# Patient Record
Sex: FEMALE | Employment: OTHER | URBAN - METROPOLITAN AREA
[De-identification: names, ages, dates, MRNs, and addresses within clinical notes are randomized per-mention and may not be internally consistent; named-entity substitution may affect disease eponyms.]

---

## 2019-01-01 ENCOUNTER — APPOINTMENT (OUTPATIENT)
Dept: GENERAL RADIOLOGY | Age: 74
DRG: 189 | End: 2019-01-01
Attending: EMERGENCY MEDICINE
Payer: MEDICARE

## 2019-01-01 ENCOUNTER — ANESTHESIA (OUTPATIENT)
Dept: ENDOSCOPY | Age: 74
DRG: 181 | End: 2019-01-01
Payer: MEDICARE

## 2019-01-01 ENCOUNTER — APPOINTMENT (OUTPATIENT)
Dept: VASCULAR SURGERY | Age: 74
DRG: 189 | End: 2019-01-01
Attending: HOSPITALIST
Payer: MEDICARE

## 2019-01-01 ENCOUNTER — APPOINTMENT (OUTPATIENT)
Dept: ULTRASOUND IMAGING | Age: 74
End: 2019-01-01
Attending: PHYSICIAN ASSISTANT
Payer: MEDICARE

## 2019-01-01 ENCOUNTER — ANESTHESIA EVENT (OUTPATIENT)
Dept: ENDOSCOPY | Age: 74
DRG: 181 | End: 2019-01-01
Payer: MEDICARE

## 2019-01-01 ENCOUNTER — APPOINTMENT (OUTPATIENT)
Dept: CT IMAGING | Age: 74
DRG: 189 | End: 2019-01-01
Attending: EMERGENCY MEDICINE
Payer: MEDICARE

## 2019-01-01 ENCOUNTER — APPOINTMENT (OUTPATIENT)
Dept: CT IMAGING | Age: 74
DRG: 189 | End: 2019-01-01
Attending: HOSPITALIST
Payer: MEDICARE

## 2019-01-01 ENCOUNTER — HOSPITAL ENCOUNTER (INPATIENT)
Age: 74
LOS: 1 days | Discharge: ANOTHER HOSPITAL | DRG: 189 | End: 2019-12-16
Attending: EMERGENCY MEDICINE | Admitting: HOSPITALIST
Payer: MEDICARE

## 2019-01-01 ENCOUNTER — HOSPITAL ENCOUNTER (EMERGENCY)
Age: 74
Discharge: HOME | End: 2019-12-07
Attending: EMERGENCY MEDICINE
Payer: MEDICARE

## 2019-01-01 ENCOUNTER — HOSPITAL ENCOUNTER (INPATIENT)
Age: 74
LOS: 4 days | Discharge: ANOTHER HOSPITAL | DRG: 181 | End: 2019-12-20
Attending: FAMILY MEDICINE | Admitting: STUDENT IN AN ORGANIZED HEALTH CARE EDUCATION/TRAINING PROGRAM
Payer: MEDICARE

## 2019-01-01 VITALS
SYSTOLIC BLOOD PRESSURE: 118 MMHG | RESPIRATION RATE: 20 BRPM | OXYGEN SATURATION: 93 % | TEMPERATURE: 98.3 F | HEART RATE: 90 BPM | BODY MASS INDEX: 15.54 KG/M2 | DIASTOLIC BLOOD PRESSURE: 73 MMHG | HEIGHT: 62 IN | WEIGHT: 84.44 LBS

## 2019-01-01 VITALS
HEIGHT: 62 IN | TEMPERATURE: 97.7 F | OXYGEN SATURATION: 97 % | DIASTOLIC BLOOD PRESSURE: 94 MMHG | RESPIRATION RATE: 16 BRPM | SYSTOLIC BLOOD PRESSURE: 169 MMHG | WEIGHT: 82.89 LBS | BODY MASS INDEX: 15.25 KG/M2 | HEART RATE: 74 BPM

## 2019-01-01 VITALS
BODY MASS INDEX: 15.42 KG/M2 | HEIGHT: 62 IN | OXYGEN SATURATION: 100 % | RESPIRATION RATE: 18 BRPM | HEART RATE: 69 BPM | TEMPERATURE: 97.4 F | SYSTOLIC BLOOD PRESSURE: 164 MMHG | DIASTOLIC BLOOD PRESSURE: 95 MMHG | WEIGHT: 83.78 LBS

## 2019-01-01 DIAGNOSIS — J44.9 CHRONIC OBSTRUCTIVE PULMONARY DISEASE, UNSPECIFIED COPD TYPE (HCC): ICD-10-CM

## 2019-01-01 DIAGNOSIS — R09.02 HYPOXIA: ICD-10-CM

## 2019-01-01 DIAGNOSIS — R06.09 DYSPNEA ON EXERTION: ICD-10-CM

## 2019-01-01 DIAGNOSIS — M79.662 PAIN OF LEFT CALF: Primary | ICD-10-CM

## 2019-01-01 DIAGNOSIS — Z71.6 TOBACCO ABUSE COUNSELING: ICD-10-CM

## 2019-01-01 DIAGNOSIS — M54.32 SCIATICA, LEFT SIDE: ICD-10-CM

## 2019-01-01 DIAGNOSIS — I83.813 VARICOSE VEINS OF BOTH LOWER EXTREMITIES WITH PAIN: ICD-10-CM

## 2019-01-01 DIAGNOSIS — I87.1 SVC SYNDROME: Primary | ICD-10-CM

## 2019-01-01 LAB
ALBUMIN SERPL-MCNC: 3.2 G/DL (ref 3.5–5)
ALBUMIN SERPL-MCNC: 3.4 G/DL (ref 3.5–5)
ALBUMIN/GLOB SERPL: 0.9 {RATIO} (ref 1.1–2.2)
ALBUMIN/GLOB SERPL: 1 {RATIO} (ref 1.1–2.2)
ALP SERPL-CCNC: 81 U/L (ref 45–117)
ALP SERPL-CCNC: 88 U/L (ref 45–117)
ALT SERPL-CCNC: 26 U/L (ref 12–78)
ALT SERPL-CCNC: 29 U/L (ref 12–78)
ANION GAP SERPL CALC-SCNC: 4 MMOL/L (ref 5–15)
ANION GAP SERPL CALC-SCNC: 6 MMOL/L (ref 5–15)
ANION GAP SERPL CALC-SCNC: 7 MMOL/L (ref 5–15)
APTT PPP: 110.4 SEC (ref 22.1–32)
APTT PPP: 21.2 SEC (ref 22.1–32)
APTT PPP: 26.7 SEC (ref 22.1–32)
APTT PPP: 27.2 SEC (ref 22.1–32)
APTT PPP: 31.9 SEC (ref 22.1–32)
APTT PPP: 39.4 SEC (ref 22.1–32)
APTT PPP: 43.6 SEC (ref 22.1–32)
APTT PPP: 52 SEC (ref 22.1–32)
APTT PPP: 53.6 SEC (ref 22.1–32)
APTT PPP: 60.6 SEC (ref 22.1–32)
APTT PPP: 60.9 SEC (ref 22.1–32)
APTT PPP: 61.8 SEC (ref 22.1–32)
APTT PPP: 64.2 SEC (ref 22.1–32)
APTT PPP: 66.5 SEC (ref 22.1–32)
APTT PPP: 74.9 SEC (ref 22.1–32)
APTT PPP: >130 SEC (ref 22.1–32)
AST SERPL-CCNC: 23 U/L (ref 15–37)
AST SERPL-CCNC: 28 U/L (ref 15–37)
ATRIAL RATE: 72 BPM
ATRIAL RATE: 74 BPM
BASOPHILS # BLD: 0 K/UL (ref 0–0.1)
BASOPHILS # BLD: 0 K/UL (ref 0–0.1)
BASOPHILS # BLD: 0.1 K/UL (ref 0–0.1)
BASOPHILS NFR BLD: 0 % (ref 0–1)
BASOPHILS NFR BLD: 0 % (ref 0–1)
BASOPHILS NFR BLD: 1 % (ref 0–1)
BILIRUB SERPL-MCNC: 0.4 MG/DL (ref 0.2–1)
BILIRUB SERPL-MCNC: 0.4 MG/DL (ref 0.2–1)
BNP SERPL-MCNC: 396 PG/ML
BUN SERPL-MCNC: 15 MG/DL (ref 6–20)
BUN SERPL-MCNC: 20 MG/DL (ref 6–20)
BUN SERPL-MCNC: 20 MG/DL (ref 6–20)
BUN SERPL-MCNC: 23 MG/DL (ref 6–20)
BUN SERPL-MCNC: 27 MG/DL (ref 6–20)
BUN/CREAT SERPL: 21 (ref 12–20)
BUN/CREAT SERPL: 23 (ref 12–20)
BUN/CREAT SERPL: 23 (ref 12–20)
BUN/CREAT SERPL: 27 (ref 12–20)
BUN/CREAT SERPL: 38 (ref 12–20)
CALCIUM SERPL-MCNC: 8.3 MG/DL (ref 8.5–10.1)
CALCIUM SERPL-MCNC: 8.4 MG/DL (ref 8.5–10.1)
CALCIUM SERPL-MCNC: 8.6 MG/DL (ref 8.5–10.1)
CALCIUM SERPL-MCNC: 8.8 MG/DL (ref 8.5–10.1)
CALCIUM SERPL-MCNC: 9 MG/DL (ref 8.5–10.1)
CALCULATED P AXIS, ECG09: 74 DEGREES
CALCULATED P AXIS, ECG09: 88 DEGREES
CALCULATED R AXIS, ECG10: 86 DEGREES
CALCULATED R AXIS, ECG10: 91 DEGREES
CALCULATED T AXIS, ECG11: 63 DEGREES
CALCULATED T AXIS, ECG11: 87 DEGREES
CHLORIDE SERPL-SCNC: 103 MMOL/L (ref 97–108)
CHLORIDE SERPL-SCNC: 103 MMOL/L (ref 97–108)
CHLORIDE SERPL-SCNC: 104 MMOL/L (ref 97–108)
CHLORIDE SERPL-SCNC: 105 MMOL/L (ref 97–108)
CHLORIDE SERPL-SCNC: 105 MMOL/L (ref 97–108)
CO2 SERPL-SCNC: 23 MMOL/L (ref 21–32)
CO2 SERPL-SCNC: 25 MMOL/L (ref 21–32)
CO2 SERPL-SCNC: 26 MMOL/L (ref 21–32)
CO2 SERPL-SCNC: 29 MMOL/L (ref 21–32)
CO2 SERPL-SCNC: 31 MMOL/L (ref 21–32)
CREAT SERPL-MCNC: 0.71 MG/DL (ref 0.55–1.02)
CREAT SERPL-MCNC: 0.72 MG/DL (ref 0.55–1.02)
CREAT SERPL-MCNC: 0.85 MG/DL (ref 0.55–1.02)
CREAT SERPL-MCNC: 0.86 MG/DL (ref 0.55–1.02)
CREAT SERPL-MCNC: 0.88 MG/DL (ref 0.55–1.02)
DIAGNOSIS, 93000: NORMAL
DIAGNOSIS, 93000: NORMAL
DIFFERENTIAL METHOD BLD: ABNORMAL
EOSINOPHIL # BLD: 0 K/UL (ref 0–0.4)
EOSINOPHIL # BLD: 0 K/UL (ref 0–0.4)
EOSINOPHIL # BLD: 0.2 K/UL (ref 0–0.4)
EOSINOPHIL NFR BLD: 0 % (ref 0–7)
EOSINOPHIL NFR BLD: 0 % (ref 0–7)
EOSINOPHIL NFR BLD: 1 % (ref 0–7)
ERYTHROCYTE [DISTWIDTH] IN BLOOD BY AUTOMATED COUNT: 12.8 % (ref 11.5–14.5)
ERYTHROCYTE [DISTWIDTH] IN BLOOD BY AUTOMATED COUNT: 13.1 % (ref 11.5–14.5)
ERYTHROCYTE [DISTWIDTH] IN BLOOD BY AUTOMATED COUNT: 13.3 % (ref 11.5–14.5)
GLOBULIN SER CALC-MCNC: 3.4 G/DL (ref 2–4)
GLOBULIN SER CALC-MCNC: 3.4 G/DL (ref 2–4)
GLUCOSE SERPL-MCNC: 105 MG/DL (ref 65–100)
GLUCOSE SERPL-MCNC: 112 MG/DL (ref 65–100)
GLUCOSE SERPL-MCNC: 126 MG/DL (ref 65–100)
GLUCOSE SERPL-MCNC: 73 MG/DL (ref 65–100)
GLUCOSE SERPL-MCNC: 82 MG/DL (ref 65–100)
HCT VFR BLD AUTO: 39.3 % (ref 35–47)
HCT VFR BLD AUTO: 39.5 % (ref 35–47)
HCT VFR BLD AUTO: 39.7 % (ref 35–47)
HCT VFR BLD AUTO: 41.4 % (ref 35–47)
HCT VFR BLD AUTO: 42.1 % (ref 35–47)
HGB BLD-MCNC: 13 G/DL (ref 11.5–16)
HGB BLD-MCNC: 13.1 G/DL (ref 11.5–16)
HGB BLD-MCNC: 13.4 G/DL (ref 11.5–16)
HGB BLD-MCNC: 13.7 G/DL (ref 11.5–16)
HGB BLD-MCNC: 13.7 G/DL (ref 11.5–16)
IMM GRANULOCYTES # BLD AUTO: 0 K/UL (ref 0–0.04)
IMM GRANULOCYTES # BLD AUTO: 0.1 K/UL (ref 0–0.04)
IMM GRANULOCYTES # BLD AUTO: 0.2 K/UL (ref 0–0.04)
IMM GRANULOCYTES NFR BLD AUTO: 0 % (ref 0–0.5)
IMM GRANULOCYTES NFR BLD AUTO: 1 % (ref 0–0.5)
IMM GRANULOCYTES NFR BLD AUTO: 1 % (ref 0–0.5)
LYMPHOCYTES # BLD: 0.5 K/UL (ref 0.8–3.5)
LYMPHOCYTES # BLD: 1.2 K/UL (ref 0.8–3.5)
LYMPHOCYTES # BLD: 2.4 K/UL (ref 0.8–3.5)
LYMPHOCYTES NFR BLD: 19 % (ref 12–49)
LYMPHOCYTES NFR BLD: 3 % (ref 12–49)
LYMPHOCYTES NFR BLD: 7 % (ref 12–49)
MAGNESIUM SERPL-MCNC: 2 MG/DL (ref 1.6–2.4)
MCH RBC QN AUTO: 31.1 PG (ref 26–34)
MCH RBC QN AUTO: 31.3 PG (ref 26–34)
MCH RBC QN AUTO: 31.5 PG (ref 26–34)
MCH RBC QN AUTO: 32 PG (ref 26–34)
MCH RBC QN AUTO: 32 PG (ref 26–34)
MCHC RBC AUTO-ENTMCNC: 32.5 G/DL (ref 30–36.5)
MCHC RBC AUTO-ENTMCNC: 32.9 G/DL (ref 30–36.5)
MCHC RBC AUTO-ENTMCNC: 33.1 G/DL (ref 30–36.5)
MCHC RBC AUTO-ENTMCNC: 33.3 G/DL (ref 30–36.5)
MCHC RBC AUTO-ENTMCNC: 33.8 G/DL (ref 30–36.5)
MCV RBC AUTO: 94.5 FL (ref 80–99)
MCV RBC AUTO: 94.7 FL (ref 80–99)
MCV RBC AUTO: 95.6 FL (ref 80–99)
MCV RBC AUTO: 95.7 FL (ref 80–99)
MCV RBC AUTO: 96.1 FL (ref 80–99)
MONOCYTES # BLD: 0.5 K/UL (ref 0–1)
MONOCYTES # BLD: 1.1 K/UL (ref 0–1)
MONOCYTES # BLD: 1.3 K/UL (ref 0–1)
MONOCYTES NFR BLD: 3 % (ref 5–13)
MONOCYTES NFR BLD: 7 % (ref 5–13)
MONOCYTES NFR BLD: 9 % (ref 5–13)
NEUTS SEG # BLD: 15 K/UL (ref 1.8–8)
NEUTS SEG # BLD: 15.3 K/UL (ref 1.8–8)
NEUTS SEG # BLD: 9.4 K/UL (ref 1.8–8)
NEUTS SEG NFR BLD: 70 % (ref 32–75)
NEUTS SEG NFR BLD: 85 % (ref 32–75)
NEUTS SEG NFR BLD: 93 % (ref 32–75)
NRBC # BLD: 0 K/UL (ref 0–0.01)
NRBC BLD-RTO: 0 PER 100 WBC
P-R INTERVAL, ECG05: 146 MS
P-R INTERVAL, ECG05: 156 MS
PHOSPHATE SERPL-MCNC: 3 MG/DL (ref 2.6–4.7)
PLATELET # BLD AUTO: 232 K/UL (ref 150–400)
PLATELET # BLD AUTO: 236 K/UL (ref 150–400)
PLATELET # BLD AUTO: 240 K/UL (ref 150–400)
PLATELET # BLD AUTO: 267 K/UL (ref 150–400)
PLATELET # BLD AUTO: 279 K/UL (ref 150–400)
PMV BLD AUTO: 10.1 FL (ref 8.9–12.9)
PMV BLD AUTO: 10.3 FL (ref 8.9–12.9)
PMV BLD AUTO: 10.6 FL (ref 8.9–12.9)
PMV BLD AUTO: 10.6 FL (ref 8.9–12.9)
PMV BLD AUTO: 9.9 FL (ref 8.9–12.9)
POTASSIUM SERPL-SCNC: 3.6 MMOL/L (ref 3.5–5.1)
POTASSIUM SERPL-SCNC: 3.8 MMOL/L (ref 3.5–5.1)
POTASSIUM SERPL-SCNC: 3.8 MMOL/L (ref 3.5–5.1)
POTASSIUM SERPL-SCNC: 4 MMOL/L (ref 3.5–5.1)
POTASSIUM SERPL-SCNC: 4.1 MMOL/L (ref 3.5–5.1)
PROT SERPL-MCNC: 6.6 G/DL (ref 6.4–8.2)
PROT SERPL-MCNC: 6.8 G/DL (ref 6.4–8.2)
Q-T INTERVAL, ECG07: 384 MS
Q-T INTERVAL, ECG07: 398 MS
QRS DURATION, ECG06: 66 MS
QRS DURATION, ECG06: 76 MS
QTC CALCULATION (BEZET), ECG08: 420 MS
QTC CALCULATION (BEZET), ECG08: 441 MS
RBC # BLD AUTO: 4.09 M/UL (ref 3.8–5.2)
RBC # BLD AUTO: 4.13 M/UL (ref 3.8–5.2)
RBC # BLD AUTO: 4.19 M/UL (ref 3.8–5.2)
RBC # BLD AUTO: 4.38 M/UL (ref 3.8–5.2)
RBC # BLD AUTO: 4.4 M/UL (ref 3.8–5.2)
RBC MORPH BLD: ABNORMAL
RBC MORPH BLD: ABNORMAL
SODIUM SERPL-SCNC: 134 MMOL/L (ref 136–145)
SODIUM SERPL-SCNC: 134 MMOL/L (ref 136–145)
SODIUM SERPL-SCNC: 135 MMOL/L (ref 136–145)
SODIUM SERPL-SCNC: 138 MMOL/L (ref 136–145)
SODIUM SERPL-SCNC: 138 MMOL/L (ref 136–145)
THERAPEUTIC RANGE,PTTT: ABNORMAL SECS (ref 58–77)
THERAPEUTIC RANGE,PTTT: NORMAL SECS (ref 58–77)
TROPONIN I SERPL-MCNC: <0.05 NG/ML
VENTRICULAR RATE, ECG03: 72 BPM
VENTRICULAR RATE, ECG03: 74 BPM
WBC # BLD AUTO: 11.3 K/UL (ref 3.6–11)
WBC # BLD AUTO: 13.2 K/UL (ref 3.6–11)
WBC # BLD AUTO: 16.5 K/UL (ref 3.6–11)
WBC # BLD AUTO: 17.6 K/UL (ref 3.6–11)
WBC # BLD AUTO: 20.9 K/UL (ref 3.6–11)

## 2019-01-01 PROCEDURE — 74011250637 HC RX REV CODE- 250/637: Performed by: INTERNAL MEDICINE

## 2019-01-01 PROCEDURE — 74011250636 HC RX REV CODE- 250/636: Performed by: STUDENT IN AN ORGANIZED HEALTH CARE EDUCATION/TRAINING PROGRAM

## 2019-01-01 PROCEDURE — 74011000250 HC RX REV CODE- 250: Performed by: PHYSICIAN ASSISTANT

## 2019-01-01 PROCEDURE — 74011250637 HC RX REV CODE- 250/637: Performed by: HOSPITALIST

## 2019-01-01 PROCEDURE — 76060000032 HC ANESTHESIA 0.5 TO 1 HR: Performed by: INTERNAL MEDICINE

## 2019-01-01 PROCEDURE — 74011000250 HC RX REV CODE- 250: Performed by: FAMILY MEDICINE

## 2019-01-01 PROCEDURE — 36415 COLL VENOUS BLD VENIPUNCTURE: CPT

## 2019-01-01 PROCEDURE — 77030029684 HC NEB SM VOL KT MONA -A

## 2019-01-01 PROCEDURE — 80048 BASIC METABOLIC PNL TOTAL CA: CPT

## 2019-01-01 PROCEDURE — 71275 CT ANGIOGRAPHY CHEST: CPT

## 2019-01-01 PROCEDURE — 85025 COMPLETE CBC W/AUTO DIFF WBC: CPT

## 2019-01-01 PROCEDURE — 65660000000 HC RM CCU STEPDOWN

## 2019-01-01 PROCEDURE — 94664 DEMO&/EVAL PT USE INHALER: CPT

## 2019-01-01 PROCEDURE — 94640 AIRWAY INHALATION TREATMENT: CPT

## 2019-01-01 PROCEDURE — 74011000250 HC RX REV CODE- 250: Performed by: HOSPITALIST

## 2019-01-01 PROCEDURE — 76040000020: Performed by: INTERNAL MEDICINE

## 2019-01-01 PROCEDURE — 74011250636 HC RX REV CODE- 250/636: Performed by: HOSPITALIST

## 2019-01-01 PROCEDURE — 74011636320 HC RX REV CODE- 636/320: Performed by: HOSPITALIST

## 2019-01-01 PROCEDURE — 85730 THROMBOPLASTIN TIME PARTIAL: CPT

## 2019-01-01 PROCEDURE — 93005 ELECTROCARDIOGRAM TRACING: CPT

## 2019-01-01 PROCEDURE — 74011250636 HC RX REV CODE- 250/636: Performed by: EMERGENCY MEDICINE

## 2019-01-01 PROCEDURE — 74011000250 HC RX REV CODE- 250: Performed by: EMERGENCY MEDICINE

## 2019-01-01 PROCEDURE — 88342 IMHCHEM/IMCYTCHM 1ST ANTB: CPT

## 2019-01-01 PROCEDURE — 76040000019: Performed by: INTERNAL MEDICINE

## 2019-01-01 PROCEDURE — 84100 ASSAY OF PHOSPHORUS: CPT

## 2019-01-01 PROCEDURE — 65660000001 HC RM ICU INTERMED STEPDOWN

## 2019-01-01 PROCEDURE — 74011250637 HC RX REV CODE- 250/637: Performed by: FAMILY MEDICINE

## 2019-01-01 PROCEDURE — 80053 COMPREHEN METABOLIC PANEL: CPT

## 2019-01-01 PROCEDURE — 88305 TISSUE EXAM BY PATHOLOGIST: CPT

## 2019-01-01 PROCEDURE — 74011250636 HC RX REV CODE- 250/636: Performed by: FAMILY MEDICINE

## 2019-01-01 PROCEDURE — 74011250637 HC RX REV CODE- 250/637: Performed by: STUDENT IN AN ORGANIZED HEALTH CARE EDUCATION/TRAINING PROGRAM

## 2019-01-01 PROCEDURE — 93971 EXTREMITY STUDY: CPT

## 2019-01-01 PROCEDURE — 74011000250 HC RX REV CODE- 250: Performed by: NURSE ANESTHETIST, CERTIFIED REGISTERED

## 2019-01-01 PROCEDURE — 76882 US LMTD JT/FCL EVL NVASC XTR: CPT

## 2019-01-01 PROCEDURE — 83880 ASSAY OF NATRIURETIC PEPTIDE: CPT

## 2019-01-01 PROCEDURE — 0BD78ZX EXTRACTION OF LEFT MAIN BRONCHUS, VIA NATURAL OR ARTIFICIAL OPENING ENDOSCOPIC, DIAGNOSTIC: ICD-10-PCS | Performed by: INTERNAL MEDICINE

## 2019-01-01 PROCEDURE — 96374 THER/PROPH/DIAG INJ IV PUSH: CPT

## 2019-01-01 PROCEDURE — 88341 IMHCHEM/IMCYTCHM EA ADD ANTB: CPT

## 2019-01-01 PROCEDURE — 70491 CT SOFT TISSUE NECK W/DYE: CPT

## 2019-01-01 PROCEDURE — 99285 EMERGENCY DEPT VISIT HI MDM: CPT

## 2019-01-01 PROCEDURE — 85027 COMPLETE CBC AUTOMATED: CPT

## 2019-01-01 PROCEDURE — 74011636320 HC RX REV CODE- 636/320: Performed by: EMERGENCY MEDICINE

## 2019-01-01 PROCEDURE — 84484 ASSAY OF TROPONIN QUANT: CPT

## 2019-01-01 PROCEDURE — 99284 EMERGENCY DEPT VISIT MOD MDM: CPT

## 2019-01-01 PROCEDURE — 74011250636 HC RX REV CODE- 250/636: Performed by: NEUROMUSCULOSKELETAL MEDICINE & OMM

## 2019-01-01 PROCEDURE — 74011250636 HC RX REV CODE- 250/636: Performed by: NURSE ANESTHETIST, CERTIFIED REGISTERED

## 2019-01-01 PROCEDURE — 71046 X-RAY EXAM CHEST 2 VIEWS: CPT

## 2019-01-01 PROCEDURE — 74011000250 HC RX REV CODE- 250: Performed by: STUDENT IN AN ORGANIZED HEALTH CARE EDUCATION/TRAINING PROGRAM

## 2019-01-01 PROCEDURE — 96375 TX/PRO/DX INJ NEW DRUG ADDON: CPT

## 2019-01-01 PROCEDURE — 83735 ASSAY OF MAGNESIUM: CPT

## 2019-01-01 RX ORDER — ONDANSETRON 2 MG/ML
4 INJECTION INTRAMUSCULAR; INTRAVENOUS
Status: CANCELLED | OUTPATIENT
Start: 2019-01-01

## 2019-01-01 RX ORDER — MONTELUKAST SODIUM 10 MG/1
10 TABLET ORAL DAILY
Status: DISCONTINUED | OUTPATIENT
Start: 2019-01-01 | End: 2019-01-01 | Stop reason: HOSPADM

## 2019-01-01 RX ORDER — SODIUM CHLORIDE 0.9 % (FLUSH) 0.9 %
5-40 SYRINGE (ML) INJECTION AS NEEDED
Status: DISCONTINUED | OUTPATIENT
Start: 2019-01-01 | End: 2019-01-01

## 2019-01-01 RX ORDER — SODIUM CHLORIDE 0.9 % (FLUSH) 0.9 %
5-40 SYRINGE (ML) INJECTION AS NEEDED
Status: DISCONTINUED | OUTPATIENT
Start: 2019-01-01 | End: 2019-01-01 | Stop reason: HOSPADM

## 2019-01-01 RX ORDER — FLUTICASONE PROPIONATE 50 MCG
2 SPRAY, SUSPENSION (ML) NASAL DAILY
Status: DISCONTINUED | OUTPATIENT
Start: 2019-01-01 | End: 2019-01-01 | Stop reason: HOSPADM

## 2019-01-01 RX ORDER — HEPARIN SODIUM 10000 [USP'U]/100ML
18-36 INJECTION, SOLUTION INTRAVENOUS
Status: DISCONTINUED | OUTPATIENT
Start: 2019-01-01 | End: 2019-01-01

## 2019-01-01 RX ORDER — SODIUM CHLORIDE 9 MG/ML
70 INJECTION, SOLUTION INTRAVENOUS CONTINUOUS
Status: DISPENSED | OUTPATIENT
Start: 2019-01-01 | End: 2019-01-01

## 2019-01-01 RX ORDER — ALBUTEROL SULFATE 0.83 MG/ML
2.5 SOLUTION RESPIRATORY (INHALATION)
Status: DISCONTINUED | OUTPATIENT
Start: 2019-01-01 | End: 2019-01-01 | Stop reason: HOSPADM

## 2019-01-01 RX ORDER — HEPARIN SODIUM 10000 [USP'U]/100ML
18-36 INJECTION, SOLUTION INTRAVENOUS
Status: DISCONTINUED | OUTPATIENT
Start: 2019-01-01 | End: 2019-01-01 | Stop reason: HOSPADM

## 2019-01-01 RX ORDER — ACETAMINOPHEN 325 MG/1
650 TABLET ORAL
Status: DISCONTINUED | OUTPATIENT
Start: 2019-01-01 | End: 2019-01-01 | Stop reason: HOSPADM

## 2019-01-01 RX ORDER — SODIUM CHLORIDE 0.9 % (FLUSH) 0.9 %
5-40 SYRINGE (ML) INJECTION AS NEEDED
Status: CANCELLED | OUTPATIENT
Start: 2019-01-01

## 2019-01-01 RX ORDER — FLUTICASONE PROPIONATE 50 MCG
1 SPRAY, SUSPENSION (ML) NASAL DAILY
Status: DISCONTINUED | OUTPATIENT
Start: 2019-01-01 | End: 2019-01-01

## 2019-01-01 RX ORDER — BUDESONIDE 3 MG/1
6 CAPSULE, COATED PELLETS ORAL
Status: DISCONTINUED | OUTPATIENT
Start: 2019-01-01 | End: 2019-01-01 | Stop reason: HOSPADM

## 2019-01-01 RX ORDER — LOSARTAN POTASSIUM 50 MG/1
100 TABLET ORAL DAILY
Status: DISCONTINUED | OUTPATIENT
Start: 2019-01-01 | End: 2019-01-01 | Stop reason: HOSPADM

## 2019-01-01 RX ORDER — HYDROXYZINE HYDROCHLORIDE 10 MG/1
10 TABLET, FILM COATED ORAL
COMMUNITY
End: 2020-01-01

## 2019-01-01 RX ORDER — IBUPROFEN 200 MG
1 TABLET ORAL DAILY
Status: CANCELLED | OUTPATIENT
Start: 2019-01-01

## 2019-01-01 RX ORDER — ATENOLOL 25 MG/1
50 TABLET ORAL DAILY
Status: DISCONTINUED | OUTPATIENT
Start: 2019-01-01 | End: 2019-01-01 | Stop reason: HOSPADM

## 2019-01-01 RX ORDER — MONTELUKAST SODIUM 10 MG/1
10 TABLET ORAL DAILY
Status: CANCELLED | OUTPATIENT
Start: 2019-01-01

## 2019-01-01 RX ORDER — POTASSIUM CHLORIDE 1500 MG/1
20 TABLET, FILM COATED, EXTENDED RELEASE ORAL DAILY
Qty: 30 TAB | Refills: 1 | Status: ON HOLD | OUTPATIENT
Start: 2019-01-01 | End: 2020-01-01 | Stop reason: SDUPTHER

## 2019-01-01 RX ORDER — HEPARIN SODIUM 5000 [USP'U]/ML
40 INJECTION, SOLUTION INTRAVENOUS; SUBCUTANEOUS ONCE
Status: COMPLETED | OUTPATIENT
Start: 2019-01-01 | End: 2019-01-01

## 2019-01-01 RX ORDER — PHENYLEPHRINE HCL IN 0.9% NACL 0.4MG/10ML
SYRINGE (ML) INTRAVENOUS AS NEEDED
Status: DISCONTINUED | OUTPATIENT
Start: 2019-01-01 | End: 2019-01-01 | Stop reason: HOSPADM

## 2019-01-01 RX ORDER — IPRATROPIUM BROMIDE AND ALBUTEROL SULFATE 2.5; .5 MG/3ML; MG/3ML
3 SOLUTION RESPIRATORY (INHALATION)
Status: CANCELLED | OUTPATIENT
Start: 2019-01-01

## 2019-01-01 RX ORDER — FLUTICASONE PROPIONATE 50 MCG
1 SPRAY, SUSPENSION (ML) NASAL DAILY
Status: CANCELLED | OUTPATIENT
Start: 2019-01-01

## 2019-01-01 RX ORDER — SUCCINYLCHOLINE CHLORIDE 20 MG/ML
INJECTION INTRAMUSCULAR; INTRAVENOUS AS NEEDED
Status: DISCONTINUED | OUTPATIENT
Start: 2019-01-01 | End: 2019-01-01 | Stop reason: HOSPADM

## 2019-01-01 RX ORDER — MONTELUKAST SODIUM 10 MG/1
10 TABLET ORAL DAILY
COMMUNITY
End: 2020-01-01

## 2019-01-01 RX ORDER — LIDOCAINE HYDROCHLORIDE AND EPINEPHRINE 10; 10 MG/ML; UG/ML
INJECTION, SOLUTION INFILTRATION; PERINEURAL
Status: DISCONTINUED
Start: 2019-01-01 | End: 2019-01-01 | Stop reason: WASHOUT

## 2019-01-01 RX ORDER — IPRATROPIUM BROMIDE AND ALBUTEROL SULFATE 2.5; .5 MG/3ML; MG/3ML
3 SOLUTION RESPIRATORY (INHALATION)
Status: DISCONTINUED | OUTPATIENT
Start: 2019-01-01 | End: 2019-01-01 | Stop reason: HOSPADM

## 2019-01-01 RX ORDER — HEPARIN SODIUM 5000 [USP'U]/ML
40 INJECTION, SOLUTION INTRAVENOUS; SUBCUTANEOUS AS NEEDED
Status: DISCONTINUED | OUTPATIENT
Start: 2019-01-01 | End: 2019-01-01 | Stop reason: HOSPADM

## 2019-01-01 RX ORDER — ENOXAPARIN SODIUM 100 MG/ML
1 INJECTION SUBCUTANEOUS EVERY 12 HOURS
Qty: 14 SYRINGE | Refills: 1 | Status: SHIPPED | OUTPATIENT
Start: 2019-01-01 | End: 2020-01-01

## 2019-01-01 RX ORDER — SODIUM CHLORIDE 0.9 % (FLUSH) 0.9 %
5-40 SYRINGE (ML) INJECTION EVERY 8 HOURS
Status: DISCONTINUED | OUTPATIENT
Start: 2019-01-01 | End: 2019-01-01

## 2019-01-01 RX ORDER — HEPARIN SODIUM 5000 [USP'U]/ML
80 INJECTION, SOLUTION INTRAVENOUS; SUBCUTANEOUS AS NEEDED
Status: DISCONTINUED | OUTPATIENT
Start: 2019-01-01 | End: 2019-01-01 | Stop reason: HOSPADM

## 2019-01-01 RX ORDER — SODIUM CHLORIDE 0.9 % (FLUSH) 0.9 %
10 SYRINGE (ML) INJECTION
Status: COMPLETED | OUTPATIENT
Start: 2019-01-01 | End: 2019-01-01

## 2019-01-01 RX ORDER — SODIUM CHLORIDE 9 MG/ML
INJECTION, SOLUTION INTRAVENOUS
Status: DISCONTINUED | OUTPATIENT
Start: 2019-01-01 | End: 2019-01-01 | Stop reason: HOSPADM

## 2019-01-01 RX ORDER — SODIUM CHLORIDE 0.9 % (FLUSH) 0.9 %
5-40 SYRINGE (ML) INJECTION EVERY 8 HOURS
Status: DISCONTINUED | OUTPATIENT
Start: 2019-01-01 | End: 2019-01-01 | Stop reason: HOSPADM

## 2019-01-01 RX ORDER — ALBUTEROL SULFATE 90 UG/1
2 AEROSOL, METERED RESPIRATORY (INHALATION)
COMMUNITY
End: 2020-01-01 | Stop reason: SDUPTHER

## 2019-01-01 RX ORDER — LORAZEPAM 2 MG/ML
1 CONCENTRATE ORAL
Status: DISCONTINUED | OUTPATIENT
Start: 2019-01-01 | End: 2019-01-01 | Stop reason: HOSPADM

## 2019-01-01 RX ORDER — HYDRALAZINE HYDROCHLORIDE 20 MG/ML
20 INJECTION INTRAMUSCULAR; INTRAVENOUS
Status: DISCONTINUED | OUTPATIENT
Start: 2019-01-01 | End: 2019-01-01 | Stop reason: HOSPADM

## 2019-01-01 RX ORDER — ALBUTEROL SULFATE 0.83 MG/ML
2.5 SOLUTION RESPIRATORY (INHALATION)
Status: CANCELLED | OUTPATIENT
Start: 2019-01-01

## 2019-01-01 RX ORDER — ARFORMOTEROL TARTRATE 15 UG/2ML
15 SOLUTION RESPIRATORY (INHALATION)
Status: DISCONTINUED | OUTPATIENT
Start: 2019-01-01 | End: 2019-01-01 | Stop reason: HOSPADM

## 2019-01-01 RX ORDER — LOSARTAN POTASSIUM 100 MG/1
100 TABLET ORAL DAILY
Status: CANCELLED | OUTPATIENT
Start: 2019-01-01

## 2019-01-01 RX ORDER — HYDROXYZINE HYDROCHLORIDE 10 MG/1
10 TABLET, FILM COATED ORAL
Status: DISCONTINUED | OUTPATIENT
Start: 2019-01-01 | End: 2019-01-01 | Stop reason: HOSPADM

## 2019-01-01 RX ORDER — LIDOCAINE HYDROCHLORIDE 20 MG/ML
INJECTION, SOLUTION EPIDURAL; INFILTRATION; INTRACAUDAL; PERINEURAL AS NEEDED
Status: DISCONTINUED | OUTPATIENT
Start: 2019-01-01 | End: 2019-01-01 | Stop reason: HOSPADM

## 2019-01-01 RX ORDER — ONDANSETRON 2 MG/ML
4 INJECTION INTRAMUSCULAR; INTRAVENOUS
Status: DISCONTINUED | OUTPATIENT
Start: 2019-01-01 | End: 2019-01-01

## 2019-01-01 RX ORDER — LOSARTAN POTASSIUM 100 MG/1
100 TABLET ORAL DAILY
COMMUNITY

## 2019-01-01 RX ORDER — HEPARIN SODIUM 5000 [USP'U]/ML
3064 INJECTION, SOLUTION INTRAVENOUS; SUBCUTANEOUS ONCE
Status: COMPLETED | OUTPATIENT
Start: 2019-01-01 | End: 2019-01-01

## 2019-01-01 RX ORDER — BUDESONIDE 3 MG/1
6 CAPSULE, COATED PELLETS ORAL
COMMUNITY
End: 2020-01-01

## 2019-01-01 RX ORDER — IPRATROPIUM BROMIDE AND ALBUTEROL SULFATE 2.5; .5 MG/3ML; MG/3ML
3 SOLUTION RESPIRATORY (INHALATION)
Status: DISCONTINUED | OUTPATIENT
Start: 2019-01-01 | End: 2019-01-01

## 2019-01-01 RX ORDER — BUDESONIDE 3 MG/1
6 CAPSULE, COATED PELLETS ORAL
Status: DISCONTINUED | OUTPATIENT
Start: 2019-01-01 | End: 2019-01-01

## 2019-01-01 RX ORDER — IPRATROPIUM BROMIDE AND ALBUTEROL SULFATE 2.5; .5 MG/3ML; MG/3ML
SOLUTION RESPIRATORY (INHALATION)
Status: DISPENSED
Start: 2019-01-01 | End: 2019-01-01

## 2019-01-01 RX ORDER — ALBUTEROL SULFATE 2.5 MG/.5ML
2.5 SOLUTION RESPIRATORY (INHALATION)
Qty: 20 ML | Refills: 5 | Status: SHIPPED | OUTPATIENT
Start: 2019-01-01

## 2019-01-01 RX ORDER — LOSARTAN POTASSIUM 100 MG/1
100 TABLET ORAL DAILY
Status: DISCONTINUED | OUTPATIENT
Start: 2019-01-01 | End: 2019-01-01 | Stop reason: HOSPADM

## 2019-01-01 RX ORDER — MONTELUKAST SODIUM 10 MG/1
10 TABLET ORAL DAILY
Status: DISCONTINUED | OUTPATIENT
Start: 2019-01-01 | End: 2019-01-01

## 2019-01-01 RX ORDER — OXYMETAZOLINE HCL 0.05 %
2 SPRAY, NON-AEROSOL (ML) NASAL
Status: CANCELLED | OUTPATIENT
Start: 2019-01-01

## 2019-01-01 RX ORDER — ONDANSETRON 2 MG/ML
4 INJECTION INTRAMUSCULAR; INTRAVENOUS
Status: DISCONTINUED | OUTPATIENT
Start: 2019-01-01 | End: 2019-01-01 | Stop reason: HOSPADM

## 2019-01-01 RX ORDER — FUROSEMIDE 40 MG/1
40 TABLET ORAL DAILY
Qty: 30 TAB | Refills: 1 | Status: SHIPPED | OUTPATIENT
Start: 2019-01-01 | End: 2020-01-01

## 2019-01-01 RX ORDER — ATENOLOL 25 MG/1
50 TABLET ORAL DAILY
Status: CANCELLED | OUTPATIENT
Start: 2019-01-01

## 2019-01-01 RX ORDER — OXYMETAZOLINE HCL 0.05 %
2 SPRAY, NON-AEROSOL (ML) NASAL
Status: DISCONTINUED | OUTPATIENT
Start: 2019-01-01 | End: 2019-01-01 | Stop reason: HOSPADM

## 2019-01-01 RX ORDER — SODIUM CHLORIDE 0.9 % (FLUSH) 0.9 %
5-40 SYRINGE (ML) INJECTION EVERY 8 HOURS
Status: CANCELLED | OUTPATIENT
Start: 2019-01-01

## 2019-01-01 RX ORDER — PROPOFOL 10 MG/ML
INJECTION, EMULSION INTRAVENOUS AS NEEDED
Status: DISCONTINUED | OUTPATIENT
Start: 2019-01-01 | End: 2019-01-01 | Stop reason: HOSPADM

## 2019-01-01 RX ORDER — FLUTICASONE PROPIONATE 50 MCG
1 SPRAY, SUSPENSION (ML) NASAL DAILY
Status: DISCONTINUED | OUTPATIENT
Start: 2019-01-01 | End: 2019-01-01 | Stop reason: HOSPADM

## 2019-01-01 RX ORDER — PREDNISONE 10 MG/1
TABLET ORAL
Qty: 16 TAB | Refills: 0 | Status: SHIPPED | OUTPATIENT
Start: 2019-01-01 | End: 2019-01-01

## 2019-01-01 RX ORDER — HYDRALAZINE HYDROCHLORIDE 20 MG/ML
20 INJECTION INTRAMUSCULAR; INTRAVENOUS
Status: CANCELLED | OUTPATIENT
Start: 2019-01-01

## 2019-01-01 RX ORDER — BUDESONIDE 3 MG/1
6 CAPSULE, COATED PELLETS ORAL
Status: CANCELLED | OUTPATIENT
Start: 2019-01-01

## 2019-01-01 RX ORDER — FUROSEMIDE 40 MG/1
40 TABLET ORAL DAILY
Status: DISCONTINUED | OUTPATIENT
Start: 2019-01-01 | End: 2019-01-01 | Stop reason: HOSPADM

## 2019-01-01 RX ORDER — BUDESONIDE 0.5 MG/2ML
500 INHALANT ORAL
Status: DISCONTINUED | OUTPATIENT
Start: 2019-01-01 | End: 2019-01-01 | Stop reason: HOSPADM

## 2019-01-01 RX ORDER — ENOXAPARIN SODIUM 100 MG/ML
1 INJECTION SUBCUTANEOUS EVERY 12 HOURS
Status: DISCONTINUED | OUTPATIENT
Start: 2019-01-01 | End: 2019-01-01 | Stop reason: HOSPADM

## 2019-01-01 RX ORDER — ROCURONIUM BROMIDE 10 MG/ML
INJECTION, SOLUTION INTRAVENOUS AS NEEDED
Status: DISCONTINUED | OUTPATIENT
Start: 2019-01-01 | End: 2019-01-01 | Stop reason: HOSPADM

## 2019-01-01 RX ORDER — IBUPROFEN 200 MG
1 TABLET ORAL DAILY
Status: DISCONTINUED | OUTPATIENT
Start: 2019-01-01 | End: 2019-01-01 | Stop reason: HOSPADM

## 2019-01-01 RX ORDER — LOSARTAN POTASSIUM 50 MG/1
100 TABLET ORAL DAILY
Status: DISCONTINUED | OUTPATIENT
Start: 2019-01-01 | End: 2019-01-01

## 2019-01-01 RX ORDER — HEPARIN SODIUM 5000 [USP'U]/ML
40 INJECTION, SOLUTION INTRAVENOUS; SUBCUTANEOUS AS NEEDED
Status: CANCELLED | OUTPATIENT
Start: 2019-01-01

## 2019-01-01 RX ORDER — ATENOLOL 50 MG/1
50 TABLET ORAL DAILY
Status: DISCONTINUED | OUTPATIENT
Start: 2019-01-01 | End: 2019-01-01

## 2019-01-01 RX ORDER — ACETAMINOPHEN 325 MG/1
650 TABLET ORAL
Status: CANCELLED | OUTPATIENT
Start: 2019-01-01

## 2019-01-01 RX ORDER — HEPARIN SODIUM 5000 [USP'U]/ML
80 INJECTION, SOLUTION INTRAVENOUS; SUBCUTANEOUS ONCE
Status: COMPLETED | OUTPATIENT
Start: 2019-01-01 | End: 2019-01-01

## 2019-01-01 RX ORDER — ATENOLOL 50 MG/1
50 TABLET ORAL DAILY
COMMUNITY

## 2019-01-01 RX ORDER — IPRATROPIUM BROMIDE AND ALBUTEROL SULFATE 2.5; .5 MG/3ML; MG/3ML
3 SOLUTION RESPIRATORY (INHALATION)
Status: COMPLETED | OUTPATIENT
Start: 2019-01-01 | End: 2019-01-01

## 2019-01-01 RX ORDER — HEPARIN SODIUM 5000 [USP'U]/ML
80 INJECTION, SOLUTION INTRAVENOUS; SUBCUTANEOUS AS NEEDED
Status: CANCELLED | OUTPATIENT
Start: 2019-01-01

## 2019-01-01 RX ADMIN — LOSARTAN POTASSIUM 100 MG: 50 TABLET, FILM COATED ORAL at 10:36

## 2019-01-01 RX ADMIN — PHENYLEPHRINE HYDROCHLORIDE 120 MCG: 10 INJECTION INTRAVENOUS at 16:25

## 2019-01-01 RX ADMIN — IPRATROPIUM BROMIDE AND ALBUTEROL SULFATE 3 ML: .5; 3 SOLUTION RESPIRATORY (INHALATION) at 08:17

## 2019-01-01 RX ADMIN — IPRATROPIUM BROMIDE AND ALBUTEROL SULFATE 3 ML: .5; 3 SOLUTION RESPIRATORY (INHALATION) at 20:13

## 2019-01-01 RX ADMIN — METHYLPREDNISOLONE SODIUM SUCCINATE 40 MG: 40 INJECTION, POWDER, FOR SOLUTION INTRAMUSCULAR; INTRAVENOUS at 06:37

## 2019-01-01 RX ADMIN — BUDESONIDE 500 MCG: 0.5 INHALANT RESPIRATORY (INHALATION) at 20:05

## 2019-01-01 RX ADMIN — HEPARIN SODIUM 20 UNITS/KG/HR: 10000 INJECTION, SOLUTION INTRAVENOUS at 02:23

## 2019-01-01 RX ADMIN — HEPARIN SODIUM 18 UNITS/KG/HR: 10000 INJECTION, SOLUTION INTRAVENOUS at 02:07

## 2019-01-01 RX ADMIN — IPRATROPIUM BROMIDE AND ALBUTEROL SULFATE 3 ML: .5; 3 SOLUTION RESPIRATORY (INHALATION) at 09:12

## 2019-01-01 RX ADMIN — METHYLPREDNISOLONE SODIUM SUCCINATE 40 MG: 40 INJECTION, POWDER, FOR SOLUTION INTRAMUSCULAR; INTRAVENOUS at 13:32

## 2019-01-01 RX ADMIN — METHYLPREDNISOLONE SODIUM SUCCINATE 40 MG: 40 INJECTION, POWDER, FOR SOLUTION INTRAMUSCULAR; INTRAVENOUS at 18:54

## 2019-01-01 RX ADMIN — PHENYLEPHRINE HYDROCHLORIDE 120 MCG: 10 INJECTION INTRAVENOUS at 16:19

## 2019-01-01 RX ADMIN — FLUTICASONE PROPIONATE 2 SPRAY: 50 SPRAY, METERED NASAL at 10:25

## 2019-01-01 RX ADMIN — ATENOLOL 50 MG: 25 TABLET ORAL at 11:59

## 2019-01-01 RX ADMIN — LIDOCAINE HYDROCHLORIDE 100 MG: 20 INJECTION, SOLUTION EPIDURAL; INFILTRATION; INTRACAUDAL; PERINEURAL at 16:01

## 2019-01-01 RX ADMIN — IPRATROPIUM BROMIDE AND ALBUTEROL SULFATE 3 ML: .5; 3 SOLUTION RESPIRATORY (INHALATION) at 22:07

## 2019-01-01 RX ADMIN — IPRATROPIUM BROMIDE AND ALBUTEROL SULFATE 3 ML: .5; 3 SOLUTION RESPIRATORY (INHALATION) at 20:05

## 2019-01-01 RX ADMIN — HYDRALAZINE HYDROCHLORIDE 20 MG: 20 INJECTION INTRAMUSCULAR; INTRAVENOUS at 00:53

## 2019-01-01 RX ADMIN — MONTELUKAST SODIUM 10 MG: 10 TABLET, FILM COATED ORAL at 10:25

## 2019-01-01 RX ADMIN — IPRATROPIUM BROMIDE AND ALBUTEROL SULFATE 3 ML: .5; 3 SOLUTION RESPIRATORY (INHALATION) at 15:32

## 2019-01-01 RX ADMIN — ROCURONIUM BROMIDE 10 MG: 10 SOLUTION INTRAVENOUS at 16:01

## 2019-01-01 RX ADMIN — MONTELUKAST SODIUM 10 MG: 10 TABLET, FILM COATED ORAL at 11:59

## 2019-01-01 RX ADMIN — MONTELUKAST 10 MG: 10 TABLET, FILM COATED ORAL at 08:14

## 2019-01-01 RX ADMIN — Medication 10 ML: at 07:05

## 2019-01-01 RX ADMIN — Medication 10 ML: at 15:56

## 2019-01-01 RX ADMIN — IPRATROPIUM BROMIDE AND ALBUTEROL SULFATE 3 ML: .5; 3 SOLUTION RESPIRATORY (INHALATION) at 13:05

## 2019-01-01 RX ADMIN — IPRATROPIUM BROMIDE AND ALBUTEROL SULFATE 3 ML: .5; 3 SOLUTION RESPIRATORY (INHALATION) at 08:02

## 2019-01-01 RX ADMIN — LIDOCAINE HYDROCHLORIDE 40 MG: 20 INJECTION, SOLUTION EPIDURAL; INFILTRATION; INTRACAUDAL; PERINEURAL at 16:11

## 2019-01-01 RX ADMIN — LOSARTAN POTASSIUM 100 MG: 100 TABLET, FILM COATED ORAL at 08:15

## 2019-01-01 RX ADMIN — BUDESONIDE 500 MCG: 0.5 INHALANT RESPIRATORY (INHALATION) at 07:19

## 2019-01-01 RX ADMIN — PHENYLEPHRINE HYDROCHLORIDE 80 MCG: 10 INJECTION INTRAVENOUS at 16:40

## 2019-01-01 RX ADMIN — HYDRALAZINE HYDROCHLORIDE 20 MG: 20 INJECTION INTRAMUSCULAR; INTRAVENOUS at 09:39

## 2019-01-01 RX ADMIN — LORAZEPAM 1 MG: 2 SOLUTION, CONCENTRATE ORAL at 22:28

## 2019-01-01 RX ADMIN — Medication 10 ML: at 06:41

## 2019-01-01 RX ADMIN — LORAZEPAM 1 MG: 2 SOLUTION, CONCENTRATE ORAL at 01:00

## 2019-01-01 RX ADMIN — PROPOFOL 80 MG: 10 INJECTION, EMULSION INTRAVENOUS at 16:01

## 2019-01-01 RX ADMIN — BUDESONIDE 500 MCG: 0.5 INHALANT RESPIRATORY (INHALATION) at 09:54

## 2019-01-01 RX ADMIN — METHYLPREDNISOLONE SODIUM SUCCINATE 40 MG: 40 INJECTION, POWDER, FOR SOLUTION INTRAMUSCULAR; INTRAVENOUS at 06:01

## 2019-01-01 RX ADMIN — METHYLPREDNISOLONE SODIUM SUCCINATE 40 MG: 40 INJECTION, POWDER, FOR SOLUTION INTRAMUSCULAR; INTRAVENOUS at 00:30

## 2019-01-01 RX ADMIN — IPRATROPIUM BROMIDE AND ALBUTEROL SULFATE 3 ML: .5; 3 SOLUTION RESPIRATORY (INHALATION) at 07:50

## 2019-01-01 RX ADMIN — HEPARIN SODIUM 19 UNITS/KG/HR: 10000 INJECTION, SOLUTION INTRAVENOUS at 09:12

## 2019-01-01 RX ADMIN — IPRATROPIUM BROMIDE AND ALBUTEROL SULFATE 3 ML: .5; 3 SOLUTION RESPIRATORY (INHALATION) at 07:21

## 2019-01-01 RX ADMIN — LIDOCAINE HYDROCHLORIDE 40 MG: 20 INJECTION, SOLUTION EPIDURAL; INFILTRATION; INTRACAUDAL; PERINEURAL at 16:07

## 2019-01-01 RX ADMIN — HEPARIN SODIUM 19 UNITS/KG/HR: 10000 INJECTION, SOLUTION INTRAVENOUS at 15:51

## 2019-01-01 RX ADMIN — IPRATROPIUM BROMIDE AND ALBUTEROL SULFATE 3 ML: .5; 3 SOLUTION RESPIRATORY (INHALATION) at 02:29

## 2019-01-01 RX ADMIN — METHYLPREDNISOLONE SODIUM SUCCINATE 40 MG: 40 INJECTION, POWDER, FOR SOLUTION INTRAMUSCULAR; INTRAVENOUS at 22:28

## 2019-01-01 RX ADMIN — Medication 10 ML: at 08:49

## 2019-01-01 RX ADMIN — SODIUM CHLORIDE 100 ML/HR: 900 INJECTION, SOLUTION INTRAVENOUS at 10:28

## 2019-01-01 RX ADMIN — METHYLPREDNISOLONE SODIUM SUCCINATE 40 MG: 40 INJECTION, POWDER, FOR SOLUTION INTRAMUSCULAR; INTRAVENOUS at 23:06

## 2019-01-01 RX ADMIN — ENOXAPARIN SODIUM 40 MG: 40 INJECTION SUBCUTANEOUS at 16:06

## 2019-01-01 RX ADMIN — Medication 10 ML: at 05:52

## 2019-01-01 RX ADMIN — FLUTICASONE PROPIONATE 1 SPRAY: 50 SPRAY, METERED NASAL at 10:22

## 2019-01-01 RX ADMIN — Medication 10 ML: at 18:55

## 2019-01-01 RX ADMIN — ACETAMINOPHEN 650 MG: 325 TABLET ORAL at 21:11

## 2019-01-01 RX ADMIN — UMECLIDINIUM BROMIDE AND VILANTEROL TRIFENATATE 1 PUFF: 62.5; 25 POWDER RESPIRATORY (INHALATION) at 08:15

## 2019-01-01 RX ADMIN — IOPAMIDOL 100 ML: 755 INJECTION, SOLUTION INTRAVENOUS at 23:47

## 2019-01-01 RX ADMIN — HEPARIN SODIUM 1550 UNITS: 5000 INJECTION INTRAVENOUS; SUBCUTANEOUS at 20:37

## 2019-01-01 RX ADMIN — Medication 10 ML: at 21:08

## 2019-01-01 RX ADMIN — IPRATROPIUM BROMIDE AND ALBUTEROL SULFATE 3 ML: .5; 3 SOLUTION RESPIRATORY (INHALATION) at 00:05

## 2019-01-01 RX ADMIN — Medication 10 ML: at 21:57

## 2019-01-01 RX ADMIN — LOSARTAN POTASSIUM 100 MG: 50 TABLET, FILM COATED ORAL at 11:59

## 2019-01-01 RX ADMIN — FUROSEMIDE 40 MG: 40 TABLET ORAL at 10:38

## 2019-01-01 RX ADMIN — SUCCINYLCHOLINE CHLORIDE 100 MG: 20 INJECTION, SOLUTION INTRAMUSCULAR; INTRAVENOUS; PARENTERAL at 16:01

## 2019-01-01 RX ADMIN — SODIUM CHLORIDE: 900 INJECTION, SOLUTION INTRAVENOUS at 15:41

## 2019-01-01 RX ADMIN — Medication 10 ML: at 06:00

## 2019-01-01 RX ADMIN — IPRATROPIUM BROMIDE AND ALBUTEROL SULFATE 3 ML: .5; 3 SOLUTION RESPIRATORY (INHALATION) at 14:02

## 2019-01-01 RX ADMIN — ATENOLOL 50 MG: 25 TABLET ORAL at 08:14

## 2019-01-01 RX ADMIN — OXYMETAZOLINE HYDROCHLORIDE 2 SPRAY: 0.05 SPRAY NASAL at 10:38

## 2019-01-01 RX ADMIN — METHYLPREDNISOLONE SODIUM SUCCINATE 40 MG: 40 INJECTION, POWDER, FOR SOLUTION INTRAMUSCULAR; INTRAVENOUS at 23:35

## 2019-01-01 RX ADMIN — ATENOLOL 50 MG: 25 TABLET ORAL at 10:24

## 2019-01-01 RX ADMIN — IPRATROPIUM BROMIDE AND ALBUTEROL SULFATE 3 ML: .5; 3 SOLUTION RESPIRATORY (INHALATION) at 14:53

## 2019-01-01 RX ADMIN — HYDRALAZINE HYDROCHLORIDE 20 MG: 20 INJECTION INTRAMUSCULAR; INTRAVENOUS at 21:10

## 2019-01-01 RX ADMIN — METHYLPREDNISOLONE SODIUM SUCCINATE 60 MG: 40 INJECTION, POWDER, FOR SOLUTION INTRAMUSCULAR; INTRAVENOUS at 21:07

## 2019-01-01 RX ADMIN — HEPARIN SODIUM 3050 UNITS: 5000 INJECTION INTRAVENOUS; SUBCUTANEOUS at 01:58

## 2019-01-01 RX ADMIN — METHYLPREDNISOLONE SODIUM SUCCINATE 40 MG: 40 INJECTION, POWDER, FOR SOLUTION INTRAMUSCULAR; INTRAVENOUS at 14:28

## 2019-01-01 RX ADMIN — IPRATROPIUM BROMIDE AND ALBUTEROL SULFATE 3 ML: .5; 3 SOLUTION RESPIRATORY (INHALATION) at 19:14

## 2019-01-01 RX ADMIN — METHYLPREDNISOLONE SODIUM SUCCINATE 60 MG: 40 INJECTION, POWDER, FOR SOLUTION INTRAMUSCULAR; INTRAVENOUS at 15:54

## 2019-01-01 RX ADMIN — Medication 10 ML: at 23:06

## 2019-01-01 RX ADMIN — MONTELUKAST SODIUM 10 MG: 10 TABLET, FILM COATED ORAL at 10:37

## 2019-01-01 RX ADMIN — Medication 10 ML: at 06:11

## 2019-01-01 RX ADMIN — IPRATROPIUM BROMIDE AND ALBUTEROL SULFATE 3 ML: .5; 3 SOLUTION RESPIRATORY (INHALATION) at 23:24

## 2019-01-01 RX ADMIN — Medication 10 ML: at 21:09

## 2019-01-01 RX ADMIN — LORAZEPAM 1 MG: 2 SOLUTION, CONCENTRATE ORAL at 20:34

## 2019-01-01 RX ADMIN — LORAZEPAM 1 MG: 2 SOLUTION, CONCENTRATE ORAL at 02:22

## 2019-01-01 RX ADMIN — FLUTICASONE PROPIONATE 1 SPRAY: 50 SPRAY, METERED NASAL at 08:18

## 2019-01-01 RX ADMIN — PHENYLEPHRINE HYDROCHLORIDE 80 MCG: 10 INJECTION INTRAVENOUS at 16:31

## 2019-01-01 RX ADMIN — METHYLPREDNISOLONE SODIUM SUCCINATE 40 MG: 40 INJECTION, POWDER, FOR SOLUTION INTRAMUSCULAR; INTRAVENOUS at 06:10

## 2019-01-01 RX ADMIN — IPRATROPIUM BROMIDE AND ALBUTEROL SULFATE 3 ML: .5; 3 SOLUTION RESPIRATORY (INHALATION) at 02:25

## 2019-01-01 RX ADMIN — METHYLPREDNISOLONE SODIUM SUCCINATE 40 MG: 40 INJECTION, POWDER, FOR SOLUTION INTRAMUSCULAR; INTRAVENOUS at 07:05

## 2019-01-01 RX ADMIN — Medication 10 ML: at 15:14

## 2019-01-01 RX ADMIN — METHYLPREDNISOLONE SODIUM SUCCINATE 60 MG: 40 INJECTION, POWDER, FOR SOLUTION INTRAMUSCULAR; INTRAVENOUS at 05:51

## 2019-01-01 RX ADMIN — ATENOLOL 50 MG: 25 TABLET ORAL at 08:25

## 2019-01-01 RX ADMIN — Medication 10 ML: at 13:32

## 2019-01-01 RX ADMIN — LORAZEPAM 1 MG: 2 SOLUTION, CONCENTRATE ORAL at 21:08

## 2019-01-01 RX ADMIN — Medication 10 ML: at 23:35

## 2019-01-01 RX ADMIN — BUDESONIDE 500 MCG: 0.5 INHALANT RESPIRATORY (INHALATION) at 19:14

## 2019-01-01 RX ADMIN — Medication 10 ML: at 23:47

## 2019-01-01 RX ADMIN — IPRATROPIUM BROMIDE AND ALBUTEROL SULFATE 3 ML: .5; 3 SOLUTION RESPIRATORY (INHALATION) at 15:53

## 2019-01-01 RX ADMIN — SODIUM CHLORIDE 70 ML/HR: 900 INJECTION, SOLUTION INTRAVENOUS at 21:21

## 2019-01-01 RX ADMIN — LOSARTAN POTASSIUM 100 MG: 50 TABLET, FILM COATED ORAL at 10:25

## 2019-01-01 RX ADMIN — BUDESONIDE 500 MCG: 0.5 INHALANT RESPIRATORY (INHALATION) at 13:05

## 2019-01-01 RX ADMIN — MONTELUKAST 10 MG: 10 TABLET, FILM COATED ORAL at 08:15

## 2019-01-01 RX ADMIN — METHYLPREDNISOLONE SODIUM SUCCINATE 125 MG: 125 INJECTION, POWDER, FOR SOLUTION INTRAMUSCULAR; INTRAVENOUS at 01:58

## 2019-01-01 RX ADMIN — IPRATROPIUM BROMIDE AND ALBUTEROL SULFATE 3 ML: .5; 3 SOLUTION RESPIRATORY (INHALATION) at 03:14

## 2019-01-01 RX ADMIN — OXYMETAZOLINE HYDROCHLORIDE 2 SPRAY: 0.05 SPRAY NASAL at 06:50

## 2019-01-01 RX ADMIN — IPRATROPIUM BROMIDE AND ALBUTEROL SULFATE 3 ML: .5; 3 SOLUTION RESPIRATORY (INHALATION) at 16:59

## 2019-01-01 RX ADMIN — HEPARIN SODIUM 3064 UNITS: 5000 INJECTION INTRAVENOUS; SUBCUTANEOUS at 17:51

## 2019-01-01 RX ADMIN — IOPAMIDOL 100 ML: 755 INJECTION, SOLUTION INTRAVENOUS at 08:49

## 2019-01-01 RX ADMIN — LOSARTAN POTASSIUM 100 MG: 50 TABLET, FILM COATED ORAL at 08:25

## 2019-01-01 RX ADMIN — ATENOLOL 50 MG: 25 TABLET ORAL at 08:15

## 2019-01-01 RX ADMIN — METHYLPREDNISOLONE SODIUM SUCCINATE 40 MG: 40 INJECTION, POWDER, FOR SOLUTION INTRAMUSCULAR; INTRAVENOUS at 15:13

## 2019-01-01 RX ADMIN — ARFORMOTEROL TARTRATE 15 MCG: 15 SOLUTION RESPIRATORY (INHALATION) at 09:54

## 2019-01-01 RX ADMIN — IPRATROPIUM BROMIDE AND ALBUTEROL SULFATE 3 ML: .5; 3 SOLUTION RESPIRATORY (INHALATION) at 11:29

## 2019-01-01 RX ADMIN — Medication 10 ML: at 06:02

## 2019-01-01 RX ADMIN — HEPARIN SODIUM 20 UNITS/KG/HR: 10000 INJECTION, SOLUTION INTRAVENOUS at 02:18

## 2019-01-01 RX ADMIN — PHENYLEPHRINE HYDROCHLORIDE 80 MCG: 10 INJECTION INTRAVENOUS at 16:18

## 2019-01-01 RX ADMIN — IPRATROPIUM BROMIDE AND ALBUTEROL SULFATE 3 ML: .5; 3 SOLUTION RESPIRATORY (INHALATION) at 00:04

## 2019-01-01 RX ADMIN — LORAZEPAM 1 MG: 2 SOLUTION, CONCENTRATE ORAL at 23:06

## 2019-01-01 RX ADMIN — BUDESONIDE 500 MCG: 0.5 INHALANT RESPIRATORY (INHALATION) at 22:07

## 2019-01-01 RX ADMIN — IPRATROPIUM BROMIDE AND ALBUTEROL SULFATE 3 ML: .5; 3 SOLUTION RESPIRATORY (INHALATION) at 11:28

## 2019-01-01 RX ADMIN — HEPARIN SODIUM 21 UNITS/KG/HR: 10000 INJECTION, SOLUTION INTRAVENOUS at 17:51

## 2019-01-01 RX ADMIN — ATENOLOL 50 MG: 25 TABLET ORAL at 10:36

## 2019-01-01 RX ADMIN — Medication 10 ML: at 14:30

## 2019-01-01 RX ADMIN — MONTELUKAST SODIUM 10 MG: 10 TABLET, FILM COATED ORAL at 08:25

## 2019-01-01 RX ADMIN — LOSARTAN POTASSIUM 100 MG: 100 TABLET, FILM COATED ORAL at 08:14

## 2019-02-25 ENCOUNTER — HOSPITAL ENCOUNTER (OUTPATIENT)
Dept: GENERAL RADIOLOGY | Age: 74
Discharge: HOME | End: 2019-02-25
Payer: MEDICARE

## 2019-02-25 DIAGNOSIS — J44.1 COPD EXACERBATION (HCC): ICD-10-CM

## 2019-02-25 PROCEDURE — 71046 X-RAY EXAM CHEST 2 VIEWS: CPT

## 2019-12-07 NOTE — ED NOTES
I have reviewed discharge instructions with the patient. The patient verbalized understanding. Pt ambulated to [facility], no distress noted, no needs at time

## 2019-12-07 NOTE — ED PROVIDER NOTES
EMERGENCY DEPARTMENT HISTORY AND PHYSICAL EXAM 
 
 
Date: 12/7/2019 Patient Name: David Negron Please note that this dictation was completed with [employer], the computer voice recognition software. Quite often unanticipated grammatical, syntax, homophones, and other interpretive errors are inadvertently transcribed by the computer software. Please disregard these errors. Please excuse any errors that have escaped final proofreading. History of Presenting Illness Chief Complaint Patient presents with  Leg Pain L posterior calf pain since October, states was seen by ortho and was told probably sciatica  Arm Pain  
  pt reports lump under her left arm that she first noticed yesterday, and states she noticed \"broken blood vessels\" to her anterior torso yesterday History Provided By: Patient HPI: David Negron, 69 y.o. female with PMHx significant for anxiety and tobacco abuse, presents ambulatory to the ED with numerous complaints including L posterior calf pain, choknig sensation that occurs 1-2x daily, \"blood veins to my chest,\" lump underneath her left arm. Patient states that her left posterior calf pain began in October and was present for about 3 weeks. She went to an orthopedic doctor and was diagnosed with sciatica. Patient states that her pain resolved after 3 weeks but returned yesterday. Patient states she noticed her \"blood veins\" feels a lump last night. She denies any pain associated with the area. In regards her blood veins, she states that she was wearing a rather tight bra for a long period of time. Of note, patient states she has been under increased stress recently. 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 She states she saw her ENT doctor few weeks ago but did not bring up with the choking sensation that she experiences.   She denies any sore throat, voice changes fever, chills, abdominal pain, chest pain, shortness of breath, skin color changes. PCP: Yael Ornelas NP There are no other complaints, changes, or physical findings at this time. Past History Past Medical History: No past medical history on file. Past Surgical History: No past surgical history on file. Family History: No family history on file. Social History: 
Social History Tobacco Use  Smoking status: Not on file Substance Use Topics  Alcohol use: Not on file  Drug use: Not on file Allergies: Allergies Allergen Reactions  Codeine Itching Review of Systems Review of Systems Constitutional: Negative. Negative for chills and fever. HENT:  
     +Choking sensation x months Eyes: Negative for pain and visual disturbance. Respiratory: Negative for cough and shortness of breath. Cardiovascular: Negative for chest pain and palpitations. Gastrointestinal: Negative for abdominal pain, nausea and vomiting. Genitourinary: Negative for dysuria and hematuria. Musculoskeletal: Positive for myalgias (L posterior calf pain). Negative for arthralgias. Skin: Negative for color change, rash and wound. +\"blood veins\" +\"bump to my armpit\" Neurological: Negative for numbness and headaches. All other systems reviewed and are negative. Physical Exam  
Physical Exam 
Vitals signs and nursing note reviewed. Constitutional:   
   General: She is not in acute distress. Appearance: She is well-developed. She is not diaphoretic. Comments: 69 y.o. female in NAD Communicates appropriately and in full sentences Normal vital signs HENT:  
   Head: Normocephalic and atraumatic.   
   Right Ear: Tympanic membrane normal.  
   Left Ear: Tympanic membrane normal.  
   Nose: Nose normal.  
   Mouth/Throat:  
   Mouth: Mucous membranes are moist.  
Eyes:  
   Conjunctiva/sclera: Conjunctivae normal.  
 Pupils: Pupils are equal, round, and reactive to light. Neck: Musculoskeletal: Normal range of motion and neck supple. Comments: No nuchal rigidity Cardiovascular:  
   Rate and Rhythm: Normal rate and regular rhythm. Pulses: Normal pulses. Heart sounds: Normal heart sounds. Comments: Small pulsatile mass to the left armpit Pulmonary:  
   Effort: Pulmonary effort is normal. No respiratory distress. Breath sounds: Normal breath sounds. No wheezing. Abdominal:  
   General: Bowel sounds are normal. There is no distension. Palpations: Abdomen is soft. Tenderness: There is no tenderness. Musculoskeletal: Normal range of motion. General: Tenderness (L posterior calf, neg SLR) present. No deformity. Right lower leg: No edema. Left lower leg: No edema. Comments: No neurologic or vascular compromise on exam.  
BACK: Normal spinal curvatures. No step off or deformity. NT to palpation along midline. Negative seated SLR bilaterally. Flexion/extension movement's at pt's baseline. Ambulatory without difficulty. Skin: 
   General: Skin is warm and dry. Capillary Refill: Capillary refill takes less than 2 seconds. Coloration: Skin is not pale. Findings: No erythema or rash. Comments: Varicose veins to her anterior chest along the bra line Neurological:  
   Mental Status: She is alert and oriented to person, place, and time. Coordination: Coordination normal.  
 
 
 
 
 
 
 
Diagnostic Study Results Labs - Recent Results (from the past 12 hour(s)) EKG, 12 LEAD, INITIAL Collection Time: 12/07/19 12:38 PM  
Result Value Ref Range Ventricular Rate 72 BPM  
 Atrial Rate 72 BPM  
 P-R Interval 156 ms QRS Duration 66 ms  
 Q-T Interval 384 ms QTC Calculation (Bezet) 420 ms Calculated P Axis 74 degrees Calculated R Axis 86 degrees Calculated T Axis 63 degrees Diagnosis Normal sinus rhythm Normal ECG No previous ECGs available CBC W/O DIFF Collection Time: 12/07/19  2:00 PM  
Result Value Ref Range WBC 11.3 (H) 3.6 - 11.0 K/uL  
 RBC 4.38 3.80 - 5.20 M/uL  
 HGB 13.7 11.5 - 16.0 g/dL HCT 41.4 35.0 - 47.0 % MCV 94.5 80.0 - 99.0 FL  
 MCH 31.3 26.0 - 34.0 PG  
 MCHC 33.1 30.0 - 36.5 g/dL  
 RDW 12.8 11.5 - 14.5 % PLATELET 672 088 - 062 K/uL MPV 10.1 8.9 - 12.9 FL  
 NRBC 0.0 0  WBC ABSOLUTE NRBC 0.00 0.00 - 0.01 K/uL METABOLIC PANEL, COMPREHENSIVE Collection Time: 12/07/19  2:00 PM  
Result Value Ref Range Sodium 138 136 - 145 mmol/L Potassium 3.8 3.5 - 5.1 mmol/L Chloride 105 97 - 108 mmol/L  
 CO2 29 21 - 32 mmol/L Anion gap 4 (L) 5 - 15 mmol/L Glucose 73 65 - 100 mg/dL BUN 15 6 - 20 MG/DL Creatinine 0.71 0.55 - 1.02 MG/DL  
 BUN/Creatinine ratio 21 (H) 12 - 20 GFR est AA >60 >60 ml/min/1.73m2 GFR est non-AA >60 >60 ml/min/1.73m2 Calcium 8.3 (L) 8.5 - 10.1 MG/DL Bilirubin, total 0.4 0.2 - 1.0 MG/DL  
 ALT (SGPT) 26 12 - 78 U/L  
 AST (SGOT) 23 15 - 37 U/L Alk. phosphatase 81 45 - 117 U/L Protein, total 6.6 6.4 - 8.2 g/dL Albumin 3.2 (L) 3.5 - 5.0 g/dL Globulin 3.4 2.0 - 4.0 g/dL A-G Ratio 0.9 (L) 1.1 - 2.2 Radiologic Studies -  
US EXT NONVAS LT LTD Final Result IMPRESSION: No mass or fluid collection in the left axillary soft tissues at the  
site of a palpable abnormality. Prominent patent venous structure in this  
location with a focal outpouching incidentally noted. .  
  
  
 
CT Results  (Last 48 hours) None CXR Results  (Last 48 hours) None Medical Decision Making I am the first provider for this patient. I reviewed the vital signs, available nursing notes, past medical history, past surgical history, family history and social history. Vital Signs-Reviewed the patient's vital signs. Patient Vitals for the past 12 hrs: 
 Temp Pulse Resp BP SpO2 12/07/19 1430    (!) 169/94 97 % 12/07/19 1400    (!) 158/92 96 % 12/07/19 1229 97.7 °F (36.5 °C) 74 16 184/88 100 % Records Reviewed: Nursing Notes and Old Medical Records Provider Notes (Medical Decision Making):  
Differential diagnosis includes DVT, sciatica, neuropathy, radiculopathy, cachexia, anemia, dehydration, electrolyte abnormality, abscess, cyst, oral cancer, throat cancer. 41-year-old female with 50-year smoking history presents with numerous complaints. Patient reporting a choking sensation that is been ongoing for the last few months. Saw ENT last week but forgot to mention this. She states that she has been seeing him for years and feels comfortable bringing this up to them. Patient has no lymphadenopathy. Will evaluate with duplex, ultrasound to left axilla, and basic labs. Will disposition based on results. ED Course:  
Initial assessment performed. The patients presenting problems have been discussed, and they are in agreement with the care plan formulated and outlined with them. I have encouraged them to ask questions as they arise throughout their visit. TOBACCO COUNSELING: 
Spent 1-5 minutes discussing the risks of smoking and the benefits of smoking cessation as well as the long term sequelae of smoking with the pt who verbalized his understanding. Reviewed strategies for success, including gradually decreasing the number of cigarettes smoked a day. DISCHARGE NOTE: 
Arabella Adame's  results have been reviewed with her. She has been counseled regarding her diagnosis. She verbally conveys understanding and agreement of the signs, symptoms, diagnosis, treatment and prognosis and additionally agrees to follow up as recommended with Dr. Sophie Bloch, NP in 24 - 48 hours. She also agrees with the care-plan and conveys that all of her questions have been answered.   I have also put together some discharge instructions for her that include: 1) educational information regarding their diagnosis, 2) how to care for their diagnosis at home, as well a 3) list of reasons why they would want to return to the ED prior to their follow-up appointment, should their condition change. She and/or family's questions have been answered. I have encouraged them to see the official results in [facility]\" or to retrieve the specifics of their results from medical records. PLAN: 
1. Return precautions as discussed 2. Follow-up with providers as directed 3. Medications as prescribed Return to ED if worse Diagnosis Clinical Impression: 1. Pain of left calf 2. Varicose veins of both lower extremities with pain 3. Tobacco abuse counseling 4. Sciatica, left side There are no discharge medications for this patient. Follow-up Information Follow up With Specialties Details Why Contact Info Your ENT  Schedule an appointment as soon as possible for a visit in 2 days Possible further evaluation and treatment, To schedule an appointment as soon as possible. [facility] EMERGENCY DEPT Emergency Medicine Go to If symptoms worsen [address] [facility] [address] [facility] 
[phone] Keanu Conner MD General and Vascular Surgery Call If symptoms worsen [address] [facility] 
[phone] This note will not be viewable in [address]

## 2019-12-07 NOTE — DISCHARGE INSTRUCTIONS
Patient Education        Leg Pain: Care Instructions  Your Care Instructions  Many things can cause leg pain. Too much exercise or overuse can cause a muscle cramp (or charley horse). You can get leg cramps from not eating a balanced diet that has enough potassium, calcium, and other minerals. If you do not drink enough fluids or are taking certain medicines, you may develop leg cramps. Other causes of leg pain include injuries, blood flow problems, nerve damage, and twisted and enlarged veins (varicose veins). You can usually ease pain with self-care. Your doctor may recommend that you rest your leg and keep it elevated. Follow-up care is a key part of your treatment and safety. Be sure to make and go to all appointments, and call your doctor if you are having problems. It's also a good idea to know your test results and keep a list of the medicines you take. How can you care for yourself at home? · Take pain medicines exactly as directed. ? If the doctor gave you a prescription medicine for pain, take it as prescribed. ? If you are not taking a prescription pain medicine, ask your doctor if you can take an over-the-counter medicine. · Take any other medicines exactly as prescribed. Call your doctor if you think you are having a problem with your medicine. · Rest your leg while you have pain, and avoid standing for long periods of time. · Prop up your leg at or above the level of your heart when possible. · Make sure you are eating a balanced diet that is rich in calcium, potassium, and magnesium, especially if you are pregnant. · If directed by your doctor, put ice or a cold pack on the area for 10 to 20 minutes at a time. Put a thin cloth between the ice and your skin. · Your leg may be in a splint, a brace, or an elastic bandage, and you may have crutches to help you walk. Follow your doctor's directions about how long to wear supports and how to use the crutches.   When should you call for help?  Call 911 anytime you think you may need emergency care. For example, call if:    · You have sudden chest pain and shortness of breath, or you cough up blood.     · Your leg is cool or pale or changes color.    Call your doctor now or seek immediate medical care if:    · You have increasing or severe pain.     · Your leg suddenly feels weak and you cannot move it.     · You have signs of a blood clot, such as:  ? Pain in your calf, back of the knee, thigh, or groin. ? Redness and swelling in your leg or groin.     · You have signs of infection, such as:  ? Increased pain, swelling, warmth, or redness. ? Red streaks leading from the sore area. ? Pus draining from a place on your leg. ? A fever.     · You cannot bear weight on your leg.    Watch closely for changes in your health, and be sure to contact your doctor if:    · You do not get better as expected. Where can you learn more? Go to http://dom-merle.info/. Enter T862 in the search box to learn more about \"Leg Pain: Care Instructions. \"  Current as of: June 26, 2019  Content Version: 12.2  © 5780-6866 [employer]. Care instructions adapted under license by [employer] (which disclaims liability or warranty for this information). If you have questions about a medical condition or this instruction, always ask your healthcare professional. [place] [address] any warranty or liability for your use of this information. Patient Education        Learning About Microphlebectomy  What is microphlebectomy? Microphlebectomy (say \"gm-drgm-ibni-GORDO-tuh-natacha\") is a procedure used to remove varicose veins. These are twisted and enlarged veins near the surface of the skin. The procedure is also called ambulatory phlebectomy or stab avulsion. How is the procedure done? The procedure is usually done in your doctor's office. You will get medicine to make you relax or to numb the area.   Your doctor will make several tiny cuts (incisions) in the skin. The varicose veins will be removed through the cuts. You most likely will not need stitches to close the cuts. It usually takes less than 1 hour. What can you expect after the procedure? Your doctor may wrap your leg in a compression bandage. You will have to wear compression stockings for a few weeks. You most likely can go home the same day. You will probably be able to do your usual activities the next day. You may have a little bruising and numbness. Follow-up care is a key part of your treatment and safety. Be sure to make and go to all appointments, and call your doctor if you are having problems. It's also a good idea to know your test results and keep a list of the medicines you take. Where can you learn more? Go to http://dom-merle.info/. Enter C143 in the search box to learn more about \"Learning About Microphlebectomy. \"  Current as of: September 26, 2018  Content Version: 12.2  © 1560-3335 [employer]. Care instructions adapted under license by [employer] (which disclaims liability or warranty for this information). If you have questions about a medical condition or this instruction, always ask your healthcare professional. [address] any warranty or liability for your use of this information. Patient Education        Varicose Veins: Care Instructions  Your Care Instructions  Varicose veins are twisted, enlarged veins near the surface of the skin. They develop most often in the legs and ankles. Some people may be more likely than others to get varicose veins because of aging or hormone changes or because a parent has them. Being overweight or pregnant can make varicose veins worse. Jobs that require standing for long periods of time also can make them worse. Follow-up care is a key part of your treatment and safety.  Be sure to make and go to all appointments, and call your doctor if you are having problems. It's also a good idea to know your test results and keep a list of the medicines you take. How can you care for yourself at home? · Wear compression stockings during the day to help relieve symptoms. They improve blood flow and are the main treatment for varicose veins. Talk to your doctor about which ones to get and where to get them. · Prop up your legs at or above the level of your heart when possible. This helps keep the blood from pooling in your lower legs and improves blood flow to the rest of your body. · Avoid sitting and standing for long periods. This puts added stress on your veins. · Get regular exercise, and control your weight. Walk, bicycle, or swim to improve blood flow in your legs. · If you bump your leg so hard that you know it is likely to bruise, prop up your leg and put ice or a cold pack on the area for 10 to 20 minutes at a time. Try to do this every 1 to 2 hours for the next 3 days (when you are awake) or until the swelling goes down. Put a thin cloth between the ice and your skin. · If you cut or scratch the skin over a vein, it may bleed a lot. Prop up your leg and apply firm pressure with a clean bandage over the site of the bleeding. Continue to apply pressure for a full 15 minutes. Do not check sooner to see if the bleeding has stopped. If the bleeding has not stopped after 15 minutes, apply pressure again for another 15 minutes. You can repeat this up to 3 times for a total of 45 minutes. If you have a blood clot in a varicose vein, you may have tenderness and swelling over the vein. The vein may feel firm. Be sure to call your doctor right away if you have these symptoms. If your doctor has told you how to care for the clot, follow his or her instructions.  Care may include the following:  · Prop up your leg and apply heat with a warm, damp cloth or a heating pad set on low (put a towel or cloth between your leg and the heating pad to prevent burns). · Ask your doctor if you can take an over-the-counter pain medicine, such as acetaminophen (Tylenol), ibuprofen (Advil, Motrin), or naproxen (Aleve). Be safe with medicines. Read and follow all instructions on the label. When should you call for help? Call 911 anytime you think you may need emergency care. For example, call if:    · You have sudden chest pain and shortness of breath, or you cough up blood.    Call your doctor now or seek immediate medical care if:    · You have signs of a blood clot, such as:  ? Pain in your calf, back of the knee, thigh, or groin. ? Redness and swelling in your leg or groin.     · A varicose vein begins to bleed and you cannot stop it.     · You have a tender lump in your leg.     · You get an open sore.    Watch closely for changes in your health, and be sure to contact your doctor if:    · Your varicose vein symptoms do not improve with home treatment. Where can you learn more? Go to http://dom-merle.info/. Enter J111 in the search box to learn more about \"Varicose Veins: Care Instructions. \"  Current as of: September 26, 2018  Content Version: 12.2  © 6398-6668 [employer]. Care instructions adapted under license by [employer] (which disclaims liability or warranty for this information). If you have questions about a medical condition or this instruction, always ask your healthcare professional. [place] [address] any warranty or liability for your use of this information. Patient Education        Learning About Benefits From Quitting Smoking  How does quitting smoking make you healthier? If you're thinking about quitting smoking, you may have a few reasons to be smoke-free. Your health may be one of them. · When you quit smoking, you lower your risks for cancer, lung disease, heart attack, stroke, blood vessel disease, and blindness from macular degeneration.   · When you're smoke-free, you get sick less often, and you heal faster. You are less likely to get colds, flu, bronchitis, and pneumonia. · As a nonsmoker, you may find that your mood is better and you are less stressed. When and how will you feel healthier? Quitting has real health benefits that start from day 1 of being smoke-free. And the longer you stay smoke-free, the healthier you get and the better you feel. The first hours  · After just 20 minutes, your blood pressure and heart rate go down. That means there's less stress on your heart and blood vessels. · Within 12 hours, the level of carbon monoxide in your blood drops back to normal. That makes room for more oxygen. With more oxygen in your body, you may notice that you have more energy than when you smoked. After 2 weeks  · Your lungs start to work better. · Your risk of heart attack starts to drop. After 1 month  · When your lungs are clear, you cough less and breathe deeper, so it's easier to be active. · Your sense of taste and smell return. That means you can enjoy food more than you have since you started smoking. Over the years  · After 1 year, your risk of heart disease is half what it would be if you kept smoking. · After 5 years, your risk of stroke starts to shrink. Within a few years after that, it's about the same as if you'd never smoked. · After 10 years, your risk of dying from lung cancer is cut by about half. And your risk for many other types of cancer is lower too. How would quitting help others in your life? When you quit smoking, you improve the health of everyone who now breathes in your smoke. · Their heart, lung, and cancer risks drop, much like yours. · They are sick less. For babies and small children, living smoke-free means they're less likely to have ear infections, pneumonia, and bronchitis. · If you're a woman who is or will be pregnant someday, quitting smoking means a healthier .   · Children who are close to you are less likely to become adult smokers. Where can you learn more? Go to http://dom-merle.info/. Enter 052 [id number] 72 11 in the search box to learn more about \"Learning About Benefits From Quitting Smoking. \"  Current as of: September 26, 2018  Content Version: 12.2  © 9552-8702 [employer]. Care instructions adapted under license by [employer] (which disclaims liability or warranty for this information). If you have questions about a medical condition or this instruction, always ask your healthcare professional. [place] [address] any warranty or liability for your use of this information.

## 2019-12-15 PROBLEM — I87.1 SVC SYNDROME: Status: ACTIVE | Noted: 2019-01-01

## 2019-12-15 NOTE — CONSULTS
[facility] [place] Initial Patient Consult Name: Karyle Reining MRN: [id number] : 1945 Hospital: [facility] Date: 12/15/2019 IMPRESSION:  
· Multiple complex acute problems: 
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  
· O2 PRN 
· Bronchodilators - she was on Symbicort at home (only prescribed 1 puff once daily. ..), but Anoro probably more appropriate for her · Systemic corticosteroids · Heparin drip for now, if she does not improve, she may need thrombolysis and/or stent - discussed on phone with IR, who will follow peripherally for now · She does need a biopsy of her nodule before committing her to long term anticoagulation, so it needs to be dealt with during this admission. Not sure of best approach at this time. Await radiology official read of chest CT. She may need navigation bronchoscopy (only done at [facility]) or possibly EBUS before starting chronic anticoagulation or stenting. · Unfortunately, her array of issues are complicated and one makes the other difficult to deal with. Discussed all of the above with the patient at length. Subjective: This patient has been seen and evaluated at the request of Dr. Daly Gonzales for pulmonary nodule and SVC syndrome. Patient is a 69 y.o. female smoker (~100 p-y) with COPD (has never seen pulmonary, though has initial appointment scheduled for sometime in the near future. She presented overnight with a 1+ day history of increasing swelling of face and arms.   She had noted new distended veins on her lower chest and her left arm about a week ago, but the swelling was new about a day prior to coming to the hospital.  She is also wheezing and more short of breath than at baseline. Found to have SVC obstruction by thrombus. Also found to have a pulmonary nodule. Past Medical History:  
Diagnosis Date  CAD (coronary artery disease)  Chronic obstructive pulmonary disease ([facility] [address]  Hypertension Past Surgical History:  
Procedure Laterality Date  HX CAROTID STENT  2003 Prior to Admission medications Medication Sig Start Date End Date Taking? Authorizing Provider  
fluticasone propionate (FLONASE ALLERGY RELIEF NA) by Nasal route. Yes Other, MD Trudy  
hydrOXYzine HCl (ATARAX) 10 mg tablet Take 10 mg by mouth three (3) times daily as needed for Itching. Yes Car, MD Trudy  
montelukast (SINGULAIR) 10 mg tablet Take 10 mg by mouth daily. Yes Other, MD Trudy  
albuterol (PROVENTIL HFA, VENTOLIN HFA, PROAIR HFA) 90 mcg/actuation inhaler Take 2 Puffs by inhalation. Yes Car, MD Trudy  
atenolol (TENORMIN) 50 mg tablet Take 50 mg by mouth daily. Yes Car, MD Trudy  
losartan (COZAAR) 100 mg tablet Take 100 mg by mouth daily. Yes Other, MD Trudy  
budesonide (ENTOCORT EC) 3 mg capsule Take 6 mg by mouth every morning. Yes Other, MD Trudy  
 
Allergies Allergen Reactions  Codeine Itching Social History Tobacco Use  Smoking status: Current Every Day Smoker Packs/day: 0.50  Smokeless tobacco: Never Used Substance Use Topics  Alcohol use: Yes Comment: occ History reviewed. No pertinent family history. Current Facility-Administered Medications Medication Dose Route Frequency  heparin 25,000 units in D5W 250 ml infusion  18-36 Units/kg/hr IntraVENous TITRATE  sodium chloride (NS) flush 5-40 mL  5-40 mL IntraVENous Q8H  
 losartan (COZAAR) tablet 100 mg  100 mg Oral DAILY  atenolol (TENORMIN) tablet 50 mg  50 mg Oral DAILY  budesonide (ENTOCORT EC) capsule 6 mg  6 mg Oral 7am  
 montelukast (SINGULAIR) tablet 10 mg  10 mg Oral DAILY  fluticasone propionate (FLONASE) 50 mcg/actuation nasal spray 1 Spray  1 Spray Both Nostrils DAILY  albuterol-ipratropium (DUO-NEB) 2.5 MG-0.5 MG/3 ML  3 mL Nebulization QID RT  
 nicotine (NICODERM CQ) 21 mg/24 hr patch 1 Patch  1 Patch TransDERmal DAILY  0.9% sodium chloride infusion  100 mL/hr IntraVENous CONTINUOUS  
 albuterol-ipratropium (DUO-NEB) 2.5 mg-0.5 mg/3 ml nebulizer solution Review of Systems: A comprehensive review of systems was negative except for that written in the HPI. Objective:  
Vital Signs:   
Visit Vitals /90 (BP 1 Location: Right arm, BP Patient Position: At rest) Pulse 70 Temp 97.8 °F (36.6 °C) Resp 18 Ht 5' 2\" (1.575 m) Wt 38.3 kg (84 lb 7 oz) SpO2 93% BMI 15.44 kg/m² O2 Device: Room air Temp (24hrs), Av.7 °F (36.5 °C), Min:96.9 °F (36.1 °C), Max:98 °F (36.7 °C) Intake/Output:  
Last shift:      No intake/output data recorded. Last 3 shifts: No intake/output data recorded. No intake or output data in the 24 hours ending 12/15/19 1218 Physical Exam:  
General:  Alert, cooperative, no distress Head:  Normocephalic, mild facial edema Eyes:  Conjunctivae/corneas clear. PERRL, EOMs intact. Nose: Nares normal. Septum midline. Mucosa normal.    
Throat: Lips, mucosa, and tongue normal.    
Neck: Supple, symmetrical, trachea midline, +distended veins Back:   Symmetric, no curvature. ROM normal.  
Lungs:   Diffuse bilateral wheeze, no rales or ronchi Chest wall:  No tenderness or deformity. Heart:  Regular rate and rhythm Abdomen:   Soft, non-tender. Bowel sounds normal.   
Extremities: Extremities normal, atraumatic, no cyanosis or clubbing Skin: Skin color, texture, turgor normal. No rashes or lesions Lymph nodes: Cervical, supraclavicular, and axillary nodes normal.  She does have what looks like a left axillary nodule but is soft and more likely a varicosity of a distended axillary vein Neurologic: Grossly nonfocal  
 
Data review:  
 
Recent Results (from the past 24 hour(s)) EKG, 12 LEAD, INITIAL Collection Time: 12/14/19 10:02 PM  
Result Value Ref Range Ventricular Rate 74 BPM  
 Atrial Rate 74 BPM  
 P-R Interval 146 ms  
 QRS Duration 76 ms  
 Q-T Interval 398 ms QTC Calculation (Bezet) 441 ms Calculated P Axis 88 degrees Calculated R Axis 91 degrees Calculated T Axis 87 degrees Diagnosis Normal sinus rhythm Rightward axis Anterior infarct , age undetermined When compared with ECG of 07-DEC-2019 12:38, Anterior infarct is now present CBC WITH AUTOMATED DIFF Collection Time: 12/14/19 10:29 PM  
Result Value Ref Range WBC 13.2 (H) 3.6 - 11.0 K/uL  
 RBC 4.40 3.80 - 5.20 M/uL  
 HGB 13.7 11.5 - 16.0 g/dL HCT 42.1 35.0 - 47.0 % MCV 95.7 80.0 - 99.0 FL  
 MCH 31.1 26.0 - 34.0 PG  
 MCHC 32.5 30.0 - 36.5 g/dL  
 RDW 12.8 11.5 - 14.5 % PLATELET 297 224 - 921 K/uL MPV 9.9 8.9 - 12.9 FL  
 NRBC 0.0 0  WBC ABSOLUTE NRBC 0.00 0.00 - 0.01 K/uL NEUTROPHILS 70 32 - 75 % LYMPHOCYTES 19 12 - 49 % MONOCYTES 9 5 - 13 % EOSINOPHILS 1 0 - 7 % BASOPHILS 1 0 - 1 % IMMATURE GRANULOCYTES 0 0.0 - 0.5 % ABS. NEUTROPHILS 9.4 (H) 1.8 - 8.0 K/UL  
 ABS. LYMPHOCYTES 2.4 0.8 - 3.5 K/UL  
 ABS. MONOCYTES 1.1 (H) 0.0 - 1.0 K/UL  
 ABS. EOSINOPHILS 0.2 0.0 - 0.4 K/UL  
 ABS. BASOPHILS 0.1 0.0 - 0.1 K/UL  
 ABS. IMM. GRANS. 0.0 0.00 - 0.04 K/UL  
 DF AUTOMATED METABOLIC PANEL, COMPREHENSIVE Collection Time: 12/14/19 10:29 PM  
Result Value Ref Range Sodium 138 136 - 145 mmol/L Potassium 3.8 3.5 - 5.1 mmol/L Chloride 103 97 - 108 mmol/L  
 CO2 31 21 - 32 mmol/L Anion gap 4 (L) 5 - 15 mmol/L Glucose 82 65 - 100 mg/dL BUN 20 6 - 20 MG/DL  Creatinine 0.86 0.55 - 1.02 MG/DL  
 BUN/Creatinine ratio 23 (H) 12 - 20 GFR est AA >60 >60 ml/min/1.73m2 GFR est non-AA >60 >60 ml/min/1.73m2 Calcium 9.0 8.5 - 10.1 MG/DL Bilirubin, total 0.4 0.2 - 1.0 MG/DL  
 ALT (SGPT) 29 12 - 78 U/L  
 AST (SGOT) 28 15 - 37 U/L Alk. phosphatase 88 45 - 117 U/L Protein, total 6.8 6.4 - 8.2 g/dL Albumin 3.4 (L) 3.5 - 5.0 g/dL Globulin 3.4 2.0 - 4.0 g/dL A-G Ratio 1.0 (L) 1.1 - 2.2 NT-PRO BNP Collection Time: 12/14/19 10:29 PM  
Result Value Ref Range NT pro- (H) <125 PG/ML  
TROPONIN I Collection Time: 12/14/19 10:29 PM  
Result Value Ref Range Troponin-I, Qt. <0.05 <0.05 ng/mL PTT Collection Time: 12/15/19  1:12 AM  
Result Value Ref Range aPTT 27.2 22.1 - 32.0 sec  
 aPTT, therapeutic range     58.0 - 77.0 SECS  
PTT Collection Time: 12/15/19  8:23 AM  
Result Value Ref Range aPTT 64.2 (H) 22.1 - 32.0 sec  
 aPTT, therapeutic range     58.0 - 77.0 SECS Imaging: 
I have personally reviewed the patients radiographs and have reviewed the reports: 
Emphysema, right upper lobe pulmonary nodule, thrombus in the SVC (I can't tell if there is any adenopathy vs thrombosis), official read pending Leona Wharton MD

## 2019-12-15 NOTE — PROGRESS NOTES
TRANSFER - IN REPORT: 
Verbal report received from [facility] RN(name) on Kin Fallon  being received from ED(unit) for routine progression of care Report consisted of patients Situation, Background, Assessment and  
Recommendations(SBAR). Information from the following report(s) SBAR, Kardex, ED Summary, Procedure Summary, Intake/Output, MAR and Recent Results was reviewed with the receiving nurse. Opportunity for questions and clarification was provided. Assessment completed upon patients arrival to unit and care assumed. Primary Nurse Jada Guillory RN and Zarina Church RN performed a dual skin assessment on this patient No impairment noted Bright score is 22.

## 2019-12-15 NOTE — ED NOTES
TRANSFER - OUT REPORT: 
 
Verbal report given to Fany(name) on Karyle Reining  being transferred to [facility](unit) for routine progression of care Report consisted of patients Situation, Background, Assessment and  
Recommendations(SBAR). Information from the following report(s) SBAR, ED Summary, [facility] and Recent Results was reviewed with the receiving nurse. Lines:  
Peripheral IV 12/14/19 Left Antecubital (Active) Site Assessment Clean, dry, & intact 12/14/2019 10:31 PM  
Phlebitis Assessment 0 12/14/2019 10:31 PM  
Infiltration Assessment 0 12/14/2019 10:31 PM  
Dressing Status Clean, dry, & intact 12/14/2019 10:31 PM  
Dressing Type Transparent 12/14/2019 10:31 PM  
Hub Color/Line Status Flushed 12/14/2019 10:31 PM  
Action Taken Blood drawn 12/14/2019 10:31 PM  
  
 
Opportunity for questions and clarification was provided. Patient transported with: 
 Monitor Registered Nurse 2

## 2019-12-15 NOTE — ED TRIAGE NOTES
Assumed care of pt from triage. Pt CC of SOB with activity, facial swelling, jaw pain, and chest tightness. The facial swelling and jaw pain started today. All other symptoms have been going on for a few weeks. Pt took neb treatment today multiple times with no relief. Pt reporting difficulty swallowing. Pt maintaining her own airway with no apparent difficulty at this time. Pt smokes. Pt on monitor x3. Call bell in reach. Family at bedside.

## 2019-12-15 NOTE — PROGRESS NOTES
0700: Received bedside report from [place], off going nurse. Assumed care of patient. Pt has an IV in the LAC, with heparin infusing. Verified w/ Dr. Klever Tatum that it is appropriate to use that IV. Waiting on a doppler to be done on R arm to rule out clot, R arm is swollen. Vascular lab is not here today, Dr. Klever Tatum okay with waiting until tomorrow to get dopplar done. Problem: Falls - Risk of 
Goal: *Absence of Falls Description Document [facility] Fall Risk and appropriate interventions in the flowsheet. Outcome: Progressing Towards Goal 
Note: Fall Risk Interventions: 
  
 
 
Medication Interventions: Patient to call before getting OOB, Teach patient to arise slowly Problem: Chronic Obstructive Pulmonary Disease (COPD) Goal: *Oxygen saturation during activity within specified parameters Outcome: Progressing Towards Goal 
Goal: *Able to remain out of bed as prescribed Outcome: Progressing Towards Goal 
Goal: *Absence of hypoxia Outcome: Progressing Towards Goal 
Goal: *Optimize nutritional status Outcome: Progressing Towards Goal 
  
Problem: Breathing Pattern - Ineffective Goal: *Absence of hypoxia Outcome: Progressing Towards Goal 
  
1900: Bedside shift change report GIVEN TO Eddi campos RN. Report included the following information SBAR, Kardex, Intake/Output, MAR, Recent Results and Cardiac Rhythm NSR.  
 
 
SIGNIFICANT CHANGES DURING SHIFT:  n/a CONCERNS TO ADDRESS WITH MD:  n/a 
 
 
 
 
[address] NURSING NOTE Admission Date 12/14/2019 Admission Diagnosis SVC syndrome [I87.1] Consults IP CONSULT TO HOSPITALIST 
IP CONSULT TO PULMONOLOGY Cardiac Monitoring [x] Yes [] No  
  
Purposeful Hourly Rounding [x] Yes   
Zeyad Score Total Score: 1 Zeyad score 3 or > [] Bed Alarm [] Avasys [] 1:1 sitter [] Patient refused (Signed refusal form in chart) Bright Score Bright Score: 21 Bright score 14 or < [] PMT consult [] Wound Care consult  
 []  Specialty bed  [] Nutrition consult Influenza Vaccine Received Flu Vaccine for Current Season (usually Sept-March): Yes Oxygen needs? [x] Room air Oxygen @  []1L    []2L    []3L   []4L    []5L   []6L via NC Chronic home O2 use? [] Yes [] No 
Perform O2 challenge test and document in progress note using smartphrase (.Homeoxygen) Last bowel movement Last Bowel Movement Date: 12/14/19 Urinary Catheter LDAs Peripheral IV 12/14/19 Left Antecubital (Active) Site Assessment Clean, dry, & intact 12/15/2019  3:58 PM  
Phlebitis Assessment 0 12/15/2019  3:58 PM  
Infiltration Assessment 0 12/15/2019  3:58 PM  
Dressing Status Clean, dry, & intact 12/15/2019  3:58 PM  
Dressing Type Transparent 12/15/2019  3:58 PM  
Hub Color/Line Status Pink; Infusing;Patent 12/15/2019  3:58 PM  
Action Taken Blood drawn 12/14/2019 10:31 PM  
   
Peripheral IV 12/15/19 Anterior; Left Forearm (Active) Site Assessment Clean, dry, & intact 12/15/2019  3:58 PM  
Phlebitis Assessment 0 12/15/2019  3:58 PM  
Infiltration Assessment 0 12/15/2019  3:58 PM  
Dressing Status Clean, dry, & intact 12/15/2019  3:58 PM  
Dressing Type Transparent 12/15/2019  3:58 PM  
Hub Color/Line Status Blue; Infusing;Patent 12/15/2019  3:58 PM  
                  
  
Readmission Risk Assessment Tool Score Low Risk 8 Total Score 3 Has Seen PCP in Last 6 Months (Yes=3, No=0)  
 5 Pt. Coverage (Medicare=5 , Medicaid, or Self-Pay=4) Criteria that do not apply:  
 . Living with Significant Other. Assisted Living. LTAC. SNF. or  
Rehab Patient Length of Stay (>5 days = 3) IP Visits Last 12 Months (1-3=4, 4=9, >4=11) Charlson Comorbidity Score (Age + Comorbid Conditions) Expected Length of Stay - - - Actual Length of Stay 0

## 2019-12-15 NOTE — PROGRESS NOTES
ADULT PROTOCOL: JET AEROSOL ASSESSMENT Patient  Karyle Reining     69 y.o.   female     12/15/2019  8:46 AM 
 
Breathing pattern: Pre procedure Breathing Pattern: Regular Post procedure Breathing Pattern: Regular Heart Rate: Pre procedure Pulse: 89 
         Post procedure Pulse: 89 Resp Rate: Pre procedure Respirations: 18 Post procedure Respirations: 18 
 
 
Oxygen: O2 Device: Room air SpO2: Pre procedure SpO2: 92 % Post procedure SpO2: 92 % Nebulizer Therapy: Current medications Aerosolized Medications: DuoNeb Smoking History: Current every day smoker Problem List:  
Patient Active Problem List  
Diagnosis Code  SVC syndrome I87.1 Respiratory Therapist: Neftali Neil

## 2019-12-15 NOTE — ED NOTES
Ambulated pt per MD orders. Pt had moments during ambulation when pt O2 sat dropped down to 80s. Pt fingers, nose and ears were blue while walking. MD notified.

## 2019-12-15 NOTE — H&P
HISTORY AND PHYSICAL 
 
 
PCP: Rey Gonzalez NP History source: the patient CC: head pressure HPI: 69 y.o lady with COPD, CAD, HTN, tobacco use, who presents with a headache. Symptoms began about a month ago. She describes a pressure sensation from inside her head that has been gradually increasing in severity over the past month. There are no known exacerbating or alleviating factors. Associated symptoms include a purple discoloration of her face and hands. She also feels like her breathing has gradually worsened over this period of time. She denies chest pain, leg swelling, fever, or new cough. PMH/PSH: 
Past Medical History:  
Diagnosis Date  CAD (coronary artery disease)  Chronic obstructive pulmonary disease ([facility] [address]  Hypertension Past Surgical History:  
Procedure Laterality Date  HX CAROTID STENT   Home meds:  
Prior to Admission medications Medication Sig Start Date End Date Taking? Authorizing Provider  
fluticasone propionate (FLONASE ALLERGY RELIEF NA) by Nasal route. Yes Car, MD Trudy  
hydrOXYzine HCl (ATARAX) 10 mg tablet Take 10 mg by mouth three (3) times daily as needed for Itching. Yes Other, MD Trudy  
montelukast (SINGULAIR) 10 mg tablet Take 10 mg by mouth daily. Yes Car, MD Trudy  
albuterol (PROVENTIL HFA, VENTOLIN HFA, PROAIR HFA) 90 mcg/actuation inhaler Take 2 Puffs by inhalation. Yes Car, MD Trudy  
atenolol (TENORMIN) 50 mg tablet Take 50 mg by mouth daily. Yes Car, MD Trudy  
losartan (COZAAR) 100 mg tablet Take 100 mg by mouth daily. Yes Car, MD Trudy  
budesonide (ENTOCORT EC) 3 mg capsule Take 6 mg by mouth every morning. Yes Other, MD Trudy  
 
 
Allergies: Allergies Allergen Reactions  Codeine Itching FH: 
Brother  from lung cancer SH: Social History Tobacco Use  Smoking status: Current Every Day Smoker Packs/day: 0.50  Smokeless tobacco: Never Used Substance Use Topics  Alcohol use: Yes Comment: occ  
smoking for >50 years ROS: A comprehensive review of systems was negative except for that written in the HPI. PHYSICAL EXAM: 
Visit Vitals BP (!) 190/96 (BP 1 Location: Left arm, BP Patient Position: At rest) Pulse 83 Temp 96.9 °F (36.1 °C) Resp 18 Ht 5' 2\" (1.575 m) Wt 38.4 kg (84 lb 10.5 oz) SpO2 98% BMI 15.48 kg/m² Gen: NAD, chronically-ill appearing HEENT: plethoric face, anicteric sclerae, normal conjunctiva, oropharynx clear, MM moist 
Neck: supple, trachea midline, no adenopathy Heart: RRR, no MRG, varicose present on anterior thorax, distended jugular veins, no peripheral edema Lungs: diminished breath sounds globally, non-labored respirations Abd: soft, NT, ND, BS+, no organomegaly Extr: warm Skin: dry, purple discoloration of the distal arms and hands Neuro: CN II-XII grossly intact, normal speech, moves all extremities Psych: normal mood, appropriate affect Labs/Imaging: 
Recent Results (from the past 24 hour(s)) EKG, 12 LEAD, INITIAL Collection Time: 12/14/19 10:02 PM  
Result Value Ref Range Ventricular Rate 74 BPM  
 Atrial Rate 74 BPM  
 P-R Interval 146 ms  
 QRS Duration 76 ms  
 Q-T Interval 398 ms QTC Calculation (Bezet) 441 ms Calculated P Axis 88 degrees Calculated R Axis 91 degrees Calculated T Axis 87 degrees Diagnosis Normal sinus rhythm Rightward axis Anterior infarct , age undetermined When compared with ECG of 07-DEC-2019 12:38, Anterior infarct is now present CBC WITH AUTOMATED DIFF Collection Time: 12/14/19 10:29 PM  
Result Value Ref Range WBC 13.2 (H) 3.6 - 11.0 K/uL  
 RBC 4.40 3.80 - 5.20 M/uL  
 HGB 13.7 11.5 - 16.0 g/dL HCT 42.1 35.0 - 47.0 % MCV 95.7 80.0 - 99.0 FL  
 MCH 31.1 26.0 - 34.0 PG  
 MCHC 32.5 30.0 - 36.5 g/dL  
 RDW 12.8 11.5 - 14.5 % PLATELET 634 843 - 831 K/uL MPV 9.9 8.9 - 12.9 FL  
 NRBC 0.0 0  WBC ABSOLUTE NRBC 0.00 0.00 - 0.01 K/uL NEUTROPHILS 70 32 - 75 % LYMPHOCYTES 19 12 - 49 % MONOCYTES 9 5 - 13 % EOSINOPHILS 1 0 - 7 % BASOPHILS 1 0 - 1 % IMMATURE GRANULOCYTES 0 0.0 - 0.5 % ABS. NEUTROPHILS 9.4 (H) 1.8 - 8.0 K/UL  
 ABS. LYMPHOCYTES 2.4 0.8 - 3.5 K/UL  
 ABS. MONOCYTES 1.1 (H) 0.0 - 1.0 K/UL  
 ABS. EOSINOPHILS 0.2 0.0 - 0.4 K/UL  
 ABS. BASOPHILS 0.1 0.0 - 0.1 K/UL  
 ABS. IMM. GRANS. 0.0 0.00 - 0.04 K/UL  
 DF AUTOMATED METABOLIC PANEL, COMPREHENSIVE Collection Time: 12/14/19 10:29 PM  
Result Value Ref Range Sodium 138 136 - 145 mmol/L Potassium 3.8 3.5 - 5.1 mmol/L Chloride 103 97 - 108 mmol/L  
 CO2 31 21 - 32 mmol/L Anion gap 4 (L) 5 - 15 mmol/L Glucose 82 65 - 100 mg/dL BUN 20 6 - 20 MG/DL Creatinine 0.86 0.55 - 1.02 MG/DL  
 BUN/Creatinine ratio 23 (H) 12 - 20 GFR est AA >60 >60 ml/min/1.73m2 GFR est non-AA >60 >60 ml/min/1.73m2 Calcium 9.0 8.5 - 10.1 MG/DL Bilirubin, total 0.4 0.2 - 1.0 MG/DL  
 ALT (SGPT) 29 12 - 78 U/L  
 AST (SGOT) 28 15 - 37 U/L Alk. phosphatase 88 45 - 117 U/L Protein, total 6.8 6.4 - 8.2 g/dL Albumin 3.4 (L) 3.5 - 5.0 g/dL Globulin 3.4 2.0 - 4.0 g/dL A-G Ratio 1.0 (L) 1.1 - 2.2 NT-PRO BNP Collection Time: 12/14/19 10:29 PM  
Result Value Ref Range NT pro- (H) <125 PG/ML  
TROPONIN I Collection Time: 12/14/19 10:29 PM  
Result Value Ref Range Troponin-I, Qt. <0.05 <0.05 ng/mL Recent Labs 12/14/19 2229 WBC 13.2* HGB 13.7 HCT 42.1  Recent Labs 12/14/19 2229   
K 3.8  CO2 31 BUN 20  
CREA 0.86 GLU 82  
CA 9.0 Recent Labs 12/14/19 2229 SGOT 28 ALT 29 AP 88 TBILI 0.4 TP 6.8 ALB 3.4*  
GLOB 3.4 Recent Labs 12/14/19 2229 TROIQ <0.05 No results for input(s): INR, PTP, APTT, INREXT in the last 72 hours. No results for input(s): PH, PCO2, PO2 in the last 72 hours. Xr Chest Pa Lat Result Date: 12/14/2019 IMPRESSION: Severe COPD. No airspace disease or other acute abnormality. Ct Neck Soft Tissue W Cont Result Date: 12/15/2019 IMPRESSION: 1. Normal CT examination of the neck. 2. 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 3. Right upper lobe lung nodule. 4. Emphysema. Assessment & Plan: SVC syndrome: grade 1, secondary to left brachiocephalic and SVC thrombus. Malignancy has not been ruled-out yet. 
-continue heparin drip started in the ED 
-needs a CT of the chest which will be delayed until later today since she just received IV contrast 
 
COPD: no evidence of bronchospasm currently HTN: 
-continue home meds Tobacco use: she is not really looking to quit at this time 
-advised her on cessation 
-provide nicotine patch while here CAD: s/p stenting per the patient, though chart notes \"carotid stent\" DVT ppx: anticoagulated Code status: full Disposition: home when ready Signed By: Yuriy Smith MD   
 December 15, 2019

## 2019-12-15 NOTE — PROGRESS NOTES
Hospitalist Progress Note 12/15/2019  1:40 PM                    
 
Patient:  Esteban Colorado PCP:  Micheal Barney, NP Date of admission:  12/14/2019 
 
70 yo female with COPD, CAD, HTN, Chronic smoker admitted for head pressures, arm swelling going on for 1 month. 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 
- this is a complicated situation. Patient likely needs EBUS however her SVC syndrome symptoms are worsening and needs anticoagulation. I have discussed it with Pulmonary associates who have spoke with IR already. Pt may need thrombolytics but if that fails may kerry up getting stent to SVC and will end up on DAP which would make doing Biopsy difficult as well. Currently patient has COPD exacerbation and that needs to be treated before moving forward. - Nebs and Solumedrol added - c/w Heparin gtt - Pulmonary following - CTA Chest with Occluded SVC by mediastinal mass or nodule, RUL malignant appearing nodule. Hx of CAD 
- c/w Atenolol COPD exacerbation 
- nebs, steroids, Singulair, Anoro HTN 
- c/w cozar and atenolol VTE prophylaxis: hep gtt Follow-up labs/studies: Pulm recs Discussed plan of care with Patient/Family, Nurse and Consultant Pulmonary team  
Disposition:  Anticipate discharge to  HOME Subjective Pt seen and examined Feels that her right arm is swelling Head pressure is persistent Review of systems otherwise as above Physical examination Visit Vitals /90 (BP 1 Location: Right arm, BP Patient Position: At rest) Pulse 70 Temp 97.8 °F (36.6 °C) Resp 18 Ht 5' 2\" (1.575 m) Wt 38.3 kg (84 lb 7 oz) SpO2 93% BMI 15.44 kg/m² Temp (24hrs), Av.7 °F (36.5 °C), Min:96.9 °F (36.1 °C), Max:98 °F (36.7 °C) O2 Device: Room air Visit Vitals /90 (BP 1 Location: Right arm, BP Patient Position: At rest) Pulse 70 Temp 97.8 °F (36.6 °C) Resp 18 Ht 5' 2\" (1.575 m) Wt 38.3 kg (84 lb 7 oz) SpO2 93% BMI 15.44 kg/m² O2 Device: Room air Visit Vitals /90 (BP 1 Location: Right arm, BP Patient Position: At rest) Pulse 70 Temp 97.8 °F (36.6 °C) Resp 18 Ht 5' 2\" (1.575 m) Wt 38.3 kg (84 lb 7 oz) SpO2 93% BMI 15.44 kg/m² No intake or output data in the 24 hours ending 12/15/19 1340 Last shift: 
  No intake/output data recorded. Last 3 shifts: 
  No intake/output data recorded. General:   Alert, cooperative, no acute distress Head:   No obvious abnormalities, atraumatic Eyes:   Conjunctivae clear Oropharynx:  Oral mucosa normal  
Neck:  Supple, trachea midline, no adenopathy No JVD Back:    No CVA tenderness Chest wall:    No tenderness or deformities Lungs:   decreased BS, +Wheezing Heart:   Regular rhythm, no murmur Abdomen:    Soft, non-tender Bowel sounds normal  
 No masses or organomegaly Extremities:  No edema or DVT signs Pulses:  Symmetric all extremities Skin:  Warm and dry No rashes or lesions Neurologic:  Oriented x3 No focal deficits Urinary catheter:  deferred Data review CTA Chest 
IMPRESSION:  
1. No pulmonary embolus. 2. RUL neoplastic appearing nodule. 3. Occluded SVC with adjacent mediastinal soft tissue mass/node. 4. Emphysema. 
  
 
Recent Labs 19 
2229 WBC 13.2* HGB 13.7 HCT 42.1  Recent Labs 19 
2229   
K 3.8  CO2 31 GLU 82 BUN 20  
CREA 0.86 CA 9.0 ALB 3.4* TBILI 0.4 SGOT 28 ALT 29  
 
Current Facility-Administered Medications Medication Dose Route Frequency  heparin 25,000 units in D5W 250 ml infusion  18-36 Units/kg/hr IntraVENous TITRATE  sodium chloride (NS) flush 5-40 mL  5-40 mL IntraVENous Q8H  
 sodium chloride (NS) flush 5-40 mL  5-40 mL IntraVENous PRN  
 acetaminophen (TYLENOL) tablet 650 mg  650 mg Oral Q4H PRN  
 ondansetron (ZOFRAN) injection 4 mg  4 mg IntraVENous Q4H PRN  
 losartan (COZAAR) tablet 100 mg  100 mg Oral DAILY  atenolol (TENORMIN) tablet 50 mg  50 mg Oral DAILY  budesonide (ENTOCORT EC) capsule 6 mg  6 mg Oral 7am  
 montelukast (SINGULAIR) tablet 10 mg  10 mg Oral DAILY  fluticasone propionate (FLONASE) 50 mcg/actuation nasal spray 1 Spray  1 Spray Both Nostrils DAILY  heparin (porcine) injection 1,550 Units  40 Units/kg IntraVENous PRN Or  
 heparin (porcine) injection 3,050 Units  80 Units/kg IntraVENous PRN  
 albuterol-ipratropium (DUO-NEB) 2.5 MG-0.5 MG/3 ML  3 mL Nebulization QID RT  
 albuterol (PROVENTIL VENTOLIN) nebulizer solution 2.5 mg  2.5 mg Nebulization Q4H PRN  
 nicotine (NICODERM CQ) 21 mg/24 hr patch 1 Patch  1 Patch TransDERmal DAILY  hydrALAZINE (APRESOLINE) 20 mg/mL injection 20 mg  20 mg IntraVENous Q6H PRN  
 0.9% sodium chloride infusion  100 mL/hr IntraVENous CONTINUOUS  
 albuterol-ipratropium (DUO-NEB) 2.5 mg-0.5 mg/3 ml nebulizer solution  methylPREDNISolone (PF) (SOLU-MEDROL) injection 60 mg  60 mg IntraVENous Q8H  
 [START ON 12/16/2019] umeclidinium-vilanterol (ANORO ELLIPTA) 62.5 mcg- 25 mcg/inhalation  1 Puff Inhalation DAILY Total time spent managing care of the patient: 35 minutes. Lauren Capone MD 
 --Hospitalist, Internal Medicine

## 2019-12-15 NOTE — ED PROVIDER NOTES
EMERGENCY DEPARTMENT HISTORY AND PHYSICAL EXAM 
 
 
Date: 12/14/2019 Patient Name: Misael Cole History of Presenting Illness Chief Complaint Patient presents with  Shortness of Breath  
  c/o sinus issues for 6 weeks, woke today with shortness of breath and swelling to face and eyes History Provided By: Patient HPI: Misael Cole, 69 y.o. female with history of COPD presents to the ED with cc of shortness of breath and facial swelling. Shortness of breath has been present for the past few weeks with progressive worsening. She complains of dyspnea on exertion at home. Denies any change in sputum production. She continues to smoke a little less than a pack per day. She does not have any baseline O2 requirements. She denies any fevers or recent illness but states that she has been having sinus issues for the past few weeks. Today she developed facial swelling with a redness/purplish hue to her face. She has a sensation that of throat tightness and feels that somebody is choking her. This is a new symptom for her and had her concerned enough to come into the ED. She denies any stridor or difficulty swallowing. No change in her voice. No change in vision. She is tolerating secretions. There are no other complaints, changes, or physical findings at this time. PCP: Marcio Barnett NP No current facility-administered medications on file prior to encounter. Current Outpatient Medications on File Prior to Encounter Medication Sig Dispense Refill  fluticasone propionate (FLONASE ALLERGY RELIEF NA) by Nasal route.  hydrOXYzine HCl (ATARAX) 10 mg tablet Take 10 mg by mouth three (3) times daily as needed for Itching.  montelukast (SINGULAIR) 10 mg tablet Take 10 mg by mouth daily.  albuterol (PROVENTIL HFA, VENTOLIN HFA, PROAIR HFA) 90 mcg/actuation inhaler Take 2 Puffs by inhalation.  atenolol (TENORMIN) 50 mg tablet Take 50 mg by mouth daily.  losartan (COZAAR) 100 mg tablet Take 100 mg by mouth daily.  budesonide (ENTOCORT EC) 3 mg capsule Take 6 mg by mouth every morning. Past History Past Medical History: 
Past Medical History:  
Diagnosis Date  CAD (coronary artery disease)  Chronic obstructive pulmonary disease ([facility] [address]  Hypertension Past Surgical History: 
Past Surgical History:  
Procedure Laterality Date  HX CAROTID STENT  2003 Family History: 
History reviewed. No pertinent family history. Social History: 
Social History Tobacco Use  Smoking status: Current Every Day Smoker Packs/day: 0.50  Smokeless tobacco: Never Used Substance Use Topics  Alcohol use: Yes Comment: occ  Drug use: Never Allergies: Allergies Allergen Reactions  Codeine Itching Review of Systems Review of Systems Constitutional: Negative for chills and fever. HENT: Positive for facial swelling. Negative for congestion, rhinorrhea, sore throat, trouble swallowing and voice change. Eyes: Negative for visual disturbance. Respiratory: Positive for cough, shortness of breath and wheezing. Cardiovascular: Negative for chest pain and leg swelling. Gastrointestinal: Negative for abdominal pain, nausea and vomiting. Genitourinary: Negative. Musculoskeletal: Negative for back pain and gait problem. Skin: Negative for color change and rash. Neurological: Negative for dizziness, weakness, light-headedness and headaches. Hematological: Does not bruise/bleed easily. All other systems reviewed and are negative. Physical Exam  
Physical Exam 
Vitals signs and nursing note reviewed. Constitutional:   
   General: She is not in acute distress. Appearance: Normal appearance. She is not ill-appearing or toxic-appearing. HENT:  
   Head: Normocephalic and atraumatic.   
   Comments: I do not appreciate any facial swelling but family at bedside states that patient's face appears more puffy. There is minimal sinus tenderness to palpation Nose: Nose normal.  
   Mouth/Throat:  
   Mouth: Mucous membranes are moist.  
   Pharynx: Oropharynx is clear. No pharyngeal swelling or oropharyngeal exudate. Eyes:  
   Extraocular Movements: Extraocular movements intact. Pupils: Pupils are equal, round, and reactive to light. Neck: Musculoskeletal: Normal range of motion and neck supple. Cardiovascular:  
   Rate and Rhythm: Normal rate and regular rhythm. Heart sounds: No murmur. Pulmonary:  
   Effort: Pulmonary effort is normal. No tachypnea or respiratory distress. Breath sounds: Normal breath sounds. No wheezing. Abdominal:  
   General: There is no distension. Palpations: Abdomen is soft. Tenderness: There is no tenderness. There is no guarding or rebound. Musculoskeletal: Normal range of motion. General: No swelling or tenderness. Right lower leg: No edema. Left lower leg: No edema. Skin: 
   General: Skin is warm and dry. Coloration: Skin is not pale. Findings: No erythema. Neurological:  
   General: No focal deficit present. Mental Status: She is alert and oriented to person, place, and time. Diagnostic Study Results Labs - Recent Results (from the past 12 hour(s)) EKG, 12 LEAD, INITIAL Collection Time: 12/14/19 10:02 PM  
Result Value Ref Range Ventricular Rate 74 BPM  
 Atrial Rate 74 BPM  
 P-R Interval 146 ms  
 QRS Duration 76 ms  
 Q-T Interval 398 ms QTC Calculation (Bezet) 441 ms Calculated P Axis 88 degrees Calculated R Axis 91 degrees Calculated T Axis 87 degrees Diagnosis Normal sinus rhythm Rightward axis Anterior infarct , age undetermined When compared with ECG of 07-DEC-2019 12:38, Anterior infarct is now present CBC WITH AUTOMATED DIFF Collection Time: 12/14/19 10:29 PM  
Result Value Ref Range WBC 13.2 (H) 3.6 - 11.0 K/uL  
 RBC 4.40 3.80 - 5.20 M/uL  
 HGB 13.7 11.5 - 16.0 g/dL HCT 42.1 35.0 - 47.0 % MCV 95.7 80.0 - 99.0 FL  
 MCH 31.1 26.0 - 34.0 PG  
 MCHC 32.5 30.0 - 36.5 g/dL  
 RDW 12.8 11.5 - 14.5 % PLATELET 073 634 - 977 K/uL MPV 9.9 8.9 - 12.9 FL  
 NRBC 0.0 0  WBC ABSOLUTE NRBC 0.00 0.00 - 0.01 K/uL NEUTROPHILS 70 32 - 75 % LYMPHOCYTES 19 12 - 49 % MONOCYTES 9 5 - 13 % EOSINOPHILS 1 0 - 7 % BASOPHILS 1 0 - 1 % IMMATURE GRANULOCYTES 0 0.0 - 0.5 % ABS. NEUTROPHILS 9.4 (H) 1.8 - 8.0 K/UL  
 ABS. LYMPHOCYTES 2.4 0.8 - 3.5 K/UL  
 ABS. MONOCYTES 1.1 (H) 0.0 - 1.0 K/UL  
 ABS. EOSINOPHILS 0.2 0.0 - 0.4 K/UL  
 ABS. BASOPHILS 0.1 0.0 - 0.1 K/UL  
 ABS. IMM. GRANS. 0.0 0.00 - 0.04 K/UL  
 DF AUTOMATED METABOLIC PANEL, COMPREHENSIVE Collection Time: 12/14/19 10:29 PM  
Result Value Ref Range Sodium 138 136 - 145 mmol/L Potassium 3.8 3.5 - 5.1 mmol/L Chloride 103 97 - 108 mmol/L  
 CO2 31 21 - 32 mmol/L Anion gap 4 (L) 5 - 15 mmol/L Glucose 82 65 - 100 mg/dL BUN 20 6 - 20 MG/DL Creatinine 0.86 0.55 - 1.02 MG/DL  
 BUN/Creatinine ratio 23 (H) 12 - 20 GFR est AA >60 >60 ml/min/1.73m2 GFR est non-AA >60 >60 ml/min/1.73m2 Calcium 9.0 8.5 - 10.1 MG/DL Bilirubin, total 0.4 0.2 - 1.0 MG/DL  
 ALT (SGPT) 29 12 - 78 U/L  
 AST (SGOT) 28 15 - 37 U/L Alk. phosphatase 88 45 - 117 U/L Protein, total 6.8 6.4 - 8.2 g/dL Albumin 3.4 (L) 3.5 - 5.0 g/dL Globulin 3.4 2.0 - 4.0 g/dL A-G Ratio 1.0 (L) 1.1 - 2.2 Radiologic Studies -  
CT NECK SOFT TISSUE W CONT Final Result IMPRESSION:   
1. Normal CT examination of the neck. 2. 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  
extensive collateral vessels of the left shoulder and neck. 3. Right upper lobe lung nodule. 4. Emphysema. XR CHEST PA LAT Final Result IMPRESSION: Severe COPD. No airspace disease or other acute abnormality. CT Results  (Last 48 hours) 12/14/19 2349  CT NECK SOFT TISSUE W CONT Final result Impression:  IMPRESSION:   
1. Normal CT examination of the neck. 2. 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  
extensive collateral vessels of the left shoulder and neck. 3. Right upper lobe lung nodule. 4. Emphysema. Narrative:  EXAM:  CT NECK SOFT TISSUE W CONT INDICATION: swelling in throat/face. concern for pancoast tumor vs SVC syndrome  
vs ENT tumor vs sinusitis. COMPARISON: None. CONTRAST: 100 mL of Isovue-300. TECHNIQUE: Multislice helical CT was performed from the mid calvarium to the  
aortic arch during uneventful rapid bolus intravenous contrast administration. Contiguous 2.5 mm axial images were reconstructed and lung and soft tissue  
windows were generated. Coronal reformations were generated. CT dose reduction  
was achieved through use of a standardized protocol tailored for this  
examination and automatic exposure control for dose modulation. FINDINGS:  
No mass or adenopathy is identified within the neck. The carotid and jugular vessels enhance normally. The thyroid gland, submandibular salivary glands and parotid glands are normal.  
No nasopharyngeal, pharyngeal or laryngeal mass is identified. No abnormalities are identified in the visualized portions of the brain or  
orbits. The visualized mastoid air cells and paranasal sinuses are clear. There are changes of emphysema throughout the lungs and there is a 1.6 x 1.6 x  
2.3 cm nodule in the right upper lobe. There is thrombus in the left brachiocephalic vein which is not completely  
imaged resulting in extensive collateral vessels in the region of the left  
shoulder. CXR Results  (Last 48 hours) 12/14/19 2245  XR CHEST PA LAT Final result Impression:  IMPRESSION: Severe COPD. No airspace disease or other acute abnormality. Narrative:  EXAM:  XR CHEST PA LAT. INDICATION: SOB. COMPARISON: 2/25/2019. FINDINGS:   
PA and lateral radiographs of the chest were obtained. The patient is on a  
cardiac monitor. Lungs: The lungs are very hyperinflated and clear of mass, nodule, airspace  
disease or edema. Pleura: There is no pleural effusion or pneumothorax. Mediastinum: The cardiac and mediastinal contours and pulmonary vascularity are  
normal.  
Bones and soft tissues: There are degenerative changes of the spine. Medical Decision Making I am the first provider for this patient. I reviewed the vital signs, available nursing notes, past medical history, past surgical history, family history and social history. Vital Signs-Reviewed the patient's vital signs. Patient Vitals for the past 12 hrs: 
 Temp Pulse Resp BP SpO2  
12/14/19 2156 96.9 °F (36.1 °C) 83 18 (!) 190/96 98 % Records Reviewed: Nursing Notes, Old Medical Records, Previous Radiology Studies and Previous Laboratory Studies Provider Notes (Medical Decision Making): This is a 78-year-old female here with shortness of breath, dyspnea, facial swelling and plethoric veins with discoloration of the face and bilateral upper extremities. On exam she is in mild respiratory distress. She does become hypoxic with ambulation or with extensive conversation. She does seem to have some plethoric edema and discoloration to the face, neck, bilateral upper extremities. Given her extensive smoking history of this did have any concern for possible Pancoast tumor and/or SVC syndrome versus ENT mass. I obtained CT soft tissue neck which demonstrated thrombus within the left brachiocephalic vein as well as the SVC. Unclear etiology for this whether infectious or malignancy.   Patient started on heparin and will contact hospitalist at this time for admission. ED Course:  
Initial assessment performed. The patients presenting problems have been discussed, and they are in agreement with the care plan formulated and outlined with them. I have encouraged them to ask questions as they arise throughout their visit. ED Course as of Dec 15 0050 Sat Dec 14, 2019  
2242 EKG per my interpretation normal sinus rhythm, rate 74 bpm, rightward axis, no acute ischemic changes. Wanda Sexton ED Course User Index Cady Beverly MD  
 
 
 
Admission Note: 
Patient is being admitted to the hospital by Dr. Ariana Davidson, Service: Hospitalist.  The results of their tests and reasons for their admission have been discussed with them and available family. They convey agreement and understanding for the need to be admitted and for their admission diagnosis. Critical Care Time:  
0 Disposition: 
Admit PLAN: 
1. Current Discharge Medication List  
  
 
2. Follow-up Information None Return to ED if worse Diagnosis Clinical Impression: 1. SVC syndrome 2. Dyspnea on exertion 3. Hypoxia 4. Chronic obstructive pulmonary disease, unspecified COPD type ([address] Attestations: 
 
Kaylee Park MD 
 
Please note that this dictation was completed with [employer], the computer voice recognition software. Quite often unanticipated grammatical, syntax, homophones, and other interpretive errors are inadvertently transcribed by the computer software. Please disregard these errors. Please excuse any errors that have escaped final proofreading. Thank you.

## 2019-12-16 PROBLEM — I87.1 SVC (SUPERIOR VENA CAVA OBSTRUCTION): Status: ACTIVE | Noted: 2019-01-01

## 2019-12-16 PROBLEM — R91.8 LUNG MASS: Status: ACTIVE | Noted: 2019-01-01

## 2019-12-16 NOTE — PROGRESS NOTES
Reason for Admission:   SVC syndrome RRAT Score:    8 Low risk Plan for utilizing home health:  Pt has not had home health in the past. Pt is receptive to utilizing home health if needed. Current Advanced Directive/Advance Care Plan: Pt is FULL code status. Pt does not have an ACP on file. CM discussed with pt about ACP. Pt did not want to address ACP at this time. Pt wanted to have a discussion with her son about ACP. Transition of Care Plan:                   
Transfer to [facility] CM met with pt and pt's friend Jing Ayala ([phone]) at bedside to discuss d/c plan. CM verified pt's demographics, insurance and PCP. Pt is a 70 y/o  female admitted to [facility] on 12/15/19 for SVC syndrome. Pt sees Adrian Dupree NP. Pt uses [facility] in [facility] for Rx. Pt resides with her son in an one level home with 3 FLAKO. Pt is independent with ADL's and IADL's. Pt does drive. Pt has a nebulizer and crutches. No HH, SNF, or acute inpatient rehab in the past. Pt is FULL code status. Pt does not have an ACP on file. Pt will be transferred by medical transport to [facility]. Care Management Interventions PCP Verified by CM: Yes(Pt sees Adrian Dupree NP.) Palliative Care Criteria Met (RRAT>21 & CHF Dx)?: No 
Mode of Transport at Discharge: Other (see comment)(Pt will be transported by medical transport) Transition of Care Consult (CM Consult): Other(Transfer to [facility]) Discharge Durable Medical Equipment: No(Pt has a nebulizer and crutches. ) Physical Therapy Consult: No 
Occupational Therapy Consult: No 
Speech Therapy Consult: No 
Current Support Network: Relative's Home(Pt resides with her son in an one level home with 3 FLAKO. ) Confirm Follow Up Transport: Self(Pt does drive) Plan discussed with Pt/Family/Caregiver: Yes Discharge Location Discharge Placement: Transferred to higher level of care(Transfer to [facility]) CM will continue to follow patient for discharge planning needs and arrange for services as deemed necessary. Sruthi Kaiser [address] [place] [address] 
[phone]

## 2019-12-16 NOTE — PROGRESS NOTES
Hospitalist Progress Note NAME: Lexy Taylor :  1945 MRN:  [id number] Assessment / Plan: 
Acute Respiratory failure with Hypoxia due to COPD Exacerbation SVC Syndrome COPD Exacerbation RUL Nodule likely malignant 
- this is a complicated situation. Patient likely needs EBUS however her SVC syndrome symptoms are worsening and needs anticoagulation. I have discussed it with Pulmonary associates who have spoke with IR already. Pt may need thrombolytics but if that fails may kerry up getting stent to SVC and will end up on DAP which would make doing Biopsy difficult as well. Currently patient has COPD exacerbation and that needs to be treated before moving forward. - cont nebs. Decrease salumedrol to 40 q8hr. - c/w Heparin gtt - Pulmonary following - CTA Chest with Occluded SVC by mediastinal mass or nodule, RUL malignant appearing nodule.  
  
Hx of CAD 
- c/w Atenolol 
  
COPD exacerbation 
- nebs, steroids, Singulair, Anoro  
  
HTN 
- c/w cozar and atenolol  
  
VTE prophylaxis: hep gtt Follow-up labs/studies: Pulm recs Discussed plan of care with Patient/Family, Nurse and Consultant Pulmonary team  
Disposition:  Anticipate discharge to  HOME    
 
 
 
 
less than 18.5 Underweight / Body mass index is 15.44 kg/m². Code status: Full Subjective: Chief Complaint / Reason for Physician Visit \"less sob on nebs\". Discussed with RN events overnight. Review of Systems: 
Symptom Y/N Comments  Symptom Y/N Comments Fever/Chills    Chest Pain Poor Appetite    Edema Cough    Abdominal Pain Sputum    Joint Pain SOB/AMBROSE    Pruritis/Rash Nausea/vomit    Tolerating PT/OT Diarrhea    Tolerating Diet Constipation    Other Could NOT obtain due to:   
 
Objective: VITALS:  
Last 24hrs VS reviewed since prior progress note. Most recent are: 
Patient Vitals for the past 24 hrs: 
 Temp Pulse Resp BP SpO2 12/16/19 0716 98.2 °F (36.8 °C) 91 18 (!) 179/100 90 % 12/16/19 0416 97.7 °F (36.5 °C) 86 17 147/88 93 % 12/15/19 2229 97.6 °F (36.4 °C) 98 16 152/82 94 % 12/15/19 2102    178/86   
12/15/19 2020 97.7 °F (36.5 °C) 87 16 170/85 91 % 12/15/19 2013     94 % 12/15/19 1558 97.6 °F (36.4 °C) 75 16 152/86 91 % 12/15/19 1553     93 % 12/15/19 1154 97.8 °F (36.6 °C) 70 18 181/90 93 % 12/15/19 1128     94 % 12/15/19 0900  81     
12/15/19 0845  90     
12/15/19 0830  82   94 % 12/15/19 0817     92 % 12/15/19 0815  80   91 % 12/15/19 0800  92  (!) 157/94 90 % Intake/Output Summary (Last 24 hours) at 12/16/2019 0750 Last data filed at 12/16/2019 2804 Gross per 24 hour Intake 1320 ml Output  Net 1320 ml PHYSICAL EXAM: 
General: WD, WN. Alert, cooperative, no acute distress   
EENT:  EOMI. Anicteric sclerae. MMM Resp:  CTA bilaterally, no wheezing or rales. No accessory muscle use CV:  Regular  rhythm,  No edema GI:  Soft, Non distended, Non tender.  +Bowel sounds Neurologic:  Alert and oriented X 3, normal speech, Psych:   Good insight. Not anxious nor agitated Skin:  No rashes. No jaundice Reviewed most current lab test results and cultures  YES Reviewed most current radiology test results   YES Review and summation of old records today    NO Reviewed patient's current orders and MAR    YES 
PMH/SH reviewed - no change compared to H&P 
________________________________________________________________________ Care Plan discussed with: 
  Comments Patient Family RN Care Manager Consultant Multidiciplinary team rounds were held today with , nursing, pharmacist and clinical coordinator. Patient's plan of care was discussed; medications were reviewed and discharge planning was addressed. ________________________________________________________________________ Total NON critical care TIME:  20   Minutes Total CRITICAL CARE TIME Spent:   Minutes non procedure based Comments >50% of visit spent in counseling and coordination of care    
________________________________________________________________________ Charis Beebe DO  
 
Procedures: see electronic medical records for all procedures/Xrays and details which were not copied into this note but were reviewed prior to creation of Plan. LABS: 
I reviewed today's most current labs and imaging studies. Pertinent labs include: 
Recent Labs 12/14/19 
2229 WBC 13.2* HGB 13.7 HCT 42.1  Recent Labs 12/14/19 2229   
K 3.8  CO2 31 GLU 82 BUN 20  
CREA 0.86 CA 9.0 ALB 3.4* TBILI 0.4 SGOT 28 ALT 29 Signed: Charis Beebe DO

## 2019-12-16 NOTE — PROGRESS NOTES
[facility] Pulmonary, Critical Care, and Sleep Medicine Patient Consult Name: Gabriel Bullard MRN: [id number] : 1945 Hospital: [facility] Date: 2019 Discussed with Dr. Shelton of our Group who is at [facility]. Will plan to transfer to [facility] to evaluate for EBUS and possible CT guided bronch if needed. Will make NPO past midnight in case can be done tomorrow. Discussed with DR. Emmett Ramirez, Patient and her Daughter. All agree on Transfer. IMPRESSION:  
· Multiple complex acute problems: 
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 · Pulmonary nodule, likely malignant, In a difficult location to Bx. Will ask my colleagues at [facility] about [address] [place]. · CAD with h/o RCA stent in  · Tobacco use RECOMMENDATIONS:  
· O2 PRN 
· Bronchodilators - she was on Symbicort at home (only prescribed 1 puff once daily. ..), but Anoro probably more appropriate for her · Systemic corticosteroids · Heparin drip for now, if she does not improve, she may need thrombolysis and/or stent - discussed on phone with IR, who will follow peripherally for now · She does need a biopsy of her nodule before committing her to long term anticoagulation, so it needs to be dealt with during this admission. Not sure of best approach at this time. Await radiology official read of chest CT. She may need navigation bronchoscopy (only done at [facility]) or possibly EBUS before starting chronic anticoagulation or stenting. · Unfortunately, her array of issues are complicated and one makes the other difficult to deal with. Discussed all of the above with the patient at length. Subjective:  
12-16-19: Pt is feeling about the same. Still with dyspnea. No coughing. No chest pain, no back pain. No headaches. Has sensation of fullness in her chest and head. Slept ok last pm. No leg pain no abdominal pain. Tolerating po intake. Has dyspnea at rest. Has about the same level of RUL arm swelling. This patient has been seen and evaluated at the request of Dr. Luanne Hurtado for pulmonary nodule and SVC syndrome. Patient is a 69 y.o. female smoker (~100 p-y) with COPD (has never seen pulmonary, though has initial appointment scheduled for sometime in the near future. She presented overnight with a 1+ day history of increasing swelling of face and arms. She had noted new distended veins on her lower chest and her left arm about a week ago, but the swelling was new about a day prior to coming to the hospital.  She is also wheezing and more short of breath than at baseline. Found to have SVC obstruction by thrombus. Also found to have a pulmonary nodule. Past Medical History:  
Diagnosis Date  CAD (coronary artery disease)  Chronic obstructive pulmonary disease ([facility] [address]  Hypertension Past Surgical History:  
Procedure Laterality Date  HX CAROTID STENT  2003 Prior to Admission medications Medication Sig Start Date End Date Taking? Authorizing Provider  
fluticasone propionate (FLONASE ALLERGY RELIEF NA) by Nasal route. Yes Car, MD Trudy  
hydrOXYzine HCl (ATARAX) 10 mg tablet Take 10 mg by mouth three (3) times daily as needed for Itching. Yes Car, MD Trudy  
montelukast (SINGULAIR) 10 mg tablet Take 10 mg by mouth daily. Yes Car, MD Trudy  
albuterol (PROVENTIL HFA, VENTOLIN HFA, PROAIR HFA) 90 mcg/actuation inhaler Take 2 Puffs by inhalation. Yes Car, MD Trudy  
atenolol (TENORMIN) 50 mg tablet Take 50 mg by mouth daily. Yes Car, MD Trudy  
losartan (COZAAR) 100 mg tablet Take 100 mg by mouth daily. Yes Trudy Yoon MD  
budesonide (ENTOCORT EC) 3 mg capsule Take 6 mg by mouth every morning. Yes Car, MD Trudy  
 
Allergies Allergen Reactions  Codeine Itching Social History Tobacco Use  Smoking status: Current Every Day Smoker Packs/day: 0.50  Smokeless tobacco: Never Used Substance Use Topics  Alcohol use: Yes Comment: occ History reviewed. No pertinent family history. Current Facility-Administered Medications Medication Dose Route Frequency  methylPREDNISolone (PF) (SOLU-MEDROL) injection 40 mg  40 mg IntraVENous Q8H  
 heparin 25,000 units in D5W 250 ml infusion  18-36 Units/kg/hr IntraVENous TITRATE  sodium chloride (NS) flush 5-40 mL  5-40 mL IntraVENous Q8H  
 losartan (COZAAR) tablet 100 mg  100 mg Oral DAILY  atenolol (TENORMIN) tablet 50 mg  50 mg Oral DAILY  budesonide (ENTOCORT EC) capsule 6 mg  6 mg Oral 7am  
 montelukast (SINGULAIR) tablet 10 mg  10 mg Oral DAILY  fluticasone propionate (FLONASE) 50 mcg/actuation nasal spray 1 Spray  1 Spray Both Nostrils DAILY  albuterol-ipratropium (DUO-NEB) 2.5 MG-0.5 MG/3 ML  3 mL Nebulization QID RT  
 nicotine (NICODERM CQ) 21 mg/24 hr patch 1 Patch  1 Patch TransDERmal DAILY  umeclidinium-vilanterol (ANORO ELLIPTA) 62.5 mcg- 25 mcg/inhalation  1 Puff Inhalation DAILY Review of Systems: A comprehensive review of systems was negative except for that written in the HPI. Objective:  
Vital Signs:   
Visit Vitals /83 (BP 1 Location: Right arm, BP Patient Position: Sitting) Pulse 82 Temp 98 °F (36.7 °C) Resp 20 Ht 5' 2\" (1.575 m) Wt 38.3 kg (84 lb 7 oz) SpO2 90% BMI 15.44 kg/m² O2 Device: Room air Temp (24hrs), Av.8 °F (36.6 °C), Min:97.6 °F (36.4 °C), Max:98.2 °F (36.8 °C) Intake/Output:  
Last shift:       07 - 1900 In: 480 [P.O.:480] Out: - Last 3 shifts:  190 -  0700 In: 1320 [P.O.:1320] Out: - Intake/Output Summary (Last 24 hours) at 2019 1111 Last data filed at 2019 1027 Gross per 24 hour Intake 1560 ml Output  Net 1560 ml Physical Exam: General:  Alert, cooperative, no distress Head:  Normocephalic, mild facial edema Eyes:  Conjunctivae/corneas clear. PERRL, EOMs intact. Nose: Nares normal. Septum midline. Mucosa normal.    
Throat: Lips, mucosa, and tongue normal.    
Neck: Supple, symmetrical, trachea midline, +distended veins Back:   Symmetric, no curvature. ROM normal.  
Lungs:   Diffuse bilateral wheeze, no rales or ronchi Chest wall:  No tenderness or deformity. Heart:  Regular rate and rhythm Abdomen:   Soft, non-tender. Bowel sounds normal.   
Extremities: Extremities normal, atraumatic, no cyanosis or clubbing, has some edema of the RUE. Mild facial swelling. Prominet Skin: Skin color, texture, turgor normal. No rashes or lesions Lymph nodes: Cervical, supraclavicular, and axillary nodes normal.  She does have what looks like a left axillary nodule but is soft and more likely a varicosity of a distended axillary vein Neurologic: Grossly nonfocal, motor is grossly intact. Psych: no overt anxiety or depression. Data review:  
 
Recent Results (from the past 24 hour(s)) PTT Collection Time: 12/15/19  3:09 PM  
Result Value Ref Range aPTT 53.6 (H) 22.1 - 32.0 sec  
 aPTT, therapeutic range     58.0 - 77.0 SECS  
PTT Collection Time: 12/15/19  9:58 PM  
Result Value Ref Range aPTT 60.9 (H) 22.1 - 32.0 sec  
 aPTT, therapeutic range     58.0 - 77.0 SECS  
PTT Collection Time: 12/16/19  4:19 AM  
Result Value Ref Range aPTT 66.5 (H) 22.1 - 32.0 sec  
 aPTT, therapeutic range     58.0 - 77.0 SECS Imaging: 
I have personally reviewed the patients radiographs and have reviewed the reports: 
Emphysema, right upper lobe pulmonary nodule, thrombus in the SVC (I can't tell if there is any adenopathy vs thrombosis), official read pending Rennis Favre, MD

## 2019-12-16 NOTE — PROGRESS NOTES
Bedside shift change report given to Jazmyne Villela (oncoming nurse) by Toya Cates (offgoing nurse). Report included the following information SBAR.

## 2019-12-16 NOTE — PROGRESS NOTES
Patient has a bed at [facility]. Unit IM, bed 403. Transport service will set up . Called report to [phone]. Transportation set up by transport service at [facility]. 1836: TRANSFER - OUT REPORT: 
 
Verbal report given to JULIANO Argueta (name) on Wayne Hadley  being transferred to [facility] (unit) for routine progression of care Report consisted of patients Situation, Background, Assessment and  
Recommendations(SBAR). Information from the following report(s) SBAR and Kardex was reviewed with the receiving nurse. Lines:  
Peripheral IV 12/14/19 Left Antecubital (Active) Site Assessment Clean, dry, & intact 12/16/2019  2:00 PM  
Phlebitis Assessment 0 12/16/2019  2:00 PM  
Infiltration Assessment 0 12/16/2019  2:00 PM  
Dressing Status Clean, dry, & intact 12/16/2019  2:00 PM  
Dressing Type Transparent 12/16/2019  2:00 PM  
Hub Color/Line Status Pink;Flushed 12/16/2019  2:00 PM  
Action Taken Blood drawn 12/14/2019 10:31 PM  
   
Peripheral IV 12/15/19 Anterior; Left Forearm (Active) Site Assessment Clean, dry, & intact 12/16/2019  2:00 PM  
Phlebitis Assessment 0 12/16/2019  2:00 PM  
Infiltration Assessment 0 12/16/2019  2:00 PM  
Dressing Status Clean, dry, & intact 12/16/2019  2:00 PM  
Dressing Type Transparent 12/16/2019  2:00 PM  
Hub Color/Line Status Blue;Flushed 12/16/2019  2:00 PM  
  
 
Opportunity for questions and clarification was provided. Patient transported with: 
 Registered Nurse

## 2019-12-16 NOTE — PROGRESS NOTES
Bedside shift change report GIVEN TO Dimitry Boswell RN. Report included the following information SBAR. SIGNIFICANT CHANGES DURING SHIFT:  NA 
 
 
CONCERNS TO ADDRESS WITH MD:  RADHA 
 
 
 
 
[address] NURSING NOTE Admission Date 12/14/2019 Admission Diagnosis SVC syndrome [I87.1] Consults IP CONSULT TO HOSPITALIST 
IP CONSULT TO PULMONOLOGY Cardiac Monitoring [x] Yes [] No  
  
Purposeful Hourly Rounding [x] Yes   
Zeyad Score Total Score: 1 Zeyad score 3 or > [x] Bed Alarm [] Avasys [] 1:1 sitter [] Patient refused (Signed refusal form in chart) Bright Score Bright Score: 21 Bright score 14 or < [] PMT consult [] Wound Care consult  
 []  Specialty bed  [] Nutrition consult Influenza Vaccine Received Flu Vaccine for Current Season (usually Sept-March): Yes Oxygen needs? [x] Room air Oxygen @  []1L    []2L    []3L   []4L    []5L   []6L via NC Chronic home O2 use? [] Yes [x] No 
Perform O2 challenge test and document in progress note using smartphrase (.Homeoxygen) Last bowel movement Last Bowel Movement Date: 12/14/19 Urinary Catheter LDAs Peripheral IV 12/14/19 Left Antecubital (Active) Site Assessment Clean, dry, & intact 12/15/2019  8:20 PM  
Phlebitis Assessment 0 12/15/2019  8:20 PM  
Infiltration Assessment 0 12/15/2019  8:20 PM  
Dressing Status Clean, dry, & intact 12/15/2019  8:20 PM  
Dressing Type Transparent 12/15/2019  8:20 PM  
Hub Color/Line Status Pink 12/15/2019  8:20 PM  
Action Taken Blood drawn 12/14/2019 10:31 PM  
   
Peripheral IV 12/15/19 Anterior; Left Forearm (Active) Site Assessment Clean, dry, & intact 12/15/2019  8:20 PM  
Phlebitis Assessment 0 12/15/2019  8:20 PM  
Infiltration Assessment 0 12/15/2019  8:20 PM  
Dressing Status Clean, dry, & intact 12/15/2019  8:20 PM  
Dressing Type Transparent 12/15/2019  8:20 PM  
Hub Color/Line Status Blue 12/15/2019  8:20 PM  
                  
  
 Readmission Risk Assessment Tool Score Low Risk 8 Total Score 3 Has Seen PCP in Last 6 Months (Yes=3, No=0)  
 5 Pt. Coverage (Medicare=5 , Medicaid, or Self-Pay=4) Criteria that do not apply:  
 . Living with Significant Other. Assisted Living. LTAC. SNF. or  
Rehab Patient Length of Stay (>5 days = 3) IP Visits Last 12 Months (1-3=4, 4=9, >4=11) Charlson Comorbidity Score (Age + Comorbid Conditions) Expected Length of Stay - - - Actual Length of Stay 1

## 2019-12-16 NOTE — PROGRESS NOTES
ADULT PROTOCOL: JET AEROSOL ASSESSMENT Patient  [place]     69 y.o.   female     12/16/2019  3:34 AM 
 
Breath Sounds Pre Procedure: Right Breath Sounds: Diminished Left Breath Sounds: Diminished Breath Sounds Post Procedure:   
                                 
 
Breathing pattern: Pre procedure Breathing Pattern: Regular Post procedure Breathing Pattern: Regular Heart Rate: Pre procedure Pulse: 71 
         Post procedure Pulse: 74 Resp Rate: Pre procedure Respirations: 17 Post procedure Respirations: 18 Peak Flow: Pre bronchodilator Post bronchodilator FVC/FEV1:   
 
Incentive Spirometry:    
     
 
Cough: Pre procedure Cough: Strong Post procedure Cough: Non-productive Suctioned: NO Sputum: Pre procedure Post procedure Oxygen: O2 Device: Room air Changed: NO SpO2: Pre procedure SpO2: 94 %   without oxygen Post procedure SpO2: 95 %  without oxygen Nebulizer Therapy: Current medications Aerosolized Medications: DuoNeb Changed: NO Smoking History: current smoker Problem List:  
Patient Active Problem List  
Diagnosis Code  SVC syndrome I87.1 Respiratory Therapist: Mckenna Draper RT

## 2019-12-16 NOTE — PROGRESS NOTES
Problem: Falls - Risk of 
Goal: *Absence of Falls Description Document Kanika Hassell Fall Risk and appropriate interventions in the flowsheet. Outcome: Progressing Towards Goal 
Note: Fall Risk Interventions: 
  
 
  
 
Medication Interventions: Bed/chair exit alarm, Patient to call before getting OOB Problem: Patient Education: Go to Patient Education Activity Goal: Patient/Family Education Outcome: Progressing Towards Goal 
  
Problem: Chronic Obstructive Pulmonary Disease (COPD) Goal: *Oxygen saturation during activity within specified parameters Outcome: Progressing Towards Goal 
Goal: *Able to remain out of bed as prescribed Outcome: Progressing Towards Goal 
Goal: *Absence of hypoxia Outcome: Progressing Towards Goal 
Goal: *Optimize nutritional status Outcome: Progressing Towards Goal 
  
Problem: Patient Education: Go to Patient Education Activity Goal: Patient/Family Education Outcome: Progressing Towards Goal 
  
Problem: Breathing Pattern - Ineffective Goal: *Absence of hypoxia Outcome: Progressing Towards Goal 
Goal: *Use of effective breathing techniques Outcome: Progressing Towards Goal

## 2019-12-16 NOTE — PROGRESS NOTES
Initial Nutrition Assessment: 
 
INTERVENTIONS/RECOMMENDATIONS:  
· Consider regular diet · Ensure TID (chocolate) · Please document % meals and supplements consumed in flowsheet I/O's under intake · Could get SLP eval if she continues coughing with liquid consumption ASSESSMENT:  
Chart reviewed, medically noted for COPD with acute exacerbation, SVC Syndrome, RUL Nodule, and PMH shown below. Assessing pt due to low BMI. Pt reports she has never weighed more than 100 lbs. Weight history shows she has been underweight since 2013. Her usual meal pattern consists of 2 meals per day (breakfast and lunch) with snacks and 3 Boost shakes per day. She agreed to receive ensure during her stay. She reports she had experienced weight loss when she first started feeling sick however she has gained this back. She notes coughing when consuming liquids, had been seen by ENT recently. Past Medical History:  
Diagnosis Date  CAD (coronary artery disease)  Chronic obstructive pulmonary disease ([facility] [address]  Hypertension Diet Order: Cardiac 
% Eaten:   
Patient Vitals for the past 72 hrs: 
 % Diet Eaten 12/15/19 1751 50 % 12/15/19 1300 50 % 12/15/19 0900 75 % Pertinent Medications: [x]Reviewed:  
Pertinent Labs: [x]Reviewed:  
Food Allergies: [x]NKFA  []Other Last BM: 12/14 Edema:        [x]RUE 1+  [x]LUE 1+  []RLE   []LLE Pressure Injury:  n/a   [] Stage I   [] Stage II   [] Stage III   [] Stage IV Wt Readings from Last 30 Encounters:  
12/15/19 38.3 kg (84 lb 7 oz) 12/07/19 37.6 kg (82 lb 14.3 oz)  
11/16/13 39.4 kg (86 lb 13.8 oz) Anthropometrics:  
Height: 5' 2\" (157.5 cm) Weight: 38.3 kg (84 lb 7 oz) IBW (%IBW):   ( ) UBW (%UBW):   (  %) Last Weight Metrics: 
Weight Loss Metrics 12/15/2019 12/7/2019 11/16/2013 Today's Wt 84 lb 7 oz 82 lb 14.3 oz 86 lb 13.8 oz BMI 15.44 kg/m2 15.16 kg/m2 -  
 
 
BMI: Body mass index is 15.44 kg/m². This BMI is indicative of: [x]Underweight    []Normal    []Overweight    [] Obesity   [] Extreme Obesity (BMI>40) Estimated Nutrition Needs (Based on):  
1481 Kcals/day(BMR: 850 x 1.5) , 60 g(1.5 g/kg) Protein Carbohydrate: At Least 130 g/day  Fluids: 1275 mL/day (1ml/kcal) or per primary team 
 
NUTRITION DIAGNOSES:  
Problem:  Underweight Etiology: related to COPD and chronically underweight Signs/Symptoms: as evidenced by BMI of 15.4 NUTRITION INTERVENTIONS: 
Meals/Snacks: General/healthful diet   Supplements: Commercial supplement GOAL:  
consume >50% of meals and ONS in 3-5 days LEARNING NEEDS (Diet, Food/Nutrient-Drug Interaction):  
 [x] None Identified 
 [] Identified and Education Provided/Documented 
 [] Identified and Pt declined/was not appropriate Cultureal, Mandaen, OR Ethnic Dietary Needs:  
 [x] None Identified 
 [] Identified and Addressed 
 
 [x] Interdisciplinary Care Plan Reviewed/Documented  
 [x] Discharge Planning: General healthy diet with kcal and protein dense foods. Continue Boost TID MONITORING /EVALUATION:  
  
Food/Nutrient Intake Outcomes: Total energy intake Physical Signs/Symptoms Outcomes: Weight/weight change, Electrolyte and renal profile, GI 
 
NUTRITION RISK:  
 [] High              [x] Moderate           []  Low  []  Minimal/Uncompromised PT SEEN FOR:  
 []  MD Consult: []Calorie Count []Diabetic Diet Education []Diet Education []Electrolyte Management []General Nutrition Management and Supplements []Management of Tube Feeding []TPN Recommendations []  RN Referral:  []MST score >=2 
   []Enteral/Parenteral Nutrition PTA []Pregnant: Gestational DM or Multigestation 
   []Pressure Ulcer/Wound Care needs [x]  Low BMI 
[]  LOS Referral  
 
 
Re Butts RDN Pager [phone] Weekend Pager [phone]

## 2019-12-16 NOTE — PROGRESS NOTES
0700: Bedside shift change report given to [facility]. RN (oncoming nurse) by Eddi campos RN (offgoing nurse). Report included the following information Kardex.

## 2019-12-16 NOTE — PROGRESS NOTES
Problem: Chronic Obstructive Pulmonary Disease (COPD) Goal: *Oxygen saturation during activity within specified parameters Outcome: Progressing Towards Goal 
Goal: *Able to remain out of bed as prescribed Outcome: Progressing Towards Goal 
Goal: *Absence of hypoxia Outcome: Progressing Towards Goal 
Goal: *Optimize nutritional status Outcome: Progressing Towards Goal 
  
Problem: Breathing Pattern - Ineffective Goal: *Absence of hypoxia Outcome: Progressing Towards Goal 
Goal: *Use of effective breathing techniques Outcome: Progressing Towards Goal

## 2019-12-16 NOTE — PROGRESS NOTES
Spiritual Care Partner Volunteer visited patient in [facility] on 12/16/19. Documented by: 
Rev. Yael Berger EdD MDiv  [facility] Page [phone]

## 2019-12-16 NOTE — DISCHARGE SUMMARY
Hospitalist Discharge Summary Patient ID: 
Karyle Reining [id number] 
48 y.o. 
1945 12/14/2019 PCP on record: Merissa Garrido NP Admit date: 12/14/2019 Discharge date and time: 12/16/2019 DISCHARGE DIAGNOSIS: 
 
Acute Respiratory failure with Hypoxia due to COPD Exacerbation SVC Syndrome COPD Exacerbation RUL Nodule likely malignant 
 
  
Hx of CAD 
 
  
COPD exacerbation 
 
  
HTN Left brachiocephalic dvt CONSULTATIONS: 
IP CONSULT TO HOSPITALIST 
IP CONSULT TO PULMONOLOGY Excerpted HPI from H&P of Jesusita Presley MD: 
69 y.o lady with COPD, CAD, HTN, tobacco use, who presents with a headache. Symptoms began about a month ago. She describes a pressure sensation from inside her head that has been gradually increasing in severity over the past month. There are no known exacerbating or alleviating factors. Associated symptoms include a purple discoloration of her face and hands. She also feels like her breathing has gradually worsened over this period of time. She denies chest pain, leg swelling, fever, or new cough.  
  
 
______________________________________________________________________ DISCHARGE SUMMARY/HOSPITAL COURSE:  for full details see H&P, daily progress notes, labs, consult notes. The pt was placed on heparin gtt for svc syndrome. t he pt was seen by pulmonary and recommended ct guided bronch vs ebus the pt is planned for possible transfer to a facility where this can be done. Her ruq doppler was negative. She is off o2 at this time but is sob and congested with minimal activity 
 
 
 
 
_______________________________________________________________________ Patient seen and examined by me on discharge day. Pertinent Findings: 
Gen:    Not in distress Chest: Clear lungs CVS:   Regular rhythm. No edema Abd:  Soft, not distended, not tender Neuro:  Alert, nad 
_______________________________________________________________________ DISCHARGE MEDICATIONS:  
Current Discharge Medication List  
  
 
 
 
Patient Follow Up Instructions: Activity: Activity as tolerated Diet: Cardiac Diet Wound Care: None needed Follow-up with [facility] Follow-up Information Follow up With Specialties Details Why Contact Aidee Stevens NP Nurse Practitioner   [place] [phone] 
  
  
 
________________________________________________________________ Risk of deterioration: High 
 
Condition at Discharge:  Stable 
__________________________________________________________________ Disposition 
inpt hospital 
 
____________________________________________________________________ Code Status: Full Code 
___________________________________________________________________ Total time in minutes spent coordinating this discharge (includes going over instructions, follow-up, prescriptions, and preparing report for sign off to her PCP) :  31 minutes Signed: 
Duy Meza DO

## 2019-12-17 NOTE — H&P
HISTORY AND PHYSICAL Brandon Gardner MD 
 
 
 
PCP: Eduardo Sandoval NP Please note that this dictation was completed with [employer], the computer voice recognition software. Quite often unanticipated grammatical, syntax, homophones, and other interpretive errors are inadvertently transcribed by the computer software. Please disregard these errors. Please excuse any errors that have escaped final proofreading. Chief Complaint:  
Head pressure History of present illness:  
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 Patient presented to [facility] with complaint of pressure sensation from inside her head has been progressively worsening over the past month associated with swelling of the upper extremity and purple discoloration of her face and upper extremity. She also reported progressively worsening shortness of breath. She was being treated for acute respiratory failure with hypoxia due to COPD exacerbation with IV steroids and breathing treatments, now off oxygen. 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 Patient was continued on IV steroid and breathing treatment, placed on heparin drip. Pulmonology (Dr. Pravin Vazquez ) was consulted and she is transferred to [facility] for possible bronch in the morning. During my evaluation at the bedside, V/S were unremarkable SPO2 above 90% on RA. PMH/PSH: 
Past Medical History:  
Diagnosis Date  CAD (coronary artery disease)  Chronic obstructive pulmonary disease ([address]  Hypertension Past Surgical History:  
Procedure Laterality Date  HX CAROTID STENT  2003 Home meds:  
Prior to Admission medications Medication Sig Start Date End Date Taking? Authorizing Provider  
fluticasone propionate (FLONASE ALLERGY RELIEF NA) by Nasal route. Trudy Yoon MD  
hydrOXYzine HCl (ATARAX) 10 mg tablet Take 10 mg by mouth three (3) times daily as needed for Itching. Trudy Yoon MD  
montelukast (SINGULAIR) 10 mg tablet Take 10 mg by mouth daily. Trudy Yoon MD  
albuterol (PROVENTIL HFA, VENTOLIN HFA, PROAIR HFA) 90 mcg/actuation inhaler Take 2 Puffs by inhalation. Trudy Yoon MD  
atenolol (TENORMIN) 50 mg tablet Take 50 mg by mouth daily. Trudy Yoon MD  
losartan (COZAAR) 100 mg tablet Take 100 mg by mouth daily. Trudy Yoon MD  
budesonide (ENTOCORT EC) 3 mg capsule Take 6 mg by mouth every morning. Trudy Yoon MD  
 
 
Allergies: Allergies Allergen Reactions  Codeine Itching FH: No family history on file. SH: Social History Tobacco Use  Smoking status: Current Every Day Smoker Packs/day: 0.50  Smokeless tobacco: Never Used Substance Use Topics  Alcohol use: Yes Comment: occ Review of Systems Constitutional: Negative for chills and fever. HENT: Negative for ear pain, hearing loss and tinnitus. Eyes: Negative for blurred vision and double vision. Respiratory: Negative for hemoptysis and sputum production. Cardiovascular: Negative for chest pain and palpitations. Gastrointestinal: Negative for heartburn and nausea. Genitourinary: Negative for dysuria and urgency. Musculoskeletal: Negative for myalgias and neck pain. Skin: Negative for itching and rash. Neurological: Negative for dizziness and headaches. Endo/Heme/Allergies: Negative for environmental allergies. Does not bruise/bleed easily. Psychiatric/Behavioral: Negative for depression and suicidal ideas. All other systems reviewed and are negative. PHYSICAL EXAM: 
Visit Vitals /81 (BP 1 Location: Right leg) Pulse 81 Temp 98.1 °F (36.7 °C) Resp 23 SpO2 95% General:          Alert, cooperative, no distress, appears stated age. HEENT:          mild facial edema Atraumatic, anicteric sclerae, pink conjunctivae No oral ulcers, mucosa moist, throat clear, dentition fair Neck:               Supple, symmetrical,  thyroid: non tender Lungs:            Diffuse bilateral wheeze,  No  Rhonchi. No rales. Chest wall:      No tenderness  No Accessory muscle use. Heart:              Regular  rhythm,  No  murmur   Abdomen:        Soft, non-tender. Not distended. Bowel sounds normal 
Extremities:      edema of the RUE. No cyanosis. No clubbing,   
                        Skin turgor normal, Capillary refill normal, Radial dial pulse 2+ Skin:                Not pale. Not Jaundiced  No rashes Psych:             Not anxious or agitated. Neurologic:     Alert and oriented to PPT, CNII-XII intact. Motor and sensory exam grossly intact. Labs/Imaging: 
Recent Results (from the past 24 hour(s)) PTT Collection Time: 12/15/19  9:58 PM  
Result Value Ref Range aPTT 60.9 (H) 22.1 - 32.0 sec  
 aPTT, therapeutic range     58.0 - 77.0 SECS  
PTT Collection Time: 12/16/19  4:19 AM  
Result Value Ref Range aPTT 66.5 (H) 22.1 - 32.0 sec  
 aPTT, therapeutic range     58.0 - 77.0 SECS Recent Labs 12/14/19 
2229 WBC 13.2* HGB 13.7 HCT 42.1  Recent Labs 12/14/19 
2229   
K 3.8  CO2 31 BUN 20  
CREA 0.86 GLU 82  
CA 9.0 Recent Labs 12/14/19 2229 SGOT 28 ALT 29 AP 88 TBILI 0.4 TP 6.8 ALB 3.4*  
GLOB 3.4 Recent Labs 12/14/19 2229 TROIQ <0.05 Recent Labs 12/16/19 
6250 12/15/19 
2158 12/15/19 
1509 APTT 66.5* 60.9* 53.6* No results for input(s): PH, PCO2, PO2 in the last 72 hours. DUPLEX UPPER EXT VENOUS RIGHT · No evidence of acute DVT in the right upper extremity.  
  
  
  
 
Assessment & Plan: 
===================== 
 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 Pulmonology (Dr. Joann Marie ) aware Possible bronch in the morning (spoke with on-call pulmonologist, recommend continuing heparin drip until 7 AM) COPD Exacerbation- improved Now off supplemental oxygen Continue BT and steroids*  
O2 to keep sats > 90% HTN Continue home cozar and atenolol  
Hydralazine as needed Hx of CAD Continue home losartan and atenolol Patient's Baseline: Ambulates with walking DVT ppx: On heparin drip  
code status: Full  
disposition: TBD Care plan discussed with patient/family and nurse. Signed By: Mary Ann La MD   
 December 16, 2019

## 2019-12-17 NOTE — PROGRESS NOTES
2000: [facility] arrived to take PT. To [facility], room 403 on unit [address]. Paper work signed by Rolando Amaya as witness, Nursing supervisor Letty Edwards, NICOLE and MD. Pt. Is no apparent distress, VSS. Heparin gtt to continue in route to [facility]. [facility] given report by writer. All belongings sent with pts. Family member. Tele removed. Original EMTALA paperwork and copy of chart sent with [facility].

## 2019-12-17 NOTE — PROGRESS NOTES
Problem: Chronic Obstructive Pulmonary Disease (COPD) Goal: *Oxygen saturation during activity within specified parameters Outcome: Progressing Towards Goal 
  
1240: Heparin gtt restarted at 19 Units/kg/hr per pharmacist. Bronchoscopy will not be completed today. Bedside shift change report given to Basilia Vargas (oncoming nurse) by [address], RN (offgoing nurse). Report included the following information SBAR, Kardex, Intake/Output, MAR, Accordion and Recent Results.

## 2019-12-17 NOTE — ACP (ADVANCE CARE PLANNING)
Advance Care Planning Note Name: Miguel Posey YOB: 1945 MRN: [id number] Admission Date: 12/16/2019  8:45 PM 
 
Date of discussion: 12/16/2019 Active Diagnoses: 
 
Hospital Problems  Date Reviewed: 12/16/2019 Codes Class Noted POA Lung mass ICD-10-CM: R91.8 ICD-9-CM: 786.6  12/16/2019 Unknown SVC (superior vena cava obstruction) ICD-10-CM: I87.1 ICD-9-CM: 459.2  12/16/2019 Unknown These active diagnoses are of sufficient risk that focused discussion on advance care planning is indicated in order to allow the patient to thoughtfully consider personal goals of care, and if situations arise that prevent the ability to personally give input, to ensure appropriate representation of their personal desires for different levels and aggressiveness of care. Discussion:  
 
Persons present and participating in discussion: Jory Duckworth MD, Discussion: The code status with the patient , offered all options including Full code , DNR/DNI and partial code , explained all this modalities in detail , all questions were answered and patient choose to be Full code Time Spent:  
 
Total time spent face-to-face in education and discussion: 19  minutes.   
 
Phoebe Cohn MD 
12/16/2019 
10:54 PM

## 2019-12-17 NOTE — PROGRESS NOTES
Transition Plan of Care Transfer from [facility] noted and chart reviewed. Patient lives independent with her son and drives Possible Bronchoscopy Pulmonary consulted CM will continue to follow for disposition needs. Karolina Alonso RN CRM Ext [phone]

## 2019-12-17 NOTE — CONSULTS
[facility] Patient progress note Name: Wyvonnia Apley MRN: [id number] : 1945 Hospital: [address] [address]  Date: 2019 IMPRESSION:  
· Multiple complex acute problems: 
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 · Pulmonary nodule, likely malignant. · CAD with h/o RCA stent in  · Tobacco use; not followed by pulmonary medicine as an outpatient basis RECOMMENDATIONS:  
· O2 PRN 
· Restart heparin; cancel npo status this am. Bronch planning per bronch lab. Discussed with RN  
· Bronchodilators - Anoro at discharge · Follow up as an outpatient with Dr Carlin Ashton; PFTs in 1-2 wks · Systemic corticosteroids · Heparin drip for now, if she does not improve, she may need thrombolysis and/or stent · Discussed with attending and [address] [place] [address] lab Subjective:  
 We discussed what is going on. I answered all questions to the best of my ability and her apparent satisfaction 19: Pt is feeling about the same. Still with dyspnea. No coughing. No chest pain, no back pain. No headaches. Has sensation of fullness in her chest and head. Slept ok last pm. No leg pain no abdominal pain. Tolerating po intake. Has dyspnea at rest. Has about the same level of RUL arm swelling. This patient has been seen and evaluated at the request of Dr. Kentrell Moreno for pulmonary nodule and SVC syndrome. Patient is a 69 y.o. female smoker (~100 p-y) with COPD (has never seen pulmonary, though has initial appointment scheduled for sometime in the near future. She presented overnight with a 1+ day history of increasing swelling of face and arms.   She had noted new distended veins on her lower chest and her left arm about a week ago, but the swelling was new about a day prior to coming to the hospital.  She is also wheezing and more short of breath than at baseline. Found to have SVC obstruction by thrombus. Also found to have a pulmonary nodule. Past Medical History:  
Diagnosis Date  CAD (coronary artery disease)  Chronic obstructive pulmonary disease ([facility] [address]  Hypertension Past Surgical History:  
Procedure Laterality Date  HX CAROTID STENT  2003 Prior to Admission medications Medication Sig Start Date End Date Taking? Authorizing Provider  
fluticasone propionate (FLONASE ALLERGY RELIEF NA) by Nasal route. Trudy Yoon MD  
hydrOXYzine HCl (ATARAX) 10 mg tablet Take 10 mg by mouth three (3) times daily as needed for Itching. Trudy Yoon MD  
montelukast (SINGULAIR) 10 mg tablet Take 10 mg by mouth daily. Trudy Yoon MD  
albuterol (PROVENTIL HFA, VENTOLIN HFA, PROAIR HFA) 90 mcg/actuation inhaler Take 2 Puffs by inhalation. Trudy Yoon MD  
atenolol (TENORMIN) 50 mg tablet Take 50 mg by mouth daily. Trudy Yoon MD  
losartan (COZAAR) 100 mg tablet Take 100 mg by mouth daily. Trudy Yoon MD  
budesonide (ENTOCORT EC) 3 mg capsule Take 6 mg by mouth every morning. Trudy Yoon MD  
 
Allergies Allergen Reactions  Codeine Itching Social History Tobacco Use  Smoking status: Current Every Day Smoker Packs/day: 0.50  Smokeless tobacco: Never Used Substance Use Topics  Alcohol use: Yes Comment: occ No family history on file. Current Facility-Administered Medications Medication Dose Route Frequency  atenolol (TENORMIN) tablet 50 mg  50 mg Oral DAILY  fluticasone propionate (FLONASE) 50 mcg/actuation nasal spray 2 Spray  2 Spray Both Nostrils DAILY  losartan (COZAAR) tablet 100 mg  100 mg Oral DAILY  montelukast (SINGULAIR) tablet 10 mg  10 mg Oral DAILY  sodium chloride (NS) flush 5-40 mL  5-40 mL IntraVENous Q8H  
 heparin 25,000 units in D5W 250 ml infusion  18-36 Units/kg/hr IntraVENous TITRATE  albuterol-ipratropium (DUO-NEB) 2.5 MG-0.5 MG/3 ML  3 mL Nebulization Q4H RT  
 methylPREDNISolone (PF) (SOLU-MEDROL) injection 40 mg  40 mg IntraVENous Q8H Review of Systems: A comprehensive review of systems was negative except for that written in the HPI. Objective:  
Vital Signs:   
Visit Vitals /62 (BP 1 Location: Left arm, BP Patient Position: At rest) Pulse 85 Temp 99.1 °F (37.3 °C) Resp 18 Wt 38.8 kg (85 lb 8.6 oz) SpO2 100% BMI 15.65 kg/m² O2 Device: Room air Temp (24hrs), Av.2 °F (36.8 °C), Min:97.4 °F (36.3 °C), Max:99.1 °F (37.3 °C) Intake/Output:  
Last shift:      No intake/output data recorded. Last 3 shifts: 12/15 1901 -  0700 In: 40.2 [I.V.:40.2] Out: - Intake/Output Summary (Last 24 hours) at 2019 1113 Last data filed at 2019 0400 Gross per 24 hour Intake 40.15 ml Output  Net 40.15 ml Physical Exam:  
General:  Alert, cooperative, no distress Head:  Normocephalic, mild facial edema Eyes:  Conjunctivae/corneas clear. PERRL, EOMs intact. Nose: Nares normal. Septum midline. Mucosa normal.    
Throat: Lips, mucosa, and tongue normal.    
Neck: Supple, symmetrical, trachea midline, +distended veins Back:   Symmetric, no curvature. ROM normal.  
Lungs:   Diffuse bilateral wheeze, no rales or ronchi Chest wall:  No tenderness or deformity. Heart:  Regular rate and rhythm Abdomen:   Soft, non-tender. Bowel sounds normal.   
Extremities: Extremities normal, atraumatic, no cyanosis or clubbing, has some edema of the RUE. Mild facial swelling. Prominet Skin: Skin color, texture, turgor normal. No rashes or lesions Lymph nodes: Cervical, supraclavicular, and axillary nodes normal.  She does have what looks like a left axillary nodule but is soft and more likely a varicosity of a distended axillary vein Neurologic: Grossly nonfocal, motor is grossly intact. Psych: no overt anxiety or depression. Data review:  
 
Recent Results (from the past 24 hour(s)) CBC W/O DIFF Collection Time: 12/17/19  5:25 AM  
Result Value Ref Range WBC 20.9 (H) 3.6 - 11.0 K/uL  
 RBC 4.19 3.80 - 5.20 M/uL  
 HGB 13.4 11.5 - 16.0 g/dL HCT 39.7 35.0 - 47.0 % MCV 94.7 80.0 - 99.0 FL  
 MCH 32.0 26.0 - 34.0 PG  
 MCHC 33.8 30.0 - 36.5 g/dL  
 RDW 13.3 11.5 - 14.5 % PLATELET 766 011 - 473 K/uL MPV 10.3 8.9 - 12.9 FL  
 NRBC 0.0 0  WBC ABSOLUTE NRBC 0.00 0.00 - 0.01 K/uL PTT Collection Time: 12/17/19  5:25 AM  
Result Value Ref Range aPTT 74.9 (H) 22.1 - 32.0 sec  
 aPTT, therapeutic range     58.0 - 58.9 SECS  
METABOLIC PANEL, BASIC Collection Time: 12/17/19  5:44 AM  
Result Value Ref Range Sodium 135 (L) 136 - 145 mmol/L Potassium 3.6 3.5 - 5.1 mmol/L Chloride 105 97 - 108 mmol/L  
 CO2 23 21 - 32 mmol/L Anion gap 7 5 - 15 mmol/L Glucose 112 (H) 65 - 100 mg/dL BUN 27 (H) 6 - 20 MG/DL Creatinine 0.72 0.55 - 1.02 MG/DL  
 BUN/Creatinine ratio 38 (H) 12 - 20 GFR est AA >60 >60 ml/min/1.73m2 GFR est non-AA >60 >60 ml/min/1.73m2 Calcium 8.8 8.5 - 10.1 MG/DL Imaging: 
I have personally reviewed the patients radiographs and have reviewed the reports: 
Emphysema, right upper lobe pulmonary nodule, thrombus in the SVC (I can't tell if there is any adenopathy vs thrombosis), official read pending Allison Olmos PA-C

## 2019-12-17 NOTE — WOUND CARE
WOCN Note:  
 
New consult placed by RN for: non blanchable sacrum Chart shows:plan bronchoscopy today. Admitted for lung mass, COPD and superior vena cava obstruction, SOB with activity,facial swelling, jaw pain and chest tightness.; history of : CAD, COPD, HTN, carotid stent, positive smoker 1/2 pack a day and drinking alcohol. WBC = 20.9 On = 12-17-19 Admitted from : home Assessment: patient refusing skin assessment and writer explained importance of turning and repositioning along with discussion on blanchable and un blanchable skin. Daughter at bedside and with coaxing patient still not turning for assessment of sacrum. Patient is A&O x 3, communicative, continent and mobile. Independent in movement and repositioning. Patient is continent. Bed: [facility] Bed. Diet: NPO for bronchoscopy today. Patient reports no pain. Recommendations:  Refusing skin assessment. Minimize layers of linen/pads under patient to optimize support surface. Turn/reposition approximately every 2 hours and offload heels. Reviewed this with patient. Patient is continent. Specialty bed: [facility] Discussed above plan with patient and RN. Transition of Care: Plan to follow weekly and as needed while admitted to hospital.   
 
Johnson FISHMAN RN Wound Care Department Office: [phone] Pager: [phone]

## 2019-12-17 NOTE — PROGRESS NOTES
2045: notified Dr. Kya Butts of pt's arrival to the unit. Received orders to discontinue heparin gtt at 0700 for morning procedure. Problem: Tobacco Use 
Goal: *Knowledge of tobacco use cessation methods Outcome: Progressing Towards Goal 
  
Problem: General Medical Care Plan Goal: *Optimal pain control at patient's stated goal 
Outcome: Progressing Towards Goal 
Pain stated 0/10 Goal: *Anxiety reduced or absent Outcome: Progressing Towards Goal 
 PRN lorazepam  
 
Primary Nurse Walter Ayon and Minoo Bucio RN performed a dual skin assessment on this patient and the following was noted: upper torso pink, sacrum red, nonblanchable (wound care consult ordered), scattered hyperpigmentation. Pt encouraged to turn from side to side frequently. Bedside shift change report given to Felix Mckee RN (oncoming nurse) by Jessica Vee RN (offgoing nurse). Report included the following information SBAR, Kardex, ED Summary, Procedure Summary, Intake/Output, MAR, Recent Results and Cardiac Rhythm NSR/ST.

## 2019-12-18 NOTE — PROGRESS NOTES
NUTRITION COMPLETE ASSESSMENT 
 
RECOMMENDATIONS:  
1. Family may bring foods from home - including Boost Shakes 2. Daily weights on standing scale Interventions/Plan:  
Food/Nutrient Delivery:  (regular diet) Commercial supplement(Boost shakes from home) Assessment:  
Reason for Assessment: At Nutrition Risk Diet: cardiac Supplements: None Nutritionally Significant Medications: [x] Reviewed & Includes: cozaar, solu-medrol, heparin; PRN: zofran Subjective: \"Do you have the Boost shakes? \" Objective: 
Pt admitted for Lung mass. PMHx: CAD, COPD, HTN. Transferred from [facility] for bronch with new mass found. COPD with acute exacerbation, SVC Syndrome, RUL Nodule, and  
 
Assessing pt due to low BMI. Seen by RD prior to transfer. Pt reports she has never weighed more than 100 lbs. Usual meal pattern consists of 2 meals per day (breakfast and lunch) with snacks and 3 Boost shakes per day. Pt reports no liking Ensure shakes which she tried prior to transfer but family to bring in Boost from home. Declining snacks as well with pt eating late lunch brought in by family. No questions/concerns at this time. Appears fairly well nourished despite measured BMI. Last 3 Recorded Weights in this Encounter 12/16/19 2236 12/17/19 0355 12/18/19 2357 Weight: 38.8 kg (85 lb 8 oz) 38.8 kg (85 lb 8.6 oz) 39.6 kg (87 lb 4.8 oz) Will continue to follow for PO intake, wt trends. Estimated Nutrition Needs:  
Kcals/day: 1544 Kcals/day Protein: 49 g(49-60g (1.3-1.6g/kg)) Fluid: 1275 ml(1ml/kcal) Based On: [place] Jeor(x 1.4) Weight Used: Actual wt(38kg - admit wt) Pt expected to meet estimated nutrient needs:  [x]   Yes - with supplements     []  No [] Unable to predict at this time Nutrition Diagnosis:  
1. (Increased protein/energy needs) related to increased energy expenditure as evidenced by BMI 15; COPD, new lung mass Goals: Consumption of at least 50% meals and 3 supplements/day in 5-7 days; wt maintenance Monitoring & Evaluation: - Total energy intake - Weight/weight change Previous Nutrition Goals Met:   N/A Previous Recommendations:    N/A Education & Discharge Needs: 
 [x] None Identified 
 [] Identified and addressed [x] Participated in care plan, discharge planning, and/or interdisciplinary rounds Cultural, Hinduism and ethnic food preferences identified: None Skin Integrity: [x]Intact  []Other Edema: []None [x]Other: 1+ BLUE Last BM: 12/17 Food Allergies: [x]None []Other Diet Restrictions: Cultural/Synagogue Preference(s): None Anthropometrics:   
Weight Loss Metrics 12/18/2019 12/16/2019 12/15/2019 12/7/2019 11/16/2013 Today's Wt 87 lb 4.8 oz - 84 lb 7 oz 82 lb 14.3 oz 86 lb 13.8 oz BMI - 15.97 kg/m2 15.44 kg/m2 15.16 kg/m2 - Weight Source: Bed Height: 5' 2\" (157.5 cm), Body mass index is 15.97 kg/m². IBW : 49.9 kg (110 lb), % IBW (Calculated): 79.37 % 
 ,   
 
Labs:   
Lab Results Component Value Date/Time Sodium 135 (L) 12/17/2019 05:44 AM  
 Potassium 3.6 12/17/2019 05:44 AM  
 Chloride 105 12/17/2019 05:44 AM  
 CO2 23 12/17/2019 05:44 AM  
 Glucose 112 (H) 12/17/2019 05:44 AM  
 BUN 27 (H) 12/17/2019 05:44 AM  
 Creatinine 0.72 12/17/2019 05:44 AM  
 Calcium 8.8 12/17/2019 05:44 AM  
 Albumin 3.4 (L) 12/14/2019 10:29 PM  
 
No results found for: HBA1C, HGBE8, FCC3EXXV, VDP4TTDH, HDQ0JVJV Su García, RD [address] , Pager #[phone] or [phone]

## 2019-12-18 NOTE — PROGRESS NOTES
[facility] Patient progress note Name: Marcia Michaud MRN: [id number] : 1945 Hospital: [facility]  Date: 2019 IMPRESSION:  
· Multiple complex acute problems: 
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 · Pulmonary nodule, likely malignant. · CAD with h/o RCA stent in  · Tobacco use; not followed by pulmonary medicine as an outpatient basis RECOMMENDATIONS:  
· O2 PRN with sats > 90% · Restart heparin; cancel npo status this am. Bronch planning per bronch lab. Discussed with RN  
· Christiano/brovana/pulmicort - Anoro at discharge · Follow up as an outpatient with Dr Britt Hadley; PFTs in 1-2 wks · IV steroids for now · Heparin gtt. Bronch is scheduled for tomorrow · Heparin held 4 hours prior to bronch and make NPO past midnight Subjective:  
 
 Dyspnea noted today. She thinks it might be anxiety On room air  We discussed what is going on. I answered all questions to the best of my ability and her apparent satisfaction 19: Pt is feeling about the same. Still with dyspnea. No coughing. No chest pain, no back pain. No headaches. Has sensation of fullness in her chest and head. Slept ok last pm. No leg pain no abdominal pain. Tolerating po intake. Has dyspnea at rest. Has about the same level of RUL arm swelling. This patient has been seen and evaluated at the request of Dr. Charly Mcmillan for pulmonary nodule and SVC syndrome. Patient is a 69 y.o. female smoker (~100 p-y) with COPD (has never seen pulmonary, though has initial appointment scheduled for sometime in the near future. She presented overnight with a 1+ day history of increasing swelling of face and arms.   She had noted new distended veins on her lower chest and her left arm about a week ago, but the swelling was new about a day prior to coming to the hospital.  She is also wheezing and more short of breath than at baseline. Found to have SVC obstruction by thrombus. Also found to have a pulmonary nodule. Past Medical History:  
Diagnosis Date  CAD (coronary artery disease)  Chronic obstructive pulmonary disease ([facility] [address]  Hypertension Past Surgical History:  
Procedure Laterality Date  HX CAROTID STENT  2003 Prior to Admission medications Medication Sig Start Date End Date Taking? Authorizing Provider  
fluticasone propionate (FLONASE ALLERGY RELIEF NA) by Nasal route. Trudy Yoon MD  
hydrOXYzine HCl (ATARAX) 10 mg tablet Take 10 mg by mouth three (3) times daily as needed for Itching. Trudy Yoon MD  
montelukast (SINGULAIR) 10 mg tablet Take 10 mg by mouth daily. Trudy Yoon MD  
albuterol (PROVENTIL HFA, VENTOLIN HFA, PROAIR HFA) 90 mcg/actuation inhaler Take 2 Puffs by inhalation. Trudy Yoon MD  
atenolol (TENORMIN) 50 mg tablet Take 50 mg by mouth daily. Trudy Yoon MD  
losartan (COZAAR) 100 mg tablet Take 100 mg by mouth daily. Trudy Yoon MD  
budesonide (ENTOCORT EC) 3 mg capsule Take 6 mg by mouth every morning. Trudy Yoon MD  
 
Allergies Allergen Reactions  Codeine Itching Social History Tobacco Use  Smoking status: Current Every Day Smoker Packs/day: 0.50  Smokeless tobacco: Never Used Substance Use Topics  Alcohol use: Yes Comment: occ No family history on file. Current Facility-Administered Medications Medication Dose Route Frequency  albuterol-ipratropium (DUO-NEB) 2.5 MG-0.5 MG/3 ML  3 mL Nebulization Q6H RT  
 budesonide (PULMICORT) 500 mcg/2 ml nebulizer suspension  500 mcg Nebulization BID RT  
 arformoterol (BROVANA) neb solution 15 mcg  15 mcg Nebulization BID RT  
 atenolol (TENORMIN) tablet 50 mg  50 mg Oral DAILY  fluticasone propionate (FLONASE) 50 mcg/actuation nasal spray 2 Spray  2 Spray Both Nostrils DAILY  losartan (COZAAR) tablet 100 mg  100 mg Oral DAILY  montelukast (SINGULAIR) tablet 10 mg  10 mg Oral DAILY  sodium chloride (NS) flush 5-40 mL  5-40 mL IntraVENous Q8H  
 heparin 25,000 units in D5W 250 ml infusion  18-36 Units/kg/hr IntraVENous TITRATE  methylPREDNISolone (PF) (SOLU-MEDROL) injection 40 mg  40 mg IntraVENous Q8H Review of Systems: A comprehensive review of systems was negative except for that written in the HPI. Objective:  
Vital Signs:   
Visit Vitals /79 Pulse 88 Temp 97.8 °F (36.6 °C) Resp 23 Wt 39.6 kg (87 lb 4.8 oz) SpO2 94% BMI 15.97 kg/m² O2 Device: Room air Temp (24hrs), Av.4 °F (36.9 °C), Min:97.8 °F (36.6 °C), Max:98.9 °F (37.2 °C) Intake/Output:  
Last shift:      No intake/output data recorded. Last 3 shifts:  1901 -  0700 In: 40.2 [I.V.:40.2] Out: - No intake or output data in the 24 hours ending 19 1102 Physical Exam:  
General:  Alert, cooperative, no distress Head:  Normocephalic, mild facial edema Eyes:  Conjunctivae/corneas clear. PERRL, EOMs intact. Nose: Nares normal. Septum midline. Mucosa normal.    
Throat: Lips, mucosa, and tongue normal.    
Neck: Supple, symmetrical, trachea midline, +distended veins Back:   Symmetric, no curvature. ROM normal.  
Lungs:   Diffuse bilateral wheeze, no rales or ronchi Chest wall:  No tenderness or deformity. Heart:  Regular rate and rhythm Abdomen:   Soft, non-tender. Bowel sounds normal.   
Extremities: Extremities normal, atraumatic, no cyanosis or clubbing, has some edema of the RUE. Mild facial swelling. Prominet Skin: Skin color, texture, turgor normal. No rashes or lesions Lymph nodes: Cervical, supraclavicular, and axillary nodes normal.  She does have what looks like a left axillary nodule but is soft and more likely a varicosity of a distended axillary vein Neurologic: Grossly nonfocal, motor is grossly intact. Psych: no overt anxiety or depression. Data review:  
 
Recent Results (from the past 24 hour(s)) PTT Collection Time: 12/17/19  7:10 PM  
Result Value Ref Range aPTT 52.0 (H) 22.1 - 32.0 sec  
 aPTT, therapeutic range     58.0 - 77.0 SECS  
PTT Collection Time: 12/18/19  1:54 AM  
Result Value Ref Range aPTT >130.0 (HH) 22.1 - 32.0 sec  
 aPTT, therapeutic range     58.0 - 77.0 SECS  
PTT Collection Time: 12/18/19  5:39 AM  
Result Value Ref Range aPTT 31.9 22.1 - 32.0 sec  
 aPTT, therapeutic range     58.0 - 77.0 SECS Imaging: 
I have personally reviewed the patients radiographs and have reviewed the reports: 
Emphysema, right upper lobe pulmonary nodule, thrombus in the SVC (I can't tell if there is any adenopathy vs thrombosis), official read pending Brian Burch PA-C

## 2019-12-18 NOTE — CONSULTS
Vascular Surgery 
--full note dictated --in brief, I think complex thrombectomy/lysis can be attempted but should probably wait until biopsy 
--will d/w pulmonary

## 2019-12-18 NOTE — PROGRESS NOTES
Problem: Chronic Obstructive Pulmonary Disease (COPD) Goal: *Oxygen saturation during activity within specified parameters Outcome: Progressing Towards Goal 
Goal: *Able to remain out of bed as prescribed Outcome: Progressing Towards Goal 
Goal: *Absence of hypoxia Outcome: Progressing Towards Goal 
Pt remains off O2, saturation >90%. Lungs diminished but clear. Pt able to ambulate to bathroom, dyspneic with exertion. Bedside shift change report given to Betty Damian RN (oncoming nurse) by Yifan Chapman RN (offgoing nurse). Report included the following information SBAR, Kardex, Intake/Output, MAR, Accordion and Recent Results.

## 2019-12-18 NOTE — PROGRESS NOTES
[address] [facility] Adult  Hospitalist Group Hospitalist Progress Note Phoebe Vasquez MD 
Answering service: [phone] from in house phone Date of Service:  2019 NAME:  Acquanetta Skiff :  1945 MRN:  [id number] Admission Summary:  
Patient is 36-year-old female with medical history significant for COPD, CAD, hypertension and tobacco use who is a direct admit from [facility] with working diagnosis of SVC syndrome/RUL nodule likely malignant  for possible bronchoscopy in the morning. Patient presented to [facility] with complaint of pressure sensation from inside her head has been progressively worsening over the past month associated with swelling of the upper extremity and purple discoloration of her face and upper extremity. She also reported progressively worsening shortness of breath. She was being treated for acute respiratory failure with hypoxia due to COPD exacerbation with IV steroids and breathing treatments, now off oxygen. 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 Patient was continued on IV steroid and breathing treatment, placed on heparin drip. Pulmonology (Dr. Eliazar Mills ) was consulted and she is transferred to [facility] for possible bronch in the morning. Interval history / Subjective:  
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 RUL  nodule Per pulmonologist At [facility] ,cont Heparin drip for now, if she does not improve, she may need thrombolysis and/or stent , she needs navigation bronchoscopy or possibly EBUS before starting chronic anticoagulation or stenting. Pulmonology consulted for bronch- unable to do this today 
  
COPD Exacerbation- improved Now off supplemental oxygen Continue BT and steroids*  
O2 to keep sats > 90% 
  
HTN Continue home cozar and atenolol  
Hydralazine as needed 
  
Hx of CAD Continue home losartan and atenolol Code status: Full Code DVT prophylaxis: Heparin Drip Care Plan discussed with: Patient/Family Disposition: Home w/Family and TBD Hospital Problems  Date Reviewed: 12/16/2019 Codes Class Noted POA Lung mass ICD-10-CM: R91.8 ICD-9-CM: 786.6  12/16/2019 Unknown SVC (superior vena cava obstruction) ICD-10-CM: I87.1 ICD-9-CM: 459.2  12/16/2019 Unknown Review of Systems: A comprehensive review of systems was negative except for that written in the HPI. Vital Signs:  
 Last 24hrs VS reviewed since prior progress note. Most recent are: 
Visit Vitals /84 (BP 1 Location: Right leg, BP Patient Position: At rest) Pulse 76 Temp 98.9 °F (37.2 °C) Resp 12 Wt 38.8 kg (85 lb 8.6 oz) SpO2 94% BMI 15.65 kg/m² Intake/Output Summary (Last 24 hours) at 12/17/2019 2246 Last data filed at 12/17/2019 0400 Gross per 24 hour Intake 40.15 ml Output  Net 40.15 ml Physical Examination:  
 
 
     
Constitutional:  No acute distress, cooperative, pleasant ENT:  Oral mucous moist, oropharynx benign. Resp:  decreased breath sounds. No wheezing/rhonchi/rales. No accessory muscle use CV:  Regular rhythm, normal rate, no murmurs, gallops, rubs GI:  Soft, non distended, non tender. normoactive bowel sounds, no hepatosplenomegaly Musculoskeletal:  No edema, warm, 2+ pulses throughout Neurologic:  Moves all extremities. AAOx3, CN II-XII reviewed Psych:  Good insight, Not anxious nor agitated. Data Review:  
 Review and/or order of tests in the radiology section of CPT Review and/or order of tests in the medicine section of CPT Labs: Recent Labs 12/17/19 
3750 WBC 20.9* HGB 13.4 HCT 39.7  Recent Labs 12/17/19 
0544 * K 3.6  CO2 23 BUN 27* CREA 0.72 * CA 8.8 No results for input(s): SGOT, GPT, ALT, AP, TBIL, TBILI, TP, ALB, GLOB, GGT, AML, LPSE in the last 72 hours. No lab exists for component: AMYP, HLPSE Recent Labs 12/17/19 
1910 12/17/19 
0525 12/16/19 
9696 APTT 52.0* 74.9* 66.5* No results for input(s): FE, TIBC, PSAT, FERR in the last 72 hours. No results found for: FOL, RBCF No results for input(s): PH, PCO2, PO2 in the last 72 hours. No results for input(s): CPK, CKNDX, TROIQ in the last 72 hours. No lab exists for component: CPKMB No results found for: CHOL, CHOLX, CHLST, CHOLV, HDL, HDLP, LDL, LDLC, DLDLP, TGLX, TRIGL, TRIGP, CHHD, CHHDX No results found for: Kelvin Mariscal No results found for: COLOR, APPRN, SPGRU, REFSG, OJE, PROTU, GLUCU, KETU, BILU, UROU, BLACK, LEUKU, GLUKE, EPSU, BACTU, WBCU, RBCU, CASTS, UCRY Medications Reviewed:  
 
Current Facility-Administered Medications Medication Dose Route Frequency  budesonide (PULMICORT) 500 mcg/2 ml nebulizer suspension  500 mcg Nebulization BID RT  
 arformoterol (BROVANA) neb solution 15 mcg  15 mcg Nebulization BID RT  
 atenolol (TENORMIN) tablet 50 mg  50 mg Oral DAILY  fluticasone propionate (FLONASE) 50 mcg/actuation nasal spray 2 Spray  2 Spray Both Nostrils DAILY  hydrOXYzine HCl (ATARAX) tablet 10 mg  10 mg Oral TID PRN  
 losartan (COZAAR) tablet 100 mg  100 mg Oral DAILY  montelukast (SINGULAIR) tablet 10 mg  10 mg Oral DAILY  acetaminophen (TYLENOL) tablet 650 mg  650 mg Oral Q4H PRN  
 hydrALAZINE (APRESOLINE) 20 mg/mL injection 20 mg  20 mg IntraVENous Q6H PRN  
 sodium chloride (NS) flush 5-40 mL  5-40 mL IntraVENous Q8H  
 sodium chloride (NS) flush 5-40 mL  5-40 mL IntraVENous PRN  
 ondansetron (ZOFRAN) injection 4 mg  4 mg IntraVENous Q4H PRN  
  LORazepam (INTENSOL) 2 mg/mL oral concentrate 1 mg  1 mg Oral Q4H PRN  
 oxymetazoline (AFRIN) 0.05 % nasal spray 2 Spray  2 Spray Both Nostrils BID PRN  
 heparin 25,000 units in D5W 250 ml infusion  18-36 Units/kg/hr IntraVENous TITRATE  albuterol-ipratropium (DUO-NEB) 2.5 MG-0.5 MG/3 ML  3 mL Nebulization Q4H RT  
 methylPREDNISolone (PF) (SOLU-MEDROL) injection 40 mg  40 mg IntraVENous Q8H  
 
______________________________________________________________________ EXPECTED LENGTH OF STAY: - - - 
ACTUAL LENGTH OF STAY:          1 Leonela Sultana MD

## 2019-12-18 NOTE — PROGRESS NOTES
Problem: Tobacco Use 
Goal: *Knowledge of tobacco use cessation methods Outcome: Progressing Towards Goal 
Note:  
Educated pt on importance of smoking cessation. Pt verbalized understanding. Problem: General Medical Care Plan Goal: *Absence of infection signs and symptoms Outcome: Progressing Towards Goal 
Goal: *Anxiety reduced or absent Outcome: Progressing Towards Goal 
Note: PRN lorazepam given before bedtime. Problem: Chronic Obstructive Pulmonary Disease (COPD) Goal: *Absence of hypoxia Outcome: Not Progressing Towards Goal 
Note:  
Pt's oxygen saturations remain above 90 percent when awake. Will drop into high 80s when sleeping - discussed possibility of using NC at night. Pt refused. Will continue to monitor. Bedside shift change report given to Max Hensley RN (oncoming nurse) by Jeremias Mcdermott RN (offgoing nurse). Report included the following information SBAR, Kardex, ED Summary, Intake/Output, MAR, Med Rec Status and Cardiac Rhythm NSR.

## 2019-12-18 NOTE — CONSULTS
[address] Name:  Johnny Troncoso 
MR#:  [id number] :  1945 ACCOUNT #:  [de-identified] DATE OF SERVICE:  2019 REASON FOR CONSULTATION:  Superior vena cava thrombus/occlusion. HISTORY OF PRESENT ILLNESS:  The patient is a 59-year-old female with a heavy tobacco history who presents with two months of facial swelling, congestion symptoms as well as a cough. Imaging is suggestive of a pulmonary nodule of possible malignancy. She is to have a biopsy of this tomorrow. Her CT imaging had shown a large possible thrombus extending from her innominate vein to her SVC just above the right atrium. The patient reports having pretty significant symptoms from all this. PAST MEDICAL HISTORY:  Significant for coronary artery disease, COPD, hypertension, supposedly history of carotid stent. MEDICATIONS PRIOR TO ADMISSION:  Included Flonase, Atarax, Singulair, albuterol, atenolol, losartan, and Entocort EC. ALLERGIES:  SHE IS ALLERGIC TO CODEINE. SOCIAL HISTORY:  Positive for ongoing heavy tobacco history. No daily alcohol. REVIEW OF SYSTEMS:  Negative other than what has been mentioned. PHYSICAL EXAMINATION: 
GENERAL:  Currently in no acute distress. Alert and oriented. VITAL SIGNS:  Temperature is 98.5, heart rate 74, blood pressure is 123/95, and 96% on room air. LUNGS:  Coarse breath sounds bilaterally. HEART:  Regular rate and rhythm. ABDOMEN:  Soft and nontender. EXTREMITIES:  Lower extremities are warm. IMPRESSION AND PLAN:  Large superior vena cava thrombus. This can be attempted to be removed, but I think for right now it is best to pursue tissue diagnosis as our plan and after recovery from this we will plan for attempt to remove this large thrombus. We will discuss further with the patient. Johana Kumar MD 
 
 
[id number] 
D:  2019 13:08 
T:  2019 14:17 JOB #:  [id number] CC:  Jonathon Nam NP

## 2019-12-19 NOTE — ANESTHESIA PREPROCEDURE EVALUATION
Relevant Problems No relevant active problems Anesthetic History No history of anesthetic complications Review of Systems / Medical History Patient summary reviewed, nursing notes reviewed and pertinent labs reviewed Pulmonary COPD Neuro/Psych Within defined limits Cardiovascular Hypertension CAD 
 
 
  
GI/Hepatic/Renal 
Within defined limits Endo/Other Within defined limits Other Findings Physical Exam 
 
Airway Mallampati: II 
TM Distance: > 6 cm Neck ROM: normal range of motion Mouth opening: Normal 
 
 Cardiovascular Regular rate and rhythm,  S1 and S2 normal,  no murmur, click, rub, or gallop Dental 
No notable dental hx Pulmonary Breath sounds clear to auscultation Abdominal 
GI exam deferred Other Findings Anesthetic Plan ASA: 3 Anesthesia type: general 
 
 
 
 
Induction: Intravenous Anesthetic plan and risks discussed with: Patient

## 2019-12-19 NOTE — PROGRESS NOTES
Problem: General Medical Care Plan Goal: *Vital signs within specified parameters Outcome: Progressing Towards Goal 
  
Problem: Chronic Obstructive Pulmonary Disease (COPD) Goal: *Absence of hypoxia Outcome: Progressing Towards Goal 
  
1044: Heparin gtt held per order. Bronchoscopy scheduled for 1430. Pt resting in bed.  
 
1108: TRANSFER - OUT REPORT: 
 
Verbal report given to Ermelinda Perez RN (name) on Andres Deshpande  being transferred to 3N (unit) for routine progression of care Report consisted of patients Situation, Background, Assessment and  
Recommendations(SBAR). Information from the following report(s) SBAR, Kardex, MAR, Accordion, and Recent Results was reviewed with the receiving nurse. Opportunity for questions and clarification was provided.

## 2019-12-19 NOTE — PROGRESS NOTES
Problem: General Medical Care Plan Goal: *Anxiety reduced or absent Outcome: Progressing Towards Goal 
Goal: *Vital signs within specified parameters Outcome: Progressing Towards Goal 
Note:  
2340- Pts blood pressure 186/93 after ambulating to bathroom. 4148- pts blood pressure 199/87 while resting in bed, pt complaining of anxiety. Pt given prn hydralazine and prn lorezapam. 
0252- pt currently sitting up in bed receiving nebulizer treatment, pts blood pressure down to 172/87. Will continue to monitor. Last 3 Recorded Weights in this Encounter 12/17/19 5884 12/18/19 3172 12/19/19 7852 Weight: 38.8 kg (85 lb 8.6 oz) 39.6 kg (87 lb 4.8 oz) 38.5 kg (84 lb 14 oz) Pt weighed with one pillow, one blanket, and one sheet Bedside shift change report given to Flaca Costa RN (oncoming nurse) by Kaylin Temple RN (offgoing nurse). Report included the following information SBAR, Kardex, ED Summary, Procedure Summary, Intake/Output, MAR, Accordion, Recent Results, Med Rec Status, Cardiac Rhythm NSR and Alarm Parameters .

## 2019-12-19 NOTE — PROGRESS NOTES
Vascular Surgery --discussed with patient 
--plan is to have her follow up with me in 2 weeks for thrombectomy attempt 
--would discharge on Lovenox tomorrow if feasible

## 2019-12-19 NOTE — PROGRESS NOTES
[facility] [facility] Adult  Hospitalist Group Hospitalist Progress Note Antonino Vidales MD 
Answering service: [phone] from in house phone Date of Service:  2019 NAME:  Simin Izaguirre :  1945 MRN:  [id number] Admission Summary:  
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 Patient presented to [facility] with complaint of pressure sensation from inside her head has been progressively worsening over the past month associated with swelling of the upper extremity and purple discoloration of her face and upper extremity. She also reported progressively worsening shortness of breath. She was being treated for acute respiratory failure with hypoxia due to COPD exacerbation with IV steroids and breathing treatments, now off oxygen. 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 Patient was continued on IV steroid and breathing treatment, placed on heparin drip. Pulmonology (Dr. Joann Marie ) was consulted and she is transferred to [address] [place] [address] for possible bronch in the morning. Interval history / Subjective:  
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 RUL  nodule 
,cont Heparin drip - consult vascular surgery for eval and tx recs RUL Nodule/mass- likely malignant Pulmonology consulted for bronch and biopsy- unable to do this today 
  
COPD Exacerbation- improved Now off supplemental oxygen Continue BT and steroids O2 to keep sats > 90% 
  
HTN Continue home cozar and atenolol  
Hydralazine as needed 
  
Hx of CAD Continue home losartan and atenolol Code status: Full Code DVT prophylaxis: Heparin Drip Care Plan discussed with: Patient/Family Disposition: Home w/Family and TBD Hospital Problems  Date Reviewed: 12/16/2019 Codes Class Noted POA Lung mass ICD-10-CM: R91.8 ICD-9-CM: 786.6  12/16/2019 Unknown SVC (superior vena cava obstruction) ICD-10-CM: I87.1 ICD-9-CM: 459.2  12/16/2019 Unknown Review of Systems: A comprehensive review of systems was negative except for that written in the HPI. Vital Signs:  
 Last 24hrs VS reviewed since prior progress note. Most recent are: 
Visit Vitals BP (!) 163/97 (BP 1 Location: Left leg, BP Patient Position: At rest) Pulse 65 Temp 98 °F (36.7 °C) Resp 23 Ht 5' 2\" (1.575 m) Wt 39.6 kg (87 lb 4.8 oz) SpO2 96% BMI 15.97 kg/m² Intake/Output Summary (Last 24 hours) at 12/18/2019 2201 Last data filed at 12/18/2019 2034 Gross per 24 hour Intake 223.97 ml Output  Net 223.97 ml Physical Examination:  
 
 
     
Constitutional:  No acute distress, cooperative, pleasant ENT:  Oral mucous moist, oropharynx benign. Resp:  decreased breath sounds. No wheezing/rhonchi/rales. No accessory muscle use CV:  Regular rhythm, normal rate, no murmurs, gallops, rubs GI:  Soft, non distended, non tender. normoactive bowel sounds, no hepatosplenomegaly Musculoskeletal:  No edema, warm, 2+ pulses throughout Neurologic:  Moves all extremities. AAOx3, CN II-XII reviewed Psych:  Good insight, Not anxious nor agitated. Data Review:  
 Review and/or order of tests in the radiology section of CPT Review and/or order of tests in the medicine section of CPT Labs:  
 
Recent Labs 12/17/19 
9473 WBC 20.9* HGB 13.4 HCT 39.7  Recent Labs 12/17/19 
0544 * K 3.6  CO2 23 BUN 27* CREA 0.72 * CA 8.8 No results for input(s): SGOT, GPT, ALT, AP, TBIL, TBILI, TP, ALB, GLOB, GGT, AML, LPSE in the last 72 hours. No lab exists for component: AMYP, HLPSE Recent Labs 12/18/19 
1520 12/18/19 
0539 12/18/19 
0154 APTT 39.4* 31.9 >130.0* No results for input(s): FE, TIBC, PSAT, FERR in the last 72 hours. No results found for: FOL, RBCF No results for input(s): PH, PCO2, PO2 in the last 72 hours. No results for input(s): CPK, CKNDX, TROIQ in the last 72 hours. No lab exists for component: CPKMB No results found for: CHOL, CHOLX, CHLST, CHOLV, HDL, HDLP, LDL, LDLC, DLDLP, TGLX, TRIGL, TRIGP, CHHD, CHHDX No results found for: [place] No results found for: COLOR, APPRN, SPGRU, REFSG, JOE, PROTU, GLUCU, KETU, BILU, UROU, BLACK, LEUKU, GLUKE, EPSU, BACTU, WBCU, RBCU, CASTS, UCRY Medications Reviewed:  
 
Current Facility-Administered Medications Medication Dose Route Frequency  albuterol-ipratropium (DUO-NEB) 2.5 MG-0.5 MG/3 ML  3 mL Nebulization Q6H RT  
 budesonide (PULMICORT) 500 mcg/2 ml nebulizer suspension  500 mcg Nebulization BID RT  
 arformoterol (BROVANA) neb solution 15 mcg  15 mcg Nebulization BID RT  
 atenolol (TENORMIN) tablet 50 mg  50 mg Oral DAILY  fluticasone propionate (FLONASE) 50 mcg/actuation nasal spray 2 Spray  2 Spray Both Nostrils DAILY  hydrOXYzine HCl (ATARAX) tablet 10 mg  10 mg Oral TID PRN  
 losartan (COZAAR) tablet 100 mg  100 mg Oral DAILY  montelukast (SINGULAIR) tablet 10 mg  10 mg Oral DAILY  acetaminophen (TYLENOL) tablet 650 mg  650 mg Oral Q4H PRN  
 hydrALAZINE (APRESOLINE) 20 mg/mL injection 20 mg  20 mg IntraVENous Q6H PRN  
 sodium chloride (NS) flush 5-40 mL  5-40 mL IntraVENous Q8H  
 sodium chloride (NS) flush 5-40 mL  5-40 mL IntraVENous PRN  
  ondansetron (ZOFRAN) injection 4 mg  4 mg IntraVENous Q4H PRN  
 LORazepam (INTENSOL) 2 mg/mL oral concentrate 1 mg  1 mg Oral Q4H PRN  
 oxymetazoline (AFRIN) 0.05 % nasal spray 2 Spray  2 Spray Both Nostrils BID PRN  
 heparin 25,000 units in D5W 250 ml infusion  18-36 Units/kg/hr IntraVENous TITRATE  methylPREDNISolone (PF) (SOLU-MEDROL) injection 40 mg  40 mg IntraVENous Q8H  
 
______________________________________________________________________ EXPECTED LENGTH OF STAY: 2d 12h ACTUAL LENGTH OF STAY:          2 Phil Hope MD

## 2019-12-19 NOTE — ROUTINE PROCESS
SBAR report given to Jennifer primary care nurse. Pt VSS. No s/s of distress noted. Currently back at baseline with no c/o pain or discomfort. Sitting up in bed. Tolerating ice chips without difficulty. Ok to transfer back to floor.

## 2019-12-19 NOTE — PROGRESS NOTES
[facility] Pulmonary, Critical Care, and Sleep Medicine Patient progress note Name: Acquanetta Skiff MRN: [id number] : 1945 Hospital: [facility]  Date: 2019 IMPRESSION:  
· Multiple complex acute problems: 
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 · Pulmonary nodule, likely malignant. · CAD with h/o RCA stent in  · Tobacco use; not followed by pulmonary medicine as an outpatient basis RECOMMENDATIONS:  
· O2 PRN with sats > 90% · Duonebs/brovana/pulmicort - Anoro at discharge · Follow up as an outpatient with Dr Lore Jay; PFTs in 1-2 wks · IV steroids for now · Bronch this afternoon · Heparin hold 4 hours prior to bronch · Npo currently Subjective:  
 
 Resting in bed  
 
 Dyspnea noted today. She thinks it might be anxiety On room air  We discussed what is going on. I answered all questions to the best of my ability and her apparent satisfaction 19: Pt is feeling about the same. Still with dyspnea. No coughing. No chest pain, no back pain. No headaches. Has sensation of fullness in her chest and head. Slept ok last pm. No leg pain no abdominal pain. Tolerating po intake. Has dyspnea at rest. Has about the same level of RUL arm swelling. This patient has been seen and evaluated at the request of Dr. Malia Vincent for pulmonary nodule and SVC syndrome. Patient is a 69 y.o. female smoker (~100 p-y) with COPD (has never seen pulmonary, though has initial appointment scheduled for sometime in the near future. She presented overnight with a 1+ day history of increasing swelling of face and arms.   She had noted new distended veins on her lower chest and her left arm about a week ago, but the swelling was new about a day prior to coming to the hospital.  She is also wheezing and more short of breath than at baseline. Found to have SVC obstruction by thrombus. Also found to have a pulmonary nodule. Past Medical History:  
Diagnosis Date  CAD (coronary artery disease)  Chronic obstructive pulmonary disease ([facility] [address]  Hypertension Past Surgical History:  
Procedure Laterality Date  HX CAROTID STENT  2003 Prior to Admission medications Medication Sig Start Date End Date Taking? Authorizing Provider  
fluticasone propionate (FLONASE ALLERGY RELIEF NA) by Nasal route. Trudy Yoon MD  
hydrOXYzine HCl (ATARAX) 10 mg tablet Take 10 mg by mouth three (3) times daily as needed for Itching. Trudy Yoon MD  
montelukast (SINGULAIR) 10 mg tablet Take 10 mg by mouth daily. Trduy Yoon MD  
albuterol (PROVENTIL HFA, VENTOLIN HFA, PROAIR HFA) 90 mcg/actuation inhaler Take 2 Puffs by inhalation. Trudy Yoon MD  
atenolol (TENORMIN) 50 mg tablet Take 50 mg by mouth daily. Trudy Yoon MD  
losartan (COZAAR) 100 mg tablet Take 100 mg by mouth daily. Trudy Yoon MD  
budesonide (ENTOCORT EC) 3 mg capsule Take 6 mg by mouth every morning. Trudy Yoon MD  
 
Allergies Allergen Reactions  Codeine Itching Social History Tobacco Use  Smoking status: Current Every Day Smoker Packs/day: 0.50  Smokeless tobacco: Never Used Substance Use Topics  Alcohol use: Yes Comment: occ No family history on file. Current Facility-Administered Medications Medication Dose Route Frequency  albuterol-ipratropium (DUO-NEB) 2.5 MG-0.5 MG/3 ML  3 mL Nebulization Q6H RT  
 budesonide (PULMICORT) 500 mcg/2 ml nebulizer suspension  500 mcg Nebulization BID RT  
 arformoterol (BROVANA) neb solution 15 mcg  15 mcg Nebulization BID RT  
 atenolol (TENORMIN) tablet 50 mg  50 mg Oral DAILY  fluticasone propionate (FLONASE) 50 mcg/actuation nasal spray 2 Spray  2 Spray Both Nostrils DAILY  losartan (COZAAR) tablet 100 mg  100 mg Oral DAILY  montelukast (SINGULAIR) tablet 10 mg  10 mg Oral DAILY  sodium chloride (NS) flush 5-40 mL  5-40 mL IntraVENous Q8H  
 heparin 25,000 units in D5W 250 ml infusion  18-36 Units/kg/hr IntraVENous TITRATE  methylPREDNISolone (PF) (SOLU-MEDROL) injection 40 mg  40 mg IntraVENous Q8H Review of Systems: A comprehensive review of systems was negative except for that written in the HPI. Objective:  
Vital Signs:   
Visit Vitals /88 (BP 1 Location: Right arm, BP Patient Position: Sitting) Pulse 93 Temp 98.2 °F (36.8 °C) Resp 19 Ht 5' 2\" (1.575 m) Wt 38.5 kg (84 lb 14 oz) SpO2 94% BMI 15.52 kg/m² O2 Device: Room air Temp (24hrs), Av.1 °F (36.7 °C), Min:97.6 °F (36.4 °C), Max:98.5 °F (36.9 °C) Intake/Output:  
Last shift:      No intake/output data recorded. Last 3 shifts:  1901 -  0700 In: 324 [P.O.:100; I.V.:224] Out: - Intake/Output Summary (Last 24 hours) at 2019 1024 Last data filed at 2019 2340 Gross per 24 hour Intake 185.98 ml Output  Net 185.98 ml Physical Exam:  
General:  Alert, cooperative, no distress Head:  Normocephalic, mild facial edema Eyes:  Conjunctivae/corneas clear. PERRL, EOMs intact. Nose: Nares normal. Septum midline. Mucosa normal.    
Throat: Lips, mucosa, and tongue normal.    
Neck: Supple, symmetrical, trachea midline, +distended veins Back:   Symmetric, no curvature. ROM normal.  
Lungs:   Diffuse bilateral wheeze, no rales or ronchi Chest wall:  No tenderness or deformity. Heart:  Regular rate and rhythm Abdomen:   Soft, non-tender. Bowel sounds normal.   
Extremities: Extremities normal, atraumatic, no cyanosis or clubbing, has some edema of the RUE. Mild facial swelling. Prominet Skin: Skin color, texture, turgor normal. No rashes or lesions Lymph nodes: Cervical, supraclavicular, and axillary nodes normal.  She does have what looks like a left axillary nodule but is soft and more likely a varicosity of a distended axillary vein Neurologic: Grossly nonfocal, motor is grossly intact. Psych: no overt anxiety or depression. Data review:  
 
Recent Results (from the past 24 hour(s)) PTT Collection Time: 12/18/19  3:20 PM  
Result Value Ref Range aPTT 39.4 (H) 22.1 - 32.0 sec  
 aPTT, therapeutic range     58.0 - 77.0 SECS  
PTT Collection Time: 12/18/19 11:50 PM  
Result Value Ref Range aPTT 110.4 (HH) 22.1 - 32.0 sec  
 aPTT, therapeutic range     58.0 - 77.0 SECS  
CBC WITH AUTOMATED DIFF Collection Time: 12/18/19 11:50 PM  
Result Value Ref Range WBC 17.6 (H) 3.6 - 11.0 K/uL  
 RBC 4.09 3.80 - 5.20 M/uL  
 HGB 13.1 11.5 - 16.0 g/dL HCT 39.3 35.0 - 47.0 % MCV 96.1 80.0 - 99.0 FL  
 MCH 32.0 26.0 - 34.0 PG  
 MCHC 33.3 30.0 - 36.5 g/dL  
 RDW 12.8 11.5 - 14.5 % PLATELET 909 337 - 767 K/uL MPV 10.6 8.9 - 12.9 FL  
 NRBC 0.0 0  WBC ABSOLUTE NRBC 0.00 0.00 - 0.01 K/uL NEUTROPHILS 85 (H) 32 - 75 % LYMPHOCYTES 7 (L) 12 - 49 % MONOCYTES 7 5 - 13 % EOSINOPHILS 0 0 - 7 % BASOPHILS 0 0 - 1 % IMMATURE GRANULOCYTES 1 (H) 0.0 - 0.5 % ABS. NEUTROPHILS 15.0 (H) 1.8 - 8.0 K/UL  
 ABS. LYMPHOCYTES 1.2 0.8 - 3.5 K/UL  
 ABS. MONOCYTES 1.3 (H) 0.0 - 1.0 K/UL  
 ABS. EOSINOPHILS 0.0 0.0 - 0.4 K/UL  
 ABS. BASOPHILS 0.0 0.0 - 0.1 K/UL  
 ABS. IMM. GRANS. 0.1 (H) 0.00 - 0.04 K/UL  
 DF AUTOMATED METABOLIC PANEL, BASIC Collection Time: 12/18/19 11:50 PM  
Result Value Ref Range Sodium 134 (L) 136 - 145 mmol/L Potassium 4.0 3.5 - 5.1 mmol/L Chloride 103 97 - 108 mmol/L  
 CO2 25 21 - 32 mmol/L Anion gap 6 5 - 15 mmol/L Glucose 105 (H) 65 - 100 mg/dL BUN 20 6 - 20 MG/DL Creatinine 0.88 0.55 - 1.02 MG/DL  
 BUN/Creatinine ratio 23 (H) 12 - 20 GFR est AA >60 >60 ml/min/1.73m2 GFR est non-AA >60 >60 ml/min/1.73m2 Calcium 8.4 (L) 8.5 - 10.1 MG/DL MAGNESIUM Collection Time: 12/18/19 11:50 PM  
Result Value Ref Range Magnesium 2.0 1.6 - 2.4 mg/dL PHOSPHORUS Collection Time: 12/18/19 11:50 PM  
Result Value Ref Range Phosphorus 3.0 2.6 - 4.7 MG/DL  
PTT Collection Time: 12/19/19  2:44 AM  
Result Value Ref Range aPTT 21.2 (L) 22.1 - 32.0 sec  
 aPTT, therapeutic range     58.0 - 77.0 SECS Imaging: 
I have personally reviewed the patients radiographs and have reviewed the reports: 
Emphysema, right upper lobe pulmonary nodule, thrombus in the SVC (I can't tell if there is any adenopathy vs thrombosis), official read pending Allison Olmos PA-C

## 2019-12-19 NOTE — PROCEDURES
[facility] Bronchoscopy Report Procedure: Diagnostic bronchoscopy. Indication: Abnormal chest imaging Consent/Treatment: Informed consent was obtained from the  patient after risks, benefits and alternatives were explained. Timeout verified the correct patient and correct procedure. Anesthesia:  
General anesthesia was performed by anesthesiology Procedure Details:  
-- The bronchoscope was introduced through an endotracheal tube. -- The vocal cords not seen. -- The trachea and elver were completely inspected and were found to be normal. 
-- The right-sided endobronchial anatomy was completely inspected and was found to be normal. 
-- The left-sided endobronchial anatomy was completely inspected and was found to be normal.  
 
Specimens:  
Transbronchial needle aspiration from 4RLN/mass complex ( indistinguishable by EBUS) X6- 21 guage TBNA needle seen to be within mass on each pass. Cyto not available. Specimens placed immediately into formalin. was sent for  cytology Rapid On-Site Evaluation: 3 cm right paratracheal mass/LN complex s/p TBNA X6- awaiting path results Complications: none Estimated Blood Loss: less than 50 Mireya Jamison MD

## 2019-12-20 NOTE — PROGRESS NOTES
Patient Discharged was instructed if she ran into problems to call unit and have Dr. Jesse Tam paged.

## 2019-12-20 NOTE — PROGRESS NOTES
Patient demonstrated good technique and ability to administer Lovenox injection. Paged Dr. Og Ray to update and discuss Discharge

## 2019-12-20 NOTE — DISCHARGE INSTRUCTIONS
For results of lung mass biopsy - call for follow up appt and see  Dr [facility]elena Sol, at [facility]  [address] [place] [address] [phone]    For blood clots-take lovenox blood thinner twice daily and follow up with    Markanna Murphy [address] [phone]    Return to ER for bleeding , chest pain, shortness of breath, or worsened swelling in arms or legs  Quit smoking- consider a nicotine patch for helping with smoking cessation

## 2019-12-20 NOTE — PROGRESS NOTES
[address] [facility] Adult  Hospitalist Group Hospitalist Progress Note Nina Vines MD 
Answering service: [phone] from in house phone NAME:  Miguel Posey :  1945 MRN:  [id number] Admission Summary:  
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 Patient presented to [facility] with complaint of pressure sensation from inside her head has been progressively worsening over the past month associated with swelling of the upper extremity and purple discoloration of her face and upper extremity. She also reported progressively worsening shortness of breath. She was being treated for acute respiratory failure with hypoxia due to COPD exacerbation with IV steroids and breathing treatments, now off oxygen. 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 Patient was continued on IV steroid and breathing treatment, placed on heparin drip. Pulmonology (Dr. Ivan Adorno ) was consulted and she is transferred to [facility] for possible bronch in the morning. Interval history / Subjective:  
Patient reports no specific complaints 
 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 RUL  nodule 
,cont Heparin drip -  
vascular surgery consulted and following Plans for Dc home on lovenox prior to vascular intervention RUL Nodule/mass- likely malignant Pulmonology consulted for bronch and biopsy-  Done today 19 
  
COPD Exacerbation- improved Now off supplemental oxygen Continue BT and steroids O2 to keep sats > 90% 
  
HTN Continue home cozar and atenolol  
Hydralazine as needed 
  
Hx of CAD Continue home losartan and atenolol Code status: Full Code DVT prophylaxis: Heparin Drip Care Plan discussed with: Patient/Family Disposition: Home w/Family and TBD Hospital Problems  Date Reviewed: 12/16/2019 Codes Class Noted POA Lung mass ICD-10-CM: R91.8 ICD-9-CM: 786.6  12/16/2019 Unknown SVC (superior vena cava obstruction) ICD-10-CM: I87.1 ICD-9-CM: 459.2  12/16/2019 Unknown Review of Systems: A comprehensive review of systems was negative except for that written in the HPI. Vital Signs:  
 Last 24hrs VS reviewed since prior progress note. Most recent are: 
Visit Vitals /90 (BP 1 Location: Right arm, BP Patient Position: At rest) Pulse 82 Temp 97.5 °F (36.4 °C) Resp 18 Ht 5' 2\" (1.575 m) Wt 38 kg (83 lb 12.4 oz) SpO2 91% Breastfeeding No  
BMI 15.32 kg/m² Intake/Output Summary (Last 24 hours) at 12/20/2019 8982 Last data filed at 12/19/2019 1044 Gross per 24 hour Intake 83.03 ml Output  Net 83.03 ml Physical Examination:  
 
 
     
Constitutional:  No acute distress, cooperative, pleasant ENT:  Oral mucous moist, oropharynx benign. Resp:  decreased breath sounds. No wheezing/rhonchi/rales. No accessory muscle use CV:  Regular rhythm, normal rate, no murmurs, gallops, rubs GI:  Soft, non distended, non tender. normoactive bowel sounds, no hepatosplenomegaly Musculoskeletal:  No edema, warm, 2+ pulses throughout Neurologic:  Moves all extremities. AAOx3, CN II-XII reviewed Psych:  Good insight, Not anxious nor agitated. Data Review:  
 Review and/or order of tests in the radiology section of CPT Review and/or order of tests in the medicine section of CPT Labs:  
 
Recent Labs  
  12/20/19 
0228 12/18/19 
3164 WBC 16.5* 17.6* HGB 13.0 13.1 HCT 39.5 39.3  236 Recent Labs  
  12/20/19 0228 12/18/19 
2350 12/17/19 
0544 * 134* 135* K 4.1 4.0 3.6  103 105 CO2 26 25 23 BUN 23* 20 27* CREA 0.85 0.88 0.72 * 105* 112* CA 8.6 8.4* 8.8 MG  --  2.0  --   
PHOS  --  3.0  -- No results for input(s): SGOT, GPT, ALT, AP, TBIL, TBILI, TP, ALB, GLOB, GGT, AML, LPSE in the last 72 hours. No lab exists for component: AMYP, HLPSE Recent Labs  
  12/20/19 0228 12/19/19 
1837 12/19/19 
1035 APTT 60.6* 26.7 43.6* No results for input(s): FE, TIBC, PSAT, FERR in the last 72 hours. No results found for: FOL, RBCF No results for input(s): PH, PCO2, PO2 in the last 72 hours. No results for input(s): CPK, CKNDX, TROIQ in the last 72 hours. No lab exists for component: CPKMB No results found for: CHOL, CHOLX, CHLST, CHOLV, HDL, HDLP, LDL, LDLC, DLDLP, TGLX, TRIGL, TRIGP, CHHD, CHHDX No results found for: [facility] No results found for: COLOR, APPRN, SPGRU, REFSG, JOE, PROTU, GLUCU, KETU, BILU, UROU, BLACK, LEUKU, GLUKE, EPSU, BACTU, WBCU, RBCU, CASTS, UCRY Medications Reviewed:  
 
Current Facility-Administered Medications Medication Dose Route Frequency  albuterol-ipratropium (DUO-NEB) 2.5 MG-0.5 MG/3 ML  3 mL Nebulization Q6H RT  
 budesonide (PULMICORT) 500 mcg/2 ml nebulizer suspension  500 mcg Nebulization BID RT  
 arformoterol (BROVANA) neb solution 15 mcg  15 mcg Nebulization BID RT  
 atenolol (TENORMIN) tablet 50 mg  50 mg Oral DAILY  fluticasone propionate (FLONASE) 50 mcg/actuation nasal spray 2 Spray  2 Spray Both Nostrils DAILY  hydrOXYzine HCl (ATARAX) tablet 10 mg  10 mg Oral TID PRN  
 losartan (COZAAR) tablet 100 mg  100 mg Oral DAILY  montelukast (SINGULAIR) tablet 10 mg  10 mg Oral DAILY  acetaminophen (TYLENOL) tablet 650 mg  650 mg Oral Q4H PRN  
 hydrALAZINE (APRESOLINE) 20 mg/mL injection 20 mg  20 mg IntraVENous Q6H PRN  
 sodium chloride (NS) flush 5-40 mL  5-40 mL IntraVENous Q8H  
 sodium chloride (NS) flush 5-40 mL  5-40 mL IntraVENous PRN  
 ondansetron (ZOFRAN) injection 4 mg  4 mg IntraVENous Q4H PRN  
 LORazepam (INTENSOL) 2 mg/mL oral concentrate 1 mg  1 mg Oral Q4H PRN  
 oxymetazoline (AFRIN) 0.05 % nasal spray 2 Spray  2 Spray Both Nostrils BID PRN  
 heparin 25,000 units in D5W 250 ml infusion  18-36 Units/kg/hr IntraVENous TITRATE  methylPREDNISolone (PF) (SOLU-MEDROL) injection 40 mg  40 mg IntraVENous Q8H  
 
______________________________________________________________________ EXPECTED LENGTH OF STAY: 2d 12h ACTUAL LENGTH OF STAY:          4 Maximo Tipton MD

## 2019-12-20 NOTE — ROUTINE PROCESS
12/20/19 -  
KYLE: 
- Per attending, patient has been cleared by vascular for discharge - Patient to discharge with Lovenox - Attending has [facility] SN recommendation for patient 10:05 -  
CM met with patient at bedside to discuss [facility] recommendation. Patient identified excellent support system, including friends that are in the medical field, and that she is independent in functional levels. Patient has declined [facility] services. Nursing and attending aware of the above. Patient's heparin drip needs to complete, and patient needs lovenox education. Per patient, her son will provide discharge transportation to own home. CRM: Rick Desir, MPH, [address] [place] [address]; Z: [phone]

## 2019-12-20 NOTE — DISCHARGE SUMMARY
Discharge Summary PATIENT ID: Mariama Condon MRN: [id number] YOB: 1945 DATE OF ADMISSION: 12/16/2019  8:45 PM   
DATE OF DISCHARGE: 12/20/19 5pm  
PRIMARY CARE PROVIDER: Walter Klein NP Patient being discharged with planned readmission in 2-4 weeks for vascular intervention ATTENDING PHYSICIAN:ELMO DISCHARGING PROVIDER: Romana Rincon MD   
To contact this individual call [phone] and ask the  to page. If unavailable ask to be transferred the Adult Hospitalist Department. CONSULTATIONS: IP CONSULT TO VASCULAR SURGERY 
 
PROCEDURES/SURGERIES: Procedure(s): ENDOSCOPIC BRONCHOSCOPY ULTRASOUND (EBUS) TRANSBRONCHIAL BIOPSY 
 
ADMITTING DIAGNOSES & HOSPITAL COURSE:  
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  Patient presented to [facility] complaint of pressure sensation from inside her head has been progressively worsening over the past month associated with swelling of the upper extremity and purple discoloration of her face and upper extremity.  She also reported progressively worsening shortness of breath.  She was being treated for acute respiratory failure with hypoxia due to COPD exacerbation with IV steroids and breathing treatments, now off oxygen.  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  Patient was continued on IV steroid and breathing treatment, placed on heparin drip.  Pulmonology (Dr. Cristobal ) was consulted and she is transferred to [facility] for possible bronch in the morning. DISCHARGE DIAGNOSES / PLAN:   
 
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 Transition from Heparin drip to BID lovenox injections on DC-  
vascular surgery consulted and following Plans for Dc home and then return at a later date for vascular intervention 
  
RUL Nodule/mass- likely malignant Pulmonology consulted for bronch and biopsy- 
  Done  12/19/19 Follow up outpatient for results 
  
COPD Exacerbation- improved  
Now off supplemental oxygen Continue BT and steroids on DC  
SO2 stable off oxygen 
  
HTN Continue home cozar and atenolol  
Lasix and KCL added prior to DC 
  
   
 
ADDITIONAL CARE RECOMMENDATIONS:  
For results of lung mass biopsy - call for follow up appt and see Dr Genie Medrano, at [facility] [address] [place] [address] [address] [facility] 
[phone] For blood clots-take lovenox blood thinner twice daily and follow up with Dr Ad Gilliam Vascular Surgery Rhonda Oneill [address] 
[phone] Return to ER for bleeding , chest pain, shortness of breath, or worsened swelling in arms or legs Quit smoking- consider a nicotine patch for helping with smoking cessation PENDING TEST RESULTS:  
At the time of discharge the following test results are still pending: biopsy results of lung mass FOLLOW UP APPOINTMENTS:   
Follow-up Information Follow up With Specialties Details Why Contact Info Ronald Koyanagi, MD Pulmonary Disease Schedule an appointment as soon as possible for a visit  [address] [place] [address] [address] [facility] 
[phone] Fatoumata Botello MD Vascular Surgery, General Surgery   Rhonda Oneill [address] 
[phone] Vincent Srivastava NP Nurse Practitioner   [address] [phone] DIET: cardiac ACTIVITY: Activity as tolerated WOUND CARE: NA 
 
EQUIPMENT needed: none DISCHARGE MEDICATIONS: 
Current Discharge Medication List  
  
START taking these medications Details albuterol sulfate (PROVENTIL;VENTOLIN) 2.5 mg/0.5 mL nebu nebulizer solution 0.5 mL by Nebulization route every four (4) hours as needed for Wheezing or Shortness of Breath. Qty: 20 mL, Refills: 5  
  
enoxaparin (LOVENOX) 40 mg/0.4 mL 0.4 mL by SubCUTAneous route every twelve (12) hours every twelve (12) hours for 14 days. Qty: 14 Syringe, Refills: 1  
  
furosemide (LASIX) 40 mg tablet Take 1 Tab by mouth daily. Qty: 30 Tab, Refills: 1  
  
potassium chloride SR (K-TAB) 20 mEq tablet Take 1 Tab by mouth daily. Qty: 30 Tab, Refills: 1  
  
predniSONE (DELTASONE) 10 mg tablet Take 40 mg by mouth daily (with breakfast) for 2 days, THEN 20 mg daily (with breakfast) for 2 days, THEN 10 mg daily (with breakfast) for 2 days, THEN 5 mg daily (with breakfast) for 4 days. Qty: 16 Tab, Refills: 0 CONTINUE these medications which have NOT CHANGED Details  
fluticasone propionate (FLONASE ALLERGY RELIEF NA) by Nasal route. hydrOXYzine HCl (ATARAX) 10 mg tablet Take 10 mg by mouth three (3) times daily as needed for Itching. montelukast (SINGULAIR) 10 mg tablet Take 10 mg by mouth daily. albuterol (PROVENTIL HFA, VENTOLIN HFA, PROAIR HFA) 90 mcg/actuation inhaler Take 2 Puffs by inhalation. atenolol (TENORMIN) 50 mg tablet Take 50 mg by mouth daily. losartan (COZAAR) 100 mg tablet Take 100 mg by mouth daily. budesonide (ENTOCORT EC) 3 mg capsule Take 6 mg by mouth every morning. NOTIFY YOUR PHYSICIAN FOR ANY OF THE FOLLOWING:  
Fever over 101 degrees for 24 hours. Chest pain, shortness of breath, fever, chills, nausea, vomiting, diarrhea, change in mentation, falling, weakness, bleeding. Severe pain or pain not relieved by medications. Or, any other signs or symptoms that you may have questions about. DISPOSITION: 
  Home With: 
 OT  PT  HH  RN  
  
 Long term SNF/Inpatient Rehab X Independent Hospice  Other:  
 
 
PATIENT CONDITION AT DISCHARGE:  
 
 Functional status Poor Deconditioned X Independent Cognition X  Lucid Forgetful Dementia Catheters/lines (plus indication) [place] PICC   
 PEG   
X None Code status Full code DNR   
 
PHYSICAL EXAMINATION AT DISCHARGE: 
Patient Vitals for the past 24 hrs: 
 Temp Pulse Resp BP SpO2  
12/20/19 1150 97.4 °F (36.3 °C) 69 18 (!) 164/95 100 % 12/20/19 0941    (!) 160/92   
12/20/19 0807 97.7 °F (36.5 °C) 78 18 (!) 170/94 94 % 12/20/19 0303 97.5 °F (36.4 °C) 82 18 166/90 91 % 12/20/19 0225     93 % 12/19/19 2318 98.4 °F (36.9 °C) 77 18 158/80 92 % 12/19/19 2208     94 % 12/19/19 1942 97.4 °F (36.3 °C) 66 18 116/67 94 % 12/19/19 1801 97.6 °F (36.4 °C) 64 18 (!) 160/91 94 % Constitutional:  No acute distress, cooperative, pleasant   
ENT:  Oral mucous moist, oropharynx benign. Resp:  decreased breath sounds. No wheezing/rhonchi/rales. No accessory muscle use CV:  Regular rhythm, normal rate, no murmurs, gallops, rubs GI:  Soft, non distended, non tender. normoactive bowel sounds, no hepatosplenomegaly Musculoskeletal:  No edema, warm, 2+ pulses throughout Neurologic:  Moves all extremities. AAOx3, CN II-XII reviewed Psych:  Good insight, Not anxious nor agitated. Recent Results (from the past 24 hour(s)) PTT Collection Time: 12/19/19  6:37 PM  
Result Value Ref Range aPTT 26.7 22.1 - 32.0 sec  
 aPTT, therapeutic range     58.0 - 77.0 SECS  
PTT Collection Time: 12/20/19  2:28 AM  
Result Value Ref Range aPTT 60.6 (H) 22.1 - 32.0 sec  
 aPTT, therapeutic range     58.0 - 77.0 SECS  
CBC WITH AUTOMATED DIFF Collection Time: 12/20/19  2:28 AM  
Result Value Ref Range WBC 16.5 (H) 3.6 - 11.0 K/uL  
 RBC 4.13 3.80 - 5.20 M/uL  
 HGB 13.0 11.5 - 16.0 g/dL HCT 39.5 35.0 - 47.0 %  MCV 95.6 80.0 - 99.0 FL  
 MCH 31.5 26.0 - 34.0 PG  
 MCHC 32.9 30.0 - 36.5 g/dL  
 RDW 13.1 11.5 - 14.5 % PLATELET 491 823 - 195 K/uL MPV 10.6 8.9 - 12.9 FL  
 NRBC 0.0 0  WBC ABSOLUTE NRBC 0.00 0.00 - 0.01 K/uL NEUTROPHILS 93 (H) 32 - 75 % LYMPHOCYTES 3 (L) 12 - 49 % MONOCYTES 3 (L) 5 - 13 % EOSINOPHILS 0 0 - 7 % BASOPHILS 0 0 - 1 % IMMATURE GRANULOCYTES 1 (H) 0.0 - 0.5 % ABS. NEUTROPHILS 15.3 (H) 1.8 - 8.0 K/UL  
 ABS. LYMPHOCYTES 0.5 (L) 0.8 - 3.5 K/UL  
 ABS. MONOCYTES 0.5 0.0 - 1.0 K/UL  
 ABS. EOSINOPHILS 0.0 0.0 - 0.4 K/UL  
 ABS. BASOPHILS 0.0 0.0 - 0.1 K/UL  
 ABS. IMM. GRANS. 0.2 (H) 0.00 - 0.04 K/UL  
 DF SMEAR SCANNED    
 RBC COMMENTS ANISOCYTOSIS 1+ 
    
 RBC COMMENTS MACROCYTOSIS 
1+ METABOLIC PANEL, BASIC Collection Time: 12/20/19  2:28 AM  
Result Value Ref Range Sodium 134 (L) 136 - 145 mmol/L Potassium 4.1 3.5 - 5.1 mmol/L Chloride 104 97 - 108 mmol/L  
 CO2 26 21 - 32 mmol/L Anion gap 4 (L) 5 - 15 mmol/L Glucose 126 (H) 65 - 100 mg/dL BUN 23 (H) 6 - 20 MG/DL Creatinine 0.85 0.55 - 1.02 MG/DL  
 BUN/Creatinine ratio 27 (H) 12 - 20 GFR est AA >60 >60 ml/min/1.73m2 GFR est non-AA >60 >60 ml/min/1.73m2 Calcium 8.6 8.5 - 10.1 MG/DL  
PTT Collection Time: 12/20/19  8:48 AM  
Result Value Ref Range aPTT 61.8 (H) 22.1 - 32.0 sec  
 aPTT, therapeutic range     58.0 - 77.0 SECS  
 
 
 
CHRONIC MEDICAL DIAGNOSES: 
Problem List as of 12/20/2019 Date Reviewed: 12/16/2019 Codes Class Noted - Resolved Lung mass ICD-10-CM: R91.8 ICD-9-CM: 786.6  12/16/2019 - Present SVC (superior vena cava obstruction) ICD-10-CM: I87.1 ICD-9-CM: 459.2  12/16/2019 - Present SVC syndrome ICD-10-CM: I87.1 ICD-9-CM: 459.2  12/15/2019 - Present Greater than 30 minutes were spent with the patient on counseling and coordination of care Signed:  
Jaquelin Casey MD 
12/20/2019 
5:44 PM

## 2019-12-20 NOTE — PROGRESS NOTES
[facility] Patient progress note Name: Marcia Michaud MRN: [id number] : 1945 Hospital: [address] [address]  Date: 2019 IMPRESSION:  
· Multiple complex acute problems: 
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 · Pulmonary nodule, likely malignant. · CAD with h/o RCA stent in  · Tobacco use; not followed by pulmonary medicine as an outpatient basis RECOMMENDATIONS:  
· O2 PRN with sats > 90% · Duonebs/brovana/pulmicort - Anoro at discharge · Follow up as an outpatient with Dr Britt Hadley; PFTs in 1-2 wks · IV steroids start to wean · Heparin gtt · Prn over the weekend Subjective:  
 
 Tolerated bronch well Path pending  Resting in bed  
 
 Dyspnea noted today. She thinks it might be anxiety On room air  We discussed what is going on. I answered all questions to the best of my ability and her apparent satisfaction 19: Pt is feeling about the same. Still with dyspnea. No coughing. No chest pain, no back pain. No headaches. Has sensation of fullness in her chest and head. Slept ok last pm. No leg pain no abdominal pain. Tolerating po intake. Has dyspnea at rest. Has about the same level of RUL arm swelling. This patient has been seen and evaluated at the request of Dr. Charly Mcmillan for pulmonary nodule and SVC syndrome. Patient is a 69 y.o. female smoker (~100 p-y) with COPD (has never seen pulmonary, though has initial appointment scheduled for sometime in the near future. She presented overnight with a 1+ day history of increasing swelling of face and arms.   She had noted new distended veins on her lower chest and her left arm about a week ago, but the swelling was new about a day prior to coming to the hospital.  She is also wheezing and more short of breath than at baseline. Found to have SVC obstruction by thrombus. Also found to have a pulmonary nodule. Past Medical History:  
Diagnosis Date  CAD (coronary artery disease)  Chronic obstructive pulmonary disease ([facility] [address]  Hypertension Past Surgical History:  
Procedure Laterality Date  HX CAROTID STENT   Prior to Admission medications Medication Sig Start Date End Date Taking? Authorizing Provider  
fluticasone propionate (FLONASE ALLERGY RELIEF NA) by Nasal route. Trudy Yoon MD  
hydrOXYzine HCl (ATARAX) 10 mg tablet Take 10 mg by mouth three (3) times daily as needed for Itching. Trudy Yoon MD  
montelukast (SINGULAIR) 10 mg tablet Take 10 mg by mouth daily. Trudy Yoon MD  
albuterol (PROVENTIL HFA, VENTOLIN HFA, PROAIR HFA) 90 mcg/actuation inhaler Take 2 Puffs by inhalation. Trudy Yoon MD  
atenolol (TENORMIN) 50 mg tablet Take 50 mg by mouth daily. Trudy Yoon MD  
losartan (COZAAR) 100 mg tablet Take 100 mg by mouth daily. Trudy Yoon MD  
budesonide (ENTOCORT EC) 3 mg capsule Take 6 mg by mouth every morning. Trudy Yoon MD  
 
Allergies Allergen Reactions  Codeine Itching Social History Tobacco Use  Smoking status: Former Smoker Packs/day: 0.50 Last attempt to quit: 2019 Years since quittin.0  Smokeless tobacco: Never Used Substance Use Topics  Alcohol use: Not Currently Comment: occ No family history on file. Current Facility-Administered Medications Medication Dose Route Frequency  albuterol-ipratropium (DUO-NEB) 2.5 MG-0.5 MG/3 ML  3 mL Nebulization Q6H RT  
 budesonide (PULMICORT) 500 mcg/2 ml nebulizer suspension  500 mcg Nebulization BID RT  
 arformoterol (BROVANA) neb solution 15 mcg  15 mcg Nebulization BID RT  
 atenolol (TENORMIN) tablet 50 mg  50 mg Oral DAILY  fluticasone propionate (FLONASE) 50 mcg/actuation nasal spray 2 Spray  2 Spray Both Nostrils DAILY  losartan (COZAAR) tablet 100 mg  100 mg Oral DAILY  montelukast (SINGULAIR) tablet 10 mg  10 mg Oral DAILY  sodium chloride (NS) flush 5-40 mL  5-40 mL IntraVENous Q8H  
 heparin 25,000 units in D5W 250 ml infusion  18-36 Units/kg/hr IntraVENous TITRATE  methylPREDNISolone (PF) (SOLU-MEDROL) injection 40 mg  40 mg IntraVENous Q8H Review of Systems: A comprehensive review of systems was negative except for that written in the HPI. Objective:  
Vital Signs:   
Visit Vitals /90 (BP 1 Location: Right arm, BP Patient Position: At rest) Pulse 82 Temp 97.5 °F (36.4 °C) Resp 18 Ht 5' 2\" (1.575 m) Wt 38 kg (83 lb 12.4 oz) SpO2 91% Breastfeeding No  
BMI 15.32 kg/m² O2 Device: Room air O2 Flow Rate (L/min): 2 l/min Temp (24hrs), Av.7 °F (36.5 °C), Min:97.4 °F (36.3 °C), Max:98.4 °F (36.9 °C) Intake/Output:  
Last shift:      No intake/output data recorded. Last 3 shifts:  1901 -  0700 In: 269 [P.O.:100; I.V.:169] Out: - Intake/Output Summary (Last 24 hours) at 2019 0800 Last data filed at 2019 1044 Gross per 24 hour Intake 19.09 ml Output  Net 19.09 ml Physical Exam:  
General:  Alert, cooperative, no distress Head:  Normocephalic, mild facial edema Eyes:  Conjunctivae/corneas clear. PERRL, EOMs intact. Nose: Nares normal. Septum midline. Mucosa normal.    
Throat: Lips, mucosa, and tongue normal.    
Neck: Supple, symmetrical, trachea midline, +distended veins Back:   Symmetric, no curvature. ROM normal.  
Lungs:   Diffuse bilateral wheeze, no rales or ronchi Chest wall:  No tenderness or deformity. Heart:  Regular rate and rhythm Abdomen:   Soft, non-tender.  Bowel sounds normal.   
Extremities: Extremities normal, atraumatic, no cyanosis or clubbing, has some edema of the RUE. Mild facial swelling. Prominet Skin: Skin color, texture, turgor normal. No rashes or lesions Lymph nodes: Cervical, supraclavicular, and axillary nodes normal.  She does have what looks like a left axillary nodule but is soft and more likely a varicosity of a distended axillary vein Neurologic: Grossly nonfocal, motor is grossly intact. Psych: no overt anxiety or depression. Data review:  
 
Recent Results (from the past 24 hour(s)) PTT Collection Time: 12/19/19 10:35 AM  
Result Value Ref Range aPTT 43.6 (H) 22.1 - 32.0 sec  
 aPTT, therapeutic range     58.0 - 77.0 SECS  
PTT Collection Time: 12/19/19  6:37 PM  
Result Value Ref Range aPTT 26.7 22.1 - 32.0 sec  
 aPTT, therapeutic range     58.0 - 77.0 SECS  
PTT Collection Time: 12/20/19  2:28 AM  
Result Value Ref Range aPTT 60.6 (H) 22.1 - 32.0 sec  
 aPTT, therapeutic range     58.0 - 77.0 SECS  
CBC WITH AUTOMATED DIFF Collection Time: 12/20/19  2:28 AM  
Result Value Ref Range WBC 16.5 (H) 3.6 - 11.0 K/uL  
 RBC 4.13 3.80 - 5.20 M/uL  
 HGB 13.0 11.5 - 16.0 g/dL HCT 39.5 35.0 - 47.0 % MCV 95.6 80.0 - 99.0 FL  
 MCH 31.5 26.0 - 34.0 PG  
 MCHC 32.9 30.0 - 36.5 g/dL  
 RDW 13.1 11.5 - 14.5 % PLATELET 594 828 - 156 K/uL MPV 10.6 8.9 - 12.9 FL  
 NRBC 0.0 0  WBC ABSOLUTE NRBC 0.00 0.00 - 0.01 K/uL NEUTROPHILS 93 (H) 32 - 75 % LYMPHOCYTES 3 (L) 12 - 49 % MONOCYTES 3 (L) 5 - 13 % EOSINOPHILS 0 0 - 7 % BASOPHILS 0 0 - 1 % IMMATURE GRANULOCYTES 1 (H) 0.0 - 0.5 % ABS. NEUTROPHILS 15.3 (H) 1.8 - 8.0 K/UL  
 ABS. LYMPHOCYTES 0.5 (L) 0.8 - 3.5 K/UL  
 ABS. MONOCYTES 0.5 0.0 - 1.0 K/UL  
 ABS. EOSINOPHILS 0.0 0.0 - 0.4 K/UL  
 ABS. BASOPHILS 0.0 0.0 - 0.1 K/UL  
 ABS. IMM. GRANS. 0.2 (H) 0.00 - 0.04 K/UL  
 DF SMEAR SCANNED    
 RBC COMMENTS ANISOCYTOSIS 1+ 
    
 RBC COMMENTS MACROCYTOSIS 
1+ METABOLIC PANEL, BASIC  Collection Time: 12/20/19  2:28 AM  
 Result Value Ref Range Sodium 134 (L) 136 - 145 mmol/L Potassium 4.1 3.5 - 5.1 mmol/L Chloride 104 97 - 108 mmol/L  
 CO2 26 21 - 32 mmol/L Anion gap 4 (L) 5 - 15 mmol/L Glucose 126 (H) 65 - 100 mg/dL BUN 23 (H) 6 - 20 MG/DL Creatinine 0.85 0.55 - 1.02 MG/DL  
 BUN/Creatinine ratio 27 (H) 12 - 20 GFR est AA >60 >60 ml/min/1.73m2 GFR est non-AA >60 >60 ml/min/1.73m2 Calcium 8.6 8.5 - 10.1 MG/DL Imaging: 
I have personally reviewed the patients radiographs and have reviewed the reports: 
Emphysema, right upper lobe pulmonary nodule, thrombus in the SVC (I can't tell if there is any adenopathy vs thrombosis), official read pending Cheyenne Mullen PA-C

## 2019-12-20 NOTE — PROGRESS NOTES
Spiritual Care Partner Volunteer visited patient in room 361/01 on 12.20. 19. Documented by:  : Rev. Darian Liu. Reed Jeff; [facility], to contact [phone] Jesse Castaneda call: [phone]

## 2019-12-20 NOTE — PROGRESS NOTES
Spoke with Dr. Uma Zafar regarding reconciling PTA meds. Dr. Uma Zafar called pharmacy and states they will take care of it. [facility]  to confirm restart of Heparin dose. Was instructed to base changes on PTT from 10:35 am.  Resumed protocol 2000 - Bedside shift change report given to Teresa Solitario (oncoming nurse) by Peyton Mckeon RN (offgoing nurse). Report included the following information SBAR, Kardex, Intake/Output and Recent Results.

## 2019-12-20 NOTE — ANESTHESIA POSTPROCEDURE EVALUATION
Post-Anesthesia Evaluation and Assessment Patient: Esteban Colorado MRN: [id number]  SSN: [id number] YOB: 1945  Age: 69 y.o. Sex: female I have evaluated the patient and they are stable and ready for discharge from the PACU. Cardiovascular Function/Vital Signs Visit Vitals BP (!) 160/91 (BP 1 Location: Right arm, BP Patient Position: At rest) Pulse 64 Temp 36.4 °C (97.6 °F) Resp 18 Ht 5' 2\" (1.575 m) Wt 38.5 kg (84 lb 14 oz) SpO2 94% Breastfeeding No  
BMI 15.52 kg/m² Patient is status post General anesthesia for Procedure(s): ENDOSCOPIC BRONCHOSCOPY ULTRASOUND (EBUS) TRANSBRONCHIAL BIOPSY. Nausea/Vomiting: None Postoperative hydration reviewed and adequate. Pain: 
Pain Scale 1: Numeric (0 - 10) (12/19/19 1503) Pain Intensity 1: 0 (12/19/19 1503) Managed Neurological Status:  
Neuro Neurologic State: Alert (12/19/19 1215) Orientation Level: Oriented X4 (12/19/19 1215) Cognition: Appropriate for age attention/concentration; Follows commands (12/19/19 0800) Speech: Clear (12/19/19 0800) LUE Motor Response: Purposeful (12/19/19 0800) LLE Motor Response: Purposeful (12/19/19 0800) RUE Motor Response: Purposeful (12/19/19 0800) RLE Motor Response: Purposeful (12/19/19 0800) At baseline Mental Status, Level of Consciousness: Alert and  oriented to person, place, and time Pulmonary Status:  
O2 Device: Room air (12/19/19 1720) Adequate oxygenation and airway patent Complications related to anesthesia: None Post-anesthesia assessment completed. No concerns Signed By: Demarco Andrews MD   
 December 19, 2019 Procedure(s): ENDOSCOPIC BRONCHOSCOPY ULTRASOUND (EBUS) TRANSBRONCHIAL BIOPSY. general 
 
<BSHSIANPOST> Vitals Value Taken Time /91 12/19/2019  6:01 PM  
Temp 36.4 °C (97.6 °F) 12/19/2019  6:01 PM  
Pulse 64 12/19/2019  6:01 PM  
Resp 18 12/19/2019  6:01 PM  
SpO2 94 % 12/19/2019  6:01 PM

## 2020-01-01 ENCOUNTER — HOSPITAL ENCOUNTER (INPATIENT)
Dept: RADIATION THERAPY | Age: 75
Discharge: HOME | DRG: 186 | End: 2020-02-03
Attending: INTERNAL MEDICINE
Payer: MEDICARE

## 2020-01-01 ENCOUNTER — TELEPHONE (OUTPATIENT)
Dept: PALLATIVE CARE | Age: 75
End: 2020-01-01

## 2020-01-01 ENCOUNTER — TELEPHONE (OUTPATIENT)
Dept: ONCOLOGY | Age: 75
End: 2020-01-01

## 2020-01-01 ENCOUNTER — OFFICE VISIT (OUTPATIENT)
Dept: ONCOLOGY | Age: 75
End: 2020-01-01

## 2020-01-01 ENCOUNTER — APPOINTMENT (OUTPATIENT)
Dept: INFUSION THERAPY | Age: 75
End: 2020-01-01

## 2020-01-01 ENCOUNTER — HOSPITAL ENCOUNTER (OUTPATIENT)
Dept: NUCLEAR MEDICINE | Age: 75
Discharge: HOME | End: 2020-04-07
Attending: REGISTERED NURSE
Payer: MEDICARE

## 2020-01-01 ENCOUNTER — APPOINTMENT (OUTPATIENT)
Dept: GENERAL RADIOLOGY | Age: 75
DRG: 186 | End: 2020-01-01
Attending: RADIOLOGY
Payer: MEDICARE

## 2020-01-01 ENCOUNTER — ANESTHESIA (OUTPATIENT)
Dept: SURGERY | Age: 75
DRG: 025 | End: 2020-01-01
Payer: MEDICARE

## 2020-01-01 ENCOUNTER — HOSPITAL ENCOUNTER (OUTPATIENT)
Dept: RADIATION THERAPY | Age: 75
Discharge: HOME | End: 2020-02-18
Payer: MEDICARE

## 2020-01-01 ENCOUNTER — HOSPITAL ENCOUNTER (OUTPATIENT)
Dept: INFUSION THERAPY | Age: 75
Discharge: HOME | End: 2020-04-17
Payer: MEDICARE

## 2020-01-01 ENCOUNTER — HOSPITAL ENCOUNTER (INPATIENT)
Age: 75
LOS: 6 days | Discharge: HOME | DRG: 186 | End: 2020-02-06
Attending: EMERGENCY MEDICINE | Admitting: HOSPITALIST
Payer: MEDICARE

## 2020-01-01 ENCOUNTER — HOSPITAL ENCOUNTER (OUTPATIENT)
Dept: ULTRASOUND IMAGING | Age: 75
Discharge: HOME | End: 2020-03-16
Attending: REGISTERED NURSE
Payer: MEDICARE

## 2020-01-01 ENCOUNTER — HOSPITAL ENCOUNTER (EMERGENCY)
Age: 75
Discharge: HOME | End: 2020-02-28
Attending: EMERGENCY MEDICINE | Admitting: EMERGENCY MEDICINE
Payer: MEDICARE

## 2020-01-01 ENCOUNTER — OFFICE VISIT (OUTPATIENT)
Dept: PALLATIVE CARE | Age: 75
End: 2020-01-01

## 2020-01-01 ENCOUNTER — ANESTHESIA (OUTPATIENT)
Dept: CARDIOTHORACIC SURGERY | Age: 75
End: 2020-01-01
Payer: MEDICARE

## 2020-01-01 ENCOUNTER — HOSPITAL ENCOUNTER (OUTPATIENT)
Dept: RADIATION THERAPY | Age: 75
Discharge: HOME | End: 2020-02-20
Payer: MEDICARE

## 2020-01-01 ENCOUNTER — HOSPITAL ENCOUNTER (OUTPATIENT)
Dept: INFUSION THERAPY | Age: 75
Discharge: HOME | End: 2020-03-26
Payer: MEDICARE

## 2020-01-01 ENCOUNTER — HOSPITAL ENCOUNTER (OUTPATIENT)
Dept: CT IMAGING | Age: 75
End: 2020-01-01
Attending: NURSE PRACTITIONER
Payer: MEDICARE

## 2020-01-01 ENCOUNTER — APPOINTMENT (OUTPATIENT)
Dept: GENERAL RADIOLOGY | Age: 75
DRG: 025 | End: 2020-01-01
Attending: NEUROLOGICAL SURGERY
Payer: MEDICARE

## 2020-01-01 ENCOUNTER — DOCUMENTATION ONLY (OUTPATIENT)
Dept: ONCOLOGY | Age: 75
End: 2020-01-01

## 2020-01-01 ENCOUNTER — HOSPITAL ENCOUNTER (OUTPATIENT)
Dept: INFUSION THERAPY | Age: 75
Discharge: HOME | End: 2020-04-16
Payer: MEDICARE

## 2020-01-01 ENCOUNTER — HOSPITAL ENCOUNTER (OUTPATIENT)
Age: 75
Setting detail: OBSERVATION
Discharge: HOME | End: 2020-01-15
Attending: SURGERY | Admitting: SURGERY
Payer: MEDICARE

## 2020-01-01 ENCOUNTER — APPOINTMENT (OUTPATIENT)
Dept: ULTRASOUND IMAGING | Age: 75
DRG: 186 | End: 2020-01-01
Attending: NURSE PRACTITIONER
Payer: MEDICARE

## 2020-01-01 ENCOUNTER — HOSPITAL ENCOUNTER (OUTPATIENT)
Dept: INFUSION THERAPY | Age: 75
End: 2020-01-01
Payer: MEDICARE

## 2020-01-01 ENCOUNTER — APPOINTMENT (OUTPATIENT)
Dept: GENERAL RADIOLOGY | Age: 75
DRG: 186 | End: 2020-01-01
Attending: INTERNAL MEDICINE
Payer: MEDICARE

## 2020-01-01 ENCOUNTER — HOSPITAL ENCOUNTER (OUTPATIENT)
Dept: CT IMAGING | Age: 75
Discharge: HOME | End: 2020-05-24
Attending: NURSE PRACTITIONER
Payer: MEDICARE

## 2020-01-01 ENCOUNTER — VIRTUAL VISIT (OUTPATIENT)
Dept: PALLATIVE CARE | Age: 75
End: 2020-01-01

## 2020-01-01 ENCOUNTER — APPOINTMENT (OUTPATIENT)
Dept: NON INVASIVE DIAGNOSTICS | Age: 75
DRG: 186 | End: 2020-01-01
Attending: PSYCHIATRY & NEUROLOGY
Payer: MEDICARE

## 2020-01-01 ENCOUNTER — HOSPITAL ENCOUNTER (OUTPATIENT)
Dept: RADIATION THERAPY | Age: 75
Discharge: HOME | End: 2020-02-11
Payer: MEDICARE

## 2020-01-01 ENCOUNTER — APPOINTMENT (OUTPATIENT)
Dept: GENERAL RADIOLOGY | Age: 75
End: 2020-01-01
Attending: SURGERY
Payer: MEDICARE

## 2020-01-01 ENCOUNTER — HOSPITAL ENCOUNTER (OUTPATIENT)
Dept: RADIATION THERAPY | Age: 75
Discharge: HOME | End: 2020-02-19
Payer: MEDICARE

## 2020-01-01 ENCOUNTER — HOSPITAL ENCOUNTER (OUTPATIENT)
Dept: PET IMAGING | Age: 75
Discharge: HOME | End: 2020-01-13
Attending: INTERNAL MEDICINE
Payer: MEDICARE

## 2020-01-01 ENCOUNTER — HOSPITAL ENCOUNTER (INPATIENT)
Age: 75
LOS: 1 days | DRG: 025 | End: 2020-06-04
Attending: NEUROLOGICAL SURGERY | Admitting: NEUROLOGICAL SURGERY
Payer: MEDICARE

## 2020-01-01 ENCOUNTER — APPOINTMENT (OUTPATIENT)
Dept: CT IMAGING | Age: 75
DRG: 025 | End: 2020-01-01
Attending: NEUROLOGICAL SURGERY
Payer: MEDICARE

## 2020-01-01 ENCOUNTER — HOSPITAL ENCOUNTER (OUTPATIENT)
Dept: RADIATION THERAPY | Age: 75
Discharge: HOME | End: 2020-02-25
Payer: MEDICARE

## 2020-01-01 ENCOUNTER — HOSPITAL ENCOUNTER (OUTPATIENT)
Dept: GENERAL RADIOLOGY | Age: 75
Discharge: HOME | End: 2020-03-16
Attending: REGISTERED NURSE
Payer: MEDICARE

## 2020-01-01 ENCOUNTER — HOSPITAL ENCOUNTER (OUTPATIENT)
Dept: PREADMISSION TESTING | Age: 75
Discharge: HOME | DRG: 025 | End: 2020-06-02
Payer: MEDICARE

## 2020-01-01 ENCOUNTER — HOSPITAL ENCOUNTER (OUTPATIENT)
Dept: MRI IMAGING | Age: 75
Discharge: HOME | End: 2020-05-24
Attending: NURSE PRACTITIONER
Payer: MEDICARE

## 2020-01-01 ENCOUNTER — HOSPITAL ENCOUNTER (OUTPATIENT)
Dept: RADIATION THERAPY | Age: 75
Discharge: HOME | End: 2020-02-14
Payer: MEDICARE

## 2020-01-01 ENCOUNTER — APPOINTMENT (OUTPATIENT)
Dept: MRI IMAGING | Age: 75
DRG: 186 | End: 2020-01-01
Attending: INTERNAL MEDICINE
Payer: MEDICARE

## 2020-01-01 ENCOUNTER — TELEPHONE (OUTPATIENT)
Dept: SURGERY | Age: 75
End: 2020-01-01

## 2020-01-01 ENCOUNTER — APPOINTMENT (OUTPATIENT)
Dept: CT IMAGING | Age: 75
End: 2020-01-01
Attending: EMERGENCY MEDICINE
Payer: MEDICARE

## 2020-01-01 ENCOUNTER — APPOINTMENT (OUTPATIENT)
Dept: CT IMAGING | Age: 75
DRG: 186 | End: 2020-01-01
Attending: NURSE PRACTITIONER
Payer: MEDICARE

## 2020-01-01 ENCOUNTER — HOSPITAL ENCOUNTER (OUTPATIENT)
Dept: RADIATION THERAPY | Age: 75
Discharge: HOME | End: 2020-02-13
Payer: MEDICARE

## 2020-01-01 ENCOUNTER — APPOINTMENT (OUTPATIENT)
Dept: VASCULAR SURGERY | Age: 75
DRG: 186 | End: 2020-01-01
Attending: PSYCHIATRY & NEUROLOGY
Payer: MEDICARE

## 2020-01-01 ENCOUNTER — HOSPITAL ENCOUNTER (OUTPATIENT)
Dept: PREADMISSION TESTING | Age: 75
Discharge: HOME | End: 2020-05-30
Payer: MEDICARE

## 2020-01-01 ENCOUNTER — PATIENT MESSAGE (OUTPATIENT)
Dept: PALLATIVE CARE | Age: 75
End: 2020-01-01

## 2020-01-01 ENCOUNTER — ANESTHESIA EVENT (OUTPATIENT)
Dept: SURGERY | Age: 75
DRG: 025 | End: 2020-01-01
Payer: MEDICARE

## 2020-01-01 ENCOUNTER — ANESTHESIA EVENT (OUTPATIENT)
Dept: CARDIOTHORACIC SURGERY | Age: 75
End: 2020-01-01
Payer: MEDICARE

## 2020-01-01 ENCOUNTER — HOSPITAL ENCOUNTER (OUTPATIENT)
Dept: RADIATION THERAPY | Age: 75
Discharge: HOME | End: 2020-02-26
Payer: MEDICARE

## 2020-01-01 ENCOUNTER — PATIENT MESSAGE (OUTPATIENT)
Dept: ONCOLOGY | Age: 75
End: 2020-01-01

## 2020-01-01 ENCOUNTER — DOCUMENTATION ONLY (OUTPATIENT)
Dept: PALLATIVE CARE | Age: 75
End: 2020-01-01

## 2020-01-01 ENCOUNTER — APPOINTMENT (OUTPATIENT)
Dept: CT IMAGING | Age: 75
DRG: 025 | End: 2020-01-01
Attending: NURSE PRACTITIONER
Payer: MEDICARE

## 2020-01-01 ENCOUNTER — HOSPITAL ENCOUNTER (OUTPATIENT)
Dept: RADIATION THERAPY | Age: 75
Discharge: HOME | End: 2020-02-12
Payer: MEDICARE

## 2020-01-01 ENCOUNTER — VIRTUAL VISIT (OUTPATIENT)
Dept: ONCOLOGY | Age: 75
End: 2020-01-01

## 2020-01-01 ENCOUNTER — HOSPITAL ENCOUNTER (OUTPATIENT)
Dept: RADIATION THERAPY | Age: 75
Discharge: HOME | End: 2020-02-27
Payer: MEDICARE

## 2020-01-01 ENCOUNTER — HOSPITAL ENCOUNTER (OUTPATIENT)
Dept: RADIATION THERAPY | Age: 75
Discharge: HOME | End: 2020-02-21
Payer: MEDICARE

## 2020-01-01 ENCOUNTER — HOSPITAL ENCOUNTER (OUTPATIENT)
Dept: GENERAL RADIOLOGY | Age: 75
Discharge: HOME | End: 2020-02-26
Payer: MEDICARE

## 2020-01-01 ENCOUNTER — ANESTHESIA (OUTPATIENT)
Dept: MRI IMAGING | Age: 75
DRG: 186 | End: 2020-01-01
Payer: MEDICARE

## 2020-01-01 ENCOUNTER — ANESTHESIA EVENT (OUTPATIENT)
Dept: MRI IMAGING | Age: 75
DRG: 186 | End: 2020-01-01
Payer: MEDICARE

## 2020-01-01 ENCOUNTER — HOSPITAL ENCOUNTER (OUTPATIENT)
Dept: RADIATION THERAPY | Age: 75
Discharge: HOME | End: 2020-04-29

## 2020-01-01 ENCOUNTER — HOSPITAL ENCOUNTER (OUTPATIENT)
Dept: RADIATION THERAPY | Age: 75
Discharge: HOME | End: 2020-02-17
Payer: MEDICARE

## 2020-01-01 ENCOUNTER — HOSPITAL ENCOUNTER (OUTPATIENT)
Dept: RADIATION THERAPY | Age: 75
Discharge: HOME | End: 2020-02-24
Payer: MEDICARE

## 2020-01-01 ENCOUNTER — HOSPITAL ENCOUNTER (OUTPATIENT)
Dept: INFUSION THERAPY | Age: 75
Discharge: HOME | End: 2020-05-07
Payer: MEDICARE

## 2020-01-01 ENCOUNTER — HOSPITAL ENCOUNTER (OUTPATIENT)
Dept: RADIATION THERAPY | Age: 75
Discharge: HOME | End: 2020-01-24
Payer: MEDICARE

## 2020-01-01 ENCOUNTER — HOSPITAL ENCOUNTER (OUTPATIENT)
Dept: INFUSION THERAPY | Age: 75
Discharge: HOME | End: 2020-03-05
Payer: MEDICARE

## 2020-01-01 ENCOUNTER — APPOINTMENT (OUTPATIENT)
Dept: GENERAL RADIOLOGY | Age: 75
DRG: 025 | End: 2020-01-01
Attending: NURSE PRACTITIONER
Payer: MEDICARE

## 2020-01-01 ENCOUNTER — TELEPHONE (OUTPATIENT)
Dept: INFUSION THERAPY | Age: 75
End: 2020-01-01

## 2020-01-01 ENCOUNTER — HOSPITAL ENCOUNTER (OUTPATIENT)
Dept: INFUSION THERAPY | Age: 75
End: 2020-01-01

## 2020-01-01 VITALS
RESPIRATION RATE: 17 BRPM | HEIGHT: 62 IN | HEART RATE: 64 BPM | TEMPERATURE: 97.6 F | WEIGHT: 76 LBS | SYSTOLIC BLOOD PRESSURE: 142 MMHG | OXYGEN SATURATION: 98 % | DIASTOLIC BLOOD PRESSURE: 84 MMHG | BODY MASS INDEX: 13.98 KG/M2

## 2020-01-01 VITALS
OXYGEN SATURATION: 98 % | SYSTOLIC BLOOD PRESSURE: 167 MMHG | TEMPERATURE: 97.9 F | HEART RATE: 91 BPM | BODY MASS INDEX: 14.39 KG/M2 | HEIGHT: 62 IN | RESPIRATION RATE: 18 BRPM | DIASTOLIC BLOOD PRESSURE: 83 MMHG | WEIGHT: 78.2 LBS

## 2020-01-01 VITALS
HEIGHT: 62 IN | WEIGHT: 76 LBS | SYSTOLIC BLOOD PRESSURE: 70 MMHG | RESPIRATION RATE: 18 BRPM | TEMPERATURE: 97.8 F | BODY MASS INDEX: 13.98 KG/M2 | OXYGEN SATURATION: 62 % | DIASTOLIC BLOOD PRESSURE: 49 MMHG

## 2020-01-01 VITALS
TEMPERATURE: 98 F | SYSTOLIC BLOOD PRESSURE: 135 MMHG | HEART RATE: 88 BPM | DIASTOLIC BLOOD PRESSURE: 75 MMHG | RESPIRATION RATE: 17 BRPM | OXYGEN SATURATION: 95 %

## 2020-01-01 VITALS
WEIGHT: 82.5 LBS | DIASTOLIC BLOOD PRESSURE: 89 MMHG | SYSTOLIC BLOOD PRESSURE: 149 MMHG | HEART RATE: 74 BPM | HEIGHT: 62 IN | TEMPERATURE: 98 F | BODY MASS INDEX: 15.18 KG/M2 | OXYGEN SATURATION: 98 %

## 2020-01-01 VITALS
TEMPERATURE: 98.3 F | RESPIRATION RATE: 18 BRPM | HEIGHT: 62 IN | BODY MASS INDEX: 14.72 KG/M2 | HEART RATE: 74 BPM | SYSTOLIC BLOOD PRESSURE: 163 MMHG | OXYGEN SATURATION: 94 % | WEIGHT: 80 LBS | DIASTOLIC BLOOD PRESSURE: 78 MMHG

## 2020-01-01 VITALS
HEIGHT: 62 IN | OXYGEN SATURATION: 98 % | SYSTOLIC BLOOD PRESSURE: 159 MMHG | TEMPERATURE: 98.1 F | WEIGHT: 78 LBS | BODY MASS INDEX: 14.35 KG/M2 | HEART RATE: 70 BPM | DIASTOLIC BLOOD PRESSURE: 86 MMHG

## 2020-01-01 VITALS
SYSTOLIC BLOOD PRESSURE: 135 MMHG | TEMPERATURE: 97.5 F | HEIGHT: 62 IN | WEIGHT: 78.4 LBS | HEART RATE: 66 BPM | OXYGEN SATURATION: 98 % | BODY MASS INDEX: 14.43 KG/M2 | DIASTOLIC BLOOD PRESSURE: 71 MMHG

## 2020-01-01 VITALS — DIASTOLIC BLOOD PRESSURE: 71 MMHG | TEMPERATURE: 98.1 F | HEART RATE: 73 BPM | SYSTOLIC BLOOD PRESSURE: 143 MMHG

## 2020-01-01 VITALS — TEMPERATURE: 96.7 F | DIASTOLIC BLOOD PRESSURE: 98 MMHG | SYSTOLIC BLOOD PRESSURE: 186 MMHG

## 2020-01-01 VITALS
OXYGEN SATURATION: 93 % | HEART RATE: 110 BPM | TEMPERATURE: 98.2 F | BODY MASS INDEX: 15.29 KG/M2 | WEIGHT: 83.11 LBS | SYSTOLIC BLOOD PRESSURE: 109 MMHG | HEIGHT: 62 IN | RESPIRATION RATE: 18 BRPM | DIASTOLIC BLOOD PRESSURE: 55 MMHG

## 2020-01-01 VITALS
WEIGHT: 80 LBS | DIASTOLIC BLOOD PRESSURE: 84 MMHG | RESPIRATION RATE: 20 BRPM | TEMPERATURE: 98.1 F | OXYGEN SATURATION: 98 % | BODY MASS INDEX: 14.72 KG/M2 | SYSTOLIC BLOOD PRESSURE: 182 MMHG | HEART RATE: 78 BPM | HEIGHT: 62 IN

## 2020-01-01 VITALS — HEIGHT: 62 IN | RESPIRATION RATE: 18 BRPM | BODY MASS INDEX: 14.17 KG/M2 | WEIGHT: 77 LBS

## 2020-01-01 VITALS
BODY MASS INDEX: 14.04 KG/M2 | TEMPERATURE: 97.9 F | SYSTOLIC BLOOD PRESSURE: 127 MMHG | HEART RATE: 79 BPM | DIASTOLIC BLOOD PRESSURE: 84 MMHG | RESPIRATION RATE: 17 BRPM | OXYGEN SATURATION: 97 % | WEIGHT: 76.3 LBS | HEIGHT: 62 IN

## 2020-01-01 VITALS
TEMPERATURE: 96.5 F | OXYGEN SATURATION: 94 % | HEIGHT: 62 IN | SYSTOLIC BLOOD PRESSURE: 178 MMHG | DIASTOLIC BLOOD PRESSURE: 98 MMHG | BODY MASS INDEX: 14.28 KG/M2 | WEIGHT: 77.6 LBS | HEART RATE: 85 BPM

## 2020-01-01 VITALS
BODY MASS INDEX: 14.63 KG/M2 | DIASTOLIC BLOOD PRESSURE: 59 MMHG | RESPIRATION RATE: 17 BRPM | HEART RATE: 83 BPM | SYSTOLIC BLOOD PRESSURE: 127 MMHG | TEMPERATURE: 98.2 F | HEIGHT: 62 IN

## 2020-01-01 VITALS
DIASTOLIC BLOOD PRESSURE: 74 MMHG | HEART RATE: 80 BPM | RESPIRATION RATE: 18 BRPM | TEMPERATURE: 97.7 F | SYSTOLIC BLOOD PRESSURE: 162 MMHG

## 2020-01-01 VITALS
SYSTOLIC BLOOD PRESSURE: 133 MMHG | WEIGHT: 69 LBS | TEMPERATURE: 98.1 F | DIASTOLIC BLOOD PRESSURE: 87 MMHG | HEIGHT: 62 IN | HEART RATE: 101 BPM | BODY MASS INDEX: 12.7 KG/M2

## 2020-01-01 VITALS — WEIGHT: 77 LBS | BODY MASS INDEX: 14.17 KG/M2 | HEIGHT: 62 IN

## 2020-01-01 DIAGNOSIS — C34.91 NON-SMALL CELL LUNG CANCER, RIGHT (HCC): Primary | ICD-10-CM

## 2020-01-01 DIAGNOSIS — I10 ESSENTIAL HYPERTENSION: ICD-10-CM

## 2020-01-01 DIAGNOSIS — R06.02 SOB (SHORTNESS OF BREATH): Primary | ICD-10-CM

## 2020-01-01 DIAGNOSIS — M54.6 RIGHT-SIDED THORACIC BACK PAIN, UNSPECIFIED CHRONICITY: ICD-10-CM

## 2020-01-01 DIAGNOSIS — C79.31 BRAIN METASTASES (HCC): ICD-10-CM

## 2020-01-01 DIAGNOSIS — C34.90 LUNG CANCER METASTATIC TO BRAIN (HCC): ICD-10-CM

## 2020-01-01 DIAGNOSIS — I82.210 SUPERIOR VENA CAVA THROMBOSIS (HCC): ICD-10-CM

## 2020-01-01 DIAGNOSIS — I87.1 SVC (SUPERIOR VENA CAVA OBSTRUCTION): ICD-10-CM

## 2020-01-01 DIAGNOSIS — C34.91 NON-SMALL CELL CANCER OF RIGHT LUNG (HCC): Primary | ICD-10-CM

## 2020-01-01 DIAGNOSIS — J44.9 CHRONIC OBSTRUCTIVE PULMONARY DISEASE, UNSPECIFIED COPD TYPE (HCC): ICD-10-CM

## 2020-01-01 DIAGNOSIS — C34.11 MALIGNANT NEOPLASM OF UPPER LOBE OF RIGHT LUNG (HCC): ICD-10-CM

## 2020-01-01 DIAGNOSIS — C79.31 LUNG CANCER METASTATIC TO BRAIN (HCC): Primary | ICD-10-CM

## 2020-01-01 DIAGNOSIS — R06.09 EXERTIONAL DYSPNEA: ICD-10-CM

## 2020-01-01 DIAGNOSIS — J90 PLEURAL EFFUSION: ICD-10-CM

## 2020-01-01 DIAGNOSIS — C79.31 BRAIN METASTASES (HCC): Primary | ICD-10-CM

## 2020-01-01 DIAGNOSIS — M54.9 MID BACK PAIN: Primary | ICD-10-CM

## 2020-01-01 DIAGNOSIS — C34.91 NON-SMALL CELL CANCER OF RIGHT LUNG (HCC): ICD-10-CM

## 2020-01-01 DIAGNOSIS — C34.91 MALIGNANT NEOPLASM OF RIGHT LUNG, UNSPECIFIED PART OF LUNG (HCC): Primary | ICD-10-CM

## 2020-01-01 DIAGNOSIS — J90 PLEURAL EFFUSION: Primary | ICD-10-CM

## 2020-01-01 DIAGNOSIS — C79.31 METASTATIC CANCER TO BRAIN (HCC): ICD-10-CM

## 2020-01-01 DIAGNOSIS — R91.8 LUNG MASS: Primary | ICD-10-CM

## 2020-01-01 DIAGNOSIS — Z51.12 ENCOUNTER FOR ANTINEOPLASTIC IMMUNOTHERAPY: ICD-10-CM

## 2020-01-01 DIAGNOSIS — H57.11 PAIN OF RIGHT ORBIT: ICD-10-CM

## 2020-01-01 DIAGNOSIS — I63.9 CEREBROVASCULAR ACCIDENT (CVA), UNSPECIFIED MECHANISM (HCC): ICD-10-CM

## 2020-01-01 DIAGNOSIS — C34.11 MALIGNANT NEOPLASM OF UPPER LOBE OF RIGHT LUNG (HCC): Primary | ICD-10-CM

## 2020-01-01 DIAGNOSIS — G47.00 INSOMNIA, UNSPECIFIED TYPE: Primary | ICD-10-CM

## 2020-01-01 DIAGNOSIS — R06.00 DYSPNEA, UNSPECIFIED TYPE: ICD-10-CM

## 2020-01-01 DIAGNOSIS — E78.5 HYPERLIPIDEMIA, UNSPECIFIED HYPERLIPIDEMIA TYPE: ICD-10-CM

## 2020-01-01 DIAGNOSIS — Z20.822 ENCOUNTER FOR LABORATORY TESTING FOR COVID-19 VIRUS: ICD-10-CM

## 2020-01-01 DIAGNOSIS — F41.9 ANXIETY: ICD-10-CM

## 2020-01-01 DIAGNOSIS — I87.1 SVC (SUPERIOR VENA CAVA OBSTRUCTION): Primary | ICD-10-CM

## 2020-01-01 DIAGNOSIS — C34.90 LUNG CANCER METASTATIC TO BRAIN (HCC): Primary | ICD-10-CM

## 2020-01-01 DIAGNOSIS — F41.8 ANXIETY ABOUT HEALTH: ICD-10-CM

## 2020-01-01 DIAGNOSIS — R91.8 LUNG MASS: ICD-10-CM

## 2020-01-01 DIAGNOSIS — G47.00 INSOMNIA, UNSPECIFIED TYPE: ICD-10-CM

## 2020-01-01 DIAGNOSIS — C79.31 BRAIN METASTASIS (HCC): Primary | ICD-10-CM

## 2020-01-01 DIAGNOSIS — C79.31 LUNG CANCER METASTATIC TO BRAIN (HCC): ICD-10-CM

## 2020-01-01 DIAGNOSIS — I87.1 SVC SYNDROME: ICD-10-CM

## 2020-01-01 DIAGNOSIS — M54.9 MID BACK PAIN: ICD-10-CM

## 2020-01-01 LAB
ABO + RH BLD: NORMAL
ABO + RH BLD: NORMAL
ALBUMIN SERPL-MCNC: 2.6 G/DL (ref 3.5–5)
ALBUMIN SERPL-MCNC: 2.9 G/DL (ref 3.5–5)
ALBUMIN SERPL-MCNC: 3 G/DL (ref 3.5–5)
ALBUMIN SERPL-MCNC: 3 G/DL (ref 3.5–5)
ALBUMIN SERPL-MCNC: 3.1 G/DL (ref 3.5–5)
ALBUMIN SERPL-MCNC: 3.2 G/DL (ref 3.5–5)
ALBUMIN SERPL-MCNC: 3.4 G/DL (ref 3.5–5)
ALBUMIN SERPL-MCNC: 3.5 G/DL (ref 3.5–5)
ALBUMIN/GLOB SERPL: 0.6 {RATIO} (ref 1.1–2.2)
ALBUMIN/GLOB SERPL: 0.7 {RATIO} (ref 1.1–2.2)
ALBUMIN/GLOB SERPL: 0.8 {RATIO} (ref 1.1–2.2)
ALBUMIN/GLOB SERPL: 0.8 {RATIO} (ref 1.1–2.2)
ALBUMIN/GLOB SERPL: 0.9 {RATIO} (ref 1.1–2.2)
ALBUMIN/GLOB SERPL: 1 {RATIO} (ref 1.1–2.2)
ALBUMIN/GLOB SERPL: 1.1 {RATIO} (ref 1.1–2.2)
ALBUMIN/GLOB SERPL: 1.1 {RATIO} (ref 1.1–2.2)
ALP SERPL-CCNC: 104 U/L (ref 45–117)
ALP SERPL-CCNC: 107 U/L (ref 45–117)
ALP SERPL-CCNC: 113 U/L (ref 45–117)
ALP SERPL-CCNC: 118 U/L (ref 45–117)
ALP SERPL-CCNC: 76 U/L (ref 45–117)
ALP SERPL-CCNC: 87 U/L (ref 45–117)
ALP SERPL-CCNC: 93 U/L (ref 45–117)
ALP SERPL-CCNC: 98 U/L (ref 45–117)
ALT SERPL-CCNC: 17 U/L (ref 12–78)
ALT SERPL-CCNC: 20 U/L (ref 12–78)
ALT SERPL-CCNC: 21 U/L (ref 12–78)
ALT SERPL-CCNC: 21 U/L (ref 12–78)
ALT SERPL-CCNC: 22 U/L (ref 12–78)
ALT SERPL-CCNC: 24 U/L (ref 12–78)
ALT SERPL-CCNC: 24 U/L (ref 12–78)
ALT SERPL-CCNC: 30 U/L (ref 12–78)
ANION GAP SERPL CALC-SCNC: 1 MMOL/L (ref 5–15)
ANION GAP SERPL CALC-SCNC: 2 MMOL/L (ref 5–15)
ANION GAP SERPL CALC-SCNC: 3 MMOL/L (ref 5–15)
ANION GAP SERPL CALC-SCNC: 4 MMOL/L (ref 5–15)
ANION GAP SERPL CALC-SCNC: 5 MMOL/L (ref 5–15)
ANION GAP SERPL CALC-SCNC: 5 MMOL/L (ref 5–15)
ANION GAP SERPL CALC-SCNC: 6 MMOL/L (ref 5–15)
ANION GAP SERPL CALC-SCNC: 6 MMOL/L (ref 5–15)
ANION GAP SERPL CALC-SCNC: 7 MMOL/L (ref 5–15)
ANION GAP SERPL CALC-SCNC: 7 MMOL/L (ref 5–15)
ANION GAP SERPL CALC-SCNC: 8 MMOL/L (ref 5–15)
ANION GAP SERPL CALC-SCNC: 8 MMOL/L (ref 5–15)
APTT PPP: 26 SEC (ref 22.1–32)
AST SERPL-CCNC: 14 U/L (ref 15–37)
AST SERPL-CCNC: 16 U/L (ref 15–37)
AST SERPL-CCNC: 16 U/L (ref 15–37)
AST SERPL-CCNC: 17 U/L (ref 15–37)
AST SERPL-CCNC: 17 U/L (ref 15–37)
AST SERPL-CCNC: 18 U/L (ref 15–37)
AST SERPL-CCNC: 21 U/L (ref 15–37)
AST SERPL-CCNC: 22 U/L (ref 15–37)
ATRIAL RATE: 101 BPM
ATRIAL RATE: 102 BPM
ATRIAL RATE: 65 BPM
AV VELOCITY RATIO: 0.76
BACTERIA SPEC CULT: NORMAL
BASOPHILS # BLD: 0 K/UL (ref 0–0.1)
BASOPHILS # BLD: 0.1 K/UL (ref 0–0.1)
BASOPHILS NFR BLD: 0 % (ref 0–1)
BASOPHILS NFR BLD: 1 % (ref 0–1)
BASOPHILS NFR BLD: 2 % (ref 0–1)
BILIRUB SERPL-MCNC: 0.2 MG/DL (ref 0.2–1)
BILIRUB SERPL-MCNC: 0.3 MG/DL (ref 0.2–1)
BILIRUB SERPL-MCNC: 0.4 MG/DL (ref 0.2–1)
BLOOD GROUP ANTIBODIES SERPL: NORMAL
BLOOD GROUP ANTIBODIES SERPL: NORMAL
BNP SERPL-MCNC: 1259 PG/ML
BUN SERPL-MCNC: 10 MG/DL (ref 6–20)
BUN SERPL-MCNC: 13 MG/DL (ref 6–20)
BUN SERPL-MCNC: 13 MG/DL (ref 6–20)
BUN SERPL-MCNC: 14 MG/DL (ref 6–20)
BUN SERPL-MCNC: 15 MG/DL (ref 6–20)
BUN SERPL-MCNC: 15 MG/DL (ref 6–20)
BUN SERPL-MCNC: 16 MG/DL (ref 6–20)
BUN SERPL-MCNC: 17 MG/DL (ref 6–20)
BUN SERPL-MCNC: 18 MG/DL (ref 6–20)
BUN SERPL-MCNC: 19 MG/DL (ref 6–20)
BUN SERPL-MCNC: 21 MG/DL (ref 6–20)
BUN SERPL-MCNC: 21 MG/DL (ref 6–20)
BUN/CREAT SERPL: 13 (ref 12–20)
BUN/CREAT SERPL: 15 (ref 12–20)
BUN/CREAT SERPL: 17 (ref 12–20)
BUN/CREAT SERPL: 18 (ref 12–20)
BUN/CREAT SERPL: 19 (ref 12–20)
BUN/CREAT SERPL: 19 (ref 12–20)
BUN/CREAT SERPL: 20 (ref 12–20)
BUN/CREAT SERPL: 21 (ref 12–20)
BUN/CREAT SERPL: 22 (ref 12–20)
BUN/CREAT SERPL: 23 (ref 12–20)
BUN/CREAT SERPL: 23 (ref 12–20)
BUN/CREAT SERPL: 27 (ref 12–20)
BUN/CREAT SERPL: 29 (ref 12–20)
BUN/CREAT SERPL: 29 (ref 12–20)
CALCIUM SERPL-MCNC: 8.5 MG/DL (ref 8.5–10.1)
CALCIUM SERPL-MCNC: 8.6 MG/DL (ref 8.5–10.1)
CALCIUM SERPL-MCNC: 8.7 MG/DL (ref 8.5–10.1)
CALCIUM SERPL-MCNC: 8.7 MG/DL (ref 8.5–10.1)
CALCIUM SERPL-MCNC: 9 MG/DL (ref 8.5–10.1)
CALCIUM SERPL-MCNC: 9.1 MG/DL (ref 8.5–10.1)
CALCIUM SERPL-MCNC: 9.1 MG/DL (ref 8.5–10.1)
CALCIUM SERPL-MCNC: 9.2 MG/DL (ref 8.5–10.1)
CALCIUM SERPL-MCNC: 9.2 MG/DL (ref 8.5–10.1)
CALCIUM SERPL-MCNC: 9.3 MG/DL (ref 8.5–10.1)
CALCIUM SERPL-MCNC: 9.4 MG/DL (ref 8.5–10.1)
CALCULATED P AXIS, ECG09: 82 DEGREES
CALCULATED P AXIS, ECG09: 83 DEGREES
CALCULATED P AXIS, ECG09: 92 DEGREES
CALCULATED R AXIS, ECG10: 88 DEGREES
CALCULATED R AXIS, ECG10: 90 DEGREES
CALCULATED R AXIS, ECG10: 91 DEGREES
CALCULATED T AXIS, ECG11: 70 DEGREES
CALCULATED T AXIS, ECG11: 73 DEGREES
CALCULATED T AXIS, ECG11: 74 DEGREES
CHLORIDE SERPL-SCNC: 101 MMOL/L (ref 97–108)
CHLORIDE SERPL-SCNC: 103 MMOL/L (ref 97–108)
CHLORIDE SERPL-SCNC: 104 MMOL/L (ref 97–108)
CHLORIDE SERPL-SCNC: 104 MMOL/L (ref 97–108)
CHLORIDE SERPL-SCNC: 105 MMOL/L (ref 97–108)
CHLORIDE SERPL-SCNC: 106 MMOL/L (ref 97–108)
CHLORIDE SERPL-SCNC: 96 MMOL/L (ref 97–108)
CHLORIDE SERPL-SCNC: 99 MMOL/L (ref 97–108)
CHLORIDE SERPL-SCNC: 99 MMOL/L (ref 97–108)
CHOLEST SERPL-MCNC: 204 MG/DL
CO2 SERPL-SCNC: 26 MMOL/L (ref 21–32)
CO2 SERPL-SCNC: 27 MMOL/L (ref 21–32)
CO2 SERPL-SCNC: 28 MMOL/L (ref 21–32)
CO2 SERPL-SCNC: 28 MMOL/L (ref 21–32)
CO2 SERPL-SCNC: 29 MMOL/L (ref 21–32)
CO2 SERPL-SCNC: 29 MMOL/L (ref 21–32)
CO2 SERPL-SCNC: 30 MMOL/L (ref 21–32)
CO2 SERPL-SCNC: 30 MMOL/L (ref 21–32)
CO2 SERPL-SCNC: 31 MMOL/L (ref 21–32)
CO2 SERPL-SCNC: 32 MMOL/L (ref 21–32)
CO2 SERPL-SCNC: 33 MMOL/L (ref 21–32)
CO2 SERPL-SCNC: 33 MMOL/L (ref 21–32)
COMMENT, HOLDF: NORMAL
COMMENT, HOLDF: NORMAL
CREAT SERPL-MCNC: 0.63 MG/DL (ref 0.55–1.02)
CREAT SERPL-MCNC: 0.67 MG/DL (ref 0.55–1.02)
CREAT SERPL-MCNC: 0.71 MG/DL (ref 0.55–1.02)
CREAT SERPL-MCNC: 0.73 MG/DL (ref 0.55–1.02)
CREAT SERPL-MCNC: 0.75 MG/DL (ref 0.55–1.02)
CREAT SERPL-MCNC: 0.77 MG/DL (ref 0.55–1.02)
CREAT SERPL-MCNC: 0.78 MG/DL (ref 0.55–1.02)
CREAT SERPL-MCNC: 0.78 MG/DL (ref 0.55–1.02)
CREAT SERPL-MCNC: 0.8 MG/DL (ref 0.55–1.02)
CREAT SERPL-MCNC: 0.83 MG/DL (ref 0.55–1.02)
CREAT SERPL-MCNC: 0.84 MG/DL (ref 0.55–1.02)
CREAT SERPL-MCNC: 0.86 MG/DL (ref 0.55–1.02)
CREAT SERPL-MCNC: 0.87 MG/DL (ref 0.55–1.02)
CREAT SERPL-MCNC: 0.92 MG/DL (ref 0.55–1.02)
DIAGNOSIS, 93000: NORMAL
DIFFERENTIAL METHOD BLD: ABNORMAL
ECHO AV AREA PEAK VELOCITY: 1.9 CM2
ECHO AV PEAK GRADIENT: 5.2 MMHG
ECHO AV PEAK VELOCITY: 114.27 CM/S
ECHO LA MAJOR AXIS: 2.83 CM
ECHO LV E' LATERAL VELOCITY: 7.42 CM/S
ECHO LV INTERNAL DIMENSION DIASTOLIC: 2.61 CM (ref 3.9–5.3)
ECHO LV INTERNAL DIMENSION SYSTOLIC: 1.98 CM
ECHO LV IVSD: 0.66 CM (ref 0.6–0.9)
ECHO LV MASS 2D: 41.7 G (ref 67–162)
ECHO LV MASS INDEX 2D: 32.2 G/M2 (ref 43–95)
ECHO LV POSTERIOR WALL DIASTOLIC: 0.87 CM (ref 0.6–0.9)
ECHO LVOT DIAM: 1.79 CM
ECHO LVOT PEAK GRADIENT: 3.1 MMHG
ECHO LVOT PEAK VELOCITY: 87.39 CM/S
ECHO MV E VELOCITY: 77.56 CM/S
ECHO MV E/E' LATERAL: 10.45
ECHO PULMONARY ARTERY SYSTOLIC PRESSURE (PASP): 35 MMHG
ECHO PV MAX VELOCITY: 81.65 CM/S
ECHO PV PEAK GRADIENT: 2.7 MMHG
ECHO RV INTERNAL DIMENSION: 3.26 CM
ECHO RV TAPSE: 2.02 CM (ref 1.5–2)
ECHO TV REGURGITANT MAX VELOCITY: 321.33 CM/S
ECHO TV REGURGITANT PEAK GRADIENT: 41.3 MMHG
EOSINOPHIL # BLD: 0 K/UL (ref 0–0.4)
EOSINOPHIL # BLD: 0.1 K/UL (ref 0–0.4)
EOSINOPHIL # BLD: 0.2 K/UL (ref 0–0.4)
EOSINOPHIL # BLD: 0.3 K/UL (ref 0–0.4)
EOSINOPHIL NFR BLD: 0 % (ref 0–7)
EOSINOPHIL NFR BLD: 1 % (ref 0–7)
EOSINOPHIL NFR BLD: 2 % (ref 0–7)
ERYTHROCYTE [DISTWIDTH] IN BLOOD BY AUTOMATED COUNT: 13 % (ref 11.5–14.5)
ERYTHROCYTE [DISTWIDTH] IN BLOOD BY AUTOMATED COUNT: 13.2 % (ref 11.5–14.5)
ERYTHROCYTE [DISTWIDTH] IN BLOOD BY AUTOMATED COUNT: 13.3 % (ref 11.5–14.5)
ERYTHROCYTE [DISTWIDTH] IN BLOOD BY AUTOMATED COUNT: 13.3 % (ref 11.5–14.5)
ERYTHROCYTE [DISTWIDTH] IN BLOOD BY AUTOMATED COUNT: 13.4 % (ref 11.5–14.5)
ERYTHROCYTE [DISTWIDTH] IN BLOOD BY AUTOMATED COUNT: 13.5 % (ref 11.5–14.5)
ERYTHROCYTE [DISTWIDTH] IN BLOOD BY AUTOMATED COUNT: 13.6 % (ref 11.5–14.5)
ERYTHROCYTE [DISTWIDTH] IN BLOOD BY AUTOMATED COUNT: 13.8 % (ref 11.5–14.5)
ERYTHROCYTE [DISTWIDTH] IN BLOOD BY AUTOMATED COUNT: 14 % (ref 11.5–14.5)
ERYTHROCYTE [DISTWIDTH] IN BLOOD BY AUTOMATED COUNT: 14.1 % (ref 11.5–14.5)
ERYTHROCYTE [DISTWIDTH] IN BLOOD BY AUTOMATED COUNT: 14.4 % (ref 11.5–14.5)
EST. AVERAGE GLUCOSE BLD GHB EST-MCNC: 114 MG/DL
GLOBULIN SER CALC-MCNC: 2.9 G/DL (ref 2–4)
GLOBULIN SER CALC-MCNC: 3.1 G/DL (ref 2–4)
GLOBULIN SER CALC-MCNC: 3.1 G/DL (ref 2–4)
GLOBULIN SER CALC-MCNC: 3.5 G/DL (ref 2–4)
GLOBULIN SER CALC-MCNC: 3.7 G/DL (ref 2–4)
GLOBULIN SER CALC-MCNC: 4 G/DL (ref 2–4)
GLOBULIN SER CALC-MCNC: 4.1 G/DL (ref 2–4)
GLOBULIN SER CALC-MCNC: 4.4 G/DL (ref 2–4)
GLUCOSE SERPL-MCNC: 100 MG/DL (ref 65–100)
GLUCOSE SERPL-MCNC: 101 MG/DL (ref 65–100)
GLUCOSE SERPL-MCNC: 101 MG/DL (ref 65–100)
GLUCOSE SERPL-MCNC: 127 MG/DL (ref 65–100)
GLUCOSE SERPL-MCNC: 67 MG/DL (ref 65–100)
GLUCOSE SERPL-MCNC: 83 MG/DL (ref 65–100)
GLUCOSE SERPL-MCNC: 89 MG/DL (ref 65–100)
GLUCOSE SERPL-MCNC: 93 MG/DL (ref 65–100)
GLUCOSE SERPL-MCNC: 96 MG/DL (ref 65–100)
GLUCOSE SERPL-MCNC: 96 MG/DL (ref 65–100)
GLUCOSE SERPL-MCNC: 97 MG/DL (ref 65–100)
GLUCOSE SERPL-MCNC: 97 MG/DL (ref 65–100)
GLUCOSE SERPL-MCNC: 98 MG/DL (ref 65–100)
GLUCOSE SERPL-MCNC: 99 MG/DL (ref 65–100)
HBA1C MFR BLD: 5.6 % (ref 4–5.6)
HCT VFR BLD AUTO: 34.1 % (ref 35–47)
HCT VFR BLD AUTO: 34.4 % (ref 35–47)
HCT VFR BLD AUTO: 34.9 % (ref 35–47)
HCT VFR BLD AUTO: 35.3 % (ref 35–47)
HCT VFR BLD AUTO: 35.4 % (ref 35–47)
HCT VFR BLD AUTO: 35.9 % (ref 35–47)
HCT VFR BLD AUTO: 37 % (ref 35–47)
HCT VFR BLD AUTO: 37.7 % (ref 35–47)
HCT VFR BLD AUTO: 37.8 % (ref 35–47)
HCT VFR BLD AUTO: 38 % (ref 35–47)
HCT VFR BLD AUTO: 38.4 % (ref 35–47)
HCT VFR BLD AUTO: 38.9 % (ref 35–47)
HCT VFR BLD AUTO: 41.4 % (ref 35–47)
HCT VFR BLD AUTO: 42.7 % (ref 35–47)
HCT VFR BLD AUTO: 43.3 % (ref 35–47)
HDLC SERPL-MCNC: 46 MG/DL
HDLC SERPL: 4.4 {RATIO} (ref 0–5)
HGB BLD-MCNC: 10.8 G/DL (ref 11.5–16)
HGB BLD-MCNC: 11.2 G/DL (ref 11.5–16)
HGB BLD-MCNC: 11.2 G/DL (ref 11.5–16)
HGB BLD-MCNC: 11.6 G/DL (ref 11.5–16)
HGB BLD-MCNC: 11.7 G/DL (ref 11.5–16)
HGB BLD-MCNC: 11.7 G/DL (ref 11.5–16)
HGB BLD-MCNC: 12.1 G/DL (ref 11.5–16)
HGB BLD-MCNC: 12.2 G/DL (ref 11.5–16)
HGB BLD-MCNC: 12.3 G/DL (ref 11.5–16)
HGB BLD-MCNC: 12.4 G/DL (ref 11.5–16)
HGB BLD-MCNC: 13.1 G/DL (ref 11.5–16)
HGB BLD-MCNC: 14.1 G/DL (ref 11.5–16)
HGB BLD-MCNC: 14.4 G/DL (ref 11.5–16)
IMM GRANULOCYTES # BLD AUTO: 0 K/UL (ref 0–0.04)
IMM GRANULOCYTES # BLD AUTO: 0.1 K/UL (ref 0–0.04)
IMM GRANULOCYTES # BLD AUTO: 0.2 K/UL (ref 0–0.04)
IMM GRANULOCYTES NFR BLD AUTO: 0 % (ref 0–0.5)
IMM GRANULOCYTES NFR BLD AUTO: 1 % (ref 0–0.5)
INR PPP: 1 (ref 0.9–1.1)
LDLC SERPL CALC-MCNC: 112.4 MG/DL (ref 0–100)
LEFT CCA DIST DIAS: 7.5 CM/S
LEFT CCA DIST SYS: 46 CM/S
LEFT CCA PROX DIAS: 10.3 CM/S
LEFT CCA PROX SYS: 62.7 CM/S
LEFT ECA DIAS: 8.34 CM/S
LEFT ECA SYS: 64.6 CM/S
LEFT ICA DIST DIAS: 19.3 CM/S
LEFT ICA DIST SYS: 65.6 CM/S
LEFT ICA MID DIAS: 15.3 CM/S
LEFT ICA MID SYS: 69.6 CM/S
LEFT ICA PROX DIAS: 18 CM/S
LEFT ICA PROX SYS: 68.3 CM/S
LEFT ICA/CCA SYS: 1.51
LEFT VERTEBRAL DIAS: 10.12 CM/S
LEFT VERTEBRAL SYS: 46.8 CM/S
LIPID PROFILE,FLP: ABNORMAL
LVFS 2D: 24.38 %
LYMPHOCYTES # BLD: 0.7 K/UL (ref 0.8–3.5)
LYMPHOCYTES # BLD: 0.7 K/UL (ref 0.8–3.5)
LYMPHOCYTES # BLD: 0.8 K/UL (ref 0.8–3.5)
LYMPHOCYTES # BLD: 0.9 K/UL (ref 0.8–3.5)
LYMPHOCYTES # BLD: 1 K/UL (ref 0.8–3.5)
LYMPHOCYTES # BLD: 1.3 K/UL (ref 0.8–3.5)
LYMPHOCYTES # BLD: 1.4 K/UL (ref 0.8–3.5)
LYMPHOCYTES # BLD: 1.9 K/UL (ref 0.8–3.5)
LYMPHOCYTES # BLD: 2 K/UL (ref 0.8–3.5)
LYMPHOCYTES # BLD: 2.1 K/UL (ref 0.8–3.5)
LYMPHOCYTES # BLD: 2.1 K/UL (ref 0.8–3.5)
LYMPHOCYTES # BLD: 2.2 K/UL (ref 0.8–3.5)
LYMPHOCYTES # BLD: 2.2 K/UL (ref 0.8–3.5)
LYMPHOCYTES # BLD: 2.4 K/UL (ref 0.8–3.5)
LYMPHOCYTES # BLD: 2.5 K/UL (ref 0.8–3.5)
LYMPHOCYTES NFR BLD: 10 % (ref 12–49)
LYMPHOCYTES NFR BLD: 10 % (ref 12–49)
LYMPHOCYTES NFR BLD: 11 % (ref 12–49)
LYMPHOCYTES NFR BLD: 11 % (ref 12–49)
LYMPHOCYTES NFR BLD: 13 % (ref 12–49)
LYMPHOCYTES NFR BLD: 13 % (ref 12–49)
LYMPHOCYTES NFR BLD: 14 % (ref 12–49)
LYMPHOCYTES NFR BLD: 14 % (ref 12–49)
LYMPHOCYTES NFR BLD: 15 % (ref 12–49)
LYMPHOCYTES NFR BLD: 16 % (ref 12–49)
LYMPHOCYTES NFR BLD: 16 % (ref 12–49)
LYMPHOCYTES NFR BLD: 19 % (ref 12–49)
LYMPHOCYTES NFR BLD: 3 % (ref 12–49)
LYMPHOCYTES NFR BLD: 5 % (ref 12–49)
LYMPHOCYTES NFR BLD: 7 % (ref 12–49)
MAGNESIUM SERPL-MCNC: 1.8 MG/DL (ref 1.6–2.4)
MCH RBC QN AUTO: 29.2 PG (ref 26–34)
MCH RBC QN AUTO: 29.7 PG (ref 26–34)
MCH RBC QN AUTO: 30.1 PG (ref 26–34)
MCH RBC QN AUTO: 30.2 PG (ref 26–34)
MCH RBC QN AUTO: 31.2 PG (ref 26–34)
MCH RBC QN AUTO: 31.3 PG (ref 26–34)
MCH RBC QN AUTO: 31.4 PG (ref 26–34)
MCH RBC QN AUTO: 31.6 PG (ref 26–34)
MCH RBC QN AUTO: 31.7 PG (ref 26–34)
MCH RBC QN AUTO: 31.8 PG (ref 26–34)
MCH RBC QN AUTO: 31.8 PG (ref 26–34)
MCHC RBC AUTO-ENTMCNC: 31.6 G/DL (ref 30–36.5)
MCHC RBC AUTO-ENTMCNC: 31.6 G/DL (ref 30–36.5)
MCHC RBC AUTO-ENTMCNC: 31.7 G/DL (ref 30–36.5)
MCHC RBC AUTO-ENTMCNC: 31.7 G/DL (ref 30–36.5)
MCHC RBC AUTO-ENTMCNC: 32.1 G/DL (ref 30–36.5)
MCHC RBC AUTO-ENTMCNC: 32.1 G/DL (ref 30–36.5)
MCHC RBC AUTO-ENTMCNC: 32.3 G/DL (ref 30–36.5)
MCHC RBC AUTO-ENTMCNC: 32.5 G/DL (ref 30–36.5)
MCHC RBC AUTO-ENTMCNC: 32.6 G/DL (ref 30–36.5)
MCHC RBC AUTO-ENTMCNC: 32.6 G/DL (ref 30–36.5)
MCHC RBC AUTO-ENTMCNC: 33 G/DL (ref 30–36.5)
MCHC RBC AUTO-ENTMCNC: 33.1 G/DL (ref 30–36.5)
MCHC RBC AUTO-ENTMCNC: 33.2 G/DL (ref 30–36.5)
MCHC RBC AUTO-ENTMCNC: 33.2 G/DL (ref 30–36.5)
MCHC RBC AUTO-ENTMCNC: 33.3 G/DL (ref 30–36.5)
MCV RBC AUTO: 89.3 FL (ref 80–99)
MCV RBC AUTO: 91 FL (ref 80–99)
MCV RBC AUTO: 92.4 FL (ref 80–99)
MCV RBC AUTO: 95.1 FL (ref 80–99)
MCV RBC AUTO: 95.6 FL (ref 80–99)
MCV RBC AUTO: 95.6 FL (ref 80–99)
MCV RBC AUTO: 95.9 FL (ref 80–99)
MCV RBC AUTO: 96.4 FL (ref 80–99)
MCV RBC AUTO: 97 FL (ref 80–99)
MCV RBC AUTO: 97 FL (ref 80–99)
MCV RBC AUTO: 97.7 FL (ref 80–99)
MCV RBC AUTO: 97.9 FL (ref 80–99)
MCV RBC AUTO: 98.4 FL (ref 80–99)
MCV RBC AUTO: 98.6 FL (ref 80–99)
MCV RBC AUTO: 98.9 FL (ref 80–99)
MONOCYTES # BLD: 0.4 K/UL (ref 0–1)
MONOCYTES # BLD: 0.5 K/UL (ref 0–1)
MONOCYTES # BLD: 0.8 K/UL (ref 0–1)
MONOCYTES # BLD: 0.9 K/UL (ref 0–1)
MONOCYTES # BLD: 1 K/UL (ref 0–1)
MONOCYTES # BLD: 1.1 K/UL (ref 0–1)
MONOCYTES # BLD: 1.2 K/UL (ref 0–1)
MONOCYTES # BLD: 1.3 K/UL (ref 0–1)
MONOCYTES # BLD: 1.5 K/UL (ref 0–1)
MONOCYTES # BLD: 1.6 K/UL (ref 0–1)
MONOCYTES NFR BLD: 10 % (ref 5–13)
MONOCYTES NFR BLD: 3 % (ref 5–13)
MONOCYTES NFR BLD: 7 % (ref 5–13)
MONOCYTES NFR BLD: 8 % (ref 5–13)
MONOCYTES NFR BLD: 9 % (ref 5–13)
NEUTS SEG # BLD: 11 K/UL (ref 1.8–8)
NEUTS SEG # BLD: 11.5 K/UL (ref 1.8–8)
NEUTS SEG # BLD: 11.7 K/UL (ref 1.8–8)
NEUTS SEG # BLD: 12 K/UL (ref 1.8–8)
NEUTS SEG # BLD: 12.4 K/UL (ref 1.8–8)
NEUTS SEG # BLD: 14.1 K/UL (ref 1.8–8)
NEUTS SEG # BLD: 14.6 K/UL (ref 1.8–8)
NEUTS SEG # BLD: 16.1 K/UL (ref 1.8–8)
NEUTS SEG # BLD: 20.9 K/UL (ref 1.8–8)
NEUTS SEG # BLD: 4.6 K/UL (ref 1.8–8)
NEUTS SEG # BLD: 7.7 K/UL (ref 1.8–8)
NEUTS SEG # BLD: 8.1 K/UL (ref 1.8–8)
NEUTS SEG # BLD: 8.1 K/UL (ref 1.8–8)
NEUTS SEG # BLD: 9.2 K/UL (ref 1.8–8)
NEUTS SEG # BLD: 9.9 K/UL (ref 1.8–8)
NEUTS SEG NFR BLD: 71 % (ref 32–75)
NEUTS SEG NFR BLD: 73 % (ref 32–75)
NEUTS SEG NFR BLD: 73 % (ref 32–75)
NEUTS SEG NFR BLD: 74 % (ref 32–75)
NEUTS SEG NFR BLD: 75 % (ref 32–75)
NEUTS SEG NFR BLD: 76 % (ref 32–75)
NEUTS SEG NFR BLD: 76 % (ref 32–75)
NEUTS SEG NFR BLD: 77 % (ref 32–75)
NEUTS SEG NFR BLD: 77 % (ref 32–75)
NEUTS SEG NFR BLD: 79 % (ref 32–75)
NEUTS SEG NFR BLD: 80 % (ref 32–75)
NEUTS SEG NFR BLD: 80 % (ref 32–75)
NEUTS SEG NFR BLD: 83 % (ref 32–75)
NEUTS SEG NFR BLD: 89 % (ref 32–75)
NEUTS SEG NFR BLD: 92 % (ref 32–75)
NRBC # BLD: 0 K/UL (ref 0–0.01)
NRBC BLD-RTO: 0 PER 100 WBC
P-R INTERVAL, ECG05: 150 MS
P-R INTERVAL, ECG05: 154 MS
P-R INTERVAL, ECG05: 156 MS
PHOSPHATE SERPL-MCNC: 3.6 MG/DL (ref 2.6–4.7)
PLATELET # BLD AUTO: 237 K/UL (ref 150–400)
PLATELET # BLD AUTO: 318 K/UL (ref 150–400)
PLATELET # BLD AUTO: 334 K/UL (ref 150–400)
PLATELET # BLD AUTO: 345 K/UL (ref 150–400)
PLATELET # BLD AUTO: 360 K/UL (ref 150–400)
PLATELET # BLD AUTO: 369 K/UL (ref 150–400)
PLATELET # BLD AUTO: 370 K/UL (ref 150–400)
PLATELET # BLD AUTO: 412 K/UL (ref 150–400)
PLATELET # BLD AUTO: 417 K/UL (ref 150–400)
PLATELET # BLD AUTO: 443 K/UL (ref 150–400)
PLATELET # BLD AUTO: 449 K/UL (ref 150–400)
PLATELET # BLD AUTO: 456 K/UL (ref 150–400)
PLATELET # BLD AUTO: 456 K/UL (ref 150–400)
PLATELET # BLD AUTO: 461 K/UL (ref 150–400)
PLATELET # BLD AUTO: 528 K/UL (ref 150–400)
PMV BLD AUTO: 10 FL (ref 8.9–12.9)
PMV BLD AUTO: 10.2 FL (ref 8.9–12.9)
PMV BLD AUTO: 10.5 FL (ref 8.9–12.9)
PMV BLD AUTO: 9.4 FL (ref 8.9–12.9)
PMV BLD AUTO: 9.5 FL (ref 8.9–12.9)
PMV BLD AUTO: 9.5 FL (ref 8.9–12.9)
PMV BLD AUTO: 9.6 FL (ref 8.9–12.9)
PMV BLD AUTO: 9.7 FL (ref 8.9–12.9)
PMV BLD AUTO: 9.7 FL (ref 8.9–12.9)
PMV BLD AUTO: 9.8 FL (ref 8.9–12.9)
PMV BLD AUTO: 9.9 FL (ref 8.9–12.9)
PMV BLD AUTO: 9.9 FL (ref 8.9–12.9)
POTASSIUM SERPL-SCNC: 3.1 MMOL/L (ref 3.5–5.1)
POTASSIUM SERPL-SCNC: 3.3 MMOL/L (ref 3.5–5.1)
POTASSIUM SERPL-SCNC: 3.3 MMOL/L (ref 3.5–5.1)
POTASSIUM SERPL-SCNC: 3.4 MMOL/L (ref 3.5–5.1)
POTASSIUM SERPL-SCNC: 3.5 MMOL/L (ref 3.5–5.1)
POTASSIUM SERPL-SCNC: 3.6 MMOL/L (ref 3.5–5.1)
POTASSIUM SERPL-SCNC: 3.7 MMOL/L (ref 3.5–5.1)
POTASSIUM SERPL-SCNC: 3.7 MMOL/L (ref 3.5–5.1)
POTASSIUM SERPL-SCNC: 3.9 MMOL/L (ref 3.5–5.1)
POTASSIUM SERPL-SCNC: 4 MMOL/L (ref 3.5–5.1)
POTASSIUM SERPL-SCNC: 4 MMOL/L (ref 3.5–5.1)
POTASSIUM SERPL-SCNC: 4.3 MMOL/L (ref 3.5–5.1)
PROT SERPL-MCNC: 5.9 G/DL (ref 6.4–8.2)
PROT SERPL-MCNC: 6.5 G/DL (ref 6.4–8.2)
PROT SERPL-MCNC: 6.6 G/DL (ref 6.4–8.2)
PROT SERPL-MCNC: 6.6 G/DL (ref 6.4–8.2)
PROT SERPL-MCNC: 6.7 G/DL (ref 6.4–8.2)
PROT SERPL-MCNC: 6.7 G/DL (ref 6.4–8.2)
PROT SERPL-MCNC: 7 G/DL (ref 6.4–8.2)
PROT SERPL-MCNC: 7.5 G/DL (ref 6.4–8.2)
PROTHROMBIN TIME: 10.6 SEC (ref 9–11.1)
Q-T INTERVAL, ECG07: 348 MS
Q-T INTERVAL, ECG07: 352 MS
Q-T INTERVAL, ECG07: 430 MS
QRS DURATION, ECG06: 60 MS
QRS DURATION, ECG06: 66 MS
QRS DURATION, ECG06: 66 MS
QTC CALCULATION (BEZET), ECG08: 447 MS
QTC CALCULATION (BEZET), ECG08: 453 MS
QTC CALCULATION (BEZET), ECG08: 456 MS
RBC # BLD AUTO: 3.46 M/UL (ref 3.8–5.2)
RBC # BLD AUTO: 3.52 M/UL (ref 3.8–5.2)
RBC # BLD AUTO: 3.57 M/UL (ref 3.8–5.2)
RBC # BLD AUTO: 3.67 M/UL (ref 3.8–5.2)
RBC # BLD AUTO: 3.69 M/UL (ref 3.8–5.2)
RBC # BLD AUTO: 3.7 M/UL (ref 3.8–5.2)
RBC # BLD AUTO: 3.83 M/UL (ref 3.8–5.2)
RBC # BLD AUTO: 3.87 M/UL (ref 3.8–5.2)
RBC # BLD AUTO: 3.88 M/UL (ref 3.8–5.2)
RBC # BLD AUTO: 3.92 M/UL (ref 3.8–5.2)
RBC # BLD AUTO: 3.96 M/UL (ref 3.8–5.2)
RBC # BLD AUTO: 4.07 M/UL (ref 3.8–5.2)
RBC # BLD AUTO: 4.48 M/UL (ref 3.8–5.2)
RBC # BLD AUTO: 4.69 M/UL (ref 3.8–5.2)
RBC # BLD AUTO: 4.85 M/UL (ref 3.8–5.2)
RBC MORPH BLD: ABNORMAL
RIGHT CCA DIST DIAS: 9.9 CM/S
RIGHT CCA DIST SYS: 47.4 CM/S
RIGHT CCA PROX DIAS: 9.9 CM/S
RIGHT CCA PROX SYS: 54.4 CM/S
RIGHT ECA DIAS: 5.69 CM/S
RIGHT ECA SYS: 62.6 CM/S
RIGHT ICA DIST DIAS: 11.6 CM/S
RIGHT ICA DIST SYS: 41.3 CM/S
RIGHT ICA MID DIAS: 13.4 CM/S
RIGHT ICA MID SYS: 57 CM/S
RIGHT ICA PROX DIAS: 22.3 CM/S
RIGHT ICA PROX SYS: 70.7 CM/S
RIGHT ICA/CCA SYS: 1.5
RIGHT VERTEBRAL DIAS: 0 CM/S
RIGHT VERTEBRAL SYS: 29.1 CM/S
SAMPLES BEING HELD,HOLD: NORMAL
SAMPLES BEING HELD,HOLD: NORMAL
SARS-COV-2, COV2NT: NOT DETECTED
SERVICE CMNT-IMP: NORMAL
SODIUM SERPL-SCNC: 133 MMOL/L (ref 136–145)
SODIUM SERPL-SCNC: 133 MMOL/L (ref 136–145)
SODIUM SERPL-SCNC: 134 MMOL/L (ref 136–145)
SODIUM SERPL-SCNC: 134 MMOL/L (ref 136–145)
SODIUM SERPL-SCNC: 135 MMOL/L (ref 136–145)
SODIUM SERPL-SCNC: 136 MMOL/L (ref 136–145)
SODIUM SERPL-SCNC: 137 MMOL/L (ref 136–145)
SODIUM SERPL-SCNC: 138 MMOL/L (ref 136–145)
SODIUM SERPL-SCNC: 139 MMOL/L (ref 136–145)
SPECIMEN EXP DATE BLD: NORMAL
SPECIMEN EXP DATE BLD: NORMAL
T4 FREE SERPL-MCNC: 1.2 NG/DL (ref 0.8–1.5)
THERAPEUTIC RANGE,PTTT: NORMAL SECS (ref 58–77)
TRIGL SERPL-MCNC: 228 MG/DL (ref ?–150)
TROPONIN I SERPL-MCNC: <0.05 NG/ML
TROPONIN I SERPL-MCNC: <0.05 NG/ML
TSH SERPL DL<=0.05 MIU/L-ACNC: 0.75 UIU/ML (ref 0.36–3.74)
TSH SERPL DL<=0.05 MIU/L-ACNC: 0.85 UIU/ML (ref 0.36–3.74)
TSH SERPL DL<=0.05 MIU/L-ACNC: 1.13 UIU/ML (ref 0.36–3.74)
TSH SERPL DL<=0.05 MIU/L-ACNC: 1.56 UIU/ML (ref 0.36–3.74)
VENTRICULAR RATE, ECG03: 101 BPM
VENTRICULAR RATE, ECG03: 102 BPM
VENTRICULAR RATE, ECG03: 65 BPM
VLDLC SERPL CALC-MCNC: 45.6 MG/DL
WBC # BLD AUTO: 10.4 K/UL (ref 3.6–11)
WBC # BLD AUTO: 11.3 K/UL (ref 3.6–11)
WBC # BLD AUTO: 11.9 K/UL (ref 3.6–11)
WBC # BLD AUTO: 13.5 K/UL (ref 3.6–11)
WBC # BLD AUTO: 13.6 K/UL (ref 3.6–11)
WBC # BLD AUTO: 15 K/UL (ref 3.6–11)
WBC # BLD AUTO: 15.3 K/UL (ref 3.6–11)
WBC # BLD AUTO: 15.3 K/UL (ref 3.6–11)
WBC # BLD AUTO: 15.5 K/UL (ref 3.6–11)
WBC # BLD AUTO: 17.3 K/UL (ref 3.6–11)
WBC # BLD AUTO: 18.8 K/UL (ref 3.6–11)
WBC # BLD AUTO: 19.5 K/UL (ref 3.6–11)
WBC # BLD AUTO: 23.4 K/UL (ref 3.6–11)
WBC # BLD AUTO: 6.1 K/UL (ref 3.6–11)
WBC # BLD AUTO: 9.6 K/UL (ref 3.6–11)

## 2020-01-01 PROCEDURE — 77301 RADIOTHERAPY DOSE PLAN IMRT: CPT

## 2020-01-01 PROCEDURE — 97162 PT EVAL MOD COMPLEX 30 MIN: CPT

## 2020-01-01 PROCEDURE — 77386 HC IMRT TRMT DLVR COMPL: CPT

## 2020-01-01 PROCEDURE — 77030020263 HC SOL INJ SOD CL0.9% LFCR 1000ML: Performed by: NEUROLOGICAL SURGERY

## 2020-01-01 PROCEDURE — 71275 CT ANGIOGRAPHY CHEST: CPT

## 2020-01-01 PROCEDURE — 77030003029 HC SUT VCRL J&J -B: Performed by: NEUROLOGICAL SURGERY

## 2020-01-01 PROCEDURE — 74011250636 HC RX REV CODE- 250/636: Performed by: HOSPITALIST

## 2020-01-01 PROCEDURE — 74011250636 HC RX REV CODE- 250/636: Performed by: REGISTERED NURSE

## 2020-01-01 PROCEDURE — 74011250637 HC RX REV CODE- 250/637: Performed by: INTERNAL MEDICINE

## 2020-01-01 PROCEDURE — 96413 CHEMO IV INFUSION 1 HR: CPT

## 2020-01-01 PROCEDURE — 71046 X-RAY EXAM CHEST 2 VIEWS: CPT

## 2020-01-01 PROCEDURE — 80053 COMPREHEN METABOLIC PANEL: CPT

## 2020-01-01 PROCEDURE — 74011000258 HC RX REV CODE- 258: Performed by: INTERNAL MEDICINE

## 2020-01-01 PROCEDURE — 65270000032 HC RM SEMIPRIVATE

## 2020-01-01 PROCEDURE — C1769 GUIDE WIRE: HCPCS | Performed by: SURGERY

## 2020-01-01 PROCEDURE — 36415 COLL VENOUS BLD VENIPUNCTURE: CPT

## 2020-01-01 PROCEDURE — 93005 ELECTROCARDIOGRAM TRACING: CPT

## 2020-01-01 PROCEDURE — 74011250636 HC RX REV CODE- 250/636: Performed by: ANESTHESIOLOGY

## 2020-01-01 PROCEDURE — 70450 CT HEAD/BRAIN W/O DYE: CPT

## 2020-01-01 PROCEDURE — 85025 COMPLETE CBC W/AUTO DIFF WBC: CPT

## 2020-01-01 PROCEDURE — 94640 AIRWAY INHALATION TREATMENT: CPT

## 2020-01-01 PROCEDURE — 77030005402 HC CATH RAD ART LN KT TELE -B

## 2020-01-01 PROCEDURE — 74011250636 HC RX REV CODE- 250/636: Performed by: NURSE ANESTHETIST, CERTIFIED REGISTERED

## 2020-01-01 PROCEDURE — 77030014355 HC CVR BUR H TI BIOM -C: Performed by: NEUROLOGICAL SURGERY

## 2020-01-01 PROCEDURE — 97161 PT EVAL LOW COMPLEX 20 MIN: CPT

## 2020-01-01 PROCEDURE — 74011000258 HC RX REV CODE- 258: Performed by: REGISTERED NURSE

## 2020-01-01 PROCEDURE — 77030014008 HC SPNG HEMSTAT J&J -C: Performed by: NEUROLOGICAL SURGERY

## 2020-01-01 PROCEDURE — 78306 BONE IMAGING WHOLE BODY: CPT

## 2020-01-01 PROCEDURE — 77030004472 HC BUR TAPR MEDT -B: Performed by: NEUROLOGICAL SURGERY

## 2020-01-01 PROCEDURE — A9575 INJ GADOTERATE MEGLUMI 0.1ML: HCPCS | Performed by: NURSE PRACTITIONER

## 2020-01-01 PROCEDURE — 77030018836 HC SOL IRR NACL ICUM -A: Performed by: NEUROLOGICAL SURGERY

## 2020-01-01 PROCEDURE — 94762 N-INVAS EAR/PLS OXIMTRY CONT: CPT

## 2020-01-01 PROCEDURE — 77030010813: Performed by: NEUROLOGICAL SURGERY

## 2020-01-01 PROCEDURE — 88341 IMHCHEM/IMCYTCHM EA ADD ANTB: CPT

## 2020-01-01 PROCEDURE — 74011636320 HC RX REV CODE- 636/320: Performed by: RADIOLOGY

## 2020-01-01 PROCEDURE — 74011250637 HC RX REV CODE- 250/637: Performed by: HOSPITALIST

## 2020-01-01 PROCEDURE — 77030019896 HC KT ARTERIAL LN TELE -B

## 2020-01-01 PROCEDURE — 77030012794 HC KT NSL CANN/HGH TRAN -A

## 2020-01-01 PROCEDURE — 84443 ASSAY THYROID STIM HORMONE: CPT

## 2020-01-01 PROCEDURE — 76210000000 HC OR PH I REC 2 TO 2.5 HR: Performed by: NEUROLOGICAL SURGERY

## 2020-01-01 PROCEDURE — 74011636320 HC RX REV CODE- 636/320: Performed by: NURSE PRACTITIONER

## 2020-01-01 PROCEDURE — 88342 IMHCHEM/IMCYTCHM 1ST ANTB: CPT

## 2020-01-01 PROCEDURE — 80048 BASIC METABOLIC PNL TOTAL CA: CPT

## 2020-01-01 PROCEDURE — 71045 X-RAY EXAM CHEST 1 VIEW: CPT

## 2020-01-01 PROCEDURE — 97165 OT EVAL LOW COMPLEX 30 MIN: CPT

## 2020-01-01 PROCEDURE — 83735 ASSAY OF MAGNESIUM: CPT

## 2020-01-01 PROCEDURE — 74011250636 HC RX REV CODE- 250/636

## 2020-01-01 PROCEDURE — 74011000258 HC RX REV CODE- 258: Performed by: NEUROLOGICAL SURGERY

## 2020-01-01 PROCEDURE — C1757 CATH, THROMBECTOMY/EMBOLECT: HCPCS | Performed by: SURGERY

## 2020-01-01 PROCEDURE — 77336 RADIATION PHYSICS CONSULT: CPT

## 2020-01-01 PROCEDURE — 74011636320 HC RX REV CODE- 636/320: Performed by: SURGERY

## 2020-01-01 PROCEDURE — 97530 THERAPEUTIC ACTIVITIES: CPT

## 2020-01-01 PROCEDURE — 71260 CT THORAX DX C+: CPT

## 2020-01-01 PROCEDURE — 84484 ASSAY OF TROPONIN QUANT: CPT

## 2020-01-01 PROCEDURE — 88307 TISSUE EXAM BY PATHOLOGIST: CPT

## 2020-01-01 PROCEDURE — 76010000131 HC OR TIME 2 TO 2.5 HR: Performed by: SURGERY

## 2020-01-01 PROCEDURE — 74011250636 HC RX REV CODE- 250/636: Performed by: NURSE PRACTITIONER

## 2020-01-01 PROCEDURE — 76060000036 HC ANESTHESIA 2.5 TO 3 HR: Performed by: SURGERY

## 2020-01-01 PROCEDURE — 74011250636 HC RX REV CODE- 250/636: Performed by: INTERNAL MEDICINE

## 2020-01-01 PROCEDURE — 77030037673 HC TBNG VISTA V-LYTE PMP STRY -B: Performed by: NEUROLOGICAL SURGERY

## 2020-01-01 PROCEDURE — 77030040922 HC BLNKT HYPOTHRM STRY -A

## 2020-01-01 PROCEDURE — 65610000006 HC RM INTENSIVE CARE

## 2020-01-01 PROCEDURE — 77030034689 HC VASC DIL KT W/NDL LIVA -C: Performed by: SURGERY

## 2020-01-01 PROCEDURE — 77030026438 HC STYL ET INTUB CARD -A: Performed by: ANESTHESIOLOGY

## 2020-01-01 PROCEDURE — 74011000250 HC RX REV CODE- 250: Performed by: NURSE ANESTHETIST, CERTIFIED REGISTERED

## 2020-01-01 PROCEDURE — 74011000250 HC RX REV CODE- 250: Performed by: NURSE PRACTITIONER

## 2020-01-01 PROCEDURE — 77338 DESIGN MLC DEVICE FOR IMRT: CPT

## 2020-01-01 PROCEDURE — 70551 MRI BRAIN STEM W/O DYE: CPT

## 2020-01-01 PROCEDURE — 77030005645 HC GRFT SUB DURAGN INLC -F: Performed by: NEUROLOGICAL SURGERY

## 2020-01-01 PROCEDURE — 94760 N-INVAS EAR/PLS OXIMETRY 1: CPT

## 2020-01-01 PROCEDURE — 74011000258 HC RX REV CODE- 258: Performed by: HOSPITALIST

## 2020-01-01 PROCEDURE — 74177 CT ABD & PELVIS W/CONTRAST: CPT

## 2020-01-01 PROCEDURE — 77030004391 HC BUR FLUT MEDT -C: Performed by: NEUROLOGICAL SURGERY

## 2020-01-01 PROCEDURE — C1894 INTRO/SHEATH, NON-LASER: HCPCS | Performed by: SURGERY

## 2020-01-01 PROCEDURE — 32555 ASPIRATE PLEURA W/ IMAGING: CPT

## 2020-01-01 PROCEDURE — C1729 CATH, DRAINAGE: HCPCS

## 2020-01-01 PROCEDURE — 77030004561 HC CATH ANGI DX COBRA ANGI -B: Performed by: SURGERY

## 2020-01-01 PROCEDURE — 77300 RADIATION THERAPY DOSE PLAN: CPT

## 2020-01-01 PROCEDURE — 77030011640 HC PAD GRND REM COVD -A: Performed by: NEUROLOGICAL SURGERY

## 2020-01-01 PROCEDURE — 77010033678 HC OXYGEN DAILY

## 2020-01-01 PROCEDURE — 88112 CYTOPATH CELL ENHANCE TECH: CPT

## 2020-01-01 PROCEDURE — 74011000258 HC RX REV CODE- 258: Performed by: RADIOLOGY

## 2020-01-01 PROCEDURE — A9552 F18 FDG: HCPCS

## 2020-01-01 PROCEDURE — 70460 CT HEAD/BRAIN W/DYE: CPT

## 2020-01-01 PROCEDURE — 88331 PATH CONSLTJ SURG 1 BLK 1SPC: CPT

## 2020-01-01 PROCEDURE — 74011000250 HC RX REV CODE- 250: Performed by: NEUROLOGICAL SURGERY

## 2020-01-01 PROCEDURE — 77030002946 HC SUT NRLN J&J -B: Performed by: NEUROLOGICAL SURGERY

## 2020-01-01 PROCEDURE — 99284 EMERGENCY DEPT VISIT MOD MDM: CPT

## 2020-01-01 PROCEDURE — C1713 ANCHOR/SCREW BN/BN,TIS/BN: HCPCS | Performed by: NEUROLOGICAL SURGERY

## 2020-01-01 PROCEDURE — 93306 TTE W/DOPPLER COMPLETE: CPT

## 2020-01-01 PROCEDURE — 73610000026 HC INO THERAPY EACH HOUR

## 2020-01-01 PROCEDURE — 88305 TISSUE EXAM BY PATHOLOGIST: CPT

## 2020-01-01 PROCEDURE — 77030013797 HC KT TRNSDUC PRSSR EDWD -A

## 2020-01-01 PROCEDURE — 76210000016 HC OR PH I REC 1 TO 1.5 HR: Performed by: SURGERY

## 2020-01-01 PROCEDURE — 87040 BLOOD CULTURE FOR BACTERIA: CPT

## 2020-01-01 PROCEDURE — 97535 SELF CARE MNGMENT TRAINING: CPT

## 2020-01-01 PROCEDURE — A9575 INJ GADOTERATE MEGLUMI 0.1ML: HCPCS | Performed by: INTERNAL MEDICINE

## 2020-01-01 PROCEDURE — 76010000176 HC OR TIME 4.5 TO 5 HR INTENSV-TIER 1: Performed by: NEUROLOGICAL SURGERY

## 2020-01-01 PROCEDURE — 74011250637 HC RX REV CODE- 250/637: Performed by: NURSE PRACTITIONER

## 2020-01-01 PROCEDURE — 70553 MRI BRAIN STEM W/O & W/DYE: CPT

## 2020-01-01 PROCEDURE — 96365 THER/PROPH/DIAG IV INF INIT: CPT

## 2020-01-01 PROCEDURE — 74011250636 HC RX REV CODE- 250/636: Performed by: NEUROLOGICAL SURGERY

## 2020-01-01 PROCEDURE — 84439 ASSAY OF FREE THYROXINE: CPT

## 2020-01-01 PROCEDURE — 74011000250 HC RX REV CODE- 250: Performed by: SURGERY

## 2020-01-01 PROCEDURE — 99218 HC RM OBSERVATION: CPT

## 2020-01-01 PROCEDURE — 88377 M/PHMTRC ALYS ISHQUANT/SEMIQ: CPT

## 2020-01-01 PROCEDURE — 93880 EXTRACRANIAL BILAT STUDY: CPT

## 2020-01-01 PROCEDURE — 74011000258 HC RX REV CODE- 258: Performed by: NURSE ANESTHETIST, CERTIFIED REGISTERED

## 2020-01-01 PROCEDURE — 77030002996 HC SUT SLK J&J -A: Performed by: SURGERY

## 2020-01-01 PROCEDURE — 92610 EVALUATE SWALLOWING FUNCTION: CPT

## 2020-01-01 PROCEDURE — 36140 INTRO NDL ICATH UPR/LXTR ART: CPT

## 2020-01-01 PROCEDURE — 77030013137 HC MRK XR CRAN BUSA -A: Performed by: NEUROLOGICAL SURGERY

## 2020-01-01 PROCEDURE — 83880 ASSAY OF NATRIURETIC PEPTIDE: CPT

## 2020-01-01 PROCEDURE — 88360 TUMOR IMMUNOHISTOCHEM/MANUAL: CPT

## 2020-01-01 PROCEDURE — 83036 HEMOGLOBIN GLYCOSYLATED A1C: CPT

## 2020-01-01 PROCEDURE — 84100 ASSAY OF PHOSPHORUS: CPT

## 2020-01-01 PROCEDURE — 77030014647 HC SEAL FBRN TISSL BAXT -D: Performed by: NEUROLOGICAL SURGERY

## 2020-01-01 PROCEDURE — 77030018846 HC SOL IRR STRL H20 ICUM -A: Performed by: SURGERY

## 2020-01-01 PROCEDURE — 99283 EMERGENCY DEPT VISIT LOW MDM: CPT

## 2020-01-01 PROCEDURE — 77030031139 HC SUT VCRL2 J&J -A: Performed by: NEUROLOGICAL SURGERY

## 2020-01-01 PROCEDURE — 87635 SARS-COV-2 COVID-19 AMP PRB: CPT

## 2020-01-01 PROCEDURE — C1892 INTRO/SHEATH,FIXED,PEEL-AWAY: HCPCS | Performed by: SURGERY

## 2020-01-01 PROCEDURE — 74011000250 HC RX REV CODE- 250: Performed by: INTERNAL MEDICINE

## 2020-01-01 PROCEDURE — 77030038552 HC DRN WND MDII -A: Performed by: NEUROLOGICAL SURGERY

## 2020-01-01 PROCEDURE — 03HC33Z INSERTION OF INFUSION DEVICE INTO LEFT RADIAL ARTERY, PERCUTANEOUS APPROACH: ICD-10-PCS | Performed by: ANESTHESIOLOGY

## 2020-01-01 PROCEDURE — 86900 BLOOD TYPING SEROLOGIC ABO: CPT

## 2020-01-01 PROCEDURE — 0W993ZZ DRAINAGE OF RIGHT PLEURAL CAVITY, PERCUTANEOUS APPROACH: ICD-10-PCS | Performed by: RADIOLOGY

## 2020-01-01 PROCEDURE — 74011250637 HC RX REV CODE- 250/637: Performed by: NEUROLOGICAL SURGERY

## 2020-01-01 PROCEDURE — 74011250636 HC RX REV CODE- 250/636: Performed by: SURGERY

## 2020-01-01 PROCEDURE — 71250 CT THORAX DX C-: CPT

## 2020-01-01 PROCEDURE — 77030019702 HC WRP THER MENM -C: Performed by: SURGERY

## 2020-01-01 PROCEDURE — 77030008462 HC STPLR SKN PROX J&J -A: Performed by: SURGERY

## 2020-01-01 PROCEDURE — 74011250637 HC RX REV CODE- 250/637: Performed by: SURGERY

## 2020-01-01 PROCEDURE — P9045 ALBUMIN (HUMAN), 5%, 250 ML: HCPCS | Performed by: NURSE ANESTHETIST, CERTIFIED REGISTERED

## 2020-01-01 PROCEDURE — 85610 PROTHROMBIN TIME: CPT

## 2020-01-01 PROCEDURE — 76060000041 HC ANESTHESIA 5 TO 5.5 HR: Performed by: NEUROLOGICAL SURGERY

## 2020-01-01 PROCEDURE — 77030008684 HC TU ET CUF COVD -B: Performed by: ANESTHESIOLOGY

## 2020-01-01 PROCEDURE — 80061 LIPID PANEL: CPT

## 2020-01-01 PROCEDURE — 85730 THROMBOPLASTIN TIME PARTIAL: CPT

## 2020-01-01 PROCEDURE — 77030040361 HC SLV COMPR DVT MDII -B: Performed by: NEUROLOGICAL SURGERY

## 2020-01-01 PROCEDURE — 77030020365 HC SOL INJ SOD CL 0.9% 50ML: Performed by: SURGERY

## 2020-01-01 PROCEDURE — 00B00ZZ EXCISION OF BRAIN, OPEN APPROACH: ICD-10-PCS | Performed by: NEUROLOGICAL SURGERY

## 2020-01-01 PROCEDURE — 73610000005 HC INO THERAPY INITIAL

## 2020-01-01 DEVICE — DURAGEN® PLUS DURAL REGENERATION MATRIX, 3 IN X 3 IN (7.5 CM X 7.5 CM)
Type: IMPLANTABLE DEVICE | Site: CRANIAL | Status: FUNCTIONAL
Brand: DURAGEN® PLUS

## 2020-01-01 DEVICE — COVER BUR H L DIA18.5MM THK0.5MM 5 H NEURO TI W/O TAB: Type: IMPLANTABLE DEVICE | Site: CRANIAL | Status: FUNCTIONAL

## 2020-01-01 DEVICE — SCREW BNE L3.5MM DIA1.5MM CORT MAXILLOMANDIBULAR GRN TI: Type: IMPLANTABLE DEVICE | Site: CRANIAL | Status: FUNCTIONAL

## 2020-01-01 DEVICE — PLATE BONE 25MM 6 H N COMPR BUR H CVR BILAT RIG TI FOR 1.5MM: Type: IMPLANTABLE DEVICE | Site: CRANIAL | Status: FUNCTIONAL

## 2020-01-01 RX ORDER — ALBUTEROL SULFATE 0.83 MG/ML
2.5 SOLUTION RESPIRATORY (INHALATION) AS NEEDED
Status: CANCELLED
Start: 2020-01-01

## 2020-01-01 RX ORDER — FENTANYL CITRATE 50 UG/ML
INJECTION, SOLUTION INTRAMUSCULAR; INTRAVENOUS AS NEEDED
Status: DISCONTINUED | OUTPATIENT
Start: 2020-01-01 | End: 2020-01-01 | Stop reason: HOSPADM

## 2020-01-01 RX ORDER — EPINEPHRINE 1 MG/ML
0.3 INJECTION, SOLUTION, CONCENTRATE INTRAVENOUS AS NEEDED
Status: CANCELLED | OUTPATIENT
Start: 2020-01-01

## 2020-01-01 RX ORDER — ALBUMIN HUMAN 50 G/1000ML
SOLUTION INTRAVENOUS
Status: DISCONTINUED
Start: 2020-01-01 | End: 2020-06-05 | Stop reason: HOSPADM

## 2020-01-01 RX ORDER — HYDROCORTISONE SODIUM SUCCINATE 100 MG/2ML
100 INJECTION, POWDER, FOR SOLUTION INTRAMUSCULAR; INTRAVENOUS AS NEEDED
Status: CANCELLED | OUTPATIENT
Start: 2020-01-01

## 2020-01-01 RX ORDER — ACETAMINOPHEN 325 MG/1
650 TABLET ORAL AS NEEDED
Status: CANCELLED
Start: 2020-07-30

## 2020-01-01 RX ORDER — SODIUM CHLORIDE 0.9 % (FLUSH) 0.9 %
5-40 SYRINGE (ML) INJECTION AS NEEDED
Status: DISCONTINUED | OUTPATIENT
Start: 2020-01-01 | End: 2020-01-01 | Stop reason: HOSPADM

## 2020-01-01 RX ORDER — DIPHENHYDRAMINE HYDROCHLORIDE 50 MG/ML
50 INJECTION, SOLUTION INTRAMUSCULAR; INTRAVENOUS AS NEEDED
Status: CANCELLED
Start: 2020-10-01

## 2020-01-01 RX ORDER — SODIUM CHLORIDE 9 MG/ML
10 INJECTION INTRAMUSCULAR; INTRAVENOUS; SUBCUTANEOUS AS NEEDED
Status: DISCONTINUED | OUTPATIENT
Start: 2020-01-01 | End: 2020-01-01 | Stop reason: HOSPADM

## 2020-01-01 RX ORDER — SODIUM CHLORIDE 9 MG/ML
25 INJECTION, SOLUTION INTRAVENOUS CONTINUOUS
Status: DISCONTINUED | OUTPATIENT
Start: 2020-01-01 | End: 2020-01-01 | Stop reason: HOSPADM

## 2020-01-01 RX ORDER — DIPHENHYDRAMINE HYDROCHLORIDE 50 MG/ML
25 INJECTION, SOLUTION INTRAMUSCULAR; INTRAVENOUS AS NEEDED
Status: CANCELLED
Start: 2020-09-10

## 2020-01-01 RX ORDER — DEXAMETHASONE 4 MG/1
TABLET ORAL
Qty: 14 TAB | Refills: 0 | Status: SHIPPED | OUTPATIENT
Start: 2020-01-01 | End: 2020-01-01 | Stop reason: SDUPTHER

## 2020-01-01 RX ORDER — ONDANSETRON 2 MG/ML
INJECTION INTRAMUSCULAR; INTRAVENOUS AS NEEDED
Status: DISCONTINUED | OUTPATIENT
Start: 2020-01-01 | End: 2020-01-01 | Stop reason: HOSPADM

## 2020-01-01 RX ORDER — DIPHENHYDRAMINE HYDROCHLORIDE 50 MG/ML
50 INJECTION, SOLUTION INTRAMUSCULAR; INTRAVENOUS AS NEEDED
Status: CANCELLED
Start: 2020-01-01

## 2020-01-01 RX ORDER — SODIUM CHLORIDE 9 MG/ML
10 INJECTION INTRAMUSCULAR; INTRAVENOUS; SUBCUTANEOUS AS NEEDED
Status: CANCELLED | OUTPATIENT
Start: 2020-01-01

## 2020-01-01 RX ORDER — SODIUM CHLORIDE 9 MG/ML
25 INJECTION, SOLUTION INTRAVENOUS CONTINUOUS
Status: CANCELLED | OUTPATIENT
Start: 2020-01-01

## 2020-01-01 RX ORDER — AMOXICILLIN AND CLAVULANATE POTASSIUM 500; 125 MG/1; MG/1
1 TABLET, FILM COATED ORAL 2 TIMES DAILY
Qty: 10 TAB | Refills: 0 | Status: SHIPPED | OUTPATIENT
Start: 2020-01-01 | End: 2020-01-01

## 2020-01-01 RX ORDER — SODIUM CHLORIDE 0.9 % (FLUSH) 0.9 %
10 SYRINGE (ML) INJECTION AS NEEDED
Status: CANCELLED
Start: 2020-07-09

## 2020-01-01 RX ORDER — HYDROMORPHONE HYDROCHLORIDE 2 MG/1
2 TABLET ORAL SEE ADMIN INSTRUCTIONS
Qty: 90 TAB | Refills: 0 | Status: SHIPPED | OUTPATIENT
Start: 2020-01-01 | End: 2020-01-01 | Stop reason: DRUGHIGH

## 2020-01-01 RX ORDER — HEPARIN 100 UNIT/ML
300-500 SYRINGE INTRAVENOUS AS NEEDED
Status: CANCELLED
Start: 2020-01-01

## 2020-01-01 RX ORDER — ALBUTEROL SULFATE 0.83 MG/ML
2.5 SOLUTION RESPIRATORY (INHALATION)
Status: DISCONTINUED | OUTPATIENT
Start: 2020-01-01 | End: 2020-01-01 | Stop reason: HOSPADM

## 2020-01-01 RX ORDER — SODIUM CHLORIDE, SODIUM LACTATE, POTASSIUM CHLORIDE, CALCIUM CHLORIDE 600; 310; 30; 20 MG/100ML; MG/100ML; MG/100ML; MG/100ML
100 INJECTION, SOLUTION INTRAVENOUS CONTINUOUS
Status: DISCONTINUED | OUTPATIENT
Start: 2020-01-01 | End: 2020-01-01 | Stop reason: HOSPADM

## 2020-01-01 RX ORDER — ALBUMIN HUMAN 50 G/1000ML
SOLUTION INTRAVENOUS AS NEEDED
Status: DISCONTINUED | OUTPATIENT
Start: 2020-01-01 | End: 2020-01-01 | Stop reason: HOSPADM

## 2020-01-01 RX ORDER — ALBUTEROL SULFATE 0.83 MG/ML
2.5 SOLUTION RESPIRATORY (INHALATION) AS NEEDED
Status: CANCELLED
Start: 2020-07-09

## 2020-01-01 RX ORDER — HYDROMORPHONE HYDROCHLORIDE 4 MG/1
4 TABLET ORAL
Status: DISCONTINUED | OUTPATIENT
Start: 2020-01-01 | End: 2020-06-05 | Stop reason: HOSPADM

## 2020-01-01 RX ORDER — DIPHENHYDRAMINE HYDROCHLORIDE 50 MG/ML
12.5 INJECTION, SOLUTION INTRAMUSCULAR; INTRAVENOUS AS NEEDED
Status: DISCONTINUED | OUTPATIENT
Start: 2020-01-01 | End: 2020-01-01 | Stop reason: HOSPADM

## 2020-01-01 RX ORDER — ENOXAPARIN SODIUM 100 MG/ML
40 INJECTION SUBCUTANEOUS 2 TIMES DAILY
Status: CANCELLED | OUTPATIENT
Start: 2020-01-01

## 2020-01-01 RX ORDER — MORPHINE SULFATE 10 MG/ML
2 INJECTION, SOLUTION INTRAMUSCULAR; INTRAVENOUS
Status: DISCONTINUED | OUTPATIENT
Start: 2020-01-01 | End: 2020-01-01 | Stop reason: HOSPADM

## 2020-01-01 RX ORDER — SODIUM CHLORIDE 0.9 % (FLUSH) 0.9 %
5-40 SYRINGE (ML) INJECTION EVERY 8 HOURS
Status: DISCONTINUED | OUTPATIENT
Start: 2020-01-01 | End: 2020-01-01 | Stop reason: HOSPADM

## 2020-01-01 RX ORDER — DEXAMETHASONE SODIUM PHOSPHATE 4 MG/ML
INJECTION, SOLUTION INTRA-ARTICULAR; INTRALESIONAL; INTRAMUSCULAR; INTRAVENOUS; SOFT TISSUE AS NEEDED
Status: DISCONTINUED | OUTPATIENT
Start: 2020-01-01 | End: 2020-01-01 | Stop reason: HOSPADM

## 2020-01-01 RX ORDER — LABETALOL HYDROCHLORIDE 5 MG/ML
10 INJECTION, SOLUTION INTRAVENOUS
Status: COMPLETED | OUTPATIENT
Start: 2020-01-01 | End: 2020-01-01

## 2020-01-01 RX ORDER — ATORVASTATIN CALCIUM 40 MG/1
40 TABLET, FILM COATED ORAL DAILY
Qty: 30 TAB | Refills: 0 | Status: SHIPPED | OUTPATIENT
Start: 2020-01-01 | End: 2020-01-01

## 2020-01-01 RX ORDER — ONDANSETRON 2 MG/ML
8 INJECTION INTRAMUSCULAR; INTRAVENOUS ONCE
Status: CANCELLED | OUTPATIENT
Start: 2020-01-01

## 2020-01-01 RX ORDER — GUAIFENESIN 100 MG/5ML
81 LIQUID (ML) ORAL DAILY
Status: DISCONTINUED | OUTPATIENT
Start: 2020-01-01 | End: 2020-01-01 | Stop reason: HOSPADM

## 2020-01-01 RX ORDER — HYDROMORPHONE HYDROCHLORIDE 2 MG/1
2 TABLET ORAL
Status: DISCONTINUED | OUTPATIENT
Start: 2020-01-01 | End: 2020-06-05 | Stop reason: HOSPADM

## 2020-01-01 RX ORDER — SODIUM CHLORIDE 9 MG/ML
25 INJECTION, SOLUTION INTRAVENOUS CONTINUOUS
Status: CANCELLED | OUTPATIENT
Start: 2020-07-30

## 2020-01-01 RX ORDER — SODIUM CHLORIDE, SODIUM LACTATE, POTASSIUM CHLORIDE, CALCIUM CHLORIDE 600; 310; 30; 20 MG/100ML; MG/100ML; MG/100ML; MG/100ML
25 INJECTION, SOLUTION INTRAVENOUS CONTINUOUS
Status: DISCONTINUED | OUTPATIENT
Start: 2020-01-01 | End: 2020-01-01 | Stop reason: HOSPADM

## 2020-01-01 RX ORDER — SODIUM CHLORIDE 0.9 % (FLUSH) 0.9 %
10 SYRINGE (ML) INJECTION AS NEEDED
Status: CANCELLED
Start: 2020-01-01

## 2020-01-01 RX ORDER — SODIUM CHLORIDE 9 MG/ML
10 INJECTION INTRAMUSCULAR; INTRAVENOUS; SUBCUTANEOUS AS NEEDED
Status: CANCELLED | OUTPATIENT
Start: 2020-09-10

## 2020-01-01 RX ORDER — ONDANSETRON 2 MG/ML
4 INJECTION INTRAMUSCULAR; INTRAVENOUS AS NEEDED
Status: DISCONTINUED | OUTPATIENT
Start: 2020-01-01 | End: 2020-01-01 | Stop reason: HOSPADM

## 2020-01-01 RX ORDER — ENOXAPARIN SODIUM 100 MG/ML
40 INJECTION SUBCUTANEOUS EVERY 12 HOURS
Status: DISCONTINUED | OUTPATIENT
Start: 2020-01-01 | End: 2020-01-01

## 2020-01-01 RX ORDER — LOSARTAN POTASSIUM 50 MG/1
100 TABLET ORAL DAILY
Status: DISCONTINUED | OUTPATIENT
Start: 2020-01-01 | End: 2020-01-01 | Stop reason: HOSPADM

## 2020-01-01 RX ORDER — HYDROCORTISONE SODIUM SUCCINATE 100 MG/2ML
100 INJECTION, POWDER, FOR SOLUTION INTRAMUSCULAR; INTRAVENOUS AS NEEDED
Status: CANCELLED | OUTPATIENT
Start: 2020-07-30

## 2020-01-01 RX ORDER — ACETAMINOPHEN 500 MG
500 TABLET ORAL
Status: DISCONTINUED | OUTPATIENT
Start: 2020-01-01 | End: 2020-06-05 | Stop reason: HOSPADM

## 2020-01-01 RX ORDER — DIPHENHYDRAMINE HYDROCHLORIDE 50 MG/ML
25 INJECTION, SOLUTION INTRAMUSCULAR; INTRAVENOUS AS NEEDED
Status: CANCELLED
Start: 2020-01-01

## 2020-01-01 RX ORDER — HEPARIN 100 UNIT/ML
300-500 SYRINGE INTRAVENOUS AS NEEDED
Status: CANCELLED
Start: 2020-08-20

## 2020-01-01 RX ORDER — CEFAZOLIN SODIUM/WATER 2 G/20 ML
2 SYRINGE (ML) INTRAVENOUS EVERY 8 HOURS
Status: COMPLETED | OUTPATIENT
Start: 2020-01-01 | End: 2020-01-01

## 2020-01-01 RX ORDER — ATENOLOL 50 MG/1
50 TABLET ORAL DAILY
Status: DISCONTINUED | OUTPATIENT
Start: 2020-01-01 | End: 2020-01-01 | Stop reason: HOSPADM

## 2020-01-01 RX ORDER — PROPOFOL 10 MG/ML
INJECTION, EMULSION INTRAVENOUS AS NEEDED
Status: DISCONTINUED | OUTPATIENT
Start: 2020-01-01 | End: 2020-01-01 | Stop reason: HOSPADM

## 2020-01-01 RX ORDER — MIDAZOLAM HYDROCHLORIDE 1 MG/ML
1 INJECTION, SOLUTION INTRAMUSCULAR; INTRAVENOUS AS NEEDED
Status: DISCONTINUED | OUTPATIENT
Start: 2020-01-01 | End: 2020-01-01 | Stop reason: HOSPADM

## 2020-01-01 RX ORDER — SODIUM CHLORIDE 9 MG/ML
10 INJECTION INTRAMUSCULAR; INTRAVENOUS; SUBCUTANEOUS AS NEEDED
Status: CANCELLED | OUTPATIENT
Start: 2020-06-18

## 2020-01-01 RX ORDER — ROPIVACAINE HYDROCHLORIDE 5 MG/ML
150 INJECTION, SOLUTION EPIDURAL; INFILTRATION; PERINEURAL AS NEEDED
Status: DISCONTINUED | OUTPATIENT
Start: 2020-01-01 | End: 2020-01-01 | Stop reason: HOSPADM

## 2020-01-01 RX ORDER — EPINEPHRINE 1 MG/ML
0.3 INJECTION, SOLUTION, CONCENTRATE INTRAVENOUS AS NEEDED
Status: CANCELLED | OUTPATIENT
Start: 2020-07-30

## 2020-01-01 RX ORDER — SODIUM CHLORIDE 9 MG/ML
10 INJECTION INTRAMUSCULAR; INTRAVENOUS; SUBCUTANEOUS AS NEEDED
Status: CANCELLED | OUTPATIENT
Start: 2020-08-20

## 2020-01-01 RX ORDER — DEXAMETHASONE 4 MG/1
4 TABLET ORAL EVERY 6 HOURS
Qty: 120 TAB | Refills: 0 | Status: SHIPPED | OUTPATIENT
Start: 2020-01-01

## 2020-01-01 RX ORDER — SODIUM CHLORIDE 9 MG/ML
250 INJECTION, SOLUTION INTRAVENOUS AS NEEDED
Status: DISCONTINUED | OUTPATIENT
Start: 2020-01-01 | End: 2020-01-01 | Stop reason: HOSPADM

## 2020-01-01 RX ORDER — HYDRALAZINE HYDROCHLORIDE 20 MG/ML
INJECTION INTRAMUSCULAR; INTRAVENOUS
Status: DISCONTINUED
Start: 2020-01-01 | End: 2020-01-01 | Stop reason: HOSPADM

## 2020-01-01 RX ORDER — DEXAMETHASONE SODIUM PHOSPHATE 4 MG/ML
INJECTION, SOLUTION INTRA-ARTICULAR; INTRALESIONAL; INTRAMUSCULAR; INTRAVENOUS; SOFT TISSUE
Status: DISPENSED
Start: 2020-01-01 | End: 2020-01-01

## 2020-01-01 RX ORDER — ACETAMINOPHEN 325 MG/1
650 TABLET ORAL AS NEEDED
Status: CANCELLED
Start: 2020-01-01

## 2020-01-01 RX ORDER — ONDANSETRON 2 MG/ML
8 INJECTION INTRAMUSCULAR; INTRAVENOUS AS NEEDED
Status: CANCELLED | OUTPATIENT
Start: 2020-01-01

## 2020-01-01 RX ORDER — LORAZEPAM 0.5 MG/1
0.5 TABLET ORAL SEE ADMIN INSTRUCTIONS
Status: DISCONTINUED | OUTPATIENT
Start: 2020-01-01 | End: 2020-01-01

## 2020-01-01 RX ORDER — SODIUM CHLORIDE 0.9 % (FLUSH) 0.9 %
10 SYRINGE (ML) INJECTION
Status: COMPLETED | OUTPATIENT
Start: 2020-01-01 | End: 2020-01-01

## 2020-01-01 RX ORDER — ALBUTEROL SULFATE 0.83 MG/ML
2.5 SOLUTION RESPIRATORY (INHALATION) AS NEEDED
Status: CANCELLED
Start: 2020-10-01

## 2020-01-01 RX ORDER — DIPHENHYDRAMINE HYDROCHLORIDE 50 MG/ML
25 INJECTION, SOLUTION INTRAMUSCULAR; INTRAVENOUS AS NEEDED
Status: CANCELLED
Start: 2020-07-09

## 2020-01-01 RX ORDER — HEPARIN 100 UNIT/ML
300-500 SYRINGE INTRAVENOUS AS NEEDED
Status: DISCONTINUED | OUTPATIENT
Start: 2020-01-01 | End: 2020-01-01 | Stop reason: HOSPADM

## 2020-01-01 RX ORDER — ONDANSETRON 2 MG/ML
8 INJECTION INTRAMUSCULAR; INTRAVENOUS AS NEEDED
Status: CANCELLED | OUTPATIENT
Start: 2020-06-18

## 2020-01-01 RX ORDER — OXYCODONE HYDROCHLORIDE 5 MG/1
5 TABLET ORAL AS NEEDED
Status: DISCONTINUED | OUTPATIENT
Start: 2020-01-01 | End: 2020-01-01 | Stop reason: HOSPADM

## 2020-01-01 RX ORDER — FUROSEMIDE 10 MG/ML
40 INJECTION INTRAMUSCULAR; INTRAVENOUS ONCE
Status: COMPLETED | OUTPATIENT
Start: 2020-01-01 | End: 2020-01-01

## 2020-01-01 RX ORDER — EPHEDRINE SULFATE/0.9% NACL/PF 50 MG/5 ML
5 SYRINGE (ML) INTRAVENOUS AS NEEDED
Status: DISCONTINUED | OUTPATIENT
Start: 2020-01-01 | End: 2020-01-01 | Stop reason: HOSPADM

## 2020-01-01 RX ORDER — CEFAZOLIN SODIUM/WATER 2 G/20 ML
2 SYRINGE (ML) INTRAVENOUS
Status: COMPLETED | OUTPATIENT
Start: 2020-01-01 | End: 2020-01-01

## 2020-01-01 RX ORDER — ONDANSETRON 2 MG/ML
8 INJECTION INTRAMUSCULAR; INTRAVENOUS AS NEEDED
Status: CANCELLED | OUTPATIENT
Start: 2020-10-01

## 2020-01-01 RX ORDER — SODIUM CHLORIDE 9 MG/ML
25 INJECTION, SOLUTION INTRAVENOUS CONTINUOUS
Status: CANCELLED | OUTPATIENT
Start: 2020-07-09

## 2020-01-01 RX ORDER — HYDROMORPHONE HYDROCHLORIDE 2 MG/1
2 TABLET ORAL SEE ADMIN INSTRUCTIONS
Qty: 90 TAB | Refills: 0 | Status: SHIPPED | OUTPATIENT
Start: 2020-01-01 | End: 2020-01-01 | Stop reason: SDUPTHER

## 2020-01-01 RX ORDER — SODIUM CHLORIDE 9 MG/ML
10 INJECTION INTRAMUSCULAR; INTRAVENOUS; SUBCUTANEOUS AS NEEDED
Status: CANCELLED | OUTPATIENT
Start: 2020-07-30

## 2020-01-01 RX ORDER — EPINEPHRINE 1 MG/ML
0.3 INJECTION, SOLUTION, CONCENTRATE INTRAVENOUS AS NEEDED
Status: CANCELLED | OUTPATIENT
Start: 2020-09-10

## 2020-01-01 RX ORDER — HYDROMORPHONE HYDROCHLORIDE 2 MG/1
2 TABLET ORAL
Status: DISCONTINUED | OUTPATIENT
Start: 2020-01-01 | End: 2020-01-01

## 2020-01-01 RX ORDER — ACETAMINOPHEN 325 MG/1
650 TABLET ORAL AS NEEDED
Status: CANCELLED
Start: 2020-07-09

## 2020-01-01 RX ORDER — ATORVASTATIN CALCIUM 40 MG/1
40 TABLET, FILM COATED ORAL DAILY
Status: DISCONTINUED | OUTPATIENT
Start: 2020-01-01 | End: 2020-01-01 | Stop reason: HOSPADM

## 2020-01-01 RX ORDER — POTASSIUM CHLORIDE 750 MG/1
20 TABLET, FILM COATED, EXTENDED RELEASE ORAL DAILY
Status: DISCONTINUED | OUTPATIENT
Start: 2020-01-01 | End: 2020-01-01

## 2020-01-01 RX ORDER — DIPHENHYDRAMINE HYDROCHLORIDE 50 MG/ML
25 INJECTION, SOLUTION INTRAMUSCULAR; INTRAVENOUS AS NEEDED
Status: CANCELLED
Start: 2020-06-18

## 2020-01-01 RX ORDER — FUROSEMIDE 40 MG/1
40 TABLET ORAL DAILY
Status: DISCONTINUED | OUTPATIENT
Start: 2020-01-01 | End: 2020-01-01 | Stop reason: HOSPADM

## 2020-01-01 RX ORDER — HYDROCORTISONE SODIUM SUCCINATE 100 MG/2ML
100 INJECTION, POWDER, FOR SOLUTION INTRAMUSCULAR; INTRAVENOUS AS NEEDED
Status: CANCELLED | OUTPATIENT
Start: 2020-06-18

## 2020-01-01 RX ORDER — SODIUM CHLORIDE 9 MG/ML
25 INJECTION, SOLUTION INTRAVENOUS CONTINUOUS
Status: CANCELLED | OUTPATIENT
Start: 2020-09-10

## 2020-01-01 RX ORDER — MIDAZOLAM HYDROCHLORIDE 1 MG/ML
INJECTION, SOLUTION INTRAMUSCULAR; INTRAVENOUS AS NEEDED
Status: DISCONTINUED | OUTPATIENT
Start: 2020-01-01 | End: 2020-01-01 | Stop reason: HOSPADM

## 2020-01-01 RX ORDER — LORAZEPAM 0.5 MG/1
0.5 TABLET ORAL ONCE
Status: COMPLETED | OUTPATIENT
Start: 2020-01-01 | End: 2020-01-01

## 2020-01-01 RX ORDER — DEXAMETHASONE SODIUM PHOSPHATE 4 MG/ML
10 INJECTION, SOLUTION INTRA-ARTICULAR; INTRALESIONAL; INTRAMUSCULAR; INTRAVENOUS; SOFT TISSUE ONCE
Status: COMPLETED | OUTPATIENT
Start: 2020-01-01 | End: 2020-01-01

## 2020-01-01 RX ORDER — GUAIFENESIN 100 MG/5ML
81 LIQUID (ML) ORAL DAILY
Qty: 30 TAB | Refills: 0 | Status: SHIPPED | OUTPATIENT
Start: 2020-01-01 | End: 2020-01-01

## 2020-01-01 RX ORDER — POTASSIUM CHLORIDE 750 MG/1
40 TABLET, FILM COATED, EXTENDED RELEASE ORAL DAILY
Status: DISCONTINUED | OUTPATIENT
Start: 2020-01-01 | End: 2020-01-01 | Stop reason: HOSPADM

## 2020-01-01 RX ORDER — SODIUM CHLORIDE 9 MG/ML
25 INJECTION, SOLUTION INTRAVENOUS CONTINUOUS
Status: CANCELLED | OUTPATIENT
Start: 2020-06-18

## 2020-01-01 RX ORDER — SODIUM CHLORIDE 0.9 % (FLUSH) 0.9 %
5-40 SYRINGE (ML) INJECTION AS NEEDED
Status: DISCONTINUED | OUTPATIENT
Start: 2020-01-01 | End: 2020-06-05 | Stop reason: HOSPADM

## 2020-01-01 RX ORDER — GADOTERATE MEGLUMINE 376.9 MG/ML
15 INJECTION INTRAVENOUS
Status: COMPLETED | OUTPATIENT
Start: 2020-01-01 | End: 2020-01-01

## 2020-01-01 RX ORDER — HYDRALAZINE HYDROCHLORIDE 20 MG/ML
10 INJECTION INTRAMUSCULAR; INTRAVENOUS
Status: COMPLETED | OUTPATIENT
Start: 2020-01-01 | End: 2020-01-01

## 2020-01-01 RX ORDER — LIDOCAINE HYDROCHLORIDE 10 MG/ML
10 INJECTION, SOLUTION EPIDURAL; INFILTRATION; INTRACAUDAL; PERINEURAL
Status: COMPLETED | OUTPATIENT
Start: 2020-01-01 | End: 2020-01-01

## 2020-01-01 RX ORDER — HYDROMORPHONE HYDROCHLORIDE 1 MG/ML
0.2 INJECTION, SOLUTION INTRAMUSCULAR; INTRAVENOUS; SUBCUTANEOUS
Status: DISCONTINUED | OUTPATIENT
Start: 2020-01-01 | End: 2020-01-01 | Stop reason: HOSPADM

## 2020-01-01 RX ORDER — ACETAMINOPHEN 325 MG/1
650 TABLET ORAL AS NEEDED
Status: CANCELLED
Start: 2020-09-10

## 2020-01-01 RX ORDER — MIDAZOLAM HYDROCHLORIDE 1 MG/ML
0.5 INJECTION, SOLUTION INTRAMUSCULAR; INTRAVENOUS
Status: DISCONTINUED | OUTPATIENT
Start: 2020-01-01 | End: 2020-01-01 | Stop reason: HOSPADM

## 2020-01-01 RX ORDER — HYDROCORTISONE SODIUM SUCCINATE 100 MG/2ML
100 INJECTION, POWDER, FOR SOLUTION INTRAMUSCULAR; INTRAVENOUS AS NEEDED
Status: CANCELLED | OUTPATIENT
Start: 2020-08-20

## 2020-01-01 RX ORDER — HYDROCODONE BITARTRATE AND ACETAMINOPHEN 5; 325 MG/1; MG/1
1 TABLET ORAL
Status: DISCONTINUED | OUTPATIENT
Start: 2020-01-01 | End: 2020-01-01 | Stop reason: HOSPADM

## 2020-01-01 RX ORDER — FENTANYL CITRATE 50 UG/ML
25 INJECTION, SOLUTION INTRAMUSCULAR; INTRAVENOUS
Status: DISCONTINUED | OUTPATIENT
Start: 2020-01-01 | End: 2020-01-01 | Stop reason: HOSPADM

## 2020-01-01 RX ORDER — DIPHENHYDRAMINE HYDROCHLORIDE 50 MG/ML
50 INJECTION, SOLUTION INTRAMUSCULAR; INTRAVENOUS ONCE
Status: CANCELLED
Start: 2020-01-01

## 2020-01-01 RX ORDER — SODIUM CHLORIDE 0.9 % (FLUSH) 0.9 %
10 SYRINGE (ML) INJECTION AS NEEDED
Status: CANCELLED
Start: 2020-10-01

## 2020-01-01 RX ORDER — CEFAZOLIN SODIUM/WATER 2 G/20 ML
2 SYRINGE (ML) INTRAVENOUS ONCE
Status: COMPLETED | OUTPATIENT
Start: 2020-01-01 | End: 2020-01-01

## 2020-01-01 RX ORDER — FAMOTIDINE 20 MG/1
20 TABLET, FILM COATED ORAL DAILY
Status: DISCONTINUED | OUTPATIENT
Start: 2020-06-05 | End: 2020-06-05 | Stop reason: HOSPADM

## 2020-01-01 RX ORDER — NEOSTIGMINE METHYLSULFATE 1 MG/ML
INJECTION INTRAVENOUS AS NEEDED
Status: DISCONTINUED | OUTPATIENT
Start: 2020-01-01 | End: 2020-01-01 | Stop reason: HOSPADM

## 2020-01-01 RX ORDER — ACETAMINOPHEN 500 MG
1000 TABLET ORAL
COMMUNITY
End: 2020-01-01 | Stop reason: CLARIF

## 2020-01-01 RX ORDER — SODIUM CHLORIDE 0.9 % (FLUSH) 0.9 %
5-40 SYRINGE (ML) INJECTION EVERY 8 HOURS
Status: DISCONTINUED | OUTPATIENT
Start: 2020-01-01 | End: 2020-06-05 | Stop reason: HOSPADM

## 2020-01-01 RX ORDER — ACETAMINOPHEN 325 MG/1
650 TABLET ORAL AS NEEDED
Status: CANCELLED
Start: 2020-06-18

## 2020-01-01 RX ORDER — SUCCINYLCHOLINE CHLORIDE 20 MG/ML
INJECTION INTRAMUSCULAR; INTRAVENOUS AS NEEDED
Status: DISCONTINUED | OUTPATIENT
Start: 2020-01-01 | End: 2020-01-01 | Stop reason: HOSPADM

## 2020-01-01 RX ORDER — SODIUM CHLORIDE 0.9 % (FLUSH) 0.9 %
10 SYRINGE (ML) INJECTION AS NEEDED
Status: CANCELLED
Start: 2020-08-20

## 2020-01-01 RX ORDER — DIPHENHYDRAMINE HYDROCHLORIDE 50 MG/ML
25 INJECTION, SOLUTION INTRAMUSCULAR; INTRAVENOUS AS NEEDED
Status: CANCELLED
Start: 2020-10-01

## 2020-01-01 RX ORDER — TRAZODONE HYDROCHLORIDE 50 MG/1
25 TABLET ORAL
Qty: 10 TAB | Refills: 0 | Status: SHIPPED | OUTPATIENT
Start: 2020-01-01 | End: 2020-01-01

## 2020-01-01 RX ORDER — ONDANSETRON 2 MG/ML
8 INJECTION INTRAMUSCULAR; INTRAVENOUS AS NEEDED
Status: CANCELLED | OUTPATIENT
Start: 2020-08-20

## 2020-01-01 RX ORDER — SODIUM CHLORIDE 9 MG/ML
25 INJECTION, SOLUTION INTRAVENOUS CONTINUOUS
Status: CANCELLED | OUTPATIENT
Start: 2020-10-01

## 2020-01-01 RX ORDER — PHENYLEPHRINE HCL IN 0.9% NACL 0.4MG/10ML
SYRINGE (ML) INTRAVENOUS AS NEEDED
Status: DISCONTINUED | OUTPATIENT
Start: 2020-01-01 | End: 2020-01-01 | Stop reason: HOSPADM

## 2020-01-01 RX ORDER — DIPHENHYDRAMINE HYDROCHLORIDE 50 MG/ML
25 INJECTION, SOLUTION INTRAMUSCULAR; INTRAVENOUS AS NEEDED
Status: CANCELLED
Start: 2020-07-30

## 2020-01-01 RX ORDER — LORAZEPAM 0.5 MG/1
0.5 TABLET ORAL SEE ADMIN INSTRUCTIONS
Qty: 1 TAB | Refills: 0 | OUTPATIENT
Start: 2020-01-01 | End: 2020-01-01

## 2020-01-01 RX ORDER — LIDOCAINE HYDROCHLORIDE 20 MG/ML
INJECTION, SOLUTION EPIDURAL; INFILTRATION; INTRACAUDAL; PERINEURAL AS NEEDED
Status: DISCONTINUED | OUTPATIENT
Start: 2020-01-01 | End: 2020-01-01 | Stop reason: HOSPADM

## 2020-01-01 RX ORDER — MANNITOL 250 MG/ML
INJECTION, SOLUTION INTRAVENOUS AS NEEDED
Status: DISCONTINUED | OUTPATIENT
Start: 2020-01-01 | End: 2020-01-01 | Stop reason: HOSPADM

## 2020-01-01 RX ORDER — ONDANSETRON 2 MG/ML
8 INJECTION INTRAMUSCULAR; INTRAVENOUS AS NEEDED
Status: CANCELLED | OUTPATIENT
Start: 2020-07-09

## 2020-01-01 RX ORDER — FUROSEMIDE 10 MG/ML
INJECTION INTRAMUSCULAR; INTRAVENOUS AS NEEDED
Status: DISCONTINUED | OUTPATIENT
Start: 2020-01-01 | End: 2020-01-01 | Stop reason: HOSPADM

## 2020-01-01 RX ORDER — HYDROMORPHONE HYDROCHLORIDE 4 MG/1
4 TABLET ORAL
Qty: 180 TAB | Refills: 0 | Status: SHIPPED | OUTPATIENT
Start: 2020-01-01 | End: 2020-01-01

## 2020-01-01 RX ORDER — ATENOLOL 25 MG/1
50 TABLET ORAL DAILY
Status: DISCONTINUED | OUTPATIENT
Start: 2020-01-01 | End: 2020-06-05 | Stop reason: HOSPADM

## 2020-01-01 RX ORDER — LORAZEPAM 2 MG/ML
1 INJECTION INTRAMUSCULAR
Status: COMPLETED | OUTPATIENT
Start: 2020-01-01 | End: 2020-01-01

## 2020-01-01 RX ORDER — ENOXAPARIN SODIUM 100 MG/ML
40 INJECTION SUBCUTANEOUS 2 TIMES DAILY
COMMUNITY
End: 2020-01-01 | Stop reason: ALTCHOICE

## 2020-01-01 RX ORDER — HYDROMORPHONE HYDROCHLORIDE 1 MG/ML
1 INJECTION, SOLUTION INTRAMUSCULAR; INTRAVENOUS; SUBCUTANEOUS
Status: DISCONTINUED | OUTPATIENT
Start: 2020-01-01 | End: 2020-06-05 | Stop reason: HOSPADM

## 2020-01-01 RX ORDER — FENTANYL CITRATE 50 UG/ML
50 INJECTION, SOLUTION INTRAMUSCULAR; INTRAVENOUS AS NEEDED
Status: DISCONTINUED | OUTPATIENT
Start: 2020-01-01 | End: 2020-01-01 | Stop reason: HOSPADM

## 2020-01-01 RX ORDER — LIDOCAINE HYDROCHLORIDE AND EPINEPHRINE 10; 10 MG/ML; UG/ML
1.5 INJECTION, SOLUTION INFILTRATION; PERINEURAL ONCE
Status: COMPLETED | OUTPATIENT
Start: 2020-01-01 | End: 2020-01-01

## 2020-01-01 RX ORDER — ROCURONIUM BROMIDE 10 MG/ML
INJECTION, SOLUTION INTRAVENOUS AS NEEDED
Status: DISCONTINUED | OUTPATIENT
Start: 2020-01-01 | End: 2020-01-01 | Stop reason: HOSPADM

## 2020-01-01 RX ORDER — IPRATROPIUM BROMIDE AND ALBUTEROL SULFATE 2.5; .5 MG/3ML; MG/3ML
3 SOLUTION RESPIRATORY (INHALATION)
Status: DISCONTINUED | OUTPATIENT
Start: 2020-01-01 | End: 2020-01-01

## 2020-01-01 RX ORDER — DIPHENHYDRAMINE HYDROCHLORIDE 50 MG/ML
50 INJECTION, SOLUTION INTRAMUSCULAR; INTRAVENOUS AS NEEDED
Status: CANCELLED
Start: 2020-07-09

## 2020-01-01 RX ORDER — ACETAMINOPHEN 325 MG/1
650 TABLET ORAL AS NEEDED
Status: CANCELLED
Start: 2020-10-01

## 2020-01-01 RX ORDER — ACETAMINOPHEN 325 MG/1
650 TABLET ORAL AS NEEDED
Status: CANCELLED
Start: 2020-08-20

## 2020-01-01 RX ORDER — HEPARIN SODIUM 1000 [USP'U]/ML
INJECTION, SOLUTION INTRAVENOUS; SUBCUTANEOUS AS NEEDED
Status: DISCONTINUED | OUTPATIENT
Start: 2020-01-01 | End: 2020-01-01 | Stop reason: HOSPADM

## 2020-01-01 RX ORDER — ENOXAPARIN SODIUM 100 MG/ML
40 INJECTION SUBCUTANEOUS 2 TIMES DAILY
Status: DISCONTINUED | OUTPATIENT
Start: 2020-01-01 | End: 2020-01-01 | Stop reason: HOSPADM

## 2020-01-01 RX ORDER — HYDROCODONE BITARTRATE AND ACETAMINOPHEN 5; 325 MG/1; MG/1
1 TABLET ORAL
Qty: 60 TAB | Refills: 0 | Status: SHIPPED | OUTPATIENT
Start: 2020-01-01 | End: 2020-01-01

## 2020-01-01 RX ORDER — HYDROMORPHONE HYDROCHLORIDE 1 MG/ML
0.2 INJECTION, SOLUTION INTRAMUSCULAR; INTRAVENOUS; SUBCUTANEOUS
Status: ACTIVE | OUTPATIENT
Start: 2020-01-01 | End: 2020-01-01

## 2020-01-01 RX ORDER — EPINEPHRINE 1 MG/ML
0.3 INJECTION, SOLUTION, CONCENTRATE INTRAVENOUS AS NEEDED
Status: CANCELLED | OUTPATIENT
Start: 2020-07-09

## 2020-01-01 RX ORDER — HYDROCODONE BITARTRATE AND ACETAMINOPHEN 5; 325 MG/1; MG/1
1 TABLET ORAL
Qty: 8 TAB | Refills: 0 | Status: SHIPPED | OUTPATIENT
Start: 2020-01-01 | End: 2020-01-01

## 2020-01-01 RX ORDER — FENTANYL CITRATE 50 UG/ML
25 INJECTION, SOLUTION INTRAMUSCULAR; INTRAVENOUS ONCE
Status: COMPLETED | OUTPATIENT
Start: 2020-01-01 | End: 2020-01-01

## 2020-01-01 RX ORDER — LOSARTAN POTASSIUM 50 MG/1
100 TABLET ORAL DAILY
Status: DISCONTINUED | OUTPATIENT
Start: 2020-01-01 | End: 2020-06-05 | Stop reason: HOSPADM

## 2020-01-01 RX ORDER — BALSAM PERU/CASTOR OIL
OINTMENT (GRAM) TOPICAL 2 TIMES DAILY
Status: DISCONTINUED | OUTPATIENT
Start: 2020-01-01 | End: 2020-06-05 | Stop reason: HOSPADM

## 2020-01-01 RX ORDER — HYDRALAZINE HYDROCHLORIDE 20 MG/ML
10 INJECTION INTRAMUSCULAR; INTRAVENOUS
Status: DISCONTINUED | OUTPATIENT
Start: 2020-01-01 | End: 2020-06-05 | Stop reason: HOSPADM

## 2020-01-01 RX ORDER — SODIUM CHLORIDE, SODIUM LACTATE, POTASSIUM CHLORIDE, CALCIUM CHLORIDE 600; 310; 30; 20 MG/100ML; MG/100ML; MG/100ML; MG/100ML
INJECTION, SOLUTION INTRAVENOUS
Status: DISCONTINUED | OUTPATIENT
Start: 2020-01-01 | End: 2020-01-01 | Stop reason: HOSPADM

## 2020-01-01 RX ORDER — ACETAMINOPHEN 325 MG/1
650 TABLET ORAL ONCE
Status: DISCONTINUED | OUTPATIENT
Start: 2020-01-01 | End: 2020-01-01 | Stop reason: HOSPADM

## 2020-01-01 RX ORDER — SODIUM CHLORIDE 0.9 % (FLUSH) 0.9 %
10 SYRINGE (ML) INJECTION AS NEEDED
Status: DISCONTINUED | OUTPATIENT
Start: 2020-01-01 | End: 2020-01-01 | Stop reason: HOSPADM

## 2020-01-01 RX ORDER — SODIUM CHLORIDE 9 MG/ML
10 INJECTION INTRAMUSCULAR; INTRAVENOUS; SUBCUTANEOUS AS NEEDED
Status: CANCELLED | OUTPATIENT
Start: 2020-07-09

## 2020-01-01 RX ORDER — DEXAMETHASONE SODIUM PHOSPHATE 4 MG/ML
4 INJECTION, SOLUTION INTRA-ARTICULAR; INTRALESIONAL; INTRAMUSCULAR; INTRAVENOUS; SOFT TISSUE EVERY 6 HOURS
Status: DISCONTINUED | OUTPATIENT
Start: 2020-01-01 | End: 2020-06-05 | Stop reason: HOSPADM

## 2020-01-01 RX ORDER — POTASSIUM CHLORIDE 1500 MG/1
40 TABLET, FILM COATED, EXTENDED RELEASE ORAL DAILY
Qty: 30 TAB | Refills: 1 | Status: SHIPPED
Start: 2020-01-01 | End: 2020-01-01

## 2020-01-01 RX ORDER — ACETAMINOPHEN 325 MG/1
325 TABLET ORAL
COMMUNITY

## 2020-01-01 RX ORDER — SODIUM CHLORIDE 0.9 % (FLUSH) 0.9 %
10 SYRINGE (ML) INJECTION AS NEEDED
Status: CANCELLED
Start: 2020-09-10

## 2020-01-01 RX ORDER — SODIUM CHLORIDE 0.9 % (FLUSH) 0.9 %
10 SYRINGE (ML) INJECTION AS NEEDED
Status: CANCELLED
Start: 2020-06-18

## 2020-01-01 RX ORDER — SODIUM CHLORIDE 9 MG/ML
25 INJECTION, SOLUTION INTRAVENOUS CONTINUOUS
Status: CANCELLED | OUTPATIENT
Start: 2020-08-20

## 2020-01-01 RX ORDER — ALBUTEROL SULFATE 0.83 MG/ML
2.5 SOLUTION RESPIRATORY (INHALATION) AS NEEDED
Status: CANCELLED
Start: 2020-07-30

## 2020-01-01 RX ORDER — ALBUTEROL SULFATE 0.83 MG/ML
2.5 SOLUTION RESPIRATORY (INHALATION) AS NEEDED
Status: CANCELLED
Start: 2020-09-10

## 2020-01-01 RX ORDER — ALBUTEROL SULFATE 90 UG/1
2 AEROSOL, METERED RESPIRATORY (INHALATION)
Qty: 1 INHALER | Refills: 1 | Status: SHIPPED | OUTPATIENT
Start: 2020-01-01

## 2020-01-01 RX ORDER — ONDANSETRON 8 MG/1
8 TABLET, ORALLY DISINTEGRATING ORAL
Qty: 30 TAB | Refills: 0 | Status: SHIPPED | OUTPATIENT
Start: 2020-01-01 | End: 2020-01-01

## 2020-01-01 RX ORDER — LABETALOL HYDROCHLORIDE 5 MG/ML
10 INJECTION, SOLUTION INTRAVENOUS
Status: DISCONTINUED | OUTPATIENT
Start: 2020-01-01 | End: 2020-06-05 | Stop reason: HOSPADM

## 2020-01-01 RX ORDER — AMOXICILLIN 250 MG
1 CAPSULE ORAL DAILY
Status: DISCONTINUED | OUTPATIENT
Start: 2020-01-01 | End: 2020-06-05 | Stop reason: HOSPADM

## 2020-01-01 RX ORDER — LIDOCAINE HYDROCHLORIDE 10 MG/ML
0.1 INJECTION, SOLUTION EPIDURAL; INFILTRATION; INTRACAUDAL; PERINEURAL AS NEEDED
Status: DISCONTINUED | OUTPATIENT
Start: 2020-01-01 | End: 2020-01-01 | Stop reason: HOSPADM

## 2020-01-01 RX ORDER — SODIUM CHLORIDE 0.9 % (FLUSH) 0.9 %
10 SYRINGE (ML) INJECTION AS NEEDED
Status: CANCELLED
Start: 2020-07-30

## 2020-01-01 RX ORDER — ENOXAPARIN SODIUM 100 MG/ML
30 INJECTION SUBCUTANEOUS EVERY 24 HOURS
Status: DISCONTINUED | OUTPATIENT
Start: 2020-01-01 | End: 2020-01-01 | Stop reason: HOSPADM

## 2020-01-01 RX ORDER — GADOTERATE MEGLUMINE 376.9 MG/ML
10 INJECTION INTRAVENOUS
Status: COMPLETED | OUTPATIENT
Start: 2020-01-01 | End: 2020-01-01

## 2020-01-01 RX ORDER — HEPARIN 100 UNIT/ML
300-500 SYRINGE INTRAVENOUS AS NEEDED
Status: CANCELLED
Start: 2020-07-30

## 2020-01-01 RX ORDER — EPINEPHRINE 1 MG/ML
0.3 INJECTION, SOLUTION, CONCENTRATE INTRAVENOUS AS NEEDED
Status: CANCELLED | OUTPATIENT
Start: 2020-10-01

## 2020-01-01 RX ORDER — HYDROXYZINE HYDROCHLORIDE 10 MG/1
10 TABLET, FILM COATED ORAL
Status: DISCONTINUED | OUTPATIENT
Start: 2020-01-01 | End: 2020-01-01 | Stop reason: HOSPADM

## 2020-01-01 RX ORDER — ONDANSETRON 2 MG/ML
8 INJECTION INTRAMUSCULAR; INTRAVENOUS AS NEEDED
Status: CANCELLED | OUTPATIENT
Start: 2020-09-10

## 2020-01-01 RX ORDER — LIDOCAINE 50 MG/G
1 PATCH TOPICAL EVERY 24 HOURS
Qty: 10 EACH | Refills: 3 | Status: SHIPPED | OUTPATIENT
Start: 2020-01-01 | End: 2020-01-01

## 2020-01-01 RX ORDER — ROPIVACAINE HYDROCHLORIDE 5 MG/ML
30 INJECTION, SOLUTION EPIDURAL; INFILTRATION; PERINEURAL AS NEEDED
Status: DISCONTINUED | OUTPATIENT
Start: 2020-01-01 | End: 2020-01-01 | Stop reason: HOSPADM

## 2020-01-01 RX ORDER — ONDANSETRON 2 MG/ML
4 INJECTION INTRAMUSCULAR; INTRAVENOUS
Status: DISCONTINUED | OUTPATIENT
Start: 2020-01-01 | End: 2020-06-05 | Stop reason: HOSPADM

## 2020-01-01 RX ORDER — GLYCOPYRROLATE 0.2 MG/ML
INJECTION INTRAMUSCULAR; INTRAVENOUS AS NEEDED
Status: DISCONTINUED | OUTPATIENT
Start: 2020-01-01 | End: 2020-01-01 | Stop reason: HOSPADM

## 2020-01-01 RX ORDER — SODIUM CHLORIDE, SODIUM LACTATE, POTASSIUM CHLORIDE, CALCIUM CHLORIDE 600; 310; 30; 20 MG/100ML; MG/100ML; MG/100ML; MG/100ML
75 INJECTION, SOLUTION INTRAVENOUS CONTINUOUS
Status: DISCONTINUED | OUTPATIENT
Start: 2020-01-01 | End: 2020-01-01

## 2020-01-01 RX ORDER — LORAZEPAM 0.5 MG/1
0.5 TABLET ORAL SEE ADMIN INSTRUCTIONS
Qty: 1 TAB | Refills: 0 | Status: SHIPPED | OUTPATIENT
Start: 2020-01-01 | End: 2020-01-01

## 2020-01-01 RX ORDER — ALBUMIN HUMAN 50 G/1000ML
12.5 SOLUTION INTRAVENOUS ONCE
Status: DISCONTINUED | OUTPATIENT
Start: 2020-01-01 | End: 2020-06-05 | Stop reason: HOSPADM

## 2020-01-01 RX ORDER — HEPARIN 100 UNIT/ML
300-500 SYRINGE INTRAVENOUS AS NEEDED
Status: CANCELLED
Start: 2020-10-01

## 2020-01-01 RX ORDER — EPINEPHRINE 1 MG/ML
0.3 INJECTION, SOLUTION, CONCENTRATE INTRAVENOUS AS NEEDED
Status: CANCELLED | OUTPATIENT
Start: 2020-08-20

## 2020-01-01 RX ORDER — DIPHENHYDRAMINE HYDROCHLORIDE 50 MG/ML
50 INJECTION, SOLUTION INTRAMUSCULAR; INTRAVENOUS AS NEEDED
Status: CANCELLED
Start: 2020-09-10

## 2020-01-01 RX ORDER — EPINEPHRINE 1 MG/ML
0.3 INJECTION, SOLUTION, CONCENTRATE INTRAVENOUS AS NEEDED
Status: CANCELLED | OUTPATIENT
Start: 2020-06-18

## 2020-01-01 RX ORDER — DIPHENHYDRAMINE HYDROCHLORIDE 50 MG/ML
50 INJECTION, SOLUTION INTRAMUSCULAR; INTRAVENOUS AS NEEDED
Status: CANCELLED
Start: 2020-06-18

## 2020-01-01 RX ORDER — HYDROMORPHONE HYDROCHLORIDE 4 MG/1
4 TABLET ORAL
COMMUNITY

## 2020-01-01 RX ORDER — FENTANYL CITRATE 50 UG/ML
INJECTION, SOLUTION INTRAMUSCULAR; INTRAVENOUS
Status: COMPLETED
Start: 2020-01-01 | End: 2020-01-01

## 2020-01-01 RX ORDER — LORAZEPAM 0.5 MG/1
0.5 TABLET ORAL
Status: ACTIVE | OUTPATIENT
Start: 2020-01-01 | End: 2020-01-01

## 2020-01-01 RX ORDER — LORAZEPAM 2 MG/ML
1 INJECTION INTRAMUSCULAR ONCE
Status: DISPENSED | OUTPATIENT
Start: 2020-01-01 | End: 2020-01-01

## 2020-01-01 RX ORDER — DIPHENHYDRAMINE HYDROCHLORIDE 50 MG/ML
50 INJECTION, SOLUTION INTRAMUSCULAR; INTRAVENOUS AS NEEDED
Status: CANCELLED
Start: 2020-08-20

## 2020-01-01 RX ORDER — HYDROCORTISONE SODIUM SUCCINATE 100 MG/2ML
100 INJECTION, POWDER, FOR SOLUTION INTRAMUSCULAR; INTRAVENOUS AS NEEDED
Status: CANCELLED | OUTPATIENT
Start: 2020-07-09

## 2020-01-01 RX ORDER — SODIUM CHLORIDE 9 MG/ML
10 INJECTION INTRAMUSCULAR; INTRAVENOUS; SUBCUTANEOUS AS NEEDED
Status: CANCELLED | OUTPATIENT
Start: 2020-10-01

## 2020-01-01 RX ORDER — IPRATROPIUM BROMIDE AND ALBUTEROL SULFATE 2.5; .5 MG/3ML; MG/3ML
3 SOLUTION RESPIRATORY (INHALATION)
Status: DISCONTINUED | OUTPATIENT
Start: 2020-01-01 | End: 2020-01-01 | Stop reason: HOSPADM

## 2020-01-01 RX ORDER — FENTANYL CITRATE 50 UG/ML
50 INJECTION, SOLUTION INTRAMUSCULAR; INTRAVENOUS
Status: DISCONTINUED | OUTPATIENT
Start: 2020-01-01 | End: 2020-01-01 | Stop reason: HOSPADM

## 2020-01-01 RX ORDER — ALBUTEROL SULFATE 0.83 MG/ML
2.5 SOLUTION RESPIRATORY (INHALATION)
Status: DISCONTINUED | OUTPATIENT
Start: 2020-01-01 | End: 2020-06-05 | Stop reason: HOSPADM

## 2020-01-01 RX ORDER — HEPARIN 100 UNIT/ML
300-500 SYRINGE INTRAVENOUS AS NEEDED
Status: CANCELLED
Start: 2020-09-10

## 2020-01-01 RX ORDER — HYDROCORTISONE SODIUM SUCCINATE 100 MG/2ML
100 INJECTION, POWDER, FOR SOLUTION INTRAMUSCULAR; INTRAVENOUS AS NEEDED
Status: CANCELLED | OUTPATIENT
Start: 2020-09-10

## 2020-01-01 RX ORDER — MONTELUKAST SODIUM 10 MG/1
10 TABLET ORAL DAILY
Status: DISCONTINUED | OUTPATIENT
Start: 2020-01-01 | End: 2020-01-01

## 2020-01-01 RX ORDER — HYDROCORTISONE SODIUM SUCCINATE 100 MG/2ML
100 INJECTION, POWDER, FOR SOLUTION INTRAMUSCULAR; INTRAVENOUS AS NEEDED
Status: CANCELLED | OUTPATIENT
Start: 2020-10-01

## 2020-01-01 RX ORDER — MONTELUKAST SODIUM 10 MG/1
10 TABLET ORAL DAILY
Status: DISCONTINUED | OUTPATIENT
Start: 2020-01-01 | End: 2020-01-01 | Stop reason: HOSPADM

## 2020-01-01 RX ORDER — ACETAMINOPHEN 500 MG
1000 TABLET ORAL
Status: DISCONTINUED | OUTPATIENT
Start: 2020-01-01 | End: 2020-01-01 | Stop reason: HOSPADM

## 2020-01-01 RX ORDER — HEPARIN 100 UNIT/ML
300-500 SYRINGE INTRAVENOUS AS NEEDED
Status: CANCELLED
Start: 2020-06-18

## 2020-01-01 RX ORDER — POVIDONE-IODINE 10 MG/G
OINTMENT TOPICAL ONCE
Status: DISCONTINUED | OUTPATIENT
Start: 2020-01-01 | End: 2020-01-01 | Stop reason: HOSPADM

## 2020-01-01 RX ORDER — DIPHENHYDRAMINE HYDROCHLORIDE 50 MG/ML
50 INJECTION, SOLUTION INTRAMUSCULAR; INTRAVENOUS AS NEEDED
Status: CANCELLED
Start: 2020-07-30

## 2020-01-01 RX ORDER — DIPHENHYDRAMINE HYDROCHLORIDE 50 MG/ML
25 INJECTION, SOLUTION INTRAMUSCULAR; INTRAVENOUS AS NEEDED
Status: CANCELLED
Start: 2020-08-20

## 2020-01-01 RX ORDER — ONDANSETRON 2 MG/ML
8 INJECTION INTRAMUSCULAR; INTRAVENOUS AS NEEDED
Status: CANCELLED | OUTPATIENT
Start: 2020-07-30

## 2020-01-01 RX ORDER — SODIUM CHLORIDE AND POTASSIUM CHLORIDE .9; .15 G/100ML; G/100ML
SOLUTION INTRAVENOUS CONTINUOUS
Status: DISCONTINUED | OUTPATIENT
Start: 2020-01-01 | End: 2020-06-05 | Stop reason: HOSPADM

## 2020-01-01 RX ORDER — FENTANYL CITRATE 50 UG/ML
50 INJECTION, SOLUTION INTRAMUSCULAR; INTRAVENOUS ONCE
Status: COMPLETED | OUTPATIENT
Start: 2020-01-01 | End: 2020-01-01

## 2020-01-01 RX ORDER — SODIUM CHLORIDE, SODIUM LACTATE, POTASSIUM CHLORIDE, CALCIUM CHLORIDE 600; 310; 30; 20 MG/100ML; MG/100ML; MG/100ML; MG/100ML
1000 INJECTION, SOLUTION INTRAVENOUS CONTINUOUS
Status: DISCONTINUED | OUTPATIENT
Start: 2020-01-01 | End: 2020-01-01 | Stop reason: HOSPADM

## 2020-01-01 RX ORDER — BUDESONIDE 3 MG/1
6 CAPSULE, COATED PELLETS ORAL
Status: DISCONTINUED | OUTPATIENT
Start: 2020-01-01 | End: 2020-01-01

## 2020-01-01 RX ORDER — ALBUTEROL SULFATE 0.83 MG/ML
2.5 SOLUTION RESPIRATORY (INHALATION) AS NEEDED
Status: CANCELLED
Start: 2020-06-18

## 2020-01-01 RX ORDER — HEPARIN 100 UNIT/ML
300-500 SYRINGE INTRAVENOUS AS NEEDED
Status: CANCELLED
Start: 2020-07-09

## 2020-01-01 RX ORDER — PROPOFOL 10 MG/ML
INJECTION, EMULSION INTRAVENOUS
Status: DISCONTINUED | OUTPATIENT
Start: 2020-01-01 | End: 2020-01-01 | Stop reason: HOSPADM

## 2020-01-01 RX ORDER — FAMOTIDINE 20 MG/1
20 TABLET, FILM COATED ORAL EVERY 12 HOURS
Status: DISCONTINUED | OUTPATIENT
Start: 2020-01-01 | End: 2020-01-01

## 2020-01-01 RX ORDER — ALBUTEROL SULFATE 0.83 MG/ML
2.5 SOLUTION RESPIRATORY (INHALATION) AS NEEDED
Status: CANCELLED
Start: 2020-08-20

## 2020-01-01 RX ADMIN — HYDROCODONE BITARTRATE AND ACETAMINOPHEN 1 TABLET: 5; 325 TABLET ORAL at 07:00

## 2020-01-01 RX ADMIN — SODIUM CHLORIDE 500 MG: 900 INJECTION, SOLUTION INTRAVENOUS at 09:38

## 2020-01-01 RX ADMIN — ROCURONIUM BROMIDE 30 MG: 10 SOLUTION INTRAVENOUS at 10:13

## 2020-01-01 RX ADMIN — ENOXAPARIN SODIUM 40 MG: 40 INJECTION SUBCUTANEOUS at 08:16

## 2020-01-01 RX ADMIN — SODIUM CHLORIDE 25 ML/HR: 900 INJECTION, SOLUTION INTRAVENOUS at 15:19

## 2020-01-01 RX ADMIN — GLYCOPYRROLATE 0.4 MG: 0.2 INJECTION INTRAMUSCULAR; INTRAVENOUS at 09:43

## 2020-01-01 RX ADMIN — SUGAMMADEX 100 MG: 100 INJECTION, SOLUTION INTRAVENOUS at 13:50

## 2020-01-01 RX ADMIN — Medication 10 ML: at 18:38

## 2020-01-01 RX ADMIN — SODIUM CHLORIDE 200 MG: 900 INJECTION, SOLUTION INTRAVENOUS at 16:31

## 2020-01-01 RX ADMIN — Medication 10 ML: at 07:00

## 2020-01-01 RX ADMIN — ATENOLOL 50 MG: 25 TABLET ORAL at 10:53

## 2020-01-01 RX ADMIN — ATENOLOL 50 MG: 25 TABLET ORAL at 08:33

## 2020-01-01 RX ADMIN — REMIFENTANIL HYDROCHLORIDE 0.2 MCG/KG/MIN: 1 INJECTION, POWDER, LYOPHILIZED, FOR SOLUTION INTRAVENOUS at 09:45

## 2020-01-01 RX ADMIN — Medication 40 MCG: at 12:46

## 2020-01-01 RX ADMIN — MONTELUKAST SODIUM 10 MG: 10 TABLET, FILM COATED ORAL at 08:28

## 2020-01-01 RX ADMIN — ENOXAPARIN SODIUM 40 MG: 100 INJECTION SUBCUTANEOUS at 16:07

## 2020-01-01 RX ADMIN — CASTOR OIL AND BALSAM, PERU: 788; 87 OINTMENT TOPICAL at 17:35

## 2020-01-01 RX ADMIN — Medication 10 ML: at 17:49

## 2020-01-01 RX ADMIN — LORAZEPAM 1 MG: 2 INJECTION INTRAMUSCULAR; INTRAVENOUS at 10:31

## 2020-01-01 RX ADMIN — PIPERACILLIN AND TAZOBACTAM 3.38 G: 3; .375 INJECTION, POWDER, LYOPHILIZED, FOR SOLUTION INTRAVENOUS at 16:07

## 2020-01-01 RX ADMIN — DEXAMETHASONE SODIUM PHOSPHATE 10 MG: 4 INJECTION, SOLUTION INTRAMUSCULAR; INTRAVENOUS at 09:55

## 2020-01-01 RX ADMIN — Medication 10 ML: at 08:55

## 2020-01-01 RX ADMIN — Medication 10 ML: at 14:50

## 2020-01-01 RX ADMIN — CEFAZOLIN SODIUM 2 G: 10 INJECTION, POWDER, FOR SOLUTION INTRAVENOUS at 01:08

## 2020-01-01 RX ADMIN — Medication 10 ML: at 04:36

## 2020-01-01 RX ADMIN — PROPOFOL 50 MG: 10 INJECTION, EMULSION INTRAVENOUS at 10:07

## 2020-01-01 RX ADMIN — Medication 10 ML: at 13:10

## 2020-01-01 RX ADMIN — MIDAZOLAM 1 MG: 1 INJECTION INTRAMUSCULAR; INTRAVENOUS at 09:41

## 2020-01-01 RX ADMIN — ATORVASTATIN CALCIUM 40 MG: 40 TABLET, FILM COATED ORAL at 09:40

## 2020-01-01 RX ADMIN — POTASSIUM CHLORIDE 20 MEQ: 750 TABLET, FILM COATED, EXTENDED RELEASE ORAL at 08:28

## 2020-01-01 RX ADMIN — ONDANSETRON 4 MG: 2 INJECTION INTRAMUSCULAR; INTRAVENOUS at 15:26

## 2020-01-01 RX ADMIN — LOSARTAN POTASSIUM 100 MG: 50 TABLET, FILM COATED ORAL at 08:33

## 2020-01-01 RX ADMIN — IOPAMIDOL 60 ML: 755 INJECTION, SOLUTION INTRAVENOUS at 16:00

## 2020-01-01 RX ADMIN — GADOTERATE MEGLUMINE 8 ML: 376.9 INJECTION INTRAVENOUS at 14:32

## 2020-01-01 RX ADMIN — SODIUM CHLORIDE, POTASSIUM CHLORIDE, SODIUM LACTATE AND CALCIUM CHLORIDE: 600; 310; 30; 20 INJECTION, SOLUTION INTRAVENOUS at 12:59

## 2020-01-01 RX ADMIN — PIPERACILLIN AND TAZOBACTAM 3.38 G: 3; .375 INJECTION, POWDER, LYOPHILIZED, FOR SOLUTION INTRAVENOUS at 08:34

## 2020-01-01 RX ADMIN — PROPOFOL 20 MG: 10 INJECTION, EMULSION INTRAVENOUS at 10:03

## 2020-01-01 RX ADMIN — MIDAZOLAM 1 MG: 1 INJECTION INTRAMUSCULAR; INTRAVENOUS at 09:24

## 2020-01-01 RX ADMIN — SODIUM CHLORIDE, POTASSIUM CHLORIDE, SODIUM LACTATE AND CALCIUM CHLORIDE: 600; 310; 30; 20 INJECTION, SOLUTION INTRAVENOUS at 07:30

## 2020-01-01 RX ADMIN — Medication 10 ML: at 15:33

## 2020-01-01 RX ADMIN — FUROSEMIDE 40 MG: 40 TABLET ORAL at 09:40

## 2020-01-01 RX ADMIN — ROCURONIUM BROMIDE 5 MG: 10 SOLUTION INTRAVENOUS at 07:37

## 2020-01-01 RX ADMIN — MONTELUKAST SODIUM 10 MG: 10 TABLET, FILM COATED ORAL at 08:27

## 2020-01-01 RX ADMIN — DEXAMETHASONE SODIUM PHOSPHATE 4 MG: 4 INJECTION, SOLUTION INTRAMUSCULAR; INTRAVENOUS at 18:27

## 2020-01-01 RX ADMIN — HYDROCODONE BITARTRATE AND ACETAMINOPHEN 1 TABLET: 5; 325 TABLET ORAL at 21:45

## 2020-01-01 RX ADMIN — HYDROCODONE BITARTRATE AND ACETAMINOPHEN 1 TABLET: 5; 325 TABLET ORAL at 04:33

## 2020-01-01 RX ADMIN — PIPERACILLIN AND TAZOBACTAM 3.38 G: 3; .375 INJECTION, POWDER, LYOPHILIZED, FOR SOLUTION INTRAVENOUS at 00:56

## 2020-01-01 RX ADMIN — FENTANYL CITRATE 25 MCG: 50 INJECTION INTRAMUSCULAR; INTRAVENOUS at 15:30

## 2020-01-01 RX ADMIN — Medication 40 MCG: at 10:16

## 2020-01-01 RX ADMIN — POTASSIUM CHLORIDE AND SODIUM CHLORIDE: 900; 150 INJECTION, SOLUTION INTRAVENOUS at 18:24

## 2020-01-01 RX ADMIN — PHENYLEPHRINE HYDROCHLORIDE 40 MCG/MIN: 10 INJECTION INTRAVENOUS at 09:57

## 2020-01-01 RX ADMIN — HYDRALAZINE HYDROCHLORIDE 10 MG: 20 INJECTION INTRAMUSCULAR; INTRAVENOUS at 01:15

## 2020-01-01 RX ADMIN — ROCURONIUM BROMIDE 20 MG: 10 SOLUTION INTRAVENOUS at 09:41

## 2020-01-01 RX ADMIN — PIPERACILLIN AND TAZOBACTAM 3.38 G: 3; .375 INJECTION, POWDER, LYOPHILIZED, FOR SOLUTION INTRAVENOUS at 08:35

## 2020-01-01 RX ADMIN — ENOXAPARIN SODIUM 40 MG: 40 INJECTION SUBCUTANEOUS at 18:17

## 2020-01-01 RX ADMIN — HYDROCODONE BITARTRATE AND ACETAMINOPHEN 1 TABLET: 5; 325 TABLET ORAL at 13:59

## 2020-01-01 RX ADMIN — FENTANYL CITRATE 25 MCG: 50 INJECTION, SOLUTION INTRAMUSCULAR; INTRAVENOUS at 10:57

## 2020-01-01 RX ADMIN — DEXMEDETOMIDINE HYDROCHLORIDE 0.4 MCG/KG/HR: 100 INJECTION, SOLUTION, CONCENTRATE INTRAVENOUS at 19:03

## 2020-01-01 RX ADMIN — ATENOLOL 50 MG: 25 TABLET ORAL at 08:28

## 2020-01-01 RX ADMIN — ALBUMIN (HUMAN) 250 ML: 12.5 INJECTION, SOLUTION INTRAVENOUS at 12:59

## 2020-01-01 RX ADMIN — SODIUM CHLORIDE 25 ML/HR: 900 INJECTION, SOLUTION INTRAVENOUS at 16:30

## 2020-01-01 RX ADMIN — DEXTROSE 5 MG/HR: 5 SOLUTION INTRAVENOUS at 10:06

## 2020-01-01 RX ADMIN — IOHEXOL 50 ML: 240 INJECTION, SOLUTION INTRATHECAL; INTRAVASCULAR; INTRAVENOUS; ORAL at 13:00

## 2020-01-01 RX ADMIN — Medication 10 ML: at 13:11

## 2020-01-01 RX ADMIN — SODIUM CHLORIDE 100 ML: 900 INJECTION, SOLUTION INTRAVENOUS at 16:00

## 2020-01-01 RX ADMIN — Medication 10 ML: at 13:58

## 2020-01-01 RX ADMIN — PIPERACILLIN AND TAZOBACTAM 3.38 G: 3; .375 INJECTION, POWDER, LYOPHILIZED, FOR SOLUTION INTRAVENOUS at 16:59

## 2020-01-01 RX ADMIN — ROCURONIUM BROMIDE 20 MG: 10 SOLUTION INTRAVENOUS at 11:39

## 2020-01-01 RX ADMIN — ONDANSETRON HYDROCHLORIDE 4 MG: 2 INJECTION, SOLUTION INTRAMUSCULAR; INTRAVENOUS at 13:49

## 2020-01-01 RX ADMIN — Medication 40 MCG: at 13:32

## 2020-01-01 RX ADMIN — Medication 1 G: at 07:59

## 2020-01-01 RX ADMIN — Medication 2 G: at 09:50

## 2020-01-01 RX ADMIN — HYDRALAZINE HYDROCHLORIDE 10 MG: 20 INJECTION INTRAMUSCULAR; INTRAVENOUS at 10:40

## 2020-01-01 RX ADMIN — ONDANSETRON HYDROCHLORIDE 4 MG: 2 INJECTION, SOLUTION INTRAMUSCULAR; INTRAVENOUS at 09:43

## 2020-01-01 RX ADMIN — LOSARTAN POTASSIUM 100 MG: 50 TABLET, FILM COATED ORAL at 08:26

## 2020-01-01 RX ADMIN — Medication 10 ML: at 13:35

## 2020-01-01 RX ADMIN — DEXAMETHASONE SODIUM PHOSPHATE 4 MG: 4 INJECTION, SOLUTION INTRAMUSCULAR; INTRAVENOUS at 17:34

## 2020-01-01 RX ADMIN — ENOXAPARIN SODIUM 40 MG: 100 INJECTION SUBCUTANEOUS at 17:20

## 2020-01-01 RX ADMIN — MONTELUKAST SODIUM 10 MG: 10 TABLET, FILM COATED ORAL at 10:53

## 2020-01-01 RX ADMIN — Medication 10 ML: at 16:00

## 2020-01-01 RX ADMIN — POTASSIUM CHLORIDE AND SODIUM CHLORIDE: 900; 150 INJECTION, SOLUTION INTRAVENOUS at 15:12

## 2020-01-01 RX ADMIN — SODIUM CHLORIDE 100 ML: 900 INJECTION, SOLUTION INTRAVENOUS at 08:55

## 2020-01-01 RX ADMIN — DEXAMETHASONE SODIUM PHOSPHATE 4 MG: 4 INJECTION, SOLUTION INTRAMUSCULAR; INTRAVENOUS at 23:40

## 2020-01-01 RX ADMIN — Medication 40 MCG: at 12:50

## 2020-01-01 RX ADMIN — ENOXAPARIN SODIUM 40 MG: 100 INJECTION SUBCUTANEOUS at 07:01

## 2020-01-01 RX ADMIN — SODIUM CHLORIDE, POTASSIUM CHLORIDE, SODIUM LACTATE AND CALCIUM CHLORIDE: 600; 310; 30; 20 INJECTION, SOLUTION INTRAVENOUS at 09:30

## 2020-01-01 RX ADMIN — SODIUM CHLORIDE, POTASSIUM CHLORIDE, SODIUM LACTATE AND CALCIUM CHLORIDE 1000 ML: 600; 310; 30; 20 INJECTION, SOLUTION INTRAVENOUS at 09:00

## 2020-01-01 RX ADMIN — LOSARTAN POTASSIUM 100 MG: 50 TABLET, FILM COATED ORAL at 10:53

## 2020-01-01 RX ADMIN — Medication 10 ML: at 18:19

## 2020-01-01 RX ADMIN — ENOXAPARIN SODIUM 40 MG: 100 INJECTION SUBCUTANEOUS at 05:04

## 2020-01-01 RX ADMIN — HYDROCODONE BITARTRATE AND ACETAMINOPHEN 1 TABLET: 5; 325 TABLET ORAL at 20:58

## 2020-01-01 RX ADMIN — LOSARTAN POTASSIUM 100 MG: 50 TABLET, FILM COATED ORAL at 09:41

## 2020-01-01 RX ADMIN — PIPERACILLIN AND TAZOBACTAM 3.38 G: 3; .375 INJECTION, POWDER, LYOPHILIZED, FOR SOLUTION INTRAVENOUS at 16:52

## 2020-01-01 RX ADMIN — PIPERACILLIN AND TAZOBACTAM 3.38 G: 3; .375 INJECTION, POWDER, LYOPHILIZED, FOR SOLUTION INTRAVENOUS at 01:20

## 2020-01-01 RX ADMIN — SODIUM CHLORIDE 200 MG: 9 INJECTION, SOLUTION INTRAVENOUS at 15:19

## 2020-01-01 RX ADMIN — FENTANYL CITRATE 25 MCG: 50 INJECTION, SOLUTION INTRAMUSCULAR; INTRAVENOUS at 08:00

## 2020-01-01 RX ADMIN — ATENOLOL 50 MG: 50 TABLET ORAL at 08:16

## 2020-01-01 RX ADMIN — Medication 80 MCG: at 10:22

## 2020-01-01 RX ADMIN — FENTANYL CITRATE 25 MCG: 50 INJECTION, SOLUTION INTRAMUSCULAR; INTRAVENOUS at 10:30

## 2020-01-01 RX ADMIN — DEXAMETHASONE SODIUM PHOSPHATE 4 MG: 4 INJECTION, SOLUTION INTRAMUSCULAR; INTRAVENOUS at 06:04

## 2020-01-01 RX ADMIN — PIPERACILLIN AND TAZOBACTAM 3.38 G: 3; .375 INJECTION, POWDER, LYOPHILIZED, FOR SOLUTION INTRAVENOUS at 02:00

## 2020-01-01 RX ADMIN — MONTELUKAST SODIUM 10 MG: 10 TABLET, FILM COATED ORAL at 09:40

## 2020-01-01 RX ADMIN — IOPAMIDOL 100 ML: 612 INJECTION, SOLUTION INTRAVENOUS at 08:55

## 2020-01-01 RX ADMIN — PIPERACILLIN AND TAZOBACTAM 3.38 G: 3; .375 INJECTION, POWDER, LYOPHILIZED, FOR SOLUTION INTRAVENOUS at 08:18

## 2020-01-01 RX ADMIN — SODIUM CHLORIDE 5 MG/HR: 900 INJECTION, SOLUTION INTRAVENOUS at 16:29

## 2020-01-01 RX ADMIN — HYDROMORPHONE HYDROCHLORIDE 1 MG: 1 INJECTION, SOLUTION INTRAMUSCULAR; INTRAVENOUS; SUBCUTANEOUS at 15:42

## 2020-01-01 RX ADMIN — SUCCINYLCHOLINE CHLORIDE 100 MG: 20 INJECTION, SOLUTION INTRAMUSCULAR; INTRAVENOUS at 07:37

## 2020-01-01 RX ADMIN — LOSARTAN POTASSIUM 100 MG: 50 TABLET, FILM COATED ORAL at 08:15

## 2020-01-01 RX ADMIN — IOPAMIDOL 100 ML: 755 INJECTION, SOLUTION INTRAVENOUS at 13:38

## 2020-01-01 RX ADMIN — HYDROCODONE BITARTRATE AND ACETAMINOPHEN 1 TABLET: 5; 325 TABLET ORAL at 15:13

## 2020-01-01 RX ADMIN — Medication 20 ML: at 22:00

## 2020-01-01 RX ADMIN — POTASSIUM CHLORIDE 20 MEQ: 750 TABLET, FILM COATED, EXTENDED RELEASE ORAL at 08:35

## 2020-01-01 RX ADMIN — HEPARIN SODIUM 5000 UNITS: 1000 INJECTION, SOLUTION INTRAVENOUS; SUBCUTANEOUS at 08:37

## 2020-01-01 RX ADMIN — POTASSIUM CHLORIDE 40 MEQ: 750 TABLET, FILM COATED, EXTENDED RELEASE ORAL at 08:33

## 2020-01-01 RX ADMIN — HYDROCODONE BITARTRATE AND ACETAMINOPHEN 1 TABLET: 5; 325 TABLET ORAL at 01:16

## 2020-01-01 RX ADMIN — MONTELUKAST SODIUM 10 MG: 10 TABLET, FILM COATED ORAL at 08:33

## 2020-01-01 RX ADMIN — PROPOFOL 75 MCG/KG/MIN: 10 INJECTION, EMULSION INTRAVENOUS at 09:45

## 2020-01-01 RX ADMIN — POTASSIUM CHLORIDE 40 MEQ: 750 TABLET, FILM COATED, EXTENDED RELEASE ORAL at 09:40

## 2020-01-01 RX ADMIN — LOSARTAN POTASSIUM 100 MG: 50 TABLET, FILM COATED ORAL at 08:28

## 2020-01-01 RX ADMIN — CEFAZOLIN SODIUM 2 G: 10 INJECTION, POWDER, FOR SOLUTION INTRAVENOUS at 09:38

## 2020-01-01 RX ADMIN — SODIUM CHLORIDE, POTASSIUM CHLORIDE, SODIUM LACTATE AND CALCIUM CHLORIDE: 600; 310; 30; 20 INJECTION, SOLUTION INTRAVENOUS at 10:52

## 2020-01-01 RX ADMIN — ATORVASTATIN CALCIUM 40 MG: 40 TABLET, FILM COATED ORAL at 10:53

## 2020-01-01 RX ADMIN — PIPERACILLIN AND TAZOBACTAM 3.38 G: 3; .375 INJECTION, POWDER, LYOPHILIZED, FOR SOLUTION INTRAVENOUS at 18:37

## 2020-01-01 RX ADMIN — SODIUM CHLORIDE 200 MG: 9 INJECTION, SOLUTION INTRAVENOUS at 16:12

## 2020-01-01 RX ADMIN — SODIUM CHLORIDE 100 ML: 900 INJECTION, SOLUTION INTRAVENOUS at 17:49

## 2020-01-01 RX ADMIN — LIDOCAINE HYDROCHLORIDE 40 MG: 20 INJECTION, SOLUTION EPIDURAL; INFILTRATION; INTRACAUDAL; PERINEURAL at 07:37

## 2020-01-01 RX ADMIN — PROPOFOL 100 MG: 10 INJECTION, EMULSION INTRAVENOUS at 07:37

## 2020-01-01 RX ADMIN — ENOXAPARIN SODIUM 40 MG: 100 INJECTION SUBCUTANEOUS at 05:47

## 2020-01-01 RX ADMIN — FENTANYL CITRATE 25 MCG: 50 INJECTION INTRAMUSCULAR; INTRAVENOUS at 14:12

## 2020-01-01 RX ADMIN — PROPOFOL: 10 INJECTION, EMULSION INTRAVENOUS at 13:04

## 2020-01-01 RX ADMIN — HYDROMORPHONE HYDROCHLORIDE 1 MG: 1 INJECTION, SOLUTION INTRAMUSCULAR; INTRAVENOUS; SUBCUTANEOUS at 18:32

## 2020-01-01 RX ADMIN — FENTANYL CITRATE 25 MCG: 50 INJECTION, SOLUTION INTRAMUSCULAR; INTRAVENOUS at 07:37

## 2020-01-01 RX ADMIN — PIPERACILLIN AND TAZOBACTAM 3.38 G: 3; .375 INJECTION, POWDER, LYOPHILIZED, FOR SOLUTION INTRAVENOUS at 00:52

## 2020-01-01 RX ADMIN — ASPIRIN 81 MG 81 MG: 81 TABLET ORAL at 10:53

## 2020-01-01 RX ADMIN — SODIUM CHLORIDE, SODIUM LACTATE, POTASSIUM CHLORIDE, AND CALCIUM CHLORIDE 75 ML/HR: 600; 310; 30; 20 INJECTION, SOLUTION INTRAVENOUS at 11:30

## 2020-01-01 RX ADMIN — FUROSEMIDE 40 MG: 40 TABLET ORAL at 08:15

## 2020-01-01 RX ADMIN — FUROSEMIDE 40 MG: 40 TABLET ORAL at 10:53

## 2020-01-01 RX ADMIN — FUROSEMIDE 40 MG: 40 TABLET ORAL at 08:27

## 2020-01-01 RX ADMIN — LIDOCAINE HYDROCHLORIDE 40 MG: 20 INJECTION, SOLUTION EPIDURAL; INFILTRATION; INTRACAUDAL; PERINEURAL at 09:41

## 2020-01-01 RX ADMIN — Medication 40 MCG: at 09:41

## 2020-01-01 RX ADMIN — DEXAMETHASONE SODIUM PHOSPHATE 4 MG: 4 INJECTION, SOLUTION INTRAMUSCULAR; INTRAVENOUS at 12:19

## 2020-01-01 RX ADMIN — SODIUM CHLORIDE 200 MG: 9 INJECTION, SOLUTION INTRAVENOUS at 17:13

## 2020-01-01 RX ADMIN — HYDROMORPHONE HYDROCHLORIDE 1 MG: 1 INJECTION, SOLUTION INTRAMUSCULAR; INTRAVENOUS; SUBCUTANEOUS at 05:57

## 2020-01-01 RX ADMIN — PIPERACILLIN AND TAZOBACTAM 3.38 G: 3; .375 INJECTION, POWDER, LYOPHILIZED, FOR SOLUTION INTRAVENOUS at 01:13

## 2020-01-01 RX ADMIN — LORAZEPAM 0.5 MG: 0.5 TABLET ORAL at 00:15

## 2020-01-01 RX ADMIN — HYDROCODONE BITARTRATE AND ACETAMINOPHEN 1 TABLET: 5; 325 TABLET ORAL at 01:00

## 2020-01-01 RX ADMIN — ALBUTEROL SULFATE 2.5 MG: 2.5 SOLUTION RESPIRATORY (INHALATION) at 09:09

## 2020-01-01 RX ADMIN — Medication 10 ML: at 06:04

## 2020-01-01 RX ADMIN — PIPERACILLIN AND TAZOBACTAM 3.38 G: 3; .375 INJECTION, POWDER, LYOPHILIZED, FOR SOLUTION INTRAVENOUS at 02:07

## 2020-01-01 RX ADMIN — HYDROCODONE BITARTRATE AND ACETAMINOPHEN 1 TABLET: 5; 325 TABLET ORAL at 20:52

## 2020-01-01 RX ADMIN — ATENOLOL 50 MG: 25 TABLET ORAL at 08:35

## 2020-01-01 RX ADMIN — Medication 120 MCG: at 09:59

## 2020-01-01 RX ADMIN — LIDOCAINE HYDROCHLORIDE 10 ML: 10 INJECTION, SOLUTION EPIDURAL; INFILTRATION; INTRACAUDAL; PERINEURAL at 13:55

## 2020-01-01 RX ADMIN — FUROSEMIDE 40 MG: 40 TABLET ORAL at 08:33

## 2020-01-01 RX ADMIN — REMIFENTANIL HYDROCHLORIDE: 1 INJECTION, POWDER, LYOPHILIZED, FOR SOLUTION INTRAVENOUS at 11:34

## 2020-01-01 RX ADMIN — HYDROCODONE BITARTRATE AND ACETAMINOPHEN 1 TABLET: 5; 325 TABLET ORAL at 09:47

## 2020-01-01 RX ADMIN — MONTELUKAST SODIUM 10 MG: 10 TABLET, FILM COATED ORAL at 08:35

## 2020-01-01 RX ADMIN — FUROSEMIDE 40 MG: 10 INJECTION, SOLUTION INTRAMUSCULAR; INTRAVENOUS at 17:34

## 2020-01-01 RX ADMIN — POTASSIUM CHLORIDE 40 MEQ: 750 TABLET, FILM COATED, EXTENDED RELEASE ORAL at 10:52

## 2020-01-01 RX ADMIN — ALBUTEROL SULFATE 2.5 MG: 2.5 SOLUTION RESPIRATORY (INHALATION) at 21:05

## 2020-01-01 RX ADMIN — MANNITOL 25 G: 12.5 INJECTION, SOLUTION INTRAVENOUS at 09:55

## 2020-01-01 RX ADMIN — POTASSIUM CHLORIDE 20 MEQ: 750 TABLET, FILM COATED, EXTENDED RELEASE ORAL at 08:26

## 2020-01-01 RX ADMIN — PROPOFOL 30 MG: 10 INJECTION, EMULSION INTRAVENOUS at 12:36

## 2020-01-01 RX ADMIN — FENTANYL CITRATE 50 MCG: 50 INJECTION, SOLUTION INTRAMUSCULAR; INTRAVENOUS at 02:05

## 2020-01-01 RX ADMIN — HYDRALAZINE HYDROCHLORIDE 10 MG: 20 INJECTION INTRAMUSCULAR; INTRAVENOUS at 23:40

## 2020-01-01 RX ADMIN — POTASSIUM CHLORIDE AND SODIUM CHLORIDE: 900; 150 INJECTION, SOLUTION INTRAVENOUS at 01:08

## 2020-01-01 RX ADMIN — ASPIRIN 81 MG 81 MG: 81 TABLET ORAL at 09:40

## 2020-01-01 RX ADMIN — HYDROCODONE BITARTRATE AND ACETAMINOPHEN 1 TABLET: 5; 325 TABLET ORAL at 19:39

## 2020-01-01 RX ADMIN — Medication 10 ML: at 17:02

## 2020-01-01 RX ADMIN — LOSARTAN POTASSIUM 100 MG: 50 TABLET, FILM COATED ORAL at 08:35

## 2020-01-01 RX ADMIN — FENTANYL CITRATE 50 MCG: 50 INJECTION, SOLUTION INTRAMUSCULAR; INTRAVENOUS at 21:27

## 2020-01-01 RX ADMIN — IOPAMIDOL 80 ML: 755 INJECTION, SOLUTION INTRAVENOUS at 17:49

## 2020-01-01 RX ADMIN — Medication 10 ML: at 16:13

## 2020-01-01 RX ADMIN — FENTANYL CITRATE 50 MCG: 50 INJECTION, SOLUTION INTRAMUSCULAR; INTRAVENOUS at 18:39

## 2020-01-01 RX ADMIN — HYDROMORPHONE HYDROCHLORIDE 1 MG: 1 INJECTION, SOLUTION INTRAMUSCULAR; INTRAVENOUS; SUBCUTANEOUS at 10:58

## 2020-01-01 RX ADMIN — PHENYLEPHRINE HYDROCHLORIDE 40 MCG/MIN: 10 INJECTION INTRAVENOUS at 07:42

## 2020-01-01 RX ADMIN — HYDROCODONE BITARTRATE AND ACETAMINOPHEN 1 TABLET: 5; 325 TABLET ORAL at 09:41

## 2020-01-01 RX ADMIN — LEVETIRACETAM 500 MG: 100 INJECTION, SOLUTION INTRAVENOUS at 10:04

## 2020-01-01 RX ADMIN — HYDROMORPHONE HYDROCHLORIDE 1 MG: 1 INJECTION, SOLUTION INTRAMUSCULAR; INTRAVENOUS; SUBCUTANEOUS at 23:50

## 2020-01-01 RX ADMIN — PROPOFOL 30 MG: 10 INJECTION, EMULSION INTRAVENOUS at 09:51

## 2020-01-01 RX ADMIN — SODIUM CHLORIDE, SODIUM LACTATE, POTASSIUM CHLORIDE, AND CALCIUM CHLORIDE 75 ML/HR: 600; 310; 30; 20 INJECTION, SOLUTION INTRAVENOUS at 07:15

## 2020-01-01 RX ADMIN — DEXAMETHASONE SODIUM PHOSPHATE 4 MG: 4 INJECTION, SOLUTION INTRAMUSCULAR; INTRAVENOUS at 08:55

## 2020-01-01 RX ADMIN — ATENOLOL 50 MG: 25 TABLET ORAL at 08:26

## 2020-01-01 RX ADMIN — FENTANYL CITRATE 50 MCG: 50 INJECTION, SOLUTION INTRAMUSCULAR; INTRAVENOUS at 09:41

## 2020-01-01 RX ADMIN — Medication 10 ML: at 05:47

## 2020-01-01 RX ADMIN — PIPERACILLIN AND TAZOBACTAM 3.38 G: 3; .375 INJECTION, POWDER, LYOPHILIZED, FOR SOLUTION INTRAVENOUS at 19:02

## 2020-01-01 RX ADMIN — HYDROCODONE BITARTRATE AND ACETAMINOPHEN 1 TABLET: 5; 325 TABLET ORAL at 05:51

## 2020-01-01 RX ADMIN — PIPERACILLIN AND TAZOBACTAM 3.38 G: 3; .375 INJECTION, POWDER, LYOPHILIZED, FOR SOLUTION INTRAVENOUS at 08:29

## 2020-01-01 RX ADMIN — Medication 40 MCG: at 10:13

## 2020-01-01 RX ADMIN — ATENOLOL 50 MG: 25 TABLET ORAL at 09:40

## 2020-01-01 RX ADMIN — SODIUM CHLORIDE 500 MG: 900 INJECTION, SOLUTION INTRAVENOUS at 20:51

## 2020-01-01 RX ADMIN — ENOXAPARIN SODIUM 40 MG: 100 INJECTION SUBCUTANEOUS at 16:14

## 2020-01-01 RX ADMIN — FUROSEMIDE 40 MG: 40 TABLET ORAL at 08:35

## 2020-01-01 RX ADMIN — LABETALOL HYDROCHLORIDE 10 MG: 5 INJECTION INTRAVENOUS at 14:50

## 2020-01-01 RX ADMIN — PIPERACILLIN AND TAZOBACTAM 3.38 G: 3; .375 INJECTION, POWDER, LYOPHILIZED, FOR SOLUTION INTRAVENOUS at 10:52

## 2020-01-01 RX ADMIN — SODIUM CHLORIDE, POTASSIUM CHLORIDE, SODIUM LACTATE AND CALCIUM CHLORIDE 25 ML/HR: 600; 310; 30; 20 INJECTION, SOLUTION INTRAVENOUS at 07:27

## 2020-01-01 RX ADMIN — HYDROCODONE BITARTRATE AND ACETAMINOPHEN 1 TABLET: 5; 325 TABLET ORAL at 21:11

## 2020-01-01 RX ADMIN — NEOSTIGMINE METHYLSULFATE 2 MG: 1 INJECTION, SOLUTION INTRAVENOUS at 09:43

## 2020-01-01 RX ADMIN — HYDROCODONE BITARTRATE AND ACETAMINOPHEN 1 TABLET: 5; 325 TABLET ORAL at 21:06

## 2020-01-01 RX ADMIN — Medication 10 ML: at 13:38

## 2020-01-01 RX ADMIN — FUROSEMIDE 10 MG: 10 INJECTION, SOLUTION INTRAMUSCULAR; INTRAVENOUS at 10:45

## 2020-01-01 RX ADMIN — ATORVASTATIN CALCIUM 40 MG: 40 TABLET, FILM COATED ORAL at 08:33

## 2020-01-01 RX ADMIN — CEFAZOLIN SODIUM 2 G: 10 INJECTION, POWDER, FOR SOLUTION INTRAVENOUS at 19:01

## 2020-01-01 RX ADMIN — FUROSEMIDE 40 MG: 40 TABLET ORAL at 08:28

## 2020-01-01 RX ADMIN — PIPERACILLIN AND TAZOBACTAM 3.38 G: 3; .375 INJECTION, POWDER, LYOPHILIZED, FOR SOLUTION INTRAVENOUS at 09:34

## 2020-01-01 RX ADMIN — GADOTERATE MEGLUMINE 8 ML: 376.9 INJECTION INTRAVENOUS at 14:49

## 2020-01-01 RX ADMIN — SODIUM CHLORIDE 25 ML/HR: 900 INJECTION, SOLUTION INTRAVENOUS at 16:12

## 2020-01-01 RX ADMIN — DEXAMETHASONE SODIUM PHOSPHATE 10 MG: 4 INJECTION, SOLUTION INTRAMUSCULAR; INTRAVENOUS at 14:47

## 2020-01-01 RX ADMIN — PROPOFOL 70 MG: 10 INJECTION, EMULSION INTRAVENOUS at 09:41

## 2020-01-01 RX ADMIN — FENTANYL CITRATE 50 MCG: 50 INJECTION, SOLUTION INTRAMUSCULAR; INTRAVENOUS at 09:24

## 2020-01-01 RX ADMIN — HYDROCODONE BITARTRATE AND ACETAMINOPHEN 1 TABLET: 5; 325 TABLET ORAL at 00:27

## 2020-01-01 RX ADMIN — SUCCINYLCHOLINE CHLORIDE 25 MG: 20 INJECTION, SOLUTION INTRAMUSCULAR; INTRAVENOUS at 07:56

## 2020-01-10 NOTE — PROGRESS NOTES
1/10/2020    Called patient and confirmed x2 identifiers   Patient stated vascular would be keeping her inpatient for monitoring for a few days  Informed patient appointment with MD Chance Bustos can be adjusted   Offered appointment 1/21/2020 2PM   Patient accepted appointment date and time; request pre-med for PET scans     Will notify MD and NP     No new questions or concerns at this time

## 2020-01-14 PROBLEM — I82.210 SUPERIOR VENA CAVA THROMBOSIS (HCC): Status: ACTIVE | Noted: 2020-01-01

## 2020-01-14 NOTE — PROGRESS NOTES
Problem: Falls - Risk of 
Goal: *Absence of Falls Description Document Bishop Tena Fall Risk and appropriate interventions in the flowsheet. Outcome: Progressing Towards Goal 
Note: Fall Risk Interventions: 
  
 
  
 
  
 
  
 
  
 
 
  
Problem: Patient Education: Go to Patient Education Activity Goal: Patient/Family Education Outcome: Progressing Towards Goal 
  
Problem: Surgical Pathway Day of Surgery Goal: Off Pathway (Use only if patient is Off Pathway) Outcome: Progressing Towards Goal 
Goal: Activity/Safety Outcome: Progressing Towards Goal 
Goal: Consults, if ordered Outcome: Progressing Towards Goal 
Goal: Nutrition/Diet Outcome: Progressing Towards Goal 
Goal: Medications Outcome: Progressing Towards Goal 
Goal: Respiratory Outcome: Progressing Towards Goal 
Goal: Treatments/Interventions/Procedures Outcome: Progressing Towards Goal 
Goal: Psychosocial 
Outcome: Progressing Towards Goal 
Goal: *No signs and symptoms of infection or wound complications Outcome: Progressing Towards Goal 
Goal: *Optimal pain control at patient's stated goal 
Outcome: Progressing Towards Goal 
Goal: *Adequate urinary output (equal to or greater than 30 milliliters/hour) Description Ambulatory Surgery patients voiding without difficulty. Outcome: Progressing Towards Goal 
Goal: *Hemodynamically stable Outcome: Progressing Towards Goal 
Goal: *Tolerating diet Outcome: Progressing Towards Goal 
Goal: *Demonstrates progressive activity Outcome: Progressing Towards Goal 
  
Problem: Surgical Pathway Post-Op Day 1 Goal: Off Pathway (Use only if patient is Off Pathway) Outcome: Progressing Towards Goal 
Goal: Activity/Safety Outcome: Progressing Towards Goal 
Goal: Diagnostic Test/Procedures Outcome: Progressing Towards Goal 
Goal: Nutrition/Diet Outcome: Progressing Towards Goal 
Goal: Discharge Planning Outcome: Progressing Towards Goal 
Goal: Medications Outcome: Progressing Towards Goal 
 Goal: Respiratory Outcome: Progressing Towards Goal 
Goal: Treatments/Interventions/Procedures Outcome: Progressing Towards Goal 
Goal: Psychosocial 
Outcome: Progressing Towards Goal 
Goal: *No signs and symptoms of infection or wound complications Outcome: Progressing Towards Goal 
Goal: *Optimal pain control at patient's stated goal 
Outcome: Progressing Towards Goal 
Goal: *Adequate urinary output (equal to or greater than 30 milliliters/hour) Outcome: Progressing Towards Goal 
Goal: *Hemodynamically stable Outcome: Progressing Towards Goal 
Goal: *Tolerating diet Outcome: Progressing Towards Goal 
Goal: *Demonstrates progressive activity Outcome: Progressing Towards Goal 
Goal: *Lungs clear or at baseline Outcome: Progressing Towards Goal 
  
Problem: Surgical Pathway Post-Op Day 2 through Discharge Goal: Off Pathway (Use only if patient is Off Pathway) Outcome: Progressing Towards Goal 
Goal: Activity/Safety Outcome: Progressing Towards Goal 
Goal: Nutrition/Diet Outcome: Progressing Towards Goal 
Goal: Discharge Planning Outcome: Progressing Towards Goal 
Goal: Medications Outcome: Progressing Towards Goal 
Goal: Respiratory Outcome: Progressing Towards Goal 
Goal: Treatments/Interventions/Procedures Outcome: Progressing Towards Goal 
Goal: Psychosocial 
Outcome: Progressing Towards Goal 
Goal: *No signs and symptoms of infection or wound complications Outcome: Progressing Towards Goal 
Goal: *Optimal pain control at patient's stated goal 
Outcome: Progressing Towards Goal 
Goal: *Adequate urinary output (equal to or greater than 30 milliliters/hour) Outcome: Progressing Towards Goal 
Goal: *Hemodynamically stable Outcome: Progressing Towards Goal 
Goal: *Tolerating diet Outcome: Progressing Towards Goal 
Goal: *Demonstrates progressive activity Outcome: Progressing Towards Goal 
Goal: *Lungs clear or at baseline Outcome: Progressing Towards Goal 
  
 Problem: Surgical Pathway: Discharge Outcomes Goal: *Hemodynamically stable Outcome: Progressing Towards Goal 
Goal: *Lungs clear or at baseline Outcome: Progressing Towards Goal 
Goal: *Demonstrates independent activity or return to baseline Outcome: Progressing Towards Goal 
Goal: *Optimal pain control at patient's stated goal 
Outcome: Progressing Towards Goal 
Goal: *Verbalizes understanding and describes prescribed diet Outcome: Progressing Towards Goal 
Goal: *Tolerating diet Outcome: Progressing Towards Goal 
Goal: *Verbalizes name, dosage, time, side effects, and number of days to continue medications Outcome: Progressing Towards Goal 
Goal: *No signs and symptoms of infection or wound complications Outcome: Progressing Towards Goal 
Goal: *Anxiety reduced or absent Outcome: Progressing Towards Goal 
Goal: *Understands and describes signs and symptoms to report to providers(Stroke Metric) Outcome: Progressing Towards Goal 
Goal: *Describes follow-up/return visits to physicians Outcome: Progressing Towards Goal 
Goal: *Describes available resources and support systems Outcome: Progressing Towards Goal

## 2020-01-14 NOTE — PROGRESS NOTES
TRANSFER - IN REPORT: 
 
Verbal report received from Mayank(name) on Ema Filler  being received from [facility]) for routine post - op Report consisted of patients Situation, Background, Assessment and  
Recommendations(SBAR). Information from the following report(s) SBAR, Procedure Summary, Intake/Output and MAR was reviewed with the receiving nurse. Opportunity for questions and clarification was provided. Assessment completed upon patients arrival to unit and care assumed.

## 2020-01-14 NOTE — PERIOP NOTES
TRANSFER - OUT REPORT: 
 
Verbal report given to JULIANO Luna(name) on Danisha Gomez  being transferred to (unit) for routine post - op Report consisted of patients Situation, Background, Assessment and  
Recommendations(SBAR). Time Pre op antibiotic [facility]:6303 Anesthesia Stop time: 1008 [place] Present on Transfer to floor:yes Order for De La Cruz on Chart:yes Discharge Prescriptions with Chart:no Information from the following report(s) SBAR, Kardex, OR Summary, Procedure Summary, Intake/Output, MAR, Accordion, Recent Results and Cardiac Rhythm NSR was reviewed with the receiving nurse. Opportunity for questions and clarification was provided. Is the patient on 02? NO Is the patient on a monitor? YES Is the nurse transporting with the patient? YES Surgical Waiting Area notified of patient's transfer from PACU? YES The following personal items collected during your admission accompanied patient upon transfer:  
Dental Appliance: Dental Appliances: Lowers, Uppers Vision: Visual Aid: Glasses Hearing Aid:   
Jewelry: Jewelry: None Clothing: Clothing: Footwear, Jacket/Coat, Pants, Socks, Undergarments(sent to [employer]) Other Valuables: Other Valuables: None Valuables sent to safe:

## 2020-01-14 NOTE — ROUTINE PROCESS
Patient: Birder Soulier MRN: [id number]  SSN: [id number]   YOB: 1945  Age: 69 y.o. Sex: female     Patient is status post Procedure(s):  SUPERIOR VENA CAVA CLOTTRIEVER MECHANICAL THROMBECTOMY.     Surgeon(s) and Role:     Shannan Cummins MD - Primary    Local/Dose/Irrigation:  NONE                  Peripheral IV 01/14/20 Left Arm (Active)       Peripheral IV 01/14/20 Right Arm (Active)            Airway - Endotracheal Tube 01/14/20 Oral (Active)                   Dressing/Packing:  Wound Neck Left Percutaneous site Left neck-Dressing Type: 4 x 4;Special tape (comment)(MEDIPORE TAPE) (01/14/20 7027)    Splint/Cast:  ]    Other:

## 2020-01-14 NOTE — ANESTHESIA PREPROCEDURE EVALUATION
Relevant Problems   No relevant active problems       Anesthetic History   No history of anesthetic complications            Review of Systems / Medical History  Patient summary reviewed, nursing notes reviewed and pertinent labs reviewed    Pulmonary    COPD               Neuro/Psych   Within defined limits           Cardiovascular    Hypertension          CAD    Exercise tolerance: >4 METS  Comments: SVC syndrome/ SVC thrombus   GI/Hepatic/Renal                Endo/Other        Cancer (Lung)     Other Findings              Physical Exam    Airway  Mallampati: I  TM Distance: 4 - 6 cm  Neck ROM: normal range of motion   Mouth opening: Normal     Cardiovascular    Rhythm: regular  Rate: normal         Dental    Dentition: Full lower dentures and Full upper dentures     Pulmonary            Prolonged expiration     Abdominal  GI exam deferred       Other Findings            Anesthetic Plan    ASA: 3  Anesthesia type: general    Monitoring Plan: Arterial line      Induction: Intravenous  Anesthetic plan and risks discussed with: Patient

## 2020-01-14 NOTE — ANESTHESIA PROCEDURE NOTES
Arterial Line Placement Start time: 1/14/2020 7:01 AM 
End time: 1/14/2020 7:08 AM 
Performed by: Darryl Carpenter MD 
Authorized by: Darryl Carpenter MD  
 
Pre-Procedure Indications:  Arterial pressure monitoring Preanesthetic Checklist: patient identified, risks and benefits discussed, site marked, patient being monitored, timeout performed and patient being monitored Procedure:  
Prep:  Chlorhexidine Seldinger Technique?: Yes Orientation:  Left Location:  Radial artery Catheter size:  20 G Number of attempts:  1 Cont Cardiac Output Sensor: No   
 
Assessment:  
Post-procedure:  Line secured and sterile dressing applied Patient Tolerance:  Patient tolerated the procedure well with no immediate complications

## 2020-01-14 NOTE — PERIOP NOTES
Patient's family called and updated about start of procedure and progress at [address] [place] [address] by JAIR Lord.

## 2020-01-14 NOTE — ANESTHESIA POSTPROCEDURE EVALUATION
Post-Anesthesia Evaluation and Assessment Patient: Basilia Landaverde MRN: [id number]  SSN: [id number] YOB: 1945  Age: 69 y.o. Sex: female I have evaluated the patient and they are stable and ready for discharge from the PACU. Cardiovascular Function/Vital Signs Visit Vitals /60 Pulse 80 Temp 36.7 °C (98.1 °F) Resp 15 Ht 5' 2\" (1.575 m) Wt 34.9 kg (77 lb) SpO2 98% BMI 14.08 kg/m² Patient is status post General anesthesia for Procedure(s): 
SUPERIOR VENA CAVA CLOTTRIEVER MECHANICAL THROMBECTOMY. Nausea/Vomiting: None Postoperative hydration reviewed and adequate. Pain: 
Pain Scale 1: Numeric (0 - 10) (01/14/20 1057) Pain Intensity 1: 5 (01/14/20 1057) Managed Neurological Status:  
Neuro (WDL): Within Defined Limits (01/14/20 1010) Neuro Neurologic State: Anesthetized (01/14/20 1010) Orientation Level: Oriented X4 (01/14/20 1010) Cognition: Follows commands (01/14/20 1010) Speech: Clear (01/14/20 1010) LUE Motor Response: Purposeful (01/14/20 1010) LLE Motor Response: Purposeful (01/14/20 1010) RUE Motor Response: Purposeful (01/14/20 1010) RLE Motor Response: Purposeful (01/14/20 1010) At baseline Mental Status, Level of Consciousness: Alert and  oriented to person, place, and time Pulmonary Status:  
O2 Device: Nasal cannula (01/14/20 1010) Adequate oxygenation and airway patent Complications related to anesthesia: None Post-anesthesia assessment completed. No concerns Signed By: Von Cortez MD   
 January 14, 2020 Procedure(s): 
SUPERIOR VENA CAVA CLOTTRIEVER MECHANICAL THROMBECTOMY. general 
 
<BSHSIANPOST> Vitals Value Taken Time /59 1/14/2020 11:00 AM  
Temp 36.7 °C (98.1 °F) 1/14/2020 10:10 AM  
Pulse 76 1/14/2020 11:03 AM  
Resp 15 1/14/2020 11:03 AM  
SpO2 98 % 1/14/2020 11:03 AM  
Vitals shown include unvalidated device data.

## 2020-01-14 NOTE — OP NOTES
[address]  
OPERATIVE REPORT Name:  Natasha Wilson 
MR#:  [id number] :  1945 ACCOUNT #:  [de-identified] DATE OF SERVICE:  2020 PREOPERATIVE DIAGNOSIS:  Superior vena cava thrombus/occlusion. POSTOPERATIVE DIAGNOSIS:  Superior vena cava thrombus/occlusion. PROCEDURES PERFORMED: 
1. Placement of catheter in left internal jugular vein. 2.  Superior vena cava venogram. 
3.  Mechanical thrombectomy of superior vena cava thrombus. SURGEON:  Sully Torrez MD 
 
ASSISTANT:  Percy Cooley 
 
ANESTHESIA:  General. 
 
COMPLICATIONS:  None. SPECIMENS REMOVED:  Thrombus. IMPLANTS:  None. ESTIMATED BLOOD LOSS:  200 mL. INDICATIONS FOR PROCEDURE:  The patient is a 78-year-old female with newly diagnosed lung malignancy. She is noted to have superior vena cava thrombotic occlusion, causing an SVC syndrome with at least 2 months of significant headache, fullness, and other symptoms. She presents for mechanical thrombectomy of this lesion. Risks and benefits of the procedure were discussed preoperatively to the patient. She voiced understanding and wished to proceed. PROCEDURE:  The patient was placed supine on the operating room table. General anesthesia was established. The back and both groins were prepped and draped in a standard surgical fashion. Using real-time ultrasound guidance, the left internal jugular vein was identified and accessed with the micropuncture system exchanged for 5-Zambian sheath. At this point, a venogram was performed, showing the area of the superior vena cava thrombus going into the innominate vein. The tip of the sheath was pulled back a little bit. There appeared to be a budding and collateral, and a combination of Berenstein catheter and Glidewire was used to traverse the thrombotic occlusion down into the inferior vena cava.   After this was completed, the wire was exchanged for a Super Stiff Amplatz wire that was placed in the right iliac vein. After this, series of dilators were used, starting with a 12 and then a 16, and then after this, a 13-Syriac Inari ClotTriever sheath was placed. The patient was infused with 5000 units of heparin. Following this, the final portion of the sheath was deployed. The ClotTriever catheter was then passed over the wire to just past the clot and then the core portion of this device was then deployed beyond the clot. Multiple passes were made, a total of 5 passes. On the first 2-3 passes, a large amount of thrombus and a chronic perithrombus were removed. This was all sent as specimen. On the last 2 passes, minimal thrombus if any was removed. Venograms were obtained throughout the procedure, which kept showing improvement of the occlusion until there was very little if any left. On the last image, there appears to be an artifact right in the area near the injection and the second phase of it looks like the whole superior vena cava is clear. At this point, the sheath, wires, and system were removed. The manual pressure was held in the neck and figure-of-eight 0 silk suture was placed in this area as well. Pressure dressing was applied. The patient is now in the process of waking up and being transferred to the recovery room. Shanelle Hernandez MD [id number] 
D:  01/14/2020 9:58 T:  01/14/2020 17:03 
JOB #:  [id number] CC:  MD Ronny Ramirez DO

## 2020-01-15 NOTE — PROGRESS NOTES
Problem: Falls - Risk of 
Goal: *Absence of Falls Description Document Clide Failing Fall Risk and appropriate interventions in the flowsheet. Outcome: Progressing Towards Goal 
Note: Fall Risk Interventions: 
  
 
  
 
Medication Interventions: Patient to call before getting OOB Elimination Interventions: Patient to call for help with toileting needs Problem: Patient Education: Go to Patient Education Activity Goal: Patient/Family Education Outcome: Progressing Towards Goal 
  
Problem: Surgical Pathway Day of Surgery Goal: Activity/Safety Outcome: Progressing Towards Goal 
Goal: Psychosocial 
Outcome: Progressing Towards Goal 
Goal: *No signs and symptoms of infection or wound complications Outcome: Progressing Towards Goal 
Goal: *Adequate urinary output (equal to or greater than 30 milliliters/hour) Description Ambulatory Surgery patients voiding without difficulty. Outcome: Progressing Towards Goal 
  
Problem: Pressure Injury - Risk of 
Goal: *Prevention of pressure injury Description Document Bright Scale and appropriate interventions in the flowsheet. Outcome: Progressing Towards Goal 
Note: Pressure Injury Interventions: Activity Interventions: Pressure redistribution bed/mattress(bed type) Mobility Interventions: Pressure redistribution bed/mattress (bed type) Nutrition Interventions: Document food/fluid/supplement intake Problem: Patient Education: Go to Patient Education Activity Goal: Patient/Family Education Outcome: Progressing Towards Goal

## 2020-01-15 NOTE — PROGRESS NOTES
Vascular Surgery 
--reports some mild swelling but otherwise no complaints Patient Vitals for the past 12 hrs: 
 Temp Pulse Resp BP SpO2  
01/15/20 0911     93 % 01/15/20 0812 98.2 °F (36.8 °C) (!) 110 18 109/55 94 % 01/15/20 0435 98.2 °F (36.8 °C) 95 19 128/72 92 % 01/14/20 2314 98.6 °F (37 °C) 99 18 112/51 91 % Neck is flat Labs ok Plan: 
--d/c to home on her usual meds;  
--f/u with me in 1 week

## 2020-01-15 NOTE — PROGRESS NOTES
Bedside shift change report given to [address] [place] [address] (oncoming nurse) by Elvira Roque (offgoing nurse). Report included the following information SBAR, Kardex, Intake/Output, MAR and Cardiac Rhythm NSR.

## 2020-01-21 NOTE — PROGRESS NOTES
This note will not be viewable in [address] Oncology Navigator Psychosocial Assessment Reason for Assessment:   
[]Depression  []Anxiety  []Caregiver [place]  []Maladaptive Coping with Serious Illness   [] Social Work Referral [x] Initial Assessment  [] Other Sources of Information:   
[x]Patient  [x]Family  []Staff  []Medical Record Advance Care Planning: 
Advance Care Planning 1/14/2020 Confirm Advance Directive None Patient does not have an advanced medical directive, and did not express interest in completing one today. Mental Status:   
[x]Alert  []Lethargic  []Unresponsive   [] Unable to assess Oriented to:  [x]Person  [x]Place  [x]Time  [x]Situation Barriers to Learning:   
[]Language  []Developmental  []Cognitive  []Altered Mental Status  []Visual/Hearing Impairment  []Unable to Read/Write  []Motivational   [x]No Barriers Identified  []Other: 
 
Relationship Status: 
[]Single  []  []Significant Other/Life Partner  [x]  []  [] Living Circumstances: 
[]Lives Alone  [x]Family/Significant Other in Household  []Roommates  []Children in the Home  []Paid Caregivers  []Assisted Living Facility/Group [address]  []Homeless  []Incarcerated  []Environmental/Care Concerns  []other: 
 
Employment Status: 
[]Employed Full-time []Employed Part-time []Homemaker [] Disabled  [x] Retired []Other: 
 
Support System:   
[x]Strong  []Fair  []Limited Financial/Legal Concerns:   
[]Uninsured  []Limited Income/Resources  []Non-Citizen  [x]No Concerns Identified  []Financial POA:   
[]Other: 
 
Faith/Spiritual/Existential: 
[]Strong Sense of Spirituality  []Involved in [facility] []Request  Visit  []Expressing Spiritual/Existential Angst  [x]No Concerns Identified Coping with Illness:       
 Patient: Family/Caregiver:  
Understanding and Acceptance of Illness/Prognosis  [x] [x] Strong Sense of Resilience [x] [x] Self Reflection [x] [x] Engaged Support System [x] [x] Does not Readily Discuss Illness [] [] Denial of Terminal Status [] [] Anger [] [] Depression [] [] Anxiety/Fear [] []  
Bargaining [] [] Recent Diagnosis/Prognosis [] [] Difficulties with Body Image [] [] Loss of Identity [] [] Excessive Substance Use [] [] Mental Health History [] []  
Enmeshed Relationships [] [] History of Loss [x] [] Anticipatory Grief [] [] Concern for Complicated Grief [] [] Suicidal Ideation or Plan [] [] Unable to assess [] []  
        
Narrative: Met with the patient during her initial office visit today, along with her son Eron Bass and daughter-in-law Eleni Moffett. The patient is being seen for NSCLC. The patient lives with her son and daughter-in-law. She is retired from a career in . She retired 9 years ago. The patient is  and has a son Eron Bass and daughter Kristen Rocha. The patient has a PCP - Jimmy Phalen, NP. Provided this 's contact information as well as information regarding the complementary services provided by the [facility]. Provided information regarding the [facility], as the patient lives in [place].   
 
Assessment/Action:  
1. Introduced self and role of this  in the [address] [place] [address] Dr. 2.  Informed the patient of the [facility] and available resources there. 3.  Continue to meet with the patient when she returns to the clinic for ongoing assessment of the patients adjustment to her diagnosis and treatment. 4.  Ongoing psychosocial support as desired by patient. Plan/Referral:  
Complementary therapies referral 
Chen Meehan LCSW

## 2020-01-21 NOTE — PROGRESS NOTES
[facility] [address] [place], [id number] [place] [address] Road, [address] [place] [address] Nahed W: [phone]  F: [phone] Reason for Visit:  
Yifan Gonzalez is a 69 y.o. female who is seen in consultation at the request of Dr. Zari Alba for evaluation of NSCLC Treatment History:  
· 12/15/2019-CTA showed right upper lobe lung nodule measuring 2.1 x 1.4 cm, occluded SVC with adjustment mediastinal soft tissue mass. The whole thing measuring approximately 2.3 to 1.8 cm. · 2020-EBUS and biopsy of mediastinal mass showed poorly differentiated adenocarcinoma · 2020-PET scan showed right upper lobe lung nodule with an SUV of 20, right mediastinal adenopathy with SUV of 16, right retrocaval clavicular subcentimeter focus with an SUV of 3.2 History of Present Illness:  
Patient is a 69 y.o. female with PMH as below who is seen for NSCLC She has a h/o smoking and COPD. Se presented in [facility] Dec 2019 with c/o increasing swelling of face , neck and arms along with SOB. Her problems started with a choking sensation and head tightness in 2019. She initially saw ENT and was to see Pulmonary. However she presented to the Interim. She had imaging as above that showed SVC thrombus and mediastinal mass. Subsequently had a biopsy and diagnosis as above. Was placed on Lovenox and had a mechanical thrombectomy of the SVC thrombus  on 2020 She states that she has improved arm and neck swelling. She still has SOB with walking a few 10 feet. She has had no hemoptysis. She has had some some chest tightness as well, She has also noted pain along her R lateral neck going to her back, she also had some epistaxis. She is dizzy sometimes, she is weak, has lost weight. She has some raynauds, she has no neuropathy, she cant hear from her left ear at all. She is a current smoker Brother and father  of lung cancer Past Medical History:  
Diagnosis Date  CAD (coronary artery disease)  Chronic obstructive pulmonary disease ([address]  Hypertension Past Surgical History:  
Procedure Laterality Date  HX ABDOMINAL LAPAROSCOPY    
 lots of intenstine removed/ lysis of adhesions  HX CAROTID STENT    HX HYSTERECTOMY  HX TONSIL AND ADENOIDECTOMY  IR BRONCH W LUNG BIOPSY SINGLE LOBE  2019 Social History Tobacco Use  Smoking status: Former Smoker Packs/day: 0.50 Last attempt to quit: 2019 Years since quittin.1  Smokeless tobacco: Never Used Substance Use Topics  Alcohol use: Not Currently Comment: occ Current Outpatient Medications Medication Sig  
 enoxaparin (LOVENOX) 40 mg/0.4 mL 40 mg by SubCUTAneous route two (2) times a day.  acetaminophen (TYLENOL EXTRA STRENGTH) 500 mg tablet Take 1,000 mg by mouth every six (6) hours as needed for Pain.  LORazepam (ATIVAN) 0.5 mg tablet Take 1 Tab by mouth See Admin Instructions. Take 30-60 minutes prior to PET  albuterol sulfate (PROVENTIL;VENTOLIN) 2.5 mg/0.5 mL nebu nebulizer solution 0.5 mL by Nebulization route every four (4) hours as needed for Wheezing or Shortness of Breath.  furosemide (LASIX) 40 mg tablet Take 1 Tab by mouth daily.  potassium chloride SR (K-TAB) 20 mEq tablet Take 1 Tab by mouth daily.  fluticasone propionate (FLONASE ALLERGY RELIEF NA) by Nasal route.  hydrOXYzine HCl (ATARAX) 10 mg tablet Take 10 mg by mouth three (3) times daily as needed for Itching.  montelukast (SINGULAIR) 10 mg tablet Take 10 mg by mouth daily.  albuterol (PROVENTIL HFA, VENTOLIN HFA, PROAIR HFA) 90 mcg/actuation inhaler Take 2 Puffs by inhalation.  atenolol (TENORMIN) 50 mg tablet Take 50 mg by mouth daily.  losartan (COZAAR) 100 mg tablet Take 100 mg by mouth daily.  budesonide (ENTOCORT EC) 3 mg capsule Take 6 mg by mouth every morning. No current facility-administered medications for this visit. Allergies Allergen Reactions  Codeine Itching Review of Systems: A complete review of systems was obtained, negative except as described above. Physical Exam:  
 
Visit Vitals /89 (BP 1 Location: Left arm) Pulse 74 Temp 98 °F (36.7 °C) Ht 5' 2\" (1.575 m) Wt 82 lb 8 oz (37.4 kg) SpO2 98% BMI 15.09 kg/m² ECOG PS: 1 General: No distress Eyes: PERRLA, anicteric sclerae HENT: Atraumatic, OP clear Neck: Supple, no edema Lymphatic: No cervical, supraclavicular, or inguinal adenopathy Respiratory: normal respiratory effort CV: Normal rate, r no peripheral edema GI: Soft, nontender, nondistended MS: Normal gait and station. Digits without clubbing or cyanosis. Skin: No rashes, ecchymoses, or petechiae. Normal temperature, turgor, and texture. Psych: Alert, oriented, appropriate affect, normal judgment/insight Results:  
 
Lab Results Component Value Date/Time WBC 11.3 (H) 01/15/2020 02:15 AM  
 HGB 10.8 (L) 01/15/2020 02:15 AM  
 HCT 34.1 (L) 01/15/2020 02:15 AM  
 PLATELET 690 99/88/0133 02:15 AM  
 MCV 98.6 01/15/2020 02:15 AM  
 ABS. NEUTROPHILS 8.1 (H) 01/15/2020 02:15 AM  
 
Lab Results Component Value Date/Time Sodium 136 01/15/2020 02:15 AM  
 Potassium 3.9 01/15/2020 02:15 AM  
 Chloride 105 01/15/2020 02:15 AM  
 CO2 27 01/15/2020 02:15 AM  
 Glucose 100 01/15/2020 02:15 AM  
 BUN 18 01/15/2020 02:15 AM  
 Creatinine 0.83 01/15/2020 02:15 AM  
 GFR est AA >60 01/15/2020 02:15 AM  
 GFR est non-AA >60 01/15/2020 02:15 AM  
 Calcium 8.5 01/15/2020 02:15 AM  
 
Lab Results Component Value Date/Time Bilirubin, total 0.4 12/14/2019 10:29 PM  
 ALT (SGPT) 29 12/14/2019 10:29 PM  
 AST (SGOT) 28 12/14/2019 10:29 PM  
 Alk. phosphatase 88 12/14/2019 10:29 PM  
 Protein, total 6.8 12/14/2019 10:29 PM  
 Albumin 3.4 (L) 12/14/2019 10:29 PM  
 Globulin 3.4 12/14/2019 10:29 PM  
 
 
Records reviewed and summarized above. Pathology report(s) reviewed above. Radiology report(s) reviewed above. PET CT 
IMPRESSION IMPRESSION:  
1. RUL malignancy. 2. Mediastinal griffin metastasis. 3. Likely subcentimeter right retroclavicular griffin metastasis. Assessment:  
1) NSCLC- Right R lung mass, mediastinal adenopathy, SVC tumor thrombus and a possible R retroclavicular node 
T1N3M0-Stage IIIC Poorly differentiated adenocarcinoma Molecular studies pending Scans and pathology reviewed in thoracic tumor board Tumor is unresectable In general the 5 year OS for stage IIIC NSCLC is 13-20% Chemoradiation compared to radiation alone improves survival. Outcomes may have improved with consolidation/ adjuvant immunotherapy thereafter based on results of the pacific trial. 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 Annabella Heaton next week, I think she could be switched to Eliquis 5 mg BID and continue this indefinitely 3) COPD 4) CAD 5) R neck pain Unclear etiology No clear lesions on the scan that explain this 6) Psychosocial  
Lives in [facility] [facility] with son and daughter SW consulted Plan: · Will order MRI brain next visit · Await molecular studies · Norco prn pain · Refer to Rad Onc · RTC 1 week I appreciate the opportunity to participate in Ms. Rosa Adame's care.  
 
Signed By: Adriana Smith MD

## 2020-01-21 NOTE — PROGRESS NOTES
Ema Chamberlain is a 69 y.o. female Chief Complaint Patient presents with  New Patient 1. Have you been to the ER, urgent care clinic since your last visit? Hospitalized since your last visit? No 
2. Have you seen or consulted any other health care providers outside of the [address] [place] [address] since your last visit? Include any pap smears or colon screening.   No

## 2020-01-27 PROBLEM — C34.90 LUNG CANCER (HCC): Status: ACTIVE | Noted: 2020-01-01

## 2020-01-27 NOTE — ONCOLOGY PATHWAY NOTE
START ON PATHWAY REGIMEN - Non-Small Cell Lung [id number] Paclitaxel (Taxol(R)) Carboplatin (Paraplatin(R)) **Always confirm dose/schedule in your pharmacy ordering system** Patient Characteristics: 
Stage III - Unresectable, PS = 0, 1 AJCC T Category: TX 
Current Disease Status: No Distant Mets or Local Recurrence AJCC N Category: NX 
AJCC M Category: M0 AJCC 8 Stage Grouping: IIIC Performance Status: PS = 0, 1 Intent of Therapy: 
Curative Intent, Not Discussed with Patient

## 2020-01-31 PROBLEM — R06.09 EXERTIONAL DYSPNEA: Status: ACTIVE | Noted: 2020-01-01

## 2020-01-31 NOTE — CONSULTS
[facility] 
[address], [address] W: [phone]  F: [phone] Reason for Consult:  
Zakiya Garner is a 69 y.o. female who is seen in consultation at the request of Dr. Senait Chand for evaluation of shortness of breath in setting of NSCLC. Treatment History:  
· 12/15/2019-CTA showed right upper lobe lung nodule measuring 2.1 x 1.4 cm, occluded SVC with adjustment mediastinal soft tissue mass. The whole thing measuring approximately 2.3 to 1.8 cm. · 12/24/2020-EBUS and biopsy of mediastinal mass showed poorly differentiated adenocarcinoma · 1/13/2020-PET scan showed right upper lobe lung nodule with an SUV of 20, right mediastinal adenopathy with SUV of 16, right retrocaval clavicular subcentimeter focus with an SUV of 3.2 History of Present Illness:  
Patient is a 69 y.o. female with PMH as below who presented to the ED with shortness of breath which has been increasing in the last 3 weeks. She had a CT Sim with Radiation Oncology on 1/27/2020 had an episode of desaturation down to 80% on exertion. On review of the CT, Dr. Claudeen Dawley discovered a significant pleural effusion and called the patient to come to the ED for further assessment. Patient is sitting up in bed on oxygen via nasal cannula. Patient states her shortness of breath and cough have been worsening the last few days. Denies fevers or chills. Discussed current plan to hold lovenox, perform CT, schedule ultrasound guided thoracentesis, send fluid for path. Patient and family verbalized understanding. Oncologic history Se presented in [facility] Dec 2019 with c/o increasing swelling of face , neck and arms along with SOB. Her problems started with a choking sensation and head tightness in Nov 2019. She initially saw ENT and was to see Pulmonary. However she presented to the Interim.  She had imaging as above that showed SVC thrombus and mediastinal mass. Subsequently had a biopsy and diagnosis as above. Was placed on Lovenox and had a mechanical thrombectomy of the SVC thrombus  on 2020. Plans were to start ChemoRT for stage III NSCLC She is a current smoker Brother and father  of lung cancer Past Medical History:  
Diagnosis Date  CAD (coronary artery disease)  Chronic obstructive pulmonary disease ([address]  Hypertension Past Surgical History:  
Procedure Laterality Date  HX ABDOMINAL LAPAROSCOPY    
 lots of intenstine removed/ lysis of adhesions  HX CAROTID STENT    HX HYSTERECTOMY  HX TONSIL AND ADENOIDECTOMY  IR BRONCH W LUNG BIOPSY SINGLE LOBE  2019 Social History Tobacco Use  Smoking status: Former Smoker Packs/day: 0.50 Last attempt to quit: 2019 Years since quittin.1  Smokeless tobacco: Never Used Substance Use Topics  Alcohol use: Not Currently Comment: occ No current facility-administered medications for this encounter. Current Outpatient Medications Medication Sig  
 HYDROcodone-acetaminophen (NORCO) 5-325 mg per tablet Take 1 Tab by mouth every four (4) hours as needed for Pain for up to 15 days. Max Daily Amount: 6 Tabs.  enoxaparin (LOVENOX) 40 mg/0.4 mL 40 mg by SubCUTAneous route two (2) times a day.  LORazepam (ATIVAN) 0.5 mg tablet Take 1 Tab by mouth See Admin Instructions. Take 30-60 minutes prior to PET  albuterol sulfate (PROVENTIL;VENTOLIN) 2.5 mg/0.5 mL nebu nebulizer solution 0.5 mL by Nebulization route every four (4) hours as needed for Wheezing or Shortness of Breath.  furosemide (LASIX) 40 mg tablet Take 1 Tab by mouth daily.  potassium chloride SR (K-TAB) 20 mEq tablet Take 1 Tab by mouth daily.  fluticasone propionate (FLONASE ALLERGY RELIEF NA) by Nasal route.   
 hydrOXYzine HCl (ATARAX) 10 mg tablet Take 10 mg by mouth three (3) times daily as needed for Itching.  montelukast (SINGULAIR) 10 mg tablet Take 10 mg by mouth daily.  albuterol (PROVENTIL HFA, VENTOLIN HFA, PROAIR HFA) 90 mcg/actuation inhaler Take 2 Puffs by inhalation.  atenolol (TENORMIN) 50 mg tablet Take 50 mg by mouth daily.  losartan (COZAAR) 100 mg tablet Take 100 mg by mouth daily.  budesonide (ENTOCORT EC) 3 mg capsule Take 6 mg by mouth every morning. Allergies Allergen Reactions  Codeine Itching Review of Systems: A complete review of systems was obtained, negative except as described above. Physical Exam:  
 
Visit Vitals /63 (BP 1 Location: Right arm, BP Patient Position: At rest) Pulse 82 Temp 98.7 °F (37.1 °C) Resp (!) 36 SpO2 95% ECOG PS: 1-2 General: frail, appearing in no acute distress Eyes: PERRL, anicteric sclerae HENT: Atraumatic, OP clear Neck: Supple, no edema Respiratory: normal respiratory effort, wet cough, on O2 nasal cannula CV: Normal rate, no peripheral edema GI: Soft, nontender, nondistended,  
MS: Moves all extremities, Digits without clubbing or cyanosis. Skin: ecchymoses to bilateral forearms, No rash or petechiae. Normal temperature, turgor, and texture. Psych: Alert, oriented, appropriate affect, normal judgment/insight Results:  
 
Lab Results Component Value Date/Time WBC 17.3 (H) 01/31/2020 11:39 AM  
 HGB 12.2 01/31/2020 11:39 AM  
 HCT 38.0 01/31/2020 11:39 AM  
 PLATELET 894 (H) 72/79/4293 11:39 AM  
 MCV 97.9 01/31/2020 11:39 AM  
 ABS. NEUTROPHILS 14.1 (H) 01/31/2020 11:39 AM  
 
Lab Results Component Value Date/Time  Sodium 133 (L) 01/31/2020 11:39 AM  
 Potassium 3.5 01/31/2020 11:39 AM  
 Chloride 99 01/31/2020 11:39 AM  
 CO2 32 01/31/2020 11:39 AM  
 Glucose 97 01/31/2020 11:39 AM  
 BUN 18 01/31/2020 11:39 AM  
 Creatinine 0.78 01/31/2020 11:39 AM  
 GFR est AA >60 01/31/2020 11:39 AM  
 GFR est non-AA >60 01/31/2020 11:39 AM  
 Calcium 9.2 01/31/2020 11:39 AM  
 
Lab Results Component Value Date/Time Bilirubin, total 0.3 01/31/2020 11:39 AM  
 ALT (SGPT) 21 01/31/2020 11:39 AM  
 AST (SGOT) 17 01/31/2020 11:39 AM  
 Alk. phosphatase 118 (H) 01/31/2020 11:39 AM  
 Protein, total 7.5 01/31/2020 11:39 AM  
 Albumin 3.1 (L) 01/31/2020 11:39 AM  
 Globulin 4.4 (H) 01/31/2020 11:39 AM  
 
 
Records reviewed and summarized above. Pathology report(s) reviewed above. Radiology report(s) reviewed above. PET CT 
IMPRESSION IMPRESSION:  
1. RUL malignancy. 2. Mediastinal griffin metastasis. 3. Likely subcentimeter right retroclavicular griffin metastasis. Ct 1/31/2020 Not reviewed but noted a moderate R effusion Assessment:  
1) NSCLC- Right R lung mass, mediastinal adenopathy, SVC tumor thrombus and a possible R retroclavicular node 
T1N3M0-Stage IIIC Poorly differentiated adenocarcinoma Molecular studies - EGFR negative, PD-L1 is 80%, others pending Scans and pathology reviewed in thoracic tumor board Tumor is unresectable In general the 5 year OS for stage IIIC NSCLC is 13-20% I recommended Radiation with concurrent Carboplatin and taxol given weekly However she now has a new R effusion A malignant effusion needs to be ruled out as that would upstage her to stage IV and will alter management. If that turns out to be the case I will discuss palliative Keytruda and palliatIVE rt 
 
 
2) Shortness of Breath Pleural effusion per [facility] note on 1/27 CT reviewed and noted for a new R moderate effusion Please hold Lovenox tonight and Saturday morning Thereafter suggest diagnostic and therapeutic thoracentecis 3)Hypoxia Desaturation to 80% at [facility] visit with walking. On 2L nasal cannula at rest. 
Patient will need to be set up for home oxygen before discharge. 4)SVC syndrome Due to tumor thrombus as the mass itself is not large enough She is s/p clot evacuation and prescribed Lovenox BID 
 Hold lovenox for thoracentesis. 5)COPD On albuterol, montelukast, and budesonide. Management per primary team 
 
6)CAD Management per primary team 
 
7)Psychosocial  
Lives in [address] [place] [address] at bedside Plan:  
 
· CT Chest ordered and reviewed- R moderate effusion · Thoracentesis ordered for tomorrow morning; Please Obtain Cytology · Hold lovenox for thoracentesis tonight and tomorrow morning. Resume Saturday night in the absence of complications · Patient will need home oxygen before discharge · Will need outpatient follow-up next week after discharge. I appreciate the opportunity to participate in Ms. Rory Adame's care. Seen in conjunction with Carol Castellanos NP Signed By: Aditya Chase MD

## 2020-01-31 NOTE — ROUTINE PROCESS
TRANSFER - OUT REPORT: 
 
Verbal report given to Ana Cristina Christopher RN(name) on [facility]  being transferred to 602(unit) for routine progression of care Report consisted of patients Situation, Background, Assessment and  
Recommendations(SBAR). Information from the following report(s) SBAR and Kardex was reviewed with the receiving nurse. Lines:    
 
Opportunity for questions and clarification was provided. Patient transported with: 
 O2 @ 2 liters Tech

## 2020-01-31 NOTE — ED NOTES
Pt resting in bed. Family at bedside. Pt on 2LNC with SpO2 of 100%. Call bell in reach. No further needs at this time.

## 2020-01-31 NOTE — ED NOTES
Pt resting in bed. Pt ambulated to restroom with stand by assistance. Placed back on O2 2L NC on return to room. Call bell in reach. Tech at bedside completing EKG. Family at bedside. No further needs at this time.

## 2020-01-31 NOTE — ED PROVIDER NOTES
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 Over the last 2 to 3 weeks, she has had increasing dyspnea on exertion or laying flat. She has had no fever or chills. There is been no nausea or vomiting. Shortness of breath is becoming much more prominent. The patient says she had a CT of her chest done yesterday, although, there are no CTs in the system at the present time. She also states that she was told by Dr. Ирина Scott who I believe is her radiation oncologist, to come to the ED today for drainage of her lung. It is unclear at this time where the fluid is or what volume of fluid there is. We will attempt to contact her physicians. There has been no nausea or vomiting and no diarrhea. Patient denies any chest pain or abdominal pain. She is due to start chemo and radiation this coming week. She is also scheduled for an MRI of her chest on Monday. Past Medical History:  
Diagnosis Date  CAD (coronary artery disease)  Chronic obstructive pulmonary disease ([facility] [address]  Hypertension Past Surgical History:  
Procedure Laterality Date  HX ABDOMINAL LAPAROSCOPY    
 lots of intenstine removed/ lysis of adhesions  HX CAROTID STENT  2003  HX HYSTERECTOMY  HX TONSIL AND ADENOIDECTOMY  IR BRONCH W LUNG BIOPSY SINGLE LOBE  12/2019 History reviewed. No pertinent family history. Social History Socioeconomic History  Marital status: SINGLE Spouse name: Not on file  Number of children: Not on file  Years of education: Not on file  Highest education level: Not on file Occupational History  Not on file Social Needs  Financial resource strain: Not on file  Food insecurity:  
  Worry: Not on file Inability: Not on file  Transportation needs:  
  Medical: Not on file Non-medical: Not on file Tobacco Use  Smoking status: Former Smoker Packs/day: 0.50 Last attempt to quit: 2019 Years since quittin.1  Smokeless tobacco: Never Used Substance and Sexual Activity  Alcohol use: Not Currently Comment: occ  Drug use: Never  Sexual activity: Not on file Lifestyle  Physical activity:  
  Days per week: Not on file Minutes per session: Not on file  Stress: Not on file Relationships  Social connections:  
  Talks on phone: Not on file Gets together: Not on file Attends Scientologist service: Not on file Active member of club or organization: Not on file Attends meetings of clubs or organizations: Not on file Relationship status: Not on file  Intimate partner violence:  
  Fear of current or ex partner: Not on file Emotionally abused: Not on file Physically abused: Not on file Forced sexual activity: Not on file Other Topics Concern  Not on file Social History Narrative  Not on file ALLERGIES: Codeine Review of Systems Constitutional: Negative for activity change, appetite change and fatigue. HENT: Negative for ear pain, facial swelling, sore throat and trouble swallowing. Eyes: Negative for pain, discharge and visual disturbance. Respiratory: Positive for shortness of breath. Negative for chest tightness and wheezing. See HPI. The patient is short of breath only when laying flat or up ambulating. Cardiovascular: Negative for chest pain and palpitations. Gastrointestinal: Negative for abdominal pain, blood in stool, nausea and vomiting. Genitourinary: Negative for difficulty urinating, flank pain and hematuria. Musculoskeletal: Negative for arthralgias, joint swelling, myalgias and neck pain. Skin: Negative for color change and rash. Neurological: Negative for dizziness, weakness, numbness and headaches. Hematological: Negative for adenopathy. Does not bruise/bleed easily. Psychiatric/Behavioral: Negative for behavioral problems, confusion and sleep disturbance. All other systems reviewed and are negative. Vitals:  
 01/31/20 1053 BP: 145/71 Pulse: 79 Resp: 18 Temp: 98.3 °F (36.8 °C) SpO2: (!) 86% Physical Exam 
Vitals signs and nursing note reviewed. Constitutional:   
   General: She is not in acute distress. Appearance: She is well-developed. HENT:  
   Head: Normocephalic and atraumatic. Nose: Nose normal.  
Eyes:  
   General: No scleral icterus. Conjunctiva/sclera: Conjunctivae normal.  
   Pupils: Pupils are equal, round, and reactive to light. Neck: Musculoskeletal: Normal range of motion and neck supple. Thyroid: No thyromegaly. Vascular: No JVD. Trachea: No tracheal deviation. Comments: No carotid bruits noted. Cardiovascular:  
   Rate and Rhythm: Normal rate and regular rhythm. Heart sounds: Normal heart sounds. No murmur. No friction rub. No gallop. Pulmonary:  
   Effort: Pulmonary effort is normal. No respiratory distress. Breath sounds: No wheezing or rales. Comments: Breath sounds are generally unremarkable. There is no definitive dullness auscultated. Patient sats are at 93% on 2 L of oxygen. Chest:  
   Chest wall: No tenderness. Abdominal:  
   General: Bowel sounds are normal. There is no distension. Palpations: Abdomen is soft. There is no mass. Tenderness: There is no abdominal tenderness. There is no guarding or rebound. Musculoskeletal: Normal range of motion. General: No tenderness. Lymphadenopathy:  
   Cervical: No cervical adenopathy. Skin: 
   General: Skin is warm and dry. Findings: No erythema or rash. Neurological:  
   Mental Status: She is alert and oriented to person, place, and time. Cranial Nerves: No cranial nerve deficit.   
   Coordination: Coordination normal.  
 Deep Tendon Reflexes: Reflexes are normal and symmetric. Psychiatric:     
   Behavior: Behavior normal.     
   Thought Content: Thought content normal.     
   Judgment: Judgment normal.  
 
  
 
MDM Number of Diagnoses or Management Options Amount and/or Complexity of Data Reviewed Decide to obtain previous medical records or to obtain history from someone other than the patient: yes Review and summarize past medical records: yes Discuss the patient with other providers: yes Risk of Complications, Morbidity, and/or Mortality Presenting problems: high Diagnostic procedures: high Management options: high Patient Progress Patient progress: stable Procedures Since there are no films in our system, I will need to get in touch with either the patient's oncologist or radiation oncologist.  The patient spoke to Dr. Mariana Kramer this morning and was told to come to the ED. Attempts are being made to consult him. Unable to find the patient's CT that she says was done yesterday. Hospitalist Flavio Text for Admission 12:39 PM 
 
ED Room Number: ER10/10 Patient Name and age:  Colt Bell 69 y.o.  female Working Diagnosis: 1. Non-small cell lung cancer, right ([address] 2. Pleural effusion 3. Dyspnea, unspecified type Readmission: no 
Isolation Requirements:  no 
Recommended Level of Care:  med/surg Code Status:  full Other:   
Department:[facility] Adult ED - [phone] ED MD EKG interpretation: There is a normal sinus rhythm with an occasional PAC rate 65 beats a minute. Axis is normal, intervals are normal, no other acute ectopy or ischemic changes noted. Maddy Ahmadi MD 
 
I have spoken with both Dr. Lynn Weston and Dr. Graeme Morales. The patient had an outpatient CT yesterday which showed a significant amount of pleural effusion on the right which is new. The saturations were low.   In consultation with pulmonary, Dr. Zaida Weston, it was felt the patient required admission for further evaluation and pleurocentesis which is to be diagnostic as well. The patient does desat into the [de-identified] with any kind of activity. Sitting still she is feeling fine with normal sats.

## 2020-01-31 NOTE — CONSULTS
Patient well known to the radiation oncology department. Treatment planning is underway for her lung radiation. Dr. Luevano Gloss aware that patient is in the ED. We will follow up on patient early next week.

## 2020-01-31 NOTE — ACP (ADVANCE CARE PLANNING)
Advance Care Planning (ACP) Provider Note - Comprehensive Date of ACP Conversation: 01/31/20 Persons included in Conversation: Patient, son and Dr Luke Jordan Patient Active Problem List  
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 Authorized Decision Maker (if patient is incapable of making informed decisions): This person is: 
SonLexie Discussions : I reviewed her acute medical condition including right lung NSCL stage III, right pleural effusion and COPD and discussed her desire for ongoing aggressive care, including potential intubation and mechanical ventilation; also discussed who would speak on her behalf should she be unable to do so and discussed what conversation she has had with her family so they understand her desire if such a situation occurred now or in the future. She said she wants aggressive care during this admission and she said her son, Lexie Monroe is her medical decision in case of emergency. Length of ACP Conversation in minutes:  16 minutes 
 
  
Ryan Nicole MD 
1/31/2020

## 2020-01-31 NOTE — ED TRIAGE NOTES
Triage: Patient arrives ambulatory from home with c/o AMBROSE x 3 days. She is currently a lung CA patient receiving treatment and her doctor wanted her to come here to possibly \"get her lung drained\". Difficulty speaking in triage. Hands cold so question saturation level, but pulse oximeter reading 86%. No hx oxygen dependence.

## 2020-01-31 NOTE — TELEPHONE ENCOUNTER
Called patient and confirmed x2 identifiers     Patient stated having frustration in trying to get in contact with office; informed patient attempts were made to return call to clarify concerns and answer questions   Reviewed with patient about visit with Radiation Oncology and their request to have patient go to the ER due to a pleural effusion     Patient stated she will going to the ER this morning     Informed patient office would call later today to check-in     Patient verbalized understanding  No new questions or concerns at this time

## 2020-01-31 NOTE — H&P
[address] HISTORY AND PHYSICAL Name:  Rachel Velasco 
MR#:  [id number] :  1945 ACCOUNT #:  [de-identified] ADMIT DATE:  2020 PRIMARY CARE PROVIDER:  Cleo Martinez NP 
 
SOURCE OF INFORMATION:  The patient. CHIEF COMPLAINT:  Progressive shortness of breath 2 weeks duration. 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 Her shortness of breath is worse with exertion. She has also associated cough, on and off productive, but no fever, left-sided chest pain, palpitation, sweating, nausea, vomiting, abdominal pain, urinary complaint or abnormal bowel movement. She has on and off right upper extremity and facial swelling for which she takes Lasix. She was evaluated by her oncologist and he had CT scan on 2020, and she was found to have right pleural effusion. She was also seeing radiation oncologist and she was advised to come to [facility] for right thoracentesis. When she was seen in the ER, her vital signs, blood pressure was 145/71, pulse 79, temperature 98.3, saturation of oxygen 86% on room air. She is placed on oxygen 2 liters per minute via nasal cannula and her saturation improved to 100%. Her oncologist and radiation oncologist were consulted and the patient transferred to hospitalist service for further evaluation and admission. REVIEW OF SYSTEMS:  Pertinent positive findings mentioned in HPI. All systems reviewed, no any other positive finding. PAST MEDICAL HISTORY: 
1. Right non-small-cell lung cancer, stage III. 2.  Hypertension. 3.  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 4.  COPD. 5.  Coronary artery disease status post stent. 6.  Hypertension. 7.  Tobacco abuse. PRIOR TO ADMISSION MEDICATION:  Include, 
1. Norco 1 tablet every 4 hours needed. 2.  Lovenox 40 mg subcutaneous q.12. 
3.  Ativan 0.5 mg as needed. 4.  Albuterol inhaler p.r.n. every 4 hours. 5.  Lasix 40 mg p.o. daily. 6.  Atenolol 50 mg p.o. daily. 7.  Losartan 100 mg p.o. daily. 8.  Budesonide 60 mg every morning. ALLERGIES:  CODEINE. SOCIAL HISTORY:  Lives with her son. She smokes on and off but stopped smoking. No alcohol abuse. Ambulates with a rollator. Code status, full code. FAMILY HISTORY:  No family history of lung cancer. PHYSICAL EXAMINATION: 
VITAL SIGNS:  Blood pressure 135/75, pulse 69, respiratory rate 21, saturation of oxygen 100% on 2 liters, temperature 98.3. GENERAL APPEARANCE:  The patient is alert, cooperative, not in cardiorespiratory distress. HEENT:  Pink conjunctivae. Anicteric sclerae. Moist tongue and buccal mucosa. LUNGS:  Decreased bronchial breath sounds to auscultation bilaterally. CHEST WALL:  No tenderness or deformity. CARDIOVASCULAR:  Regular rate and rhythm. S1 and S2 normal.  No murmur or gallop. ABDOMEN:  Soft and nontender. Bowel sounds normal.  No mass or organomegaly. EXTREMITIES:  No cyanosis or edema. SKIN:  No rash or lesion. CENTRAL NERVOUS SYSTEM:  Conscious, well oriented to time, place and person. Motor 5/5. Sensation intact. Cranial nerves II through XII grossly intact. LABORATORY DATA:  EKG:  Sinus rhythm with premature supraventricular complex, ventricular rate 65 beats per minute, nonspecific ST-T waves. White blood cell count 17.3, hemoglobin 12.2, hematocrit 38.3, MCV 97.9, platelet count 560.   Chemistry:  Sodium 133, potassium 3.5, CO2 of 32, anion gap 2, glucose 97, BUN 18, creatinine 0.78, BUN and creatinine ratio 23, calcium 9.2, total protein 7.5, albumin 3.1, ALT 21, AST 17, alkaline phosphatase 118. ASSESSMENT: 
1. Right pleural effusion likely malignant. 2.  Right non-small cell lung cancer, stage III. 3.  Leukocytosis. 4.  Hyponatremia. 5.  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 6.  Chronic obstructive pulmonary disease. 7.  History of coronary artery disease. 8.  Hypertension. PLAN: 
1. Right pleural effusion likely malignant. Admit the patient to oncology floor. will order CT scan of the chest and ultrasound-guided thoracentesis. will hold her Lovenox and follow up on pleural analysis and continue oxygen support, p.r.n. DuoNeb treatment, and pulse oximetry monitoring. 2.  Right lung non-small cell lung cancer, stage III, status post chemoradiation. Oncologist on board. She is supposed to start her chemo next week, radiation oncologist is consulted. 3.  Leukocytosis possibly due to right pleural effusion, rule out obstructive pneumonia. We will start her on IV Zosyn with probiotics and blood culture. 4.  Hyponatremia, likely related to her non-small-cell lung cancer. Mild, Repeat BMP in a.m. 
5.  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 Hold Lovenox after ultrasound-guided thoracentesis. will resume Lovenox 40 mg q.12 hrs after thoracentesis. 6.  History of chronic obstructive pulmonary disease, stable. Continue oxygen support, p.r.n. DuoNeb treatment, and pulse ox monitoring. 7.  History of coronary artery disease status post stent, stable. Continue home medication, atenolol. 8.  History of hypertension. Blood pressure stable. Continue home medication losartan and atenolol. Deep venous thrombosis prophylaxis. We will resume Lovenox. FUNCTIONAL STATUS PRIOR TO ADMISSION:  The patient ambulates with a rollator. MD KRISTA [email or web address] 
D:  01/31/2020 13:37 
T:  01/31/2020 14:40 
JOB #:  [id number]

## 2020-02-01 NOTE — PROGRESS NOTES
[facility] [place] [address] [address], [address] W: [phone]  F: [phone] Reason for Consult:  
Peter Shanks is a 69 y.o. female who is seen in consultation at the request of Dr. Stacey Whitten for evaluation of shortness of breath in setting of NSCLC. Treatment History:  
· 12/15/2019-CTA showed right upper lobe lung nodule measuring 2.1 x 1.4 cm, occluded SVC with adjustment mediastinal soft tissue mass. The whole thing measuring approximately 2.3 to 1.8 cm. · 12/24/2020-EBUS and biopsy of mediastinal mass showed poorly differentiated adenocarcinoma · 1/13/2020-PET scan showed right upper lobe lung nodule with an SUV of 20, right mediastinal adenopathy with SUV of 16, right retrocaval clavicular subcentimeter focus with an SUV of 3.2 History of Present Illness:  
Patient is a 69 y.o. female seen today for on call hospital f/u for lung cancer and new pleural effusion. Pt is sitting up in bed. C/o SOB. States waiting for lung to be drained. States doing ok otherwise. No issues overnight. Prior Hx for record:   
PMH as below who presented to the ED with shortness of breath which has been increasing in the last 3 weeks. She had a CT Sim with Radiation Oncology on 1/27/2020 had an episode of desaturation down to 80% on exertion. On review of the CT, Dr. Dane Muniz discovered a significant pleural effusion and called the patient to come to the ED for further assessment. Patient is sitting up in bed on oxygen via nasal cannula. Patient states her shortness of breath and cough have been worsening the last few days. Denies fevers or chills. Discussed current plan to hold lovenox, perform CT, schedule ultrasound guided thoracentesis, send fluid for path. Patient and family verbalized understanding. Oncologic history  Se presented in [facility] 2019 with c/o increasing swelling of face , neck and arms along with SOB. Her problems started with a choking sensation and head tightness in 2019. She initially saw ENT and was to see Pulmonary. However she presented to the Interim. She had imaging as above that showed SVC thrombus and mediastinal mass. Subsequently had a biopsy and diagnosis as above. Was placed on Lovenox and had a mechanical thrombectomy of the SVC thrombus  on 2020. Plans were to start ChemoRT for stage III NSCLC She is a current smoker Brother and father  of lung cancer Past Medical History:  
Diagnosis Date  CAD (coronary artery disease)  Chronic obstructive pulmonary disease ([facility] [address]  Hypertension Past Surgical History:  
Procedure Laterality Date  HX ABDOMINAL LAPAROSCOPY    
 lots of intenstine removed/ lysis of adhesions  HX CAROTID STENT    HX HYSTERECTOMY  HX TONSIL AND ADENOIDECTOMY  IR BRONCH W LUNG BIOPSY SINGLE LOBE  2019 Social History Tobacco Use  Smoking status: Former Smoker Packs/day: 0.50 Last attempt to quit: 2019 Years since quittin.1  Smokeless tobacco: Never Used Substance Use Topics  Alcohol use: Not Currently Comment: occ Current Facility-Administered Medications Medication Dose Route Frequency  atenoloL (TENORMIN) tablet 50 mg  50 mg Oral DAILY  furosemide (LASIX) tablet 40 mg  40 mg Oral DAILY  HYDROcodone-acetaminophen (NORCO) 5-325 mg per tablet 1 Tab  1 Tab Oral Q4H PRN  
 hydroxyzine HCL (ATARAX) tablet 10 mg  10 mg Oral TID PRN  potassium chloride SR (KLOR-CON 10) tablet 20 mEq  20 mEq Oral DAILY  LORazepam (ATIVAN) tablet 0.5 mg  0.5 mg Oral ONCE PRN  
 losartan (COZAAR) tablet 100 mg  100 mg Oral DAILY  montelukast (SINGULAIR) tablet 10 mg  10 mg Oral DAILY  albuterol-ipratropium (DUO-NEB) 2.5 MG-0.5 MG/3 ML  3 mL Nebulization Q4H PRN  
 sodium chloride (NS) flush 5-40 mL  5-40 mL IntraVENous Q8H  
  sodium chloride (NS) flush 5-40 mL  5-40 mL IntraVENous PRN  piperacillin-tazobactam (ZOSYN) 3.375 g in 0.9% sodium chloride (MBP/ADV) 100 mL  3.375 g IntraVENous Q8H Allergies Allergen Reactions  Codeine Itching Review of Systems: A complete review of systems was obtained, negative except as described above. Physical Exam:  
 
Visit Vitals /78 (BP 1 Location: Right arm, BP Patient Position: At rest) Pulse 72 Temp 98.7 °F (37.1 °C) Resp 16 SpO2 100% ECOG PS: 1-2 General: frail, appearing in no acute distress on 02 Eyes: PERRL, anicteric sclerae HENT: Atraumatic Neck: Supple, no edema Respiratory: normal respiratory effort, decreased bs on right CV: Normal rate, no peripheral edema GI: Soft, nontender, nondistended,  
MS: Moves all extremities,  
Skin: ecchymoses to bilateral forearms, No rash or petechiae. Normal temperature, turgor, and texture. Psych: Alert, oriented, appropriate affect, normal judgment/insight Results:  
 
Lab Results Component Value Date/Time WBC 13.6 (H) 02/01/2020 02:20 AM  
 HGB 11.2 (L) 02/01/2020 02:20 AM  
 HCT 35.3 02/01/2020 02:20 AM  
 PLATELET 405 (H) 87/54/5803 02:20 AM  
 MCV 98.9 02/01/2020 02:20 AM  
 ABS. NEUTROPHILS 9.9 (H) 02/01/2020 02:20 AM  
 
Lab Results Component Value Date/Time Sodium 134 (L) 02/01/2020 02:20 AM  
 Potassium 4.3 02/01/2020 02:20 AM  
 Chloride 103 02/01/2020 02:20 AM  
 CO2 30 02/01/2020 02:20 AM  
 Glucose 93 02/01/2020 02:20 AM  
 BUN 19 02/01/2020 02:20 AM  
 Creatinine 0.84 02/01/2020 02:20 AM  
 GFR est AA >60 02/01/2020 02:20 AM  
 GFR est non-AA >60 02/01/2020 02:20 AM  
 Calcium 9.1 02/01/2020 02:20 AM  
 
Lab Results Component Value Date/Time Bilirubin, total 0.2 02/01/2020 02:20 AM  
 ALT (SGPT) 20 02/01/2020 02:20 AM  
 AST (SGOT) 16 02/01/2020 02:20 AM  
 Alk.  phosphatase 104 02/01/2020 02:20 AM  
 Protein, total 6.7 02/01/2020 02:20 AM  
 Albumin 2.6 (L) 02/01/2020 02:20 AM  
 Globulin 4.1 (H) 02/01/2020 02:20 AM  
 
 
Records reviewed and summarized above. Pathology report(s) reviewed above. Radiology report(s) reviewed above. PET CT 
IMPRESSION IMPRESSION:  
1. RUL malignancy. 2. Mediastinal griffin metastasis. 3. Likely subcentimeter right retroclavicular griffin metastasis. Ct 1/31/2020 Not reviewed but noted a moderate R effusion Assessment/PLAN:  
1) NSCLC- Right R lung mass, mediastinal adenopathy, SVC tumor thrombus and a possible R retroclavicular node 
T1N3M0-Stage IIIC Poorly differentiated adenocarcinoma Molecular studies - EGFR negative, PD-L1 is 80%, others pending Scans and pathology reviewed in thoracic tumor board Tumor is unresectable In general the 5 year OS for stage IIIC NSCLC is 13-20% I recommended Radiation with concurrent Carboplatin and taxol given weekly However she now has a new R effusion A malignant effusion needs to be ruled out as that would upstage her to stage IV and will alter management. If that turns out to be the case I will discuss palliative Keytruda and palliatIVE rt Pt seen today on call hospital f/u for effusion pending thoracentesis. Pt clinically stable. Has SOB on 02.  
 
2) Shortness of Breath Pleural effusion per [facility] note on 1/27 CT reviewed and noted for a new R moderate effusion Please hold Lovenox tonight and Saturday morning Thereafter suggest diagnostic and therapeutic thoracentecis Need cytology. 3[facility] On [facility] nasal cannula at rest. 
Patient will need to be set up for home oxygen before discharge. 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 5)COPD On albuterol, montelukast, and budesonide. Management per primary team 
 
6)CAD Management per primary team 
 
 7)Psychosocial  
Lives in [address] [place] [address] at bedside Plan:  
 
· CT Chest ordered and reviewed- R moderate effusion · Thoracentesis ordered for tomorrow morning; Please Obtain Cytology · Hold lovenox for thoracentesis tonight and tomorrow morning. Resume Saturday night in the absence of complications · Patient will need home oxygen before discharge · Will need outpatient follow-up next week after discharge. Call if questions over weekend. We will follow F/u with Dr Hannah Aviles I appreciate the opportunity to participate in Ms. Sharri Adame's care.  
 
 
Signed By: Lucero Browne DO

## 2020-02-01 NOTE — PROGRESS NOTES
PHYSICAL THERAPY EVALUATION/DISCHARGE Patient: Sho Gore (21 y.o. female) Date: 2/1/2020 Primary Diagnosis: Exertional dyspnea [R06.09] Precautions:     
 
 
ASSESSMENT Based on the objective data described below, the patient presents with dyspnea on excursion, otherwise at baseline function s/p admission to the hospital.  At baseline, pt lives with her son and is mod I using rollator. Pt received on 3 O2 and agreeable to work with therapy. This day, she transferred supine<>sit and sit<>stand with mod I. She ambulated to/from bathroom with SBA pushing IV pole for support. Discussed pacing techniques, pt reports she has been performing them prior to admission. Pt has no further mobility questions or concerns at this time. No further PT services needed, will sign off. Functional Outcome Measure: The patient scored 75/100 on the Barthel Index outcome measure which is indicative of 25% impairment and dependence on others for basic mobility and self care tasks. Other factors to consider for discharge: on 3L O2 Further skilled acute physical therapy is not indicated at this time. PLAN : 
Recommendation for discharge: (in order for the patient to meet his/her long term goals) No skilled physical therapy/ follow up rehabilitation needs identified at this time. This discharge recommendation: 
Has not yet been discussed the attending provider and/or case management IF patient discharges home will need the following DME: patient owns DME required for discharge SUBJECTIVE:  
Patient stated I'm sorry I've been so cranky with you.  OBJECTIVE DATA SUMMARY:  
HISTORY:   
Past Medical History:  
Diagnosis Date  CAD (coronary artery disease)  Chronic obstructive pulmonary disease ([facility] [address]  Hypertension Past Surgical History:  
Procedure Laterality Date  HX ABDOMINAL LAPAROSCOPY    
 lots of intenstine removed/ lysis of adhesions  HX CAROTID STENT  2003  HX HYSTERECTOMY  HX TONSIL AND ADENOIDECTOMY  IR BRONCH W LUNG BIOPSY SINGLE LOBE  2019 Prior level of function: Pt lives with her son and is mod I using rollator at baseline Home Situation Home Environment: Private residence # Steps to Enter: 3 Rails to Enter: Yes Hand Rails : Bilateral 
One/Two Story Residence: One story Living Alone: No 
Support Systems: Child(buck) Patient Expects to be Discharged to[de-identified] Private residence Current DME Used/Available at Home: Grab bars, Shower chair, Walker, rollator, Commode, bedside, Hospital bed EXAMINATION/PRESENTATION/DECISION MAKING:  
Critical Behavior: 
  
  
  
  
Hearing: Auditory Auditory Impairment: Hard of hearing, bilateral 
 
Range Of Motion: 
AROM: Within functional limits PROM: Within functional limits Strength:   
Strength: Generally decreased, functional 
  
  
  
  
  
  
Tone & Sensation:  
Tone: Normal 
  
  
  
  
Sensation: Intact Coordination: 
Coordination: Within functional limits Vision:  
  
Functional Mobility: 
Bed Mobility: 
  
Supine to Sit: Modified independent Sit to Supine: Modified independent Scooting: Modified independent Transfers: 
Sit to Stand: Modified independent Stand to Sit: Modified independent Balance:  
Sitting: Intact Standing: Impaired; With support Standing - Static: Good;Constant support Standing - Dynamic : Good;Constant support Ambulation/Gait Training: 
Distance (ft): 25 Feet (ft) Assistive Device: (pushing IV pole) Ambulation - Level of Assistance: Stand-by assistance Gait Abnormalities: Decreased step clearance;Shuffling gait;Trunk sway increased Base of Support: Narrowed Speed/Tali: Shuffled; Slow Step Length: Left shortened;Right shortened Functional Measure: 
Barthel Index: 
 
Bathin Bladder: 10 Bowels: 10 
Groomin Dressing: 10 Feeding: 10 Mobility: 0 Stairs: 5 Toilet Use: 10 
 Transfer (Bed to Chair and Back): 15 Total: 75/100 The Barthel ADL Index: Guidelines 1. The index should be used as a record of what a patient does, not as a record of what a patient could do. 2. The main aim is to establish degree of independence from any help, physical or verbal, however minor and for whatever reason. 3. The need for supervision renders the patient not independent. 4. A patient's performance should be established using the best available evidence. Asking the patient, friends/relatives and nurses are the usual sources, but direct observation and common sense are also important. However direct testing is not needed. 5. Usually the patient's performance over the preceding 24-48 hours is important, but occasionally longer periods will be relevant. 6. Middle categories imply that the patient supplies over 50 per cent of the effort. 7. Use of aids to be independent is allowed. Link Heir., Barthel, D.W. (5965). Functional evaluation: the Barthel Index. [facility] (14)2. Sasha Lujan shaheen MY Choudhury, Todd Hussein., Billy Mackey., [place], [address] [place] [address] (1999). Measuring the change indisability after inpatient rehabilitation; comparison of the responsiveness of the Barthel Index and Functional [place] Measure. Journal of Neurology, Neurosurgery, and Psychiatry, 66(4), 385-739. Fatmata Diallo, N.J.A, JACK Freitas, & Consuelo Canas MAydinA. (2004.) Assessment of post-stroke quality of life in cost-effectiveness studies: The usefulness of the Barthel Index and the EuroQoL-5D. [facility], 13, 098-95 Physical Therapy Evaluation Charge Determination History Examination Presentation Decision-Making HIGH Complexity :3+ comorbidities / personal factors will impact the outcome/ POC  HIGH Complexity : 4+ Standardized tests and measures addressing body structure, function, activity limitation and / or participation in recreation  LOW Complexity : Stable, uncomplicated  LOW Complexity : FOTO score of  Based on the above components, the patient evaluation is determined to be of the following complexity level: LOW Activity Tolerance:  
Fair, AMBROSE with short distance ambulation, but able to self pace Please refer to the flowsheet for vital signs taken during this treatment. After treatment patient left in no apparent distress:  
Supine in bed and Call bell within reach COMMUNICATION/EDUCATION:  
The patients plan of care was discussed with: Occupational Therapist and Registered Nurse. Fall prevention education was provided and the patient/caregiver indicated understanding., Patient/family have participated as able in goal setting and plan of care. and Patient/family agree to work toward stated goals and plan of care. Thank you for this referral. 
Claudio Zavala, PT, DPT Time Calculation: 9 mins

## 2020-02-01 NOTE — PROGRESS NOTES
Primary Nurse Ricki Lindquist and Angelica Mckeon LPN performed a dual skin assessment on this patient Impairment noted- see wound doc flow sheet Bright score is 20 Patient has a reddened blanchable area on her sacrum

## 2020-02-01 NOTE — PROGRESS NOTES
Reason for Admission:   Exertional dyspnea RUR Score:        12/ low Plan for utilizing home health:      PT/ OT evaluations are pending at the time of this initial assessment Current Advanced Directive/Advance Care Plan:  
                     Full code/ no AMD on file. Educated on AMD  
Transition of Care Plan: This CM met with patient and confirmed address and PCP. Per patient she last saw her PCP in October 2019. Son Jody Quarles, [phone] is emergency contact and will transport patient at discharge. CM will need to follow PT/ OT assessments and if patient will need home 02. [facility]. Patient was alert and oriented at the time of this initial assessment. Patient explained that she lives with her supportive son. Patient also explained that she is in the process of applying for Medicaid with Celina Bernstein and Birgit Dunn Department of [place]. CM will need to follow. Care Management Interventions PCP Verified by CM: Yes 
Palliative Care Criteria Met (RRAT>21 & CHF Dx)?: No 
Mode of Transport at Discharge: (son) Transition of Care Consult (CM Consult): (TBD/ CM will need to follow) MyChart Signup: No 
Discharge Durable Medical Equipment: No 
Health Maintenance Reviewed: Yes Physical Therapy Consult: Yes Occupational Therapy Consult: Yes Speech Therapy Consult: No 
Current Support Network: (Lives with son) Confirm Follow Up Transport: Family Discharge Location Discharge Placement: (TBD/ CM will need to follow) Marilyn Jaimes 9:46 AM

## 2020-02-01 NOTE — PROGRESS NOTES
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 Her shortness of breath is worse with exertion. She has also associated cough, on and off productive, but no fever, left-sided chest pain, palpitation, sweating, nausea, vomiting, abdominal pain, urinary complaint or abnormal bowel movement. She has on and off right upper extremity and facial swelling for which she takes Lasix. She was evaluated by her oncologist and he had CT scan on 01/30/2020, and she was found to have right pleural effusion. She was also seeing radiation oncologist and she was advised to come to [facility] [address] [place] [address] for right thoracentesis. 1/31/2020 Assessment/Plan: 
Right pleural effusion likely malignant.  
- CT chest: Moderately large right pleural effusion, new since the prior study. - will hold her Lovenox - plan for ultrasound-guided thoracentesis today. - saturating on 2l NC , will try to wean off after the procedure . May need home oxygen Right lung non-small cell lung cancer, stage III, status post chemoradiation.   
- Oncologist on board. - CT chest: Significant increase in size of a right upper lobe parenchymal mass since the prior study. Leukocytosis 17.3 on poa - improving - possibly due to right pleural effusion 
- less suspicion for infection 
- will c/w IV Zosyn for now Hyponatremia, likely related to her non-small-cell lung cancer. 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 -  will resume Lovenox 40 mg q.12 hrs after thoracentesis. CAD s/p stent, stable. Continue home medication, atenolol. HTN  Blood pressure stable. Continue home medication losartan and atenolol. Code status: Full. Son is EMA 
DVT prophylaxis: Lovenox on hold for procedure Disposition: home when ready. ---------------------------------------------- 
 
CC: Pleural effusion S: Patient is seen and examined at bedside. No overnight events or new complaints. She does get sob on exertion. Waiting for thoracentesis. Review of Systems: A comprehensive review of systems was negative. O: 
Visit Vitals /78 (BP 1 Location: Right arm, BP Patient Position: At rest) Pulse 72 Temp 98.7 °F (37.1 °C) Resp 16 SpO2 100% PHYSICAL EXAM: 
Gen: NAD, ill looking HEENT: anicteric sclerae, normal conjunctiva, oropharynx clear, MM moist 
Neck: supple, trachea midline, no adenopathy Heart: RRR, no MRG, no JVD, no peripheral edema Lungs: CTA b/l, non-labored respirations Abd: soft, NT, ND, BS+, no organomegaly Extr: warm Skin: dry, no rash Neuro: CN II-XII grossly intact, normal speech, moves all extremities Psych: normal mood, appropriate affect No intake or output data in the 24 hours ending 02/01/20 1401 Recent labs & imaging reviewed: 
Recent Results (from the past 24 hour(s)) CULTURE, BLOOD Collection Time: 01/31/20  5:10 PM  
Result Value Ref Range Special Requests: NO SPECIAL REQUESTS Culture result: NO GROWTH AFTER 10 HOURS METABOLIC PANEL, COMPREHENSIVE Collection Time: 02/01/20  2:20 AM  
Result Value Ref Range Sodium 134 (L) 136 - 145 mmol/L Potassium 4.3 3.5 - 5.1 mmol/L Chloride 103 97 - 108 mmol/L  
 CO2 30 21 - 32 mmol/L Anion gap 1 (L) 5 - 15 mmol/L Glucose 93 65 - 100 mg/dL BUN 19 6 - 20 MG/DL Creatinine 0.84 0.55 - 1.02 MG/DL  
 BUN/Creatinine ratio 23 (H) 12 - 20 GFR est AA >60 >60 ml/min/1.73m2 GFR est non-AA >60 >60 ml/min/1.73m2 Calcium 9.1 8.5 - 10.1 MG/DL Bilirubin, total 0.2 0.2 - 1.0 MG/DL  
 ALT (SGPT) 20 12 - 78 U/L  
 AST (SGOT) 16 15 - 37 U/L Alk. phosphatase 104 45 - 117 U/L Protein, total 6.7 6.4 - 8.2 g/dL Albumin 2.6 (L) 3.5 - 5.0 g/dL Globulin 4.1 (H) 2.0 - 4.0 g/dL A-G Ratio 0.6 (L) 1.1 - 2.2    
CBC WITH AUTOMATED DIFF Collection Time: 02/01/20  2:20 AM  
Result Value Ref Range WBC 13.6 (H) 3.6 - 11.0 K/uL  
 RBC 3.57 (L) 3.80 - 5.20 M/uL  
 HGB 11.2 (L) 11.5 - 16.0 g/dL HCT 35.3 35.0 - 47.0 % MCV 98.9 80.0 - 99.0 FL  
 MCH 31.4 26.0 - 34.0 PG  
 MCHC 31.7 30.0 - 36.5 g/dL  
 RDW 13.5 11.5 - 14.5 % PLATELET 599 (H) 867 - 400 K/uL MPV 9.5 8.9 - 12.9 FL  
 NRBC 0.0 0  WBC ABSOLUTE NRBC 0.00 0.00 - 0.01 K/uL NEUTROPHILS 73 32 - 75 % LYMPHOCYTES 16 12 - 49 % MONOCYTES 8 5 - 13 % EOSINOPHILS 1 0 - 7 % BASOPHILS 1 0 - 1 % IMMATURE GRANULOCYTES 1 (H) 0.0 - 0.5 % ABS. NEUTROPHILS 9.9 (H) 1.8 - 8.0 K/UL  
 ABS. LYMPHOCYTES 2.2 0.8 - 3.5 K/UL  
 ABS. MONOCYTES 1.1 (H) 0.0 - 1.0 K/UL  
 ABS. EOSINOPHILS 0.2 0.0 - 0.4 K/UL  
 ABS. BASOPHILS 0.1 0.0 - 0.1 K/UL  
 ABS. IMM. GRANS. 0.1 (H) 0.00 - 0.04 K/UL  
 DF AUTOMATED Recent Labs 02/01/20 
0220 01/31/20 
1139 WBC 13.6* 17.3* HGB 11.2* 12.2 HCT 35.3 38.0  
* 528* Recent Labs 02/01/20 0220 01/31/20 
1139 * 133* K 4.3 3.5  99 CO2 30 32 BUN 19 18 CREA 0.84 0.78 GLU 93 97  
CA 9.1 9.2 Recent Labs 02/01/20 0220 01/31/20 
1139 SGOT 16 17 ALT 20 21  118* TBILI 0.2 0.3 TP 6.7 7.5 ALB 2.6* 3.1*  
GLOB 4.1* 4.4* No results for input(s): INR, PTP, APTT, INREXT in the last 72 hours. No results for input(s): FE, TIBC, PSAT, FERR in the last 72 hours. No results found for: FOL, RBCF No results for input(s): PH, PCO2, PO2 in the last 72 hours. No results for input(s): CPK, CKNDX, TROIQ in the last 72 hours. No lab exists for component: CPKMB No results found for: CHOL, CHOLX, CHLST, CHOLV, HDL, HDLP, LDL, LDLC, DLDLP, TGLX, TRIGL, TRIGP, CHHD, CHHDX No results found for: [facility] No results found for: COLOR, APPRN, SPGRU, REFSG, JOE, PROTU, GLUCU, KETU, BILU, UROU, BLACK, LEUKU, GLUKE, EPSU, BACTU, WBCU, RBCU, CASTS, UCRY Med list reviewed Current Facility-Administered Medications Medication Dose Route Frequency  atenoloL (TENORMIN) tablet 50 mg  50 mg Oral DAILY  furosemide (LASIX) tablet 40 mg  40 mg Oral DAILY  HYDROcodone-acetaminophen (NORCO) 5-325 mg per tablet 1 Tab  1 Tab Oral Q4H PRN  
 hydroxyzine HCL (ATARAX) tablet 10 mg  10 mg Oral TID PRN  potassium chloride SR (KLOR-CON 10) tablet 20 mEq  20 mEq Oral DAILY  LORazepam (ATIVAN) tablet 0.5 mg  0.5 mg Oral ONCE PRN  
 losartan (COZAAR) tablet 100 mg  100 mg Oral DAILY  montelukast (SINGULAIR) tablet 10 mg  10 mg Oral DAILY  albuterol-ipratropium (DUO-NEB) 2.5 MG-0.5 MG/3 ML  3 mL Nebulization Q4H PRN  
 sodium chloride (NS) flush 5-40 mL  5-40 mL IntraVENous Q8H  
 sodium chloride (NS) flush 5-40 mL  5-40 mL IntraVENous PRN  piperacillin-tazobactam (ZOSYN) 3.375 g in 0.9% sodium chloride (MBP/ADV) 100 mL  3.375 g IntraVENous Q8H Care Plan discussed with:  Patient/Family and Nurse Roosevelt Jurado MD 
Internal Medicine Date of Service: 2/1/2020

## 2020-02-02 NOTE — PROGRESS NOTES
[facility] [place] [facility] [place] [address] [address], [address] W: [phone]  F: [phone] Reason for Consult:  
Zan Galvez is a 69 y.o. female who is seen in consultation at the request of Dr. Huey Culver for evaluation of shortness of breath in setting of NSCLC. Treatment History:  
· 12/15/2019-CTA showed right upper lobe lung nodule measuring 2.1 x 1.4 cm, occluded SVC with adjustment mediastinal soft tissue mass. The whole thing measuring approximately 2.3 to 1.8 cm. · 12/24/2020-EBUS and biopsy of mediastinal mass showed poorly differentiated adenocarcinoma · 1/13/2020-PET scan showed right upper lobe lung nodule with an SUV of 20, right mediastinal adenopathy with SUV of 16, right retrocaval clavicular subcentimeter focus with an SUV of 3.2 History of Present Illness:  
Patient is a 69 y.o. female seen today for on call hospital f/u for lung cancer and new pleural effusion. Pt is sitting up in bed. C/o SOB. States still waiting for lung to be drained. Was to have brain MRI tmw as outpt and asking if this can be done as inpt since still here. States doing ok otherwise. No issues overnight. Prior Hx for record:   
PMH as below who presented to the ED with shortness of breath which has been increasing in the last 3 weeks. She had a CT Sim with Radiation Oncology on 1/27/2020 had an episode of desaturation down to 80% on exertion. On review of the CT, Dr. Ethan Peralta discovered a significant pleural effusion and called the patient to come to the ED for further assessment. Patient is sitting up in bed on oxygen via nasal cannula. Patient states her shortness of breath and cough have been worsening the last few days. Denies fevers or chills. Discussed current plan to hold lovenox, perform CT, schedule ultrasound guided thoracentesis, send fluid for path. Patient and family verbalized understanding. Oncologic history  presented in [facility] Dec 2019 with c/o increasing swelling of face , neck and arms along with SOB. Her problems started with a choking sensation and head tightness in 2019. She initially saw ENT and was to see Pulmonary. However she presented to the Interim. She had imaging as above that showed SVC thrombus and mediastinal mass. Subsequently had a biopsy and diagnosis as above. Was placed on Lovenox and had a mechanical thrombectomy of the SVC thrombus  on 2020. Plans were to start ChemoRT for stage III NSCLC She is a current smoker Brother and father  of lung cancer Past Medical History:  
Diagnosis Date  CAD (coronary artery disease)  Chronic obstructive pulmonary disease ([facility] [address]  Hypertension Past Surgical History:  
Procedure Laterality Date  HX ABDOMINAL LAPAROSCOPY    
 lots of intenstine removed/ lysis of adhesions  HX CAROTID STENT    HX HYSTERECTOMY  HX TONSIL AND ADENOIDECTOMY  IR BRONCH W LUNG BIOPSY SINGLE LOBE  2019 Social History Tobacco Use  Smoking status: Former Smoker Packs/day: 0.50 Last attempt to quit: 2019 Years since quittin.1  Smokeless tobacco: Never Used Substance Use Topics  Alcohol use: Not Currently Comment: occ Current Facility-Administered Medications Medication Dose Route Frequency  atenoloL (TENORMIN) tablet 50 mg  50 mg Oral DAILY  furosemide (LASIX) tablet 40 mg  40 mg Oral DAILY  HYDROcodone-acetaminophen (NORCO) 5-325 mg per tablet 1 Tab  1 Tab Oral Q4H PRN  
 hydroxyzine HCL (ATARAX) tablet 10 mg  10 mg Oral TID PRN  potassium chloride SR (KLOR-CON 10) tablet 20 mEq  20 mEq Oral DAILY  losartan (COZAAR) tablet 100 mg  100 mg Oral DAILY  montelukast (SINGULAIR) tablet 10 mg  10 mg Oral DAILY  albuterol-ipratropium (DUO-NEB) 2.5 MG-0.5 MG/3 ML  3 mL Nebulization Q4H PRN  
 sodium chloride (NS) flush 5-40 mL  5-40 mL IntraVENous Q8H  
  sodium chloride (NS) flush 5-40 mL  5-40 mL IntraVENous PRN  piperacillin-tazobactam (ZOSYN) 3.375 g in 0.9% sodium chloride (MBP/ADV) 100 mL  3.375 g IntraVENous Q8H Allergies Allergen Reactions  Codeine Itching Review of Systems: A complete review of systems was obtained, negative except as described above. Physical Exam:  
 
Visit Vitals /78 (BP 1 Location: Right arm, BP Patient Position: At rest) Pulse 72 Temp 98.5 °F (36.9 °C) Resp 18 SpO2 99% ECOG PS: 1-2 General: frail, appearing in no acute distress on 02 Eyes: PERRL, anicteric sclerae HENT: Atraumatic Neck: Supple MS: Moves all extremities,  
Skin: ecchymoses to bilateral forearms, No rash or petechiae. Normal temperature, turgor, and texture. Psych: Alert, oriented, appropriate affect, normal judgment/insight Results:  
 
Lab Results Component Value Date/Time WBC 15.5 (H) 02/02/2020 02:15 AM  
 HGB 11.2 (L) 02/02/2020 02:15 AM  
 HCT 34.4 (L) 02/02/2020 02:15 AM  
 PLATELET 240 (H) 92/67/4780 02:15 AM  
 MCV 97.7 02/02/2020 02:15 AM  
 ABS. NEUTROPHILS 12.0 (H) 02/02/2020 02:15 AM  
 
Lab Results Component Value Date/Time Sodium 134 (L) 02/01/2020 02:20 AM  
 Potassium 4.3 02/01/2020 02:20 AM  
 Chloride 103 02/01/2020 02:20 AM  
 CO2 30 02/01/2020 02:20 AM  
 Glucose 93 02/01/2020 02:20 AM  
 BUN 19 02/01/2020 02:20 AM  
 Creatinine 0.84 02/01/2020 02:20 AM  
 GFR est AA >60 02/01/2020 02:20 AM  
 GFR est non-AA >60 02/01/2020 02:20 AM  
 Calcium 9.1 02/01/2020 02:20 AM  
 
Lab Results Component Value Date/Time Bilirubin, total 0.2 02/01/2020 02:20 AM  
 ALT (SGPT) 20 02/01/2020 02:20 AM  
 AST (SGOT) 16 02/01/2020 02:20 AM  
 Alk. phosphatase 104 02/01/2020 02:20 AM  
 Protein, total 6.7 02/01/2020 02:20 AM  
 Albumin 2.6 (L) 02/01/2020 02:20 AM  
 Globulin 4.1 (H) 02/01/2020 02:20 AM  
 
 
Records reviewed and summarized above. Pathology report(s) reviewed above. Radiology report(s) reviewed above. PET CT 
IMPRESSION IMPRESSION:  
1. RUL malignancy. 2. Mediastinal griffin metastasis. 3. Likely subcentimeter right retroclavicular griffin metastasis. Ct 1/31/2020 Not reviewed but noted a moderate R effusion Assessment/PLAN:  
1) NSCLC- Right R lung mass, mediastinal adenopathy, SVC tumor thrombus and a possible R retroclavicular node 
T1N3M0-Stage IIIC Poorly differentiated adenocarcinoma Molecular studies - EGFR negative, PD-L1 is 80%, others pending Scans and pathology reviewed in thoracic tumor board Tumor is unresectable In general the 5 year OS for stage IIIC NSCLC is 13-20% I recommended Radiation with concurrent Carboplatin and taxol given weekly However she now has a new R effusion A malignant effusion needs to be ruled out as that would upstage her to stage IV and will alter management. If that turns out to be the case I will discuss palliative Keytruda and palliatIVE rt Pt seen today on call hospital f/u for effusion Still pending thoracentesis. Send cytology. Pt was to have outp brain MRI tmw. Can do while here. Pt clinically stable. Has SOB on 02.  
 
2) Shortness of Breath Pleural effusion per [facility] note on 1/27 CT reviewed and noted for a new R moderate effusion Please hold Lovenox Thereafter suggest diagnostic and therapeutic thoracentecis Need cytology. 3)Hypoxia Desaturation to 80% at [facility] visit with walking. On 2L nasal cannula at rest. 
Patient will need to be set up for home oxygen before discharge. 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 5)COPD On albuterol, montelukast, and budesonide. Management per primary team 
 
6)CAD Management per primary team 
 
7)Psychosocial  
Lives in [address] [place] [address] at bedside Plan: · CT Chest ordered and reviewed- R moderate effusion · Thoracentesis still pending ·  Please Obtain Cytology · Hold lovenox for thoracentesis tonight and tomorrow morning. Resume post procedure · Patient will need home oxygen before discharge · Will need outpatient follow-up next week after discharge. Call if questions over weekend. We will follow F/u with Dr Mario Burt I appreciate the opportunity to participate in Ms. Joel Adame's care.  
 
 
Signed By: Eather Homans, DO

## 2020-02-02 NOTE — PROGRESS NOTES
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 Her shortness of breath is worse with exertion. She has also associated cough, on and off productive, but no fever, left-sided chest pain, palpitation, sweating, nausea, vomiting, abdominal pain, urinary complaint or abnormal bowel movement. She has on and off right upper extremity and facial swelling for which she takes Lasix. She was evaluated by her oncologist and he had CT scan on 01/30/2020, and she was found to have right pleural effusion. She was also seeing radiation oncologist and she was advised to come to [facility] for right thoracentesis. 1/31/2020 Assessment/Plan: 
Right pleural effusion likely malignant.  
- CT chest: Moderately large right pleural effusion, new since the prior study. - will hold her Lovenox - plan for ultrasound-guided thoracentesis today. Still pending  
- saturating on 2l NC , will try to wean off after the procedure . May need home oxygen Right lung non-small cell lung cancer, stage III, status post chemoradiation.   
- Oncologist on board. - CT chest: Significant increase in size of a right upper lobe parenchymal mass since the prior study. 
- MRI brain ordered to r/o mets ( planned for outpt on Monday ) Leukocytosis 17.3 on poa - worsened to day 15.5  
- possibly due to right pleural effusion 
- less suspicion for infection 
- will c/w IV Zosyn for now Hyponatremia, likely related to her non-small-cell lung cancer. 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 -  will resume Lovenox 40 mg q.12 hrs after thoracentesis. CAD s/p stent, stable. Continue home medication, atenolol. HTN  Blood pressure stable. Continue home medication losartan and atenolol. Code status: Full. Son is EMA 
DVT prophylaxis: Lovenox on hold for procedure Disposition: home when ready. ---------------------------------------------- 
 
CC: Pleural effusion S: Patient is seen and examined at bedside. No overnight events or new complaints. She still waiting for thoracentesis. Pt requesting MRI brain to be done inpatient as she is scheduled for tomorrow as outpt Review of Systems: A comprehensive review of systems was negative. O: 
Visit Vitals /78 (BP 1 Location: Right arm, BP Patient Position: At rest) Pulse 72 Temp 98.5 °F (36.9 °C) Resp 18 SpO2 99% PHYSICAL EXAM: 
Gen: NAD, ill looking HEENT: anicteric sclerae, normal conjunctiva, oropharynx clear, MM moist 
Neck: supple, trachea midline, no adenopathy Heart: RRR, no MRG, no JVD, no peripheral edema Lungs: decreased breath sounds on right side Abd: soft, NT, ND, BS+, no organomegaly Extr: warm Skin: dry, no rash Neuro: CN II-XII grossly intact, normal speech, moves all extremities Psych: normal mood, appropriate affect No intake or output data in the 24 hours ending 02/02/20 1226 Recent labs & imaging reviewed: 
Recent Results (from the past 24 hour(s)) CBC WITH AUTOMATED DIFF Collection Time: 02/02/20  2:15 AM  
Result Value Ref Range WBC 15.5 (H) 3.6 - 11.0 K/uL  
 RBC 3.52 (L) 3.80 - 5.20 M/uL  
 HGB 11.2 (L) 11.5 - 16.0 g/dL HCT 34.4 (L) 35.0 - 47.0 % MCV 97.7 80.0 - 99.0 FL  
 MCH 31.8 26.0 - 34.0 PG  
 MCHC 32.6 30.0 - 36.5 g/dL  
 RDW 13.3 11.5 - 14.5 % PLATELET 146 (H) 226 - 400 K/uL MPV 9.4 8.9 - 12.9 FL  
 NRBC 0.0 0  WBC ABSOLUTE NRBC 0.00 0.00 - 0.01 K/uL NEUTROPHILS 77 (H) 32 - 75 % LYMPHOCYTES 13 12 - 49 % MONOCYTES 8 5 - 13 % EOSINOPHILS 1 0 - 7 % BASOPHILS 1 0 - 1 % IMMATURE GRANULOCYTES 0 0.0 - 0.5 % ABS. NEUTROPHILS 12.0 (H) 1.8 - 8.0 K/UL  
 ABS. LYMPHOCYTES 2.0 0.8 - 3.5 K/UL  
 ABS. MONOCYTES 1.2 (H) 0.0 - 1.0 K/UL  
 ABS. EOSINOPHILS 0.1 0.0 - 0.4 K/UL  
 ABS. BASOPHILS 0.1 0.0 - 0.1 K/UL  
 ABS. IMM. GRANS. 0.1 (H) 0.00 - 0.04 K/UL  
 DF AUTOMATED Recent Labs 02/02/20 0215 02/01/20 
9763 WBC 15.5* 13.6* HGB 11.2* 11.2* HCT 34.4* 35.3 * 461* Recent Labs 02/01/20 0220 01/31/20 
1139 * 133* K 4.3 3.5  99 CO2 30 32 BUN 19 18 CREA 0.84 0.78 GLU 93 97  
CA 9.1 9.2 Recent Labs 02/01/20 0220 01/31/20 
1139 SGOT 16 17 ALT 20 21  118* TBILI 0.2 0.3 TP 6.7 7.5 ALB 2.6* 3.1*  
GLOB 4.1* 4.4* No results for input(s): INR, PTP, APTT, INREXT, INREXT in the last 72 hours. No results for input(s): FE, TIBC, PSAT, FERR in the last 72 hours. No results found for: FOL, RBCF No results for input(s): PH, PCO2, PO2 in the last 72 hours. No results for input(s): CPK, CKNDX, TROIQ in the last 72 hours. No lab exists for component: CPKMB No results found for: CHOL, CHOLX, CHLST, CHOLV, HDL, HDLP, LDL, LDLC, DLDLP, TGLX, TRIGL, TRIGP, CHHD, CHHDX No results found for: [facility] No results found for: COLOR, APPRN, SPGRU, REFSG, JOE, PROTU, GLUCU, KETU, BILU, UROU, BLACK, LEUKU, GLUKE, EPSU, BACTU, WBCU, RBCU, CASTS, UCRY Med list reviewed Current Facility-Administered Medications Medication Dose Route Frequency  atenoloL (TENORMIN) tablet 50 mg  50 mg Oral DAILY  furosemide (LASIX) tablet 40 mg  40 mg Oral DAILY  HYDROcodone-acetaminophen (NORCO) 5-325 mg per tablet 1 Tab  1 Tab Oral Q4H PRN  
 hydroxyzine HCL (ATARAX) tablet 10 mg  10 mg Oral TID PRN  potassium chloride SR (KLOR-CON 10) tablet 20 mEq  20 mEq Oral DAILY  losartan (COZAAR) tablet 100 mg  100 mg Oral DAILY  montelukast (SINGULAIR) tablet 10 mg  10 mg Oral DAILY  albuterol-ipratropium (DUO-NEB) 2.5 MG-0.5 MG/3 ML  3 mL Nebulization Q4H PRN  
 sodium chloride (NS) flush 5-40 mL  5-40 mL IntraVENous Q8H  
 sodium chloride (NS) flush 5-40 mL  5-40 mL IntraVENous PRN  piperacillin-tazobactam (ZOSYN) 3.375 g in 0.9% sodium chloride (MBP/ADV) 100 mL  3.375 g IntraVENous Q8H Care Plan discussed with:  Patient/Family and Nurse Chelsie Mai MD 
Internal Medicine Date of Service: 2/2/2020

## 2020-02-03 NOTE — CONSULTS
Neuro consult completed. Dictated note to follow. Pt with MRI for met screening, incidental tiny right temporal ischemic infarct seen. Pt with multiple ischemic stroke risk factors and embolic risk factor of hypercoagulable state and prior SVC thrombus, off Lovenox for several days in preparation for thoracentesis. Exam is non-focal. Possible small vessel infarct vs emboli, though no other strokes seen. Will order stroke risk factor eval- echo with bubble, CD, lipid profile, HgbA1C. Restart OAC and/or ASA 81mg as soon as possible.

## 2020-02-03 NOTE — PROGRESS NOTES
NUTRITION COMPLETE ASSESSMENT 
 
RECOMMENDATIONS:  
 
Continue regular diet. Family to bring in Boost supplements from home May benefit from appetite stimulant - defer to MD 
 
  
Interventions/Plan:  
Food/Nutrient Delivery:  General/healthful diet Commercial supplement Assessment:  
Reason for Assessment: MST - wt loss Diet: Regular Supplements: none Meal Intake: No data found. Subjective: UBW 77-80 lb. Never weighed more than 100 lb my whole life. My mother and sister were same way. Appetite is poor, but it is always poor. Had a lot of stomach issues over the years. Had total hysterectomy in the 80s and then a lot of follow up surgeries to [id number] adhesions afterwards - each time removing some of my bowel. Never been told I had short gut. BMs are normal.  No recent weight loss. Never able to gain much. Don't like Ensure. Drink Boost at home once/day. Objective: 
69 y.o. female admitted with Exertional dyspnea [R06.09] Pt has a past medical history of CAD (coronary artery disease), Chronic obstructive pulmonary disease ([address] and Hypertension. Pt recently dx with stage 3 non-small cell lung cancer (Dec 2019). She has not yet begun recommended treatment of chemo and radiation. When she had CT Sim with Radiation Oncology on 1/27/20 pt noted to have PLEF and was recommended to come to ED. Pt is s/p thoracentesis on 2/2/20. Awaiting pleural fluid cytology (? Malignant) before starting chemo/radiation treatment as if +malignancy, will upstage to stage IV lung CA and treatment would change. MRI for met screening this admission showed incidental tiny R temporal ischemic infarct. Neurology on-board. Wt Readings from Last 20 Encounters:  
02/03/20 36.3 kg (80 lb) 02/02/20 36.3 kg (80 lb)  
01/21/20 37.4 kg (82 lb 8 oz) 01/15/20 37.7 kg (83 lb 1.8 oz) 01/13/20 34.9 kg (77 lb) 12/20/19 38 kg (83 lb 12.4 oz) 12/15/19 38.3 kg (84 lb 7 oz) 12/07/19 37.6 kg (82 lb 14.3 oz)  
11/16/13 39.4 kg (86 lb 13.8 oz) Nutritionally Significant Medications:  
Lasix, Norco, Cozaar, Zosyn, Klor Intake/Output: 
No intake or output data in the 24 hours ending 02/03/20 1634 Last BM: PTA Physical Findings: 
 
Nutrition focused physical exam: not performed, however face did not appear wasted as you would expect for someone with BMI of 14 Edema: none Abdomen: WDL Skin Integrity: intact Anthropometrics: 
Weight Loss Metrics 2/3/2020 1/31/2020 1/21/2020 1/15/2020 1/14/2020 1/13/2020 12/20/2019 Today's Wt 80 lb - 82 lb 8 oz 83 lb 1.8 oz - 77 lb 83 lb 12.4 oz BMI - 14.63 kg/m2 15.09 kg/m2 - 15.2 kg/m2 14.08 kg/m2 - Height: 5' 2\" (157.5 cm), Body mass index is 14.63 kg/m². IBW : 50 kg (110 lb 3.7 oz), % IBW (Calculated): 72.58 % Usual Body Weight: 36.3 kg (80 lb),   
 
Labs:   
 
Recent Labs 02/03/20 
3381 02/01/20 
3473 GLU 96 93 BUN 15 19 CREA 0.86 0.84  134* K 3.4* 4.3  
 103 CO2 31 30 CA 8.7 9.1 Recent Labs 02/01/20 
0220 SGOT 16 ALT 20  TBILI 0.2 TP 6.7 ALB 2.6*  
GLOB 4.1* No results for input(s): LAC in the last 72 hours. Recent Labs 02/03/20 
9075 02/02/20 
0215 WBC 15.3* 15.5* HGB 11.7 11.2* HCT 35.4 34.4*  
* 449* No results for input(s): PREALB in the last 72 hours. No results for input(s): TRIGL in the last 72 hours. No results for input(s): GLUCPOC in the last 72 hours. No results found for: HBA1C, HGBE8, BFE3XWDY, JJK2ESLJ, FZK1IJAH Cultural, Sikh and ethnic food preferences identified: None Food Allergies: NKFA Diet Restrictions: Cultural/Zoroastrianism Preference(s): None Estimated Nutrition Needs:  
Kcals/day: 1300 Kcals/day(or more) Protein: 45 g(1.2+ gm/kg) Fluid: 1500 ml(older adult) Based On: Kcal/kg - specify (Comment)(35+ kcal/day) Weight Used: Actual wt(36.3 kg) Pt expected to meet estimated nutrient needs:  Unable to predict at this time Nutrition Diagnosis:  
1. Underweight related to normal body habitus + probable inadequate oral intake as evidenced by BMI of 14.6; 73% IBW 2.   related to   as evidenced by 3.   related to   as evidenced by   
 
Goals:   
 PO >50% of meals with at least 1 supplements (from home) daily Monitoring & Evaluation: Total energy intake, Protein intake Electrolyte and renal profile, GI, Glucose profile, Weight/weight change Behavior Previous Nutrition Goals Met:   N/A Previous Recommendations:    N/A Education & Discharge Needs: 
 [] None Identified 
 [x] Identified and addressed - continue supplements [x] Participated in care plan, discharge planning, and/or interdisciplinary rounds Leah Thomas RD

## 2020-02-03 NOTE — PROGRESS NOTES
[facility] [place] [address] [address], [address] W: [phone]  F: [phone] Reason for Consult:  
Dondra Severe is a 69 y.o. female who is seen in consultation at the request of Dr. Gaurav Ross for evaluation of shortness of breath in setting of NSCLC. Treatment History:  
· 12/15/2019-CTA showed right upper lobe lung nodule measuring 2.1 x 1.4 cm, occluded SVC with adjustment mediastinal soft tissue mass. The whole thing measuring approximately 2.3 to 1.8 cm. · 2020-EBUS and biopsy of mediastinal mass showed poorly differentiated adenocarcinoma · 2020-PET scan showed right upper lobe lung nodule with an SUV of 20, right mediastinal adenopathy with SUV of 16, right retrocaval clavicular subcentimeter focus with an SUV of 3.2 History of Present Illness:  
Patient is a 69 y.o. female seen today for hospital f/u for lung cancer and new pleural effusion. Pt is post thoracentesis with good effect. Pt is off 02 and less SOB. Pt is ambulatory. States wants to go home. Had brain MRI and this report is pending. Oncologic history Se presented in [facility] Dec 2019 with c/o increasing swelling of face , neck and arms along with SOB. Her problems started with a choking sensation and head tightness in 2019. She initially saw ENT and was to see Pulmonary. However she presented to the Interim. She had imaging as above that showed SVC thrombus and mediastinal mass. Subsequently had a biopsy and diagnosis as above. Was placed on Lovenox and had a mechanical thrombectomy of the SVC thrombus  on 2020. Plans were to start ChemoRT for stage III NSCLC She is a current smoker Brother and father  of lung cancer Interval History:  
Patient sitting up in bed on arrival. Her breathing is much improved after procedure and patient states cough is almost completely gone. Denies dyspnea or chest pain.  She states she is not eating much but has not been in past. Discussed her fear of losing independence with oncologic treatment. Discussed that it is important to take it one day at a time and she can decide to change treatment at any time. Patient has f/u appt next week to discuss pleural fluid cytology and discuss treatment. Past Medical History:  
Diagnosis Date  CAD (coronary artery disease)  Chronic obstructive pulmonary disease ([facility] [address]  Hypertension Past Surgical History:  
Procedure Laterality Date  HX ABDOMINAL LAPAROSCOPY    
 lots of intenstine removed/ lysis of adhesions  HX CAROTID STENT    HX HYSTERECTOMY  HX TONSIL AND ADENOIDECTOMY  IR BRONCH W LUNG BIOPSY SINGLE LOBE  2019 Social History Tobacco Use  Smoking status: Former Smoker Packs/day: 0.50 Last attempt to quit: 2019 Years since quittin.1  Smokeless tobacco: Never Used Substance Use Topics  Alcohol use: Not Currently Comment: occ Current Facility-Administered Medications Medication Dose Route Frequency  LORazepam (ATIVAN) injection 1 mg  1 mg IntraVENous ONCE  
 atenoloL (TENORMIN) tablet 50 mg  50 mg Oral DAILY  furosemide (LASIX) tablet 40 mg  40 mg Oral DAILY  HYDROcodone-acetaminophen (NORCO) 5-325 mg per tablet 1 Tab  1 Tab Oral Q4H PRN  
 hydroxyzine HCL (ATARAX) tablet 10 mg  10 mg Oral TID PRN  potassium chloride SR (KLOR-CON 10) tablet 20 mEq  20 mEq Oral DAILY  losartan (COZAAR) tablet 100 mg  100 mg Oral DAILY  montelukast (SINGULAIR) tablet 10 mg  10 mg Oral DAILY  albuterol-ipratropium (DUO-NEB) 2.5 MG-0.5 MG/3 ML  3 mL Nebulization Q4H PRN  
 sodium chloride (NS) flush 5-40 mL  5-40 mL IntraVENous Q8H  
 sodium chloride (NS) flush 5-40 mL  5-40 mL IntraVENous PRN  piperacillin-tazobactam (ZOSYN) 3.375 g in 0.9% sodium chloride (MBP/ADV) 100 mL  3.375 g IntraVENous Q8H Allergies Allergen Reactions  Codeine Itching Review of Systems: A complete review of systems was obtained, negative except as described above. Physical Exam:  
 
Visit Vitals /76 (BP 1 Location: Right arm, BP Patient Position: At rest) Pulse 75 Temp 97.9 °F (36.6 °C) Resp 18 Ht 5' 2\" (1.575 m) SpO2 96% BMI 15.09 kg/m² ECOG PS: 1-2 General: frail, no acute distress Eyes: PERRL, anicteric sclerae HENT: Atraumatic Neck: Supple Resp: CTA 
MS: Moves all extremities,  
Skin: ecchymoses to bilateral forearms, No rash or petechiae. Normal temperature, turgor, and texture. Psych: Alert, oriented, appropriate affect, normal judgment/insight Results:  
 
Lab Results Component Value Date/Time WBC 15.3 (H) 02/03/2020 03:36 AM  
 HGB 11.7 02/03/2020 03:36 AM  
 HCT 35.4 02/03/2020 03:36 AM  
 PLATELET 375 (H) 49/69/1920 03:36 AM  
 MCV 95.9 02/03/2020 03:36 AM  
 ABS. NEUTROPHILS 11.5 (H) 02/03/2020 03:36 AM  
 
Lab Results Component Value Date/Time Sodium 137 02/03/2020 03:36 AM  
 Potassium 3.4 (L) 02/03/2020 03:36 AM  
 Chloride 103 02/03/2020 03:36 AM  
 CO2 31 02/03/2020 03:36 AM  
 Glucose 96 02/03/2020 03:36 AM  
 BUN 15 02/03/2020 03:36 AM  
 Creatinine 0.86 02/03/2020 03:36 AM  
 GFR est AA >60 02/03/2020 03:36 AM  
 GFR est non-AA >60 02/03/2020 03:36 AM  
 Calcium 8.7 02/03/2020 03:36 AM  
 
Lab Results Component Value Date/Time Bilirubin, total 0.2 02/01/2020 02:20 AM  
 ALT (SGPT) 20 02/01/2020 02:20 AM  
 AST (SGOT) 16 02/01/2020 02:20 AM  
 Alk. phosphatase 104 02/01/2020 02:20 AM  
 Protein, total 6.7 02/01/2020 02:20 AM  
 Albumin 2.6 (L) 02/01/2020 02:20 AM  
 Globulin 4.1 (H) 02/01/2020 02:20 AM  
 
 
Records reviewed and summarized above. Pathology report(s) reviewed above. Radiology report(s) reviewed above. PET CT 
IMPRESSION IMPRESSION:  
1. RUL malignancy. 2. Mediastinal griffin metastasis. 3. Likely subcentimeter right retroclavicular griffin metastasis. Ct 1/31/2020 IMPRESSION: 
  1. Moderately large right pleural effusion, new since the prior study. 2. Significant increase in size of a right upper lobe parenchymal mass since the 
prior study. 3. Stable emphysematous change. 4. Probably stable pretracheal lymphadenopathy. The superior vena cava cannot be 
assessed without contrast. 
4. Small pericardial effusion, new. 5. Hypodense poorly circumscribed area in the left hepatic lobe on axial 
imaging, not confirmed on reformatted images. Therefore, this may be 
artifactual.   
6. Stable right hepatic lobe cyst and other incidental findings. Michael Pinto MRI Brain 2/3/2020 IMPRESSION: 
1. Small right temporal lobe periventricular white matter acute lacunar infarct. 2. Chronic-appearing multifocal white matter T2 hyperintensity possibly related 
to chronic small vessel ischemia. 3. MRI was specifically ordered without contrast and therefore sensitivity for 
evaluating cerebral metastasis from patient's known small cell carcinoma the 
lung is less than optimal. MRI performed as ordered as confirmed by the [facility] 
service M.D. Assessment/PLAN:  
1) NSCLC- Right R lung mass, mediastinal adenopathy, SVC tumor thrombus and a possible R retroclavicular node 
T1N3M0-Stage IIIC Poorly differentiated adenocarcinoma Molecular studies - EGFR negative, PD-L1 is 80%, others pending Scans and pathology reviewed in thoracic tumor board Tumor is unresectable In general the 5 year OS for stage IIIC NSCLC is 13-20% I recommended Radiation with concurrent Carboplatin and taxol given weekly However she now has a new R effusion A malignant effusion needs to be ruled out as that would upstage her to stage IV and will alter management. If that turns out to be the case I will discuss palliative Keytruda and palliative rt Pt seen today for hospital f/u for effusion Thoracentesis performed. Cytology pending. Pt feeling much better overall. Less SOB. Off 02. Brain MRI for staging showed small infarct --Neurology consulted. Patient clinically stable. Likely d/c soon. Will follow up in clinic next week for further treatment discussion. 2) Shortness of Breath Pleural effusion per [facility] note on 1/27 CT reviewed and noted for a new R moderate effusion S/p thoracentesis on 2/2/2020; drained 400mL fluid Shortness of breath and cough greatly improved. 
 
3)Hypoxia Desaturation to 80% at [facility] visit with walking. On room air in bed today. Evaluate for oxygen needs with ambulation. May need to be set up for home oxygen before discharge. 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 Can restart lovenox now. 5)COPD On albuterol, montelukast, and budesonide. Management per primary team 
 
6)CAD Management per primary team 
 
7)Right temporal ischemic infarct Seen on MRI 2/2/2020 Neurology following. 8)Psychosocial  
Lives in [address] [place] [address] losing independence. Family is supportive. Plan: · Thoracentesis completed. Cytology pending. · Resume lovenox. · Evaluate need for home oxygen before discharge · Follow-up appt in clinic on 2/11/2020; will review cytology and discuss treatment plant Pt seen today in conjunction with AZRA Reyes. Case d/w hospitalist. F/u with Dr Kristina Penny after d/c. Call if questions. We will follow I appreciate the opportunity to participate in Ms. Coby Adame's care.  
 
 
Signed By: Caterina Swift NP

## 2020-02-03 NOTE — PROGRESS NOTES
OCCUPATIONAL THERAPY EVALUATION/DISCHARGE Patient: Bubba Barajas (17 y.o. female) Date: 2/3/2020 Primary Diagnosis: Exertional dyspnea [R06.09] Precautions: none ASSESSMENT Based on the objective data described below, the patient presents at modified independent functional baseline using rollator. Maintained SPO2 >94% ambulating to/ from bathroom on RA with no AMBROSE. Patient was receptive to education on fall prevention and energy conservation and was provided with handouts for reinforcement. She demonstrates good insight and safety awareness and does not require additional OT. She lives with her supportive son and has no concerns for completing daily activities at home. Current Level of Function (ADLs/self-care): modified independent Functional Outcome Measure: The patient scored 80/100 on the Barthel Index outcome measure which is indicative of ~20% impairment in functional performance. PLAN : 
 
Recommendation for discharge: (in order for the patient to meet his/her long term goals) No skilled occupational therapy/ follow up rehabilitation needs identified at this time. This discharge recommendation: 
Has not yet been discussed the attending provider and/or case management SUBJECTIVE:  
Patient stated I'm ready to go home.  OBJECTIVE DATA SUMMARY:  
HISTORY:  
Past Medical History:  
Diagnosis Date  CAD (coronary artery disease)  Chronic obstructive pulmonary disease ([facility] [address]  Hypertension Past Surgical History:  
Procedure Laterality Date  HX ABDOMINAL LAPAROSCOPY    
 lots of intenstine removed/ lysis of adhesions  HX CAROTID STENT  2003  HX HYSTERECTOMY  HX TONSIL AND ADENOIDECTOMY  IR BRONCH W LUNG BIOPSY SINGLE LOBE  12/2019 Prior Level of Function/Environment/Context: modified independent for ADLs and functional mobility using rollator. Reports no falls.   Son assists with household IADLs. Uses BSC over toilet and SC for seated bathing. Expanded or extensive additional review of patient history:  
Home Situation Home Environment: Private residence # Steps to Enter: 3 Rails to Enter: Yes Hand Rails : Bilateral 
One/Two Story Residence: One story Living Alone: No 
Support Systems: Child(buck) Patient Expects to be Discharged to[de-identified] Private residence Current DME Used/Available at Home: Grab bars, Shower chair, Walker, rollator, Commode, bedside, Hospital bed EXAMINATION OF PERFORMANCE DEFICITS: 
Cognitive/Behavioral Status: 
Neurologic State: Alert Orientation Level: Oriented X4 Cognition: Appropriate decision making; Appropriate for age attention/concentration; Appropriate safety awareness; Follows commands Perception: Appears intact Perseveration: No perseveration noted Safety/Judgement: Awareness of environment; Insight into deficits Skin: visible skin appears intact Edema: none noted Hearing: Auditory Auditory Impairment: Hard of hearing, bilateral 
 
Vision/Perceptual:   
    
    
    
  
    
Acuity: Within Defined Limits Corrective Lenses: Glasses Range of Motion: 
 
AROM: Within functional limits Strength: 
 
Strength: Generally decreased, functional 
  
  
  
  
 
Coordination: 
Coordination: Within functional limits Tone & Sensation: 
 
Tone: Normal 
Sensation: Intact Balance: 
Sitting: Intact Standing: Intact; With support(rollator) Functional Mobility and Transfers for ADLs: 
Bed Mobility: 
Supine to Sit: Modified independent Transfers: 
Sit to Stand: Modified independent Stand to Sit: Modified independent Bed to Chair: Modified independent Toilet Transfer : Modified independent ADL Assessment: 
Feeding: Independent(inferred) Oral Facial Hygiene/Grooming: Modified Independent(washed hands standing at sink) Bathing: Modified independent(inferred) Upper Body Dressing: Modified independent(inferred) Lower Body Dressing: Modified independent(inferred) Toileting: Modified independent(managed clothing and bladder hygiene) ADL Intervention and task modifications: 
  
Cognitive Retraining Safety/Judgement: Awareness of environment; Insight into deficits Functional Measure: 
Barthel Index: 
 
Bathin Bladder: 10 Bowels: 10 
Groomin Dressing: 10 Feeding: 10 Mobility: 0 Stairs: 5 Toilet Use: 10 Transfer (Bed to Chair and Back): 15 Total: 80/100 The Barthel ADL Index: Guidelines 1. The index should be used as a record of what a patient does, not as a record of what a patient could do. 2. The main aim is to establish degree of independence from any help, physical or verbal, however minor and for whatever reason. 3. The need for supervision renders the patient not independent. 4. A patient's performance should be established using the best available evidence. Asking the patient, friends/relatives and nurses are the usual sources, but direct observation and common sense are also important. However direct testing is not needed. 5. Usually the patient's performance over the preceding 24-48 hours is important, but occasionally longer periods will be relevant. 6. Middle categories imply that the patient supplies over 50 per cent of the effort. 7. Use of aids to be independent is allowed. Kourtney Stanley., Barthel, D.W. ([id number]). Functional evaluation: the Barthel Index. [facility] (14)2. MY Hernandez, Kaley Dan., Alma Bland., [place], [address] [place] [address] (). Measuring the change indisability after inpatient rehabilitation; comparison of the responsiveness of the Barthel Index and Functional [place] Measure. Journal of Neurology, Neurosurgery, and Psychiatry, 66(4), 804-617.  
Priyanka Trevino, N.J.A, JACK Freitas, Nahomy Nguyen MAydinA. (2004.) Assessment of post-stroke quality of life in cost-effectiveness studies: The usefulness of the Barthel Index and the EuroQoL-5D. [facility], 13, [phone] Occupational Therapy Evaluation Charge Determination History Examination Decision-Making LOW Complexity : Brief history review  LOW Complexity : 1-3 performance deficits relating to physical, cognitive , or psychosocial skils that result in activity limitations and / or participation restrictions  MEDIUM Complexity : Patient may present with comorbidities that affect occupational performnce. Miniml to moderate modification of tasks or assistance (eg, physical or verbal ) with assesment(s) is necessary to enable patient to complete evaluation Based on the above components, the patient evaluation is determined to be of the following complexity level: LOW Pain Rating: 
Patient did not report pain Activity Tolerance:  
Good After treatment patient left in no apparent distress:   
Sitting in chair and Call bell within reach COMMUNICATION/EDUCATION:  
The patients plan of care was discussed with: Physical Therapist and Registered Nurse. Thank you for this referral. 
Shanita Escamilla OT Time Calculation: 20 mins

## 2020-02-03 NOTE — PROGRESS NOTES
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 Her shortness of breath is worse with exertion. She has also associated cough, on and off productive, but no fever, left-sided chest pain, palpitation, sweating, nausea, vomiting, abdominal pain, urinary complaint or abnormal bowel movement. She has on and off right upper extremity and facial swelling for which she takes Lasix. She was evaluated by her oncologist and he had CT scan on 01/30/2020, and she was found to have right pleural effusion. She was also seeing radiation oncologist and she was advised to come to [facility] for right thoracentesis. 1/31/2020 Assessment/Plan: 
Right pleural effusion likely malignant.  
- CT chest: Moderately large right pleural effusion, new since the prior study. - ultrasound-guided thoracentesis done on 2/2: 400ml fluid removed. Cytology pending 
- weaned off oxygen. Saturating well on RA. Passed 6 min walk test 
 
Right lung non-small cell lung cancer, stage III, status post chemoradiation.   
- Oncologist on board. - CT chest: Significant increase in size of a right upper lobe parenchymal mass since the prior study. 
- MRI brain ordered to r/o mets ( planned for outpt on Monday ) 
- IR recommending contrast study to complete study to r/o mets 
- discussed with radiation oncology Dr Huey Sumner who agreed to MRI with contrast 
- pt refusing MRI today as she was claustrophobic yesterday and ativan did not help her. - Anesthesiology consulted for MRI sedation Acute small CVA 
- pt asymptomatic - MRI: Small right temporal lobe periventricular white matter acute lacunar infarct. - neurology on board  
- stroke work up ordered: lipid panel,  A1c and echo Leukocytosis 17.3 on poa - improved to 15.3  
- possibly due to right pleural effusion 
- less suspicion for infection 
- will c/w IV Zosyn for now Hyponatremia, likely related to her non-small-cell lung cancer. 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 -  restarted Lovenox 40 mg q.12 hrs CAD s/p stent, stable. Continue home medication, atenolol. HTN  Blood pressure stable. Continue home medication losartan and atenolol. Code status: Full. Son is NOK 
DVT prophylaxis: Lovenox Disposition: home when ready. Possible dc tomorrow  
---------------------------------------------- 
 
CC: Pleural effusion S: Patient is seen and examined at bedside. Pt had thoracentesis yesterday and tolerated well. Explained regarding MRI report regarding small stroke. Pt has no weakness, slurred speech or facial droop. Patient does not want to do MRI with contrast and requesting complete sedation Review of Systems: A comprehensive review of systems was negative. O: 
Visit Vitals /76 (BP 1 Location: Right arm, BP Patient Position: At rest) Pulse 75 Temp 97.9 °F (36.6 °C) Resp 18 Ht 5' 2\" (1.575 m) Wt 36.3 kg (80 lb) SpO2 96% BMI 14.63 kg/m² PHYSICAL EXAM: 
Gen: NAD, ill looking HEENT: anicteric sclerae, normal conjunctiva, oropharynx clear, MM moist 
Neck: supple, trachea midline, no adenopathy Heart: RRR, no MRG, no JVD, no peripheral edema Lungs: decreased breath sounds on right side Abd: soft, NT, ND, BS+, no organomegaly Extr: warm Skin: dry, no rash Neuro: CN II-XII grossly intact, normal speech, moves all extremities Psych: normal mood, appropriate affect No intake or output data in the 24 hours ending 02/03/20 [id number] Recent labs & imaging reviewed: 
Recent Results (from the past 24 hour(s)) CBC WITH AUTOMATED DIFF Collection Time: 02/03/20  3:36 AM  
Result Value Ref Range WBC 15.3 (H) 3.6 - 11.0 K/uL  
 RBC 3.69 (L) 3.80 - 5.20 M/uL  
 HGB 11.7 11.5 - 16.0 g/dL HCT 35.4 35.0 - 47.0 % MCV 95.9 80.0 - 99.0 FL  
 MCH 31.7 26.0 - 34.0 PG  
 MCHC 33.1 30.0 - 36.5 g/dL  
 RDW 13.2 11.5 - 14.5 % PLATELET 669 (H) 759 - 400 K/uL MPV 9.4 8.9 - 12.9 FL  
 NRBC 0.0 0  WBC ABSOLUTE NRBC 0.00 0.00 - 0.01 K/uL NEUTROPHILS 75 32 - 75 % LYMPHOCYTES 15 12 - 49 % MONOCYTES 8 5 - 13 % EOSINOPHILS 1 0 - 7 % BASOPHILS 1 0 - 1 % IMMATURE GRANULOCYTES 0 0.0 - 0.5 % ABS. NEUTROPHILS 11.5 (H) 1.8 - 8.0 K/UL  
 ABS. LYMPHOCYTES 2.2 0.8 - 3.5 K/UL  
 ABS. MONOCYTES 1.2 (H) 0.0 - 1.0 K/UL  
 ABS. EOSINOPHILS 0.2 0.0 - 0.4 K/UL  
 ABS. BASOPHILS 0.1 0.0 - 0.1 K/UL  
 ABS. IMM. GRANS. 0.1 (H) 0.00 - 0.04 K/UL  
 DF AUTOMATED METABOLIC PANEL, BASIC Collection Time: 02/03/20  3:36 AM  
Result Value Ref Range Sodium 137 136 - 145 mmol/L Potassium 3.4 (L) 3.5 - 5.1 mmol/L Chloride 103 97 - 108 mmol/L  
 CO2 31 21 - 32 mmol/L Anion gap 3 (L) 5 - 15 mmol/L Glucose 96 65 - 100 mg/dL BUN 15 6 - 20 MG/DL Creatinine 0.86 0.55 - 1.02 MG/DL  
 BUN/Creatinine ratio 17 12 - 20 GFR est AA >60 >60 ml/min/1.73m2 GFR est non-AA >60 >60 ml/min/1.73m2 Calcium 8.7 8.5 - 10.1 MG/DL Recent Labs 02/03/20 
9371 02/02/20 
0215 WBC 15.3* 15.5* HGB 11.7 11.2* HCT 35.4 34.4*  
* 449* Recent Labs 02/03/20 
7051 02/01/20 
4923  134* K 3.4* 4.3  
 103 CO2 31 30 BUN 15 19 CREA 0.86 0.84 GLU 96 93 CA 8.7 9.1 Recent Labs 02/01/20 
0220 SGOT 16 ALT 20  TBILI 0.2 TP 6.7 ALB 2.6*  
GLOB 4.1*  
 
 No results for input(s): INR, PTP, APTT, INREXT, INREXT in the last 72 hours. No results for input(s): FE, TIBC, PSAT, FERR in the last 72 hours. No results found for: FOL, RBCF No results for input(s): PH, PCO2, PO2 in the last 72 hours. No results for input(s): CPK, CKNDX, TROIQ in the last 72 hours. No lab exists for component: CPKMB No results found for: CHOL, CHOLX, CHLST, CHOLV, HDL, HDLP, LDL, LDLC, DLDLP, TGLX, TRIGL, TRIGP, CHHD, CHHDX No results found for: [facility] No results found for: COLOR, APPRN, SPGRU, REFSG, JOE, PROTU, GLUCU, KETU, BILU, UROU, BLACK, LEUKU, GLUKE, EPSU, BACTU, WBCU, RBCU, CASTS, UCRY Med list reviewed Current Facility-Administered Medications Medication Dose Route Frequency  LORazepam (ATIVAN) injection 1 mg  1 mg IntraVENous ONCE  
 enoxaparin (LOVENOX) injection 40 mg  40 mg SubCUTAneous Q12H  
 atenoloL (TENORMIN) tablet 50 mg  50 mg Oral DAILY  furosemide (LASIX) tablet 40 mg  40 mg Oral DAILY  HYDROcodone-acetaminophen (NORCO) 5-325 mg per tablet 1 Tab  1 Tab Oral Q4H PRN  
 hydroxyzine HCL (ATARAX) tablet 10 mg  10 mg Oral TID PRN  potassium chloride SR (KLOR-CON 10) tablet 20 mEq  20 mEq Oral DAILY  losartan (COZAAR) tablet 100 mg  100 mg Oral DAILY  montelukast (SINGULAIR) tablet 10 mg  10 mg Oral DAILY  albuterol-ipratropium (DUO-NEB) 2.5 MG-0.5 MG/3 ML  3 mL Nebulization Q4H PRN  
 sodium chloride (NS) flush 5-40 mL  5-40 mL IntraVENous Q8H  
 sodium chloride (NS) flush 5-40 mL  5-40 mL IntraVENous PRN  piperacillin-tazobactam (ZOSYN) 3.375 g in 0.9% sodium chloride (MBP/ADV) 100 mL  3.375 g IntraVENous Q8H Care Plan discussed with:  Patient/Family and Nurse Christopher Olsen MD 
Internal Medicine Date of Service: 2/3/2020

## 2020-02-04 NOTE — PROGRESS NOTES
[facility] [place] [address] [address], [address] W: [phone]  F: [phone] Reason for Consult:  
Jorge Corado is a 69 y.o. female who is seen in consultation at the request of Dr. Dalia Sena for evaluation of shortness of breath in setting of NSCLC. Treatment History:  
· 12/15/2019-CTA showed right upper lobe lung nodule measuring 2.1 x 1.4 cm, occluded SVC with adjustment mediastinal soft tissue mass. The whole thing measuring approximately 2.3 to 1.8 cm. · 12/24/2020-EBUS and biopsy of mediastinal mass showed poorly differentiated adenocarcinoma · 1/13/2020-PET scan showed right upper lobe lung nodule with an SUV of 20, right mediastinal adenopathy with SUV of 16, right retrocaval clavicular subcentimeter focus with an SUV of 3.2 History of Present Illness:  
Patient is a 69 y.o. female with PMH as below who presented to the ED with shortness of breath which has been increasing in the last 3 weeks. She had a CT Sim with Radiation Oncology on 1/27/2020 had an episode of desaturation down to 80% on exertion. On review of the CT, Dr. Levar Ro discovered a significant pleural effusion and called the patient to come to the ED for further assessment. She had a thoracentecis on 2/2/2020 Breathing has improved though thinks fluid is coming back. SOme trouble swallowing. No bleeding. Pain fair on [place] 
 
 
Oncologic history Se presented in [facility] Dec 2019 with c/o increasing swelling of face , neck and arms along with SOB. Her problems started with a choking sensation and head tightness in Nov 2019. She initially saw ENT and was to see Pulmonary. However she presented to the Interim. She had imaging as above that showed SVC thrombus and mediastinal mass. Subsequently had a biopsy and diagnosis as above. Was placed on Lovenox and had a mechanical thrombectomy of the SVC thrombus  on 1/14/2020.  Plans were to start ChemoRT for stage III NSCLC 
 
 
 She is a current smoker Brother and father  of lung cancer Past Medical History:  
Diagnosis Date  CAD (coronary artery disease)  Chronic obstructive pulmonary disease ([facility] [address]  History of common carotid artery stent placement  Hypercoagulable state ([facility] [address] SVC thrombus  Hypertension  Pleural effusion Past Surgical History:  
Procedure Laterality Date  HX ABDOMINAL LAPAROSCOPY    
 lots of intenstine removed/ lysis of adhesions  HX CAROTID STENT    HX HYSTERECTOMY  HX TONSIL AND ADENOIDECTOMY  IR BRONCH W LUNG BIOPSY SINGLE LOBE  2019 Social History Tobacco Use  Smoking status: Former Smoker Packs/day: 0.50 Last attempt to quit: 2019 Years since quittin.1  Smokeless tobacco: Never Used Substance Use Topics  Alcohol use: Not Currently Comment: occ Current Facility-Administered Medications Medication Dose Route Frequency  potassium chloride SR (KLOR-CON 10) tablet 40 mEq  40 mEq Oral DAILY  atorvastatin (LIPITOR) tablet 40 mg  40 mg Oral DAILY  [START ON 2020] aspirin chewable tablet 81 mg  81 mg Oral DAILY  enoxaparin (LOVENOX) injection 40 mg  40 mg SubCUTAneous Q12H  
 atenoloL (TENORMIN) tablet 50 mg  50 mg Oral DAILY  furosemide (LASIX) tablet 40 mg  40 mg Oral DAILY  HYDROcodone-acetaminophen (NORCO) 5-325 mg per tablet 1 Tab  1 Tab Oral Q4H PRN  
 hydroxyzine HCL (ATARAX) tablet 10 mg  10 mg Oral TID PRN  
 losartan (COZAAR) tablet 100 mg  100 mg Oral DAILY  montelukast (SINGULAIR) tablet 10 mg  10 mg Oral DAILY  albuterol-ipratropium (DUO-NEB) 2.5 MG-0.5 MG/3 ML  3 mL Nebulization Q4H PRN  
 sodium chloride (NS) flush 5-40 mL  5-40 mL IntraVENous Q8H  
 sodium chloride (NS) flush 5-40 mL  5-40 mL IntraVENous PRN  piperacillin-tazobactam (ZOSYN) 3.375 g in 0.9% sodium chloride (MBP/ADV) 100 mL  3.375 g IntraVENous Q8H Allergies Allergen Reactions  Codeine Itching Review of Systems: A complete review of systems was obtained, negative except as described above. Physical Exam:  
 
Visit Vitals /84 (BP 1 Location: Right arm, BP Patient Position: Sitting) Pulse 71 Temp 97.7 °F (36.5 °C) Resp 18 Ht 5' 2\" (1.575 m) Wt 80 lb (36.3 kg) SpO2 97% BMI 14.63 kg/m² ECOG PS: 1 General: frail, appearing in no acute distress Eyes: PERRL, anicteric sclerae HENT: Atraumatic, OP clear Neck: Supple, no edema Respiratory: normal respiratory effort MS: Moves all extremities, Digits without clubbing or cyanosis. Skin: ecchymoses to bilateral forearms, No rash or petechiae. Normal temperature, turgor, and texture. Psych: Alert, oriented, appropriate affect, normal judgment/insight Results:  
 
Lab Results Component Value Date/Time WBC 15.0 (H) 02/04/2020 02:10 AM  
 HGB 11.6 02/04/2020 02:10 AM  
 HCT 34.9 (L) 02/04/2020 02:10 AM  
 PLATELET 693 (H) 00/60/8685 02:10 AM  
 MCV 95.1 02/04/2020 02:10 AM  
 ABS. NEUTROPHILS 11.0 (H) 02/04/2020 02:10 AM  
 
Lab Results Component Value Date/Time Sodium 139 02/04/2020 02:10 AM  
 Potassium 3.3 (L) 02/04/2020 02:10 AM  
 Chloride 104 02/04/2020 02:10 AM  
 CO2 28 02/04/2020 02:10 AM  
 Glucose 98 02/04/2020 02:10 AM  
 BUN 13 02/04/2020 02:10 AM  
 Creatinine 0.87 02/04/2020 02:10 AM  
 GFR est AA >60 02/04/2020 02:10 AM  
 GFR est non-AA >60 02/04/2020 02:10 AM  
 Calcium 8.6 02/04/2020 02:10 AM  
 
Lab Results Component Value Date/Time Bilirubin, total 0.2 02/01/2020 02:20 AM  
 ALT (SGPT) 20 02/01/2020 02:20 AM  
 AST (SGOT) 16 02/01/2020 02:20 AM  
 Alk. phosphatase 104 02/01/2020 02:20 AM  
 Protein, total 6.7 02/01/2020 02:20 AM  
 Albumin 2.6 (L) 02/01/2020 02:20 AM  
 Globulin 4.1 (H) 02/01/2020 02:20 AM  
 
 
Records reviewed and summarized above. Pathology report(s) reviewed above. Radiology report(s) reviewed above. PET CT 
IMPRESSION IMPRESSION:  
 1. RUL malignancy. 2. Mediastinal griffin metastasis. 3. Likely subcentimeter right retroclavicular griffin metastasis. Ct 1/31/2020 IMPRESSION IMPRESSION: 
  
1. Moderately large right pleural effusion, new since the prior study. 2. Significant increase in size of a right upper lobe parenchymal mass since the 
prior study. 3. Stable emphysematous change. 4. Probably stable pretracheal lymphadenopathy. The superior vena cava cannot be 
assessed without contrast. 
4. Small pericardial effusion, new. 5. Hypodense poorly circumscribed area in the left hepatic lobe on axial 
imaging, not confirmed on reformatted images. Therefore, this may be 
artifactual.   
6. Stable right hepatic lobe cyst and other incidental findings Assessment:  
1) NSCLC- Right R lung mass, mediastinal adenopathy, SVC tumor thrombus and a possible R retroclavicular node 
T1N3M0-Stage IIIC Poorly differentiated adenocarcinoma Molecular studies - EGFR negative, PD-L1 is 80%, others pending Scans and pathology reviewed in thoracic tumor board Tumor is unresectable In general the 5 year OS for stage IIIC NSCLC is 13-20% I recommended Radiation with concurrent Carboplatin and taxol given weekly However she now has a new R effusion A malignant effusion needs to be ruled out as that would upstage her to stage IV and will alter management. If that turns out to be the case I will discuss palliative Keytruda and palliative RT Cytology remains pending Repeat CXR shows minimal no no re accumulation MRI brain wo contrast was negative for mets She has declined a repeat with contrast and we could hold off on that for now 2) Shortness of Breath Improved post thoracentecis 3)Hypoxia Please ensure home O2 needs are assessed prior to discharge 4)SVC syndrome Due to tumor thrombus as the mass itself is not large enough She is s/p clot evacuation and prescribed Lovenox BID Resume Lovenox 5)COPD 
 On albuterol, montelukast, and budesonide. Management per primary team 
 
6)CAD Management per primary team 
 
7)Acute Stroke? Detected on MRI Neurology recommendations appreciated Plan: · Await  Cytology · Continue therapeutic lovenox · If ok with neurology could discharged and we will follow up- please ensure home O2 Needs are assessed · Appreciate Palliative care I appreciate the opportunity to participate in Ms. Carla Adame's care. D/W Dr. Dashawn Dash Signed By: Vargas Muir MD

## 2020-02-04 NOTE — CONSULTS
Palliative Medicine Consult [place]: [phone]) Patient Name: Zan Galvez YOB: 1945 Date of Initial Consult: 2/4/2020 Reason for Consult: AMD and pain management Requesting Provider: Dr. Skip Castaneda Primary Care Physician: Eleni Gonzalez NP 
 
 SUMMARY:  
Zan Galvez is a 69 y.o. with a past history of HTN, CAD s/p stent, COPD, new diagnosis of non small cell lung cancer (dx 12/15/19)RUL nodule/ poor differentiated adenocarcinoma/ Stage III- with plan for XRT/chemo, hx SVC thrombus s/p thrombectomy 1/14/2020, who was admitted on 1/31/2020 from home with a diagnosis of SOB, hypoxia, large R pleural effusion. Current medical issues leading to Palliative Medicine involvement include: New R pleural effusion s/p thoracentesis 2/2/2020 400ml drained/ cytology pending, awaiting path to determine staging/ plan for chemo. Patient lives with her adult son, Renato Valdes ([phone]) and his 6year old daughter, and their pit/lab mix named [employer]. She has a daughter (RN) Samir Moreno in [place] and another daughter Vivienne Cavazos who lives in [place]. She talks with Jen Greer 10 times per day\" on the phone. Emergency contact also lists, Adelso Presley ([phone]) who is \"like a daughter\", longtime friend PALLIATIVE DIAGNOSES:  
1. New diagnosis NSCLC, stage III or IV (cytology of pleural fluid pending) 2. Shortness of breath, improved after thoracentesis. Chronic respiratory failure. 3. Anxiety about health 4. Chronic back pain 5. Debility 6. cachexia PLAN:  
1. Palliative Medicine services introduced to patient as added layer of support. 2. Patient surprised about being transported in next few minutes for more testing, she doesn't know what this is for. Education provided about her tests, which I see are  Echo and carotid dopplers, explained reason for these (her small stroke seen on MRI). 3. Made plan to return later to talk more about current issues.    Will plan to provide education about option of completing AMD prior to discharge home. 4. Initial consult note routed to primary continuity provider and/or primary health care team members 5. Communicated plan of care with: Palliative [facility] Team 
 
 GOALS OF CARE / TREATMENT PREFERENCES:  
 
GOALS OF CARE: 
Patient/Health Care Proxy Stated Goals: Prolong life TREATMENT PREFERENCES:  
Code Status: Full Code Advance Care Planning: 
[] [facility] Interdisciplinary Team has updated the ACP Navigator with Lizn and Patient Capacity Primary Decision Maker: Alli Sandoval  [phone] Advance Care Planning 1/31/2020 Confirm Advance Directive None Medical Interventions: Full interventions Other Instructions: * Other: As far as possible, the palliative care team has discussed with patient / health care proxy about goals of care / treatment preferences for patient. HISTORY:  
 
History obtained from: chart, patient CHIEF COMPLAINT: shortness of breath HPI/SUBJECTIVE: The patient is:  
[x] Verbal and participatory [] Non-participatory due to:  
 
69year old female with new diagnosis of lung cancer. Lots of medical events/testing since Dec 2019 Patient acknowledges it's been a lot to get used to in a very short amount of time. Breathing feels much better to her now after getting the fluid drawn off. She feels ready to go home if all her testing is completed and doctors think she's ready. Clinical Pain Assessment (nonverbal scale for severity on nonverbal patients):  
Clinical Pain Assessment Severity: 0 Duration: for how long has pt been experiencing pain (e.g., 2 days, 1 month, years) Frequency: how often pain is an issue (e.g., several times per day, once every few days, constant) FUNCTIONAL ASSESSMENT:  
 
Palliative Performance Scale (PPS): PPS: 40  PSYCHOSOCIAL/SPIRITUAL SCREENING:  
 
 Palliative IDT has assessed this patient for cultural preferences / practices and a referral made as appropriate to needs (Cultural Services, Patient Advocacy, Ethics, etc.) Any spiritual / Catholic concerns: 
[] Yes /  [x] No 
 
Caregiver Burnout: 
[] Yes /  [x] No /  [] No Caregiver Present Anticipatory grief assessment:  
[x] Normal  / [] Maladaptive ESAS Anxiety: Anxiety: 3 ESAS Depression: Depression: 0 REVIEW OF SYSTEMS:  
 
Positive and pertinent negative findings in ROS are noted above in HPI. The following systems were [x] reviewed / [] unable to be reviewed as noted in HPI Other findings are noted below. Systems: constitutional, ears/nose/mouth/throat, respiratory, gastrointestinal, genitourinary, musculoskeletal, integumentary, neurologic, psychiatric, endocrine. Positive findings noted below. Modified ESAS Completed by: provider Fatigue: 4 Drowsiness: 0 Depression: 0 Pain: 0 Anxiety: 3 Nausea: 0 Anorexia: 0 Dyspnea: 3 Constipation: No  
  Stool Occurrence(s): 1 PHYSICAL EXAM:  
 
From RN flowsheet: 
Wt Readings from Last 3 Encounters:  
02/03/20 80 lb (36.3 kg) 02/02/20 80 lb (36.3 kg) 01/21/20 82 lb 8 oz (37.4 kg) Blood pressure 170/84, pulse 71, temperature 97.7 °F (36.5 °C), resp. rate 18, height 5' 2\" (1.575 m), weight 80 lb (36.3 kg), SpO2 97 %. Pain Scale 1: Numeric (0 - 10) Pain Intensity 1: 5 Pain Onset 1: acute Pain Location 1: Back Pain Orientation 1: Posterior Pain Description 1: Shooting Pain Intervention(s) 1: Medication (see MAR) Last bowel movement, if known:  
 
Constitutional: thin NAD Eyes: pupils equal, anicteric No accessory muscle use/respiratory distress Affect is normal 
Pleasant, engaging in conversation Insight is intact HISTORY:  
 
Active Problems: 
  Exertional dyspnea (1/31/2020) Past Medical History:  
Diagnosis Date  CAD (coronary artery disease)  Chronic obstructive pulmonary disease ([facility] [address]  History of common carotid artery stent placement  Hypercoagulable state ([facility] [address] SVC thrombus  Hypertension  Pleural effusion Past Surgical History:  
Procedure Laterality Date  HX ABDOMINAL LAPAROSCOPY    
 lots of intenstine removed/ lysis of adhesions  HX CAROTID STENT    HX HYSTERECTOMY  HX TONSIL AND ADENOIDECTOMY  IR BRONCH W LUNG BIOPSY SINGLE LOBE  2019 History reviewed. No pertinent family history. History reviewed, no pertinent family history. Social History Tobacco Use  Smoking status: Former Smoker Packs/day: 0.50 Last attempt to quit: 2019 Years since quittin.1  Smokeless tobacco: Never Used Substance Use Topics  Alcohol use: Not Currently Comment: occ Allergies Allergen Reactions  Codeine Itching Current Facility-Administered Medications Medication Dose Route Frequency  potassium chloride SR (KLOR-CON 10) tablet 40 mEq  40 mEq Oral DAILY  atorvastatin (LIPITOR) tablet 40 mg  40 mg Oral DAILY  enoxaparin (LOVENOX) injection 40 mg  40 mg SubCUTAneous Q12H  
 atenoloL (TENORMIN) tablet 50 mg  50 mg Oral DAILY  furosemide (LASIX) tablet 40 mg  40 mg Oral DAILY  HYDROcodone-acetaminophen (NORCO) 5-325 mg per tablet 1 Tab  1 Tab Oral Q4H PRN  
 hydroxyzine HCL (ATARAX) tablet 10 mg  10 mg Oral TID PRN  
 losartan (COZAAR) tablet 100 mg  100 mg Oral DAILY  montelukast (SINGULAIR) tablet 10 mg  10 mg Oral DAILY  albuterol-ipratropium (DUO-NEB) 2.5 MG-0.5 MG/3 ML  3 mL Nebulization Q4H PRN  
 sodium chloride (NS) flush 5-40 mL  5-40 mL IntraVENous Q8H  
 sodium chloride (NS) flush 5-40 mL  5-40 mL IntraVENous PRN  piperacillin-tazobactam (ZOSYN) 3.375 g in 0.9% sodium chloride (MBP/ADV) 100 mL  3.375 g IntraVENous Q8H  
 
 
 
 LAB AND IMAGING FINDINGS:  
 
Lab Results Component Value Date/Time WBC 15.0 (H) 02/04/2020 02:10 AM  
 HGB 11.6 02/04/2020 02:10 AM  
 PLATELET 831 (H) 44/05/5834 02:10 AM  
 
Lab Results Component Value Date/Time Sodium 139 02/04/2020 02:10 AM  
 Potassium 3.3 (L) 02/04/2020 02:10 AM  
 Chloride 104 02/04/2020 02:10 AM  
 CO2 28 02/04/2020 02:10 AM  
 BUN 13 02/04/2020 02:10 AM  
 Creatinine 0.87 02/04/2020 02:10 AM  
 Calcium 8.6 02/04/2020 02:10 AM  
 Magnesium 2.0 12/18/2019 11:50 PM  
 Phosphorus 3.0 12/18/2019 11:50 PM  
  
Lab Results Component Value Date/Time AST (SGOT) 16 02/01/2020 02:20 AM  
 Alk. phosphatase 104 02/01/2020 02:20 AM  
 Protein, total 6.7 02/01/2020 02:20 AM  
 Albumin 2.6 (L) 02/01/2020 02:20 AM  
 Globulin 4.1 (H) 02/01/2020 02:20 AM  
 
Lab Results Component Value Date/Time INR 1.0 08/08/2009 03:43 PM  
 Prothrombin time 10.2 08/08/2009 03:43 PM  
 aPTT 61.8 (H) 12/20/2019 08:48 AM  
  
No results found for: IRON, FE, TIBC, IBCT, PSAT, FERR No results found for: PH, PCO2, PO2 No components found for: [place] No results found for: CPK, CKMB Total time:  
Counseling / coordination time, spent as noted above:  
> 50% counseling / coordination?:  
 
Prolonged service was provided for  []30 min   []75 min in face to face time in the presence of the patient, spent as noted above. Time Start:  
Time End:  
Note: this can only be billed with 32761 (initial) or 97022 (follow up). If multiple start / stop times, list each separately.

## 2020-02-04 NOTE — PROGRESS NOTES
Spiritual Care Assessment/Progress Note [facility] 
 
 
NAME: Sonia De Anda      MRN: [id number] AGE: 69 y.o. SEX: female [employer] Affiliation: Baptist  
Language: [place] 2/4/2020     Total Time (in minutes): 13 Spiritual Assessment begun in [facility] through conversation with: 
  
    [x]Patient        [] Family    [] Friend(s) Reason for Consult: Palliative Care, Initial/Spiritual Assessment Spiritual beliefs: (Please include comment if needed) [x] Identifies with a faizan tradition:     
   [] Supported by a faizan community:        
   [] Claims no spiritual orientation:       
   [] Seeking spiritual identity:            
   [] Adheres to an individual form of spirituality:       
   [] Not able to assess:                   
 
    
Identified resources for coping:  
   [] Prayer                           
   [] Music                  [] Guided Imagery [x] Family/friends                 [] Pet visits [] Devotional reading                         [] Unknown 
   [x] Other: spiritual beliefs Interventions offered during this visit: (See comments for more details) Patient Interventions: Affirmation of faizan, Normalization of emotional/spiritual concerns, End of life issues discussed, Prayer (assurance of), Initial/Spiritual assessment, patient floor Plan of Care: 
 
 [] Support spiritual and/or cultural needs  
 [] Support AMD and/or advance care planning process    
 [] Support grieving process 
 [] Coordinate Rites and/or Rituals  
 [] Coordination with community clergy [] No spiritual needs identified at this time 
 [] Detailed Plan of Care below (See Comments)  [] Make referral to Music Therapy 
[] Make referral to Pet Therapy    
[] Make referral to Addiction services 
[] Make referral to [facility] 
[] Make referral to Spiritual Care Partner 
[] No future visits requested [x] Follow up visits as needed Comments: Visited with Ms. Adali Santos for initial spiritual assessment. Pt had just returned to her room after being off the floor for some tests. Pt reflected on her coping. She indicated that she has been through a journey of illness with her brother, and this helps to inform her own decisions around her care. She shared of her spiritual beliefs that bring her comfort and peace with regards to end of life concerns. Explored any additional ways that we might support patient during this hospitalization - no additional needs expressed at this time. Assurance of prayers offered. Celi Ross, Palliative

## 2020-02-04 NOTE — PROGRESS NOTES
Spoke with Siva Vega in [address] Patient ate breakfast this morning and needs to be NPO 8hs prior to MRI. MRI will be performed tomorrow with Patient NPO after midnight.

## 2020-02-04 NOTE — CONSULTS
Palliative Attempted to see patient again, she is sound asleep after several hours of testing.   WIll check back tomorrow to offer AMD.

## 2020-02-04 NOTE — CONSULTS
[address] Name:  Patrecia Duverney 
MR#:  [id number] :  1945 ACCOUNT #:  [de-identified] DATE OF SERVICE:  2020 HISTORY OF PRESENT ILLNESS:  This is a 26-year-old right-handed female who was admitted on the  with shortness of breath and O2 sats 86% with a known large right pleural effusion and recent diagnosis of non-small-cell lung cancer, s/p thoracentesis. The patient had an MRI of brain today as a screening for metastatic disease and an incidental small acute right temporal stroke was seen. She also has extensive chronic ischemic white matter changes. The patient denies any prior history of stroke. She does have stroke risk factors of hypertension, tobacco use, hyperlipidemia, coronary artery disease with history of stenting as well as embolic risk of the recent superior vena cava thrombus and occlusion likely due to her hypercoagulable state and now off Lovenox for thoracentesis. The patient reports she has not had her cholesterol checked in a long time. She tried statins in the past and had myalgias in her lower extremities. The patient denies any numbness, weakness, speech, swallowing difficulties, vision changes cognitive changes since admission. PAST MEDICAL HISTORY: 
1.  Non-small-cell lung cancer. 2.  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 
3.  Hypertension. 4.  Coronary artery disease, status post stenting. 5.  COPD. 6.  Tobacco use. 7.  Status post hysterectomy. 8.  Status post tonsillectomy and adenoidectomy. 9.  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 REVIEW OF SYSTEMS:  As per past medical history, HPI, otherwise reviewed and negative. MEDICATIONS AT HOME: 
1.  Norco. 
2.  Lovenox. 3.  Ativan. 4.  Albuterol. 5.  Lasix. 6.  Potassium. 7.  Flonase. 8.  Atarax. 9.  Singulair. 10.  Atenolol. 11.  Losartan. 12.  Entocort. ALLERGIES:  CODEINE. SOCIAL HISTORY:  Lives in [facility] with her son. She previously worked as a  for a . She quit smoking few weeks ago. Denies alcohol or drug use. FAMILY HISTORY:  No strokes. PHYSICAL EXAMINATION: 
VITAL SIGNS:  Blood pressure 154/76, pulse 75, respiratory rate 18, satting 96%, temperature is 97.9, BMI of 14.6. GENERAL:  She is a thin, chronically ill-appearing female, sitting in bed in no distress. HEART:  Regular rhythm. No murmurs. Carotids 2+. No bruits. EXTREMITIES:  Warm. No edema. She has 2+ radial pulses. NEUROLOGICAL:  Mental status:  She is alert and oriented x4. Speech and language are intact. Her attention, memory and fund of knowledge are appropriate. Her cranial nerve examination shows no facial asymmetry or ptosis. Her extraocular eye movements are intact without diplopia or nystagmus. Her visual fields are full. Pupils are equally round and reactive. Her tongue is midline. Her palate elevates symmetrically. Trapezius and sternocleidomastoid are 5/5. Her strength and sensation intact. Her hearing is intact on the right, unable to hear on the left chronically. Motor exam is 5/5 throughout. No pronator drift. No tremor. Sensory exam is intact to light touch and pinprick throughout. Reflexes symmetric. Coordination:  Intact finger-to-nose, rapid alternating movements. Gait not assessed. STUDIES AND REPORTS:  Reviewed above. In addition, EKG is sinus rhythm. ASSESSMENT:  This is a 74-year right-handed female with multiple ischemic stroke risk factors and potential embolic stroke risk factor of hypercoagulable state with recent superior vena cava thrombus on Lovenox until it was held over the last several last days for thoracentesis.   MRI of the brain was done to screen for metastatic disease for her non-small-cell lung cancer and incidental small acute right temporal stroke seen along with extensive white matter changes with asymptomatic patient and nonfocal exam.  This could be possibly a small vessel infarct versus an embolic infarct, but no other stroke seen to suggest emboli. I will go ahead and assess for other stroke risk factors including echocardiogram with bubble study, Carotid Dopplers, lipid profile, and hemoglobin A1c. Recommend restarting her oral anticoagulation and her aspirin 81 mg a day as soon as possible. Malcolm Jara MD 
 
 
[id number] 
D:  02/03/2020 21:05 
T:  02/04/2020 0:55 JOB #:  [id number]

## 2020-02-04 NOTE — PROGRESS NOTES
Spiritual Care Partner Volunteer visited patient in Rm 602 on 2/4/20. Documented by: Chaplain Rushing MDiv, MACE 
[id number] ([id number])

## 2020-02-04 NOTE — PROGRESS NOTES
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 Her shortness of breath is worse with exertion. She has also associated cough, on and off productive, but no fever, left-sided chest pain, palpitation, sweating, nausea, vomiting, abdominal pain, urinary complaint or abnormal bowel movement. She has on and off right upper extremity and facial swelling for which she takes Lasix. She was evaluated by her oncologist and he had CT scan on 01/30/2020, and she was found to have right pleural effusion. She was also seeing radiation oncologist and she was advised to come to [facility] for right thoracentesis. 1/31/2020 Assessment/Plan: 
Right pleural effusion likely malignant.  
- CT chest: Moderately large right pleural effusion, new since the prior study. - ultrasound-guided thoracentesis done on 2/2: 400ml fluid removed. Cytology pending 
- weaned off oxygen. Saturating well on RA. Passed 6 min walk test 2/2 - Rpt CXR 2/4: Small right pleural effusion has increased in size. Right lung non-small cell lung cancer, stage III, status post chemoradiation.   
- Oncologist on board.    
- CT chest: Significant increase in size of a right upper lobe parenchymal mass since the prior study. 
- MRI brain ordered to r/o mets ( planned for outpt on Monday ) 
- IR recommending contrast study to complete study to r/o mets 
- discussed with radiation oncology Dr Dane Muniz who agreed to MRI with contrast 
 - pt refusing MRI as she was claustrophobic and ativan did not help her.  
- Anesthesiology consulted for MRI sedation. 
- MRI postponed tomorrow as pt had breakfast today. NPO after MN Acute small CVA 
- pt asymptomatic - MRI: Small right temporal lobe periventricular white matter acute lacunar infarct. - neurology on board -  
- carotid duplex: Mild, less than 50 percent, stenosis of the right and left internal carotid artery. - echo report pending 
- started on aspirin and Lipitor 40mg Addendum: echo positive for Right to left shunt seen at rest and right to left shunt seen with Valsalva. Cardiology consulted Leukocytosis 17.3 on poa -  persistent 15 
- possibly due to right pleural effusion 
- less suspicion for infection 
- will c/w IV Zosyn for now Hyponatremia, likely related to her non-small-cell lung cancer. 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 -  c/w Lovenox 40 mg q.12 hrs CAD s/p stent, stable. Continue home medication, atenolol. HTN  Blood pressure stable. Continue home medication losartan and atenolol. Palliative care on board Code status: Full. Son is EMA 
DVT prophylaxis: Lovenox Disposition: home when ready. Possible dc tomorrow  
---------------------------------------------- 
 
CC: Pleural effusion S: Patient is seen and examined at bedside. Pt c/o sob last night which is resolved now. She is concerned that her fluid is coming back. She wants MRI under sedation. Review of Systems: A comprehensive review of systems was negative. O: 
Visit Vitals /84 (BP 1 Location: Right arm, BP Patient Position: Sitting) Pulse 71 Temp 97.7 °F (36.5 °C) Resp 18 Ht 5' 2\" (1.575 m) Wt 36.3 kg (80 lb) SpO2 97% BMI 14.63 kg/m² PHYSICAL EXAM: 
Gen: NAD, ill looking HEENT: anicteric sclerae, normal conjunctiva, oropharynx clear, MM moist 
 Neck: supple, trachea midline, no adenopathy Heart: RRR, no MRG, no JVD, no peripheral edema Lungs: decreased breath sounds on right side Abd: soft, NT, ND, BS+, no organomegaly Extr: warm Skin: dry, no rash Neuro: CN II-XII grossly intact, normal speech, moves all extremities Psych: normal mood, appropriate affect No intake or output data in the 24 hours ending 02/04/20 1527 Recent labs & imaging reviewed: 
Recent Results (from the past 24 hour(s)) LIPID PANEL Collection Time: 02/03/20  6:36 PM  
Result Value Ref Range LIPID PROFILE Cholesterol, total 204 (H) <200 MG/DL Triglyceride 228 (H) <150 MG/DL  
 HDL Cholesterol 46 MG/DL  
 LDL, calculated 112.4 (H) 0 - 100 MG/DL VLDL, calculated 45.6 MG/DL  
 CHOL/HDL Ratio 4.4 0.0 - 5.0 HEMOGLOBIN A1C WITH EAG Collection Time: 02/03/20  6:36 PM  
Result Value Ref Range Hemoglobin A1c 5.6 4.0 - 5.6 % Est. average glucose 114 mg/dL CBC WITH AUTOMATED DIFF Collection Time: 02/04/20  2:10 AM  
Result Value Ref Range WBC 15.0 (H) 3.6 - 11.0 K/uL  
 RBC 3.67 (L) 3.80 - 5.20 M/uL  
 HGB 11.6 11.5 - 16.0 g/dL HCT 34.9 (L) 35.0 - 47.0 % MCV 95.1 80.0 - 99.0 FL  
 MCH 31.6 26.0 - 34.0 PG  
 MCHC 33.2 30.0 - 36.5 g/dL  
 RDW 13.2 11.5 - 14.5 % PLATELET 878 (H) 444 - 400 K/uL MPV 9.4 8.9 - 12.9 FL  
 NRBC 0.0 0  WBC ABSOLUTE NRBC 0.00 0.00 - 0.01 K/uL NEUTROPHILS 73 32 - 75 % LYMPHOCYTES 16 12 - 49 % MONOCYTES 8 5 - 13 % EOSINOPHILS 1 0 - 7 % BASOPHILS 1 0 - 1 % IMMATURE GRANULOCYTES 1 (H) 0.0 - 0.5 % ABS. NEUTROPHILS 11.0 (H) 1.8 - 8.0 K/UL  
 ABS. LYMPHOCYTES 2.4 0.8 - 3.5 K/UL  
 ABS. MONOCYTES 1.2 (H) 0.0 - 1.0 K/UL  
 ABS. EOSINOPHILS 0.2 0.0 - 0.4 K/UL  
 ABS. BASOPHILS 0.1 0.0 - 0.1 K/UL  
 ABS. IMM. GRANS. 0.1 (H) 0.00 - 0.04 K/UL  
 DF AUTOMATED METABOLIC PANEL, BASIC Collection Time: 02/04/20  2:10 AM  
Result Value Ref Range  Sodium 139 136 - 145 mmol/L  
 Potassium 3.3 (L) 3.5 - 5.1 mmol/L Chloride 104 97 - 108 mmol/L  
 CO2 28 21 - 32 mmol/L Anion gap 7 5 - 15 mmol/L Glucose 98 65 - 100 mg/dL BUN 13 6 - 20 MG/DL Creatinine 0.87 0.55 - 1.02 MG/DL  
 BUN/Creatinine ratio 15 12 - 20 GFR est AA >60 >60 ml/min/1.73m2 GFR est non-AA >60 >60 ml/min/1.73m2 Calcium 8.6 8.5 - 10.1 MG/DL  
DUPLEX CAROTID BILATERAL Collection Time: 02/04/20 12:10 PM  
Result Value Ref Range Right cca dist sys 47.4 cm/s Right CCA dist capone 9.9 cm/s Right CCA prox sys 54.4 cm/s Right CCA prox capone 9.9 cm/s Right eca sys 62.6 cm/s RIGHT EXTERNAL CAROTID ARTERY D 5.69 cm/s Right ICA dist sys 41.3 cm/s Right ICA dist capone 11.6 cm/s Right ICA mid sys 57.0 cm/s Right ICA mid capone 13.4 cm/s Right ICA prox sys 70.7 cm/s Right ICA prox capone 22.3 cm/s Right vertebral sys 29.1 cm/s RIGHT VERTEBRAL ARTERY D 0.00 cm/s Right ICA/CCA sys 1.5 Left CCA dist sys 46.0 cm/s Left CCA dist capone 7.5 cm/s Left CCA prox sys 62.7 cm/s Left CCA prox capone 10.3 cm/s Left ECA sys 64.6 cm/s LEFT EXTERNAL CAROTID ARTERY D 8.34 cm/s Left ICA dist sys 65.6 cm/s Left ICA dist capone 19.3 cm/s Left ICA mid sys 69.6 cm/s Left ICA mid capone 15.3 cm/s Left ICA prox sys 68.3 cm/s Left ICA prox capone 18.0 cm/s Left vertebral sys 46.8 cm/s LEFT VERTEBRAL ARTERY D 10.12 cm/s Left ICA/CCA sys 1.51   
ECHO ADULT COMPLETE Collection Time: 02/04/20  1:39 PM  
Result Value Ref Range Aortic Valve Systolic Peak Velocity 877.45 cm/s Aortic Valve Area by Continuity of Peak Velocity 1.9 cm2 AoV PG 5.2 mmHg LVIDd 2.61 (A) 3.9 - 5.3 cm  
 LVPWd 0.87 0.6 - 0.9 cm LVIDs 1.98 cm IVSd 0.66 0.6 - 0.9 cm  
 LVOT d 1.79 cm  
 LVOT Peak Velocity 87.39 cm/s LVOT Peak Gradient 3.1 mmHg MV E Brock 77.56 cm/s RVIDd 3.26 cm  
 LV Mass AL 41.7 (A) 67 - 162 g  
 LV Mass AL Index 32.2 43 - 95 g/m2  E/E' lateral 10.45   
 Left Atrium Major Axis 2.83 cm Triscuspid Valve Regurgitation Peak Gradient 41.3 mmHg Pulmonic Valve Max Velocity 81.65 cm/s  
 TR Max Velocity 321.33 cm/s Left Ventricular Fractional Shortening by 2D [id number] % AV Velocity Ratio 0.76   
 PV peak gradient 2.7 mmHg LV E' Lateral Velocity 7.42 cm/s Tapse 2.02 (A) 1.5 - 2.0 cm Recent Labs 02/04/20 0210 02/03/20 
2903 WBC 15.0* 15.3* HGB 11.6 11.7 HCT 34.9* 35.4 * 443* Recent Labs 02/04/20 
0210 02/03/20 
7778  137  
K 3.3* 3.4*  
 103 CO2 28 31 BUN 13 15 CREA 0.87 0.86 GLU 98 96  
CA 8.6 8.7 No results for input(s): SGOT, GPT, ALT, AP, TBIL, TBILI, TP, ALB, GLOB, GGT, AML, LPSE in the last 72 hours. No lab exists for component: AMYP, HLPSE No results for input(s): INR, PTP, APTT, INREXT, INREXT in the last 72 hours. No results for input(s): FE, TIBC, PSAT, FERR in the last 72 hours. No results found for: FOL, RBCF No results for input(s): PH, PCO2, PO2 in the last 72 hours. No results for input(s): CPK, CKNDX, TROIQ in the last 72 hours. No lab exists for component: CPKMB Lab Results Component Value Date/Time Cholesterol, total 204 (H) 02/03/2020 06:36 PM  
 HDL Cholesterol 46 02/03/2020 06:36 PM  
 LDL, calculated 112.4 (H) 02/03/2020 06:36 PM  
 Triglyceride 228 (H) 02/03/2020 06:36 PM  
 CHOL/HDL Ratio 4.4 02/03/2020 06:36 PM  
 
No results found for: [facility] No results found for: COLOR, APPRN, SPGRU, REFSG, JOE, PROTU, GLUCU, KETU, BILU, UROU, BLACK, LEUKU, GLUKE, EPSU, BACTU, WBCU, RBCU, CASTS, UCRY Med list reviewed Current Facility-Administered Medications Medication Dose Route Frequency  potassium chloride SR (KLOR-CON 10) tablet 40 mEq  40 mEq Oral DAILY  atorvastatin (LIPITOR) tablet 40 mg  40 mg Oral DAILY  [START ON 2/5/2020] aspirin chewable tablet 81 mg  81 mg Oral DAILY  enoxaparin (LOVENOX) injection 40 mg  40 mg SubCUTAneous Q12H  atenoloL (TENORMIN) tablet 50 mg  50 mg Oral DAILY  furosemide (LASIX) tablet 40 mg  40 mg Oral DAILY  HYDROcodone-acetaminophen (NORCO) 5-325 mg per tablet 1 Tab  1 Tab Oral Q4H PRN  
 hydroxyzine HCL (ATARAX) tablet 10 mg  10 mg Oral TID PRN  
 losartan (COZAAR) tablet 100 mg  100 mg Oral DAILY  montelukast (SINGULAIR) tablet 10 mg  10 mg Oral DAILY  albuterol-ipratropium (DUO-NEB) 2.5 MG-0.5 MG/3 ML  3 mL Nebulization Q4H PRN  
 sodium chloride (NS) flush 5-40 mL  5-40 mL IntraVENous Q8H  
 sodium chloride (NS) flush 5-40 mL  5-40 mL IntraVENous PRN  piperacillin-tazobactam (ZOSYN) 3.375 g in 0.9% sodium chloride (MBP/ADV) 100 mL  3.375 g IntraVENous Q8H Care Plan discussed with:  Patient/Family and Nurse Melia Florez MD 
Internal Medicine Date of Service: 2/4/2020

## 2020-02-04 NOTE — PROGRESS NOTES
Neurology Progress Note Patient ID: 
Sho Gore [id number] 
69 y.o. 
1945 HISTORY OF PRESENT ILLNESS:  This is a 69-year-old right-handed female who was admitted on the 31st with shortness of breath and O2 sats 86% with a known large right pleural effusion and recent diagnosis of non-small-cell lung cancer. Plan was for her to undergo thoracentesis which she has and Lovenox was held for this. The patient had an MRI of brain today as a screening for metastatic disease and an incidental small acute right temporal stroke was seen. She also has extensive chronic ischemic white matter changes. The patient denies any prior history of stroke. She does have stroke risk factors of hypertension, tobacco use, hyperlipidemia, coronary artery disease with history of stenting as well as embolic risk of the recent superior vena cava thrombus and occlusion likely due to her hypercoagulable state and now off Lovenox for this procedure. The patient reports she has not had her cholesterol checked in a long time. She tried statins in the past and had myalgias in her lower extremities. The patient denies any numbness, weakness, speech, swallowing difficulties, vision changes cognitive changes since admission. Subjective:  
 Doing well this morning. Echo and dopplers, pending. Lovenox was restarted yesterday. Objective: All records in [facility] reviewed and noted ROS: 
Per HPI All other 12 pt ROS negative Meds: 
Current Facility-Administered Medications Medication Dose Route Frequency  potassium chloride SR (KLOR-CON 10) tablet 40 mEq  40 mEq Oral DAILY  atorvastatin (LIPITOR) tablet 40 mg  40 mg Oral DAILY  enoxaparin (LOVENOX) injection 40 mg  40 mg SubCUTAneous Q12H  
 atenoloL (TENORMIN) tablet 50 mg  50 mg Oral DAILY  furosemide (LASIX) tablet 40 mg  40 mg Oral DAILY  HYDROcodone-acetaminophen (NORCO) 5-325 mg per tablet 1 Tab  1 Tab Oral Q4H PRN  
  hydroxyzine HCL (ATARAX) tablet 10 mg  10 mg Oral TID PRN  
 losartan (COZAAR) tablet 100 mg  100 mg Oral DAILY  montelukast (SINGULAIR) tablet 10 mg  10 mg Oral DAILY  albuterol-ipratropium (DUO-NEB) 2.5 MG-0.5 MG/3 ML  3 mL Nebulization Q4H PRN  
 sodium chloride (NS) flush 5-40 mL  5-40 mL IntraVENous Q8H  
 sodium chloride (NS) flush 5-40 mL  5-40 mL IntraVENous PRN  piperacillin-tazobactam (ZOSYN) 3.375 g in 0.9% sodium chloride (MBP/ADV) 100 mL  3.375 g IntraVENous Q8H Imaging: MRI Results (most recent): 
Results from Hospital Encounter encounter on 01/31/20 MRI BRAIN WO CONT Narrative *PRELIMINARY REPORT* Small acute infarct in the right temporal periventricular white matter. There is 
no midline shift or mass effect. The findings [facility] 
by Dr. Gemini Garzon. [address] Final report to follow. *END PRELIMINARY REPORT* *FINAL REPORT BELOW* EXAM:  MRI BRAIN WO CONT INDICATION:  r/o mets, patient with history small cell carcinoma of the right 
lung. TECHNIQUE: 
Sagittal T1, axial FLAIR, T2,T1 and gradient echo images as well as coronal T2 
weighted images and axial diffusion weighted images of the head were obtained. COMPARISON:  None available FINDINGS: 
Small subcentimeter focus of restricted diffusion in the right posterior medial 
temporal lobe white matter adjacent to the lateral aspect of the atria of the 
right lateral ventricle. Appearance is consistent with an acute small lacunar 
white matter infarct. No other restricted diffusion or findings of acute abnormality. Note is made of multiple foci T2 hyperintensity in cerebral white matter with 
some areas of confluence in a pattern suggesting chronic small vessel ischemic 
change. No evidence of acute intracranial hemorrhage, mass or abnormal extra-axial fluid 
collections. Flow voids are present in vertebral basilar and carotid artery systems. Mild mucosal thickening paranasal sinuses. Impression IMPRESSION: 
1. Small right temporal lobe periventricular white matter acute lacunar infarct. 2. Chronic-appearing multifocal white matter T2 hyperintensity possibly related 
to chronic small vessel ischemia. 3. MRI was specifically ordered without contrast and therefore sensitivity for 
evaluating cerebral metastasis from patient's known small cell carcinoma the 
lung is less than optimal. MRI performed as ordered as confirmed by the [facility] 
service M.D. Lab Review Recent Results (from the past 24 hour(s)) DUPLEX CAROTID BILATERAL Collection Time: 02/03/20  2:50 PM  
Result Value Ref Range Right cca dist sys 47.4 cm/s Right CCA dist capone 9.9 cm/s Right CCA prox sys 54.4 cm/s Right CCA prox capone 9.9 cm/s Right eca sys 62.6 cm/s RIGHT EXTERNAL CAROTID ARTERY D 5.69 cm/s Right ICA dist sys 41.3 cm/s Right ICA dist capone 11.6 cm/s Right ICA mid sys 57.0 cm/s Right ICA mid capone 13.4 cm/s Right ICA prox sys 70.7 cm/s Right ICA prox capone 22.3 cm/s Right vertebral sys 29.1 cm/s RIGHT VERTEBRAL ARTERY D 0.00 cm/s Right ICA/CCA sys 1.5 Left CCA dist sys 46.0 cm/s Left CCA dist capone 7.5 cm/s Left CCA prox sys 62.7 cm/s Left CCA prox capone 10.3 cm/s Left ECA sys 64.6 cm/s LEFT EXTERNAL CAROTID ARTERY D 8.34 cm/s Left ICA dist sys 65.6 cm/s Left ICA dist capone 19.3 cm/s Left ICA mid sys 69.6 cm/s Left ICA mid capone 15.3 cm/s Left ICA prox sys 68.3 cm/s Left ICA prox capone 18.0 cm/s Left vertebral sys 46.8 cm/s LEFT VERTEBRAL ARTERY D 10.12 cm/s Left ICA/CCA sys 1.51   
LIPID PANEL Collection Time: 02/03/20  6:36 PM  
Result Value Ref Range LIPID PROFILE Cholesterol, total 204 (H) <200 MG/DL Triglyceride 228 (H) <150 MG/DL  
 HDL Cholesterol 46 MG/DL  
 LDL, calculated 112.4 (H) 0 - 100 MG/DL  VLDL, calculated 45.6 MG/DL  
 CHOL/HDL Ratio 4.4 0.0 - 5.0 HEMOGLOBIN A1C WITH EAG Collection Time: 02/03/20  6:36 PM  
Result Value Ref Range Hemoglobin A1c 5.6 4.0 - 5.6 % Est. average glucose 114 mg/dL CBC WITH AUTOMATED DIFF Collection Time: 02/04/20  2:10 AM  
Result Value Ref Range WBC 15.0 (H) 3.6 - 11.0 K/uL  
 RBC 3.67 (L) 3.80 - 5.20 M/uL  
 HGB 11.6 11.5 - 16.0 g/dL HCT 34.9 (L) 35.0 - 47.0 % MCV 95.1 80.0 - 99.0 FL  
 MCH 31.6 26.0 - 34.0 PG  
 MCHC 33.2 30.0 - 36.5 g/dL  
 RDW 13.2 11.5 - 14.5 % PLATELET 371 (H) 482 - 400 K/uL MPV 9.4 8.9 - 12.9 FL  
 NRBC 0.0 0  WBC ABSOLUTE NRBC 0.00 0.00 - 0.01 K/uL NEUTROPHILS 73 32 - 75 % LYMPHOCYTES 16 12 - 49 % MONOCYTES 8 5 - 13 % EOSINOPHILS 1 0 - 7 % BASOPHILS 1 0 - 1 % IMMATURE GRANULOCYTES 1 (H) 0.0 - 0.5 % ABS. NEUTROPHILS 11.0 (H) 1.8 - 8.0 K/UL  
 ABS. LYMPHOCYTES 2.4 0.8 - 3.5 K/UL  
 ABS. MONOCYTES 1.2 (H) 0.0 - 1.0 K/UL  
 ABS. EOSINOPHILS 0.2 0.0 - 0.4 K/UL  
 ABS. BASOPHILS 0.1 0.0 - 0.1 K/UL  
 ABS. IMM. GRANS. 0.1 (H) 0.00 - 0.04 K/UL  
 DF AUTOMATED METABOLIC PANEL, BASIC Collection Time: 02/04/20  2:10 AM  
Result Value Ref Range Sodium 139 136 - 145 mmol/L Potassium 3.3 (L) 3.5 - 5.1 mmol/L Chloride 104 97 - 108 mmol/L  
 CO2 28 21 - 32 mmol/L Anion gap 7 5 - 15 mmol/L Glucose 98 65 - 100 mg/dL BUN 13 6 - 20 MG/DL Creatinine 0.87 0.55 - 1.02 MG/DL  
 BUN/Creatinine ratio 15 12 - 20 GFR est AA >60 >60 ml/min/1.73m2 GFR est non-AA >60 >60 ml/min/1.73m2 Calcium 8.6 8.5 - 10.1 MG/DL Exam: 
Visit Vitals /84 (BP 1 Location: Right arm, BP Patient Position: Sitting) Pulse 71 Temp 97.7 °F (36.5 °C) Resp 18 Ht 5' 2\" (1.575 m) Wt 36.3 kg (80 lb) SpO2 97% BMI 14.63 kg/m² Gen: Well developed CV: RRR Lungs: non labored breathing Neuro: A&O x 3, no dysarthria or aphasia CN II-XII:EOMI, face symmetric, tongue/palate midline Motor: strength 5/5 all four ext Sensory: intact to LT Gait:deferred Assessment:  
 
Patient Active Problem List  
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  
Right temporal ischemic infarct  
-echo, Pending - Doppler, pending  
- HgbA1C, 5.4 , at goal  
- .4, Goal 70, Continue Lipitor 40 mg HS, no sign of myalgias 
-Continue Lovenox and ASA Signed: 
Stan Adams NP 
2/4/2020 
11:19 AM

## 2020-02-04 NOTE — PROGRESS NOTES
\"anesthesia consult\" requested re: sedation for anticipated brain MRI w/ contrast. 
Notes reviewed, pulmonary status appreciated. Recent anesthetics reviewed. Will proceed when procedure is scheduled.

## 2020-02-04 NOTE — CONSULTS
RADIATION ONCOLOGY CONSULTATION 
2/3/2020 Patient: Sejal Liang YOB: 1945 Patient ID: [id number] Referring MD: Justin Chand Radiation Oncologist: Donny Ramirez Patient Diagnosis: C34.11 - Malignant neoplasm of upper lobe, right bronchus or lung, Diagnosed 1/24/2020 (Active) Stg IIIB, T1c, N3, M0  
Z72.0 - Tobacco use, Diagnosed 1/24/2020 (Active) AJCC Staging has been reviewed. Chief Complaint: Locally advanced lung cancer INTERVAL HSITORY 2/3/2020: 
Mrs. Marian Shelley is a 69year old with [id number] adenocarcinoma of the RUL lung. I sent her to ED last week after she was found to have O2 sats in high 80s in clinic as well as seen to have a pleural effusion on our CT simulation (which is not available in system). She has since undergone thoracentesis and cytology is pending. She had brain MRI that she needs to complete staging which unfortunately was done without contrast and does not rule out brain metastases. It did show some small infarcts which are being evaluated by Neurology. She has had formal O2 testing which did not show any hypoxemia. She reports to me today that she is feeling much better and her breathing is significantly better after having her lungs drained. She denies any chest pain. Appetite is good. No headaches. She is very nervous about another MRI and is requesting anesthesia. She denies any new neuro symptoms. No new cough, no hemoptysis. No new swelling. No fevers/chills. Overall doing okay and is looking forward to getting out of the hospital.  
 
 
ORIGINAL HPI 1/24/2020: 
Mrs. Marian Shelley is a 69year old with hx of COPD, CAD, HTN, and tobacco use (~100 pack years) who initially presented to [facility] on 12/14/19 with facial swelling and fullness, plethora, and arm swelling. She also had noticed visible/distended veins on her lower chest and left arm roughly a week prior.   
 
CT Neck soft tissue done on 12/14/19 showed thrombosis of the left brachiocephalic vein and SVC with extensive collateral vessels. Additionally, there was a 1. 6 x 1. 6 x 2. 3cm nodule in the right upper lung lobe. CTA Chest done on 12/15/2019 showed a mediastinal soft tissue mass/node contiguous with the SVC measuring 2. 3 x 1. 8cm as well as confirming the separate 2. 1 x 1. 4cm nodule in the right upper lung lobe seen on CT Neck. On 12/19/19 she had an EBUS with biopsy of the right peritracheal mass which was positive for poorly differentiated adenocarcinoma. PET/CT performed on 1/13/2020 confirmed max SUV of 20 in the RUL lung nodule as well as right mediastinal mass with max SUV 16. Additionally, there was a right retroclavicular subcentimeter focus with max SUV 3. 2.  
 
On 1/14/2020 she had a SVC mechanical thrombectomy and at that time a biopsy of the SVC was consistent with adenocarcinoma, tumor thrombus. IHC staining showed only positive pankeratin, CK7, and TTF-1. She is staged as having [id number]; Stage IIIB adenocarcinoma of the RUL. She has met with Dr. Jelly Gutiérrez who has referred her today for discussion of treatment. Brain MRI has not been ordered. Today she comes to clinic alone. She has an occasional cough, nothing worse than baseline. She has shortness of breath with minimal exertion. No hemoptysis. She still has occasional chest tightness. She also has some right neck discomfort, and received a Rx for dilaudid from vasc surgery who did her thrombectomy. She has ongoing LE bilateral weakness which is generalized. She has occasional dizziness. She weighs 83 lbs but states she has never weighed much more than this. She has not lost more than a few lbs throughout all this. She is taking Lasix as she had swelling even after her thrombectomy and Lasix has helped. She has smoked since age 13, and is currently still smoking but has cut back since diagnosis.  Her father and her brother had lung cancer (both smokers) and her paternal grandfather had stomach cancer. Medical History: - CAD, - cancer (NSCLC- Right 1/14/2020 R lung mass, mediastinal adenopathy, SVC tumor thrombus and a possible R retroclavicular node 
T1N3M0-Stage IIIC Poorly differentiated adenocarcinoma) , - cOPD, - hypertension. Surgical History: Abdominal laparoscopy (BARAK), carotid stent, eBUS (12/2009), hysterectomy and tonsillectomy. Allergies: Codeine Sulfate Codeine Sulfate Current Medications: Acetaminophen 2 Tablet (of 500 mg) Tablet Oral q 6 hours PRN Albuterol Sulfate 2.5 mg (of (2.5 MG/3ML) 0.083%) Aerosol, solution Inhalation q 4 hours PRN Cozaar 1 Tablet (of 100 mg) Tablet Oral daily Entocort EC 6 mg (of 3 mg) Capsule Oral daily  
hydrOXYzine HCl 1 Tablet (of 10 mg) Tablet Oral daily Lasix 1 Tablet (of 40 mg) Tablet Oral daily Lovenox 40 mg Injection daily Singulair 1 Tablet (of 10 mg) Tablet Oral daily Tenormin 1 Tablet (of 50 mg) Tablet Oral daily PQRS Medication Reconciliation: Medications documented and reviewed Family History: Father has experienced Lung Cancer. Brother has experienced Lung Cancer. Maternal Grandfather has experienced Lung Cancer. Social History: Last screened on 1/24/2020 - Current every day smoker for 57 years. Last screened on 1/24/2020 - Never drank. Patient indicated use of the following products: cigarettes. Patient indicated access to the following support systems: Lives in own house and Supportive family/friends willing to assist with needs. Patient indicated the following nutritional habits: Regular meals. Patient indicated participation in the following forms of activity: Daily activities. Review of Systems:  
Constitutional Complains of moderate fatigue. Complains of a slight weight loss. Denies lack of appetite and fever. Integumentary Complains of bruising and dry skin. Cardiovascular Complains of dyspnea and edema BUE. Denies arrhythmias. Respiratory Complains of a mild cough. Complains of dyspnea that began within the last several months. Gastrointestinal Denies abdominal pain, constipation, diarrhea, heartburn / dyspepsia, nausea, pain / cramping and satiety. ROS Genitourinary (F) Denies dysuria, frequency, nocturia and urgency. Musculoskeletal Complains of muscle weakness c/o of bilateral LE weakness and bilateral UE weakness. Right arm cramping. . Denies bone pain and joint pain. Neurologic Complains of insomnia. Denies headaches, motor weakness, seizure and stroke. Psychiatric Denies delusions, hallucinations and depression. Endocrine Denies diabetes and thyroid disease. Hematologic/Lymphatic Denies easy bruising and tender or enlarged lymph nodes. Performance Status: 70% - Unable to do active work, but able to care for self. (Karnofsky) Physical Exam:  
Constitutional NAD, pleasant, sitting comfortably. Thin but not cachectic. Head normocephalic , atraumatic Eyes EOMI. Pupils equal and round. ENMT Dry oral mucosa. Fair dentition. No oral lesions. Neck Supple, no palpable lymphadenopathy Integumentary Presents with localized bruising. No evidence of rash. Cardiovascular Normal rate. No murmurs appreciated. No cyanosis or pallor. No facial swelling or plethora. Respiratory Poor air movement throughout all lung fields with faint end expiratory wheezing. Non labored respirations at rest. Symmetric expansion. Abdomen Nontender and no distention. Extremities No upper or lower extremity edema bilaterally. Musculoskeletal No evidence of compromised muscle tone and restricted range of motion. Neurologic No evidence of impaired cranial nerve(s), uncoordinated gait and motor impairment. Psychiatric No evidence of altered affect and lack of comprehension. VITALS: Performed on 1/24/2020 3:32 PM 
BMI - 15.181 kg/m2 (LOW) -  
Height - 62 in - Weight - 83 lbs -  
Pulse - 92 /min - Systolic - 703 mm(hg) -  
 Diastolic - 64 mm(hg) (LOW) -  
Pain - 7 - Fatigue - 6 - Fall Risk - 3 - 
Vitals are within normal limits Time and Counselin minutes were spent with the patient and family members, acquiring the vital information vital to the case. The patient was examined to verify findings as related in the HPI, as well as other areas as needed. Time was allowed for all questions about the proposed course of care to be answered. Greater than 50% time was spent face to face with the patient in counseling and coordination of care. Impression:  
Mrs. Jez Adame is a 69year old with [id number]; Stage IIIB adenocarcinoma of the RUL admitted for workup of new pleural effusion. She feels much better after effusion is drained, we need to await cytology to rule out malignant pleural effusion which would alter management recommendations. She also needs a brain MRI with contrast to rule out brain mets which adenocarcinoma has predilection for, and as she has N3 disease is at high risk of. We will see if she is a candidate for anesthesia though may not be due to her poor pulmonary function. She has had planning for radiation done, and if workup is negative, we will move forward with chemoradiation to the thoracic disease with curative intent. I had a long discussion with the patient and her daughter-in-law and they were in agreement with the plan. I appreciate the opportunity to participate in the care of Mrs. Jez Adame. Plan:  
-Awaiting cytology from pleural effusion to rule out malignant effusion 
-Hopefully can get brain MRI with contrast with anesthesia to rule out brain mets 
-Will adjust plan depending on results, if all negative or cannot have MRI with contrast, will proceed with chemoradiation to thoracic disease Location of Service: [facility] [place] Electronically Signed by: Laure Saavedra MD Date: 2/3/2020

## 2020-02-04 NOTE — PROGRESS NOTES
Problem: Falls - Risk of 
Goal: *Absence of Falls Description Document Bishop Tena Fall Risk and appropriate interventions in the flowsheet. Outcome: Progressing Towards Goal 
Note: Fall Risk Interventions: 
  
 
  
 
Medication Interventions: Patient to call before getting OOB Problem: Pressure Injury - Risk of 
Goal: *Prevention of pressure injury Description Document Bright Scale and appropriate interventions in the flowsheet. Outcome: Progressing Towards Goal 
Note: Pressure Injury Interventions: 
Sensory Interventions: Assess changes in LOC Moisture Interventions: Absorbent underpads Activity Interventions: Increase time out of bed Mobility Interventions: HOB 30 degrees or less Nutrition Interventions: Document food/fluid/supplement intake Friction and Shear Interventions: Apply protective barrier, creams and emollients, Lift sheet

## 2020-02-04 NOTE — PROGRESS NOTES
Palliative Medicine [place]: [phone]) [facility]: [phone]) Code Status: Full Code Advance Care Planning: 
Advance Care Planning 1/31/2020 Confirm Advance Directive None MPOA (pt verbalized this admission):   Primary Decision Maker: Mayo Falk - [phone] Per chart: 
Avni Beyer is a 69-year-old  female with PMH significant for right non-small cell lung cancer stage III status post chemoradiation, hypertension, COPD, CAD s/p stent, history of superior vena cava s/p mechanical thrombectomy on therapeutic Lovenox, and tobacco abuse. She presented to ED with SOB x2 weeks.  Her shortness of breath is worse with exertion. She has on and off right upper extremity and facial swelling for which she takes Lasix. CT scan on 01/30/2020 showed R pleural effusion s/p R thoracentesis (1/31) - cytology pending. MRI w/o contrast revealed small stroke. MRI w/ contrast planned for 2/5 to rule out mets. Palliative consult for care decisions/AMD. 
 
Patient / Family Encounter Documentation Participants (names): Patient, Palliative Medicine (Andrew Jimenez, Dr. Hugo Levy) Narrative: 
 
Dr. Hugo Levy and I met with patient in her room. She was sleeping, roused easily to voice. She was alert and oriented x4 and welcoming of our visit. Patient said she wasn't feeling well, and was frustrated because she feels that she doesn't always know/isn't always told what is going on with her care. We heard a little about her family relationships. She lives with her son and granddaughter, daughters live out of state but she is in regular touch with them. She also relies on close family friend Patricia Gardner who is like a daughter to her. Patient admitted the challenges of processing her recent cancer diagnosis and the changes it has had on her life. She is used to being active and slowing down is a big transition for her. At this time, pt was being tx for MRI/duplex. Will follow up later for AMD. 
` 
 
Psycho: pleasant, verbally expressive, fear of losing independence Social: lives with son/WIL Alcocer and 5 yo granddaughter and 3 yo pittie/lab puppy named Jaret Brown; pt also has 2 daughters Marcia Marcus [phone] (RN) in [address] [place] [address] in [place]. Kevin Espinoza is son's  and a close family friend. Spiritual: Baptist per chart Baseline: uses rollator for walking, independent in iADLs Psychosocial challenges/Resilience factors:  
 
Patient has close supportive family. Son drives pt to all appointments. Dtr Skylar Farrell calls pt may times a day to check in. Patient has very recent cancer dx and faced major changes not only in her health but her day to day life experience. She describes herself as someone who keeps busy by doing (cleaning the house, washing cars) and does not do well not being active. Patient father and brother  of lung cancer. Goals of Care / Plan:  
 
Follow up with patient after her procedures for AMD 
 
 
Thank you for the opportunity to be involved in the care of Ms. Jez Adame and her family. Jossue Camacho, KENISHA, Supervisee in Social Work Palliative Medicine  [phone]

## 2020-02-05 NOTE — CONSULTS
[facility] Cardiology Consult        
[address], [address], [place]  
[phone] fax [phone] Name: Cody Acosta 1945 [id number] 
2/5/2020 9:28 PM 
 
Discussed in detail--hypoxia at times is disproportionate to degree of symptoms. Suggest possible role of shunting although underlying lung issues a significant contributor to hypoxemia. Assessment/Plan: 
1. AMBROSE and at rest 
2. Lung cancer with effusion, hypoxia, SVC syndrome, COPD 3. Cachexia POA Body mass index is 14.63 kg/m². 4. PFO with right to left shunt. Only indication to treat would be refractory ischemia. But given likely stage IV lung cancer, she has limited life expectancy and device based PFO closure may not  be the ideal option. Shunt from PFO could be dynamic and improving PA pressures by optimizing lung condition may be helpful in reducing shunt and should be tried first. 
5. CVA- reviewed causes with her, she is already anticoagulated so nothing further recommended Admit Date: 1/31/2020 Admit Diagnosis: Exertional dyspnea [R06.09] Primary Care Physician:Eve Suggs NP Attending Provider: Eboni Bishop MD 
 
CC/REASON FOR CONSULT: ASD/PFo in setting of CVA and hypoxia and SVC syndrome Subjective: 
 
 Cody Acosta is a 69 y.o. female admitted for Exertional dyspnea [R06.09]. Patient complains of feeling weak tired, short of breath. Has NSCLC and undergoing oncology evaluation and mgmt of her SVC syndrome. She denies chest pain. No real palpitations. Occasional dizziness, no falls, uses a walker and is very careful. Effusion pathology pending.  
+ tobacco 
+fhx lung cancer Review of Symptoms: A comprehensive review of systems was negative except for that written in the HPI. Previous treatment/evaluation includes echocardiogram . Cardiac risk factors: smoking/ tobacco exposure, sedentary life style, stress, post-menopausal. 
 
Past Medical History:  
Diagnosis Date  CAD (coronary artery disease)  Chronic obstructive pulmonary disease ([facility] [address]  History of common carotid artery stent placement  Hypercoagulable state ([facility] [address] SVC thrombus  Hypertension  Pleural effusion Past Surgical History:  
Procedure Laterality Date  HX ABDOMINAL LAPAROSCOPY    
 lots of intenstine removed/ lysis of adhesions  HX CAROTID STENT  2003  HX HYSTERECTOMY  HX TONSIL AND ADENOIDECTOMY  IR BRONCH W LUNG BIOPSY SINGLE LOBE  12/2019 Current Facility-Administered Medications Medication Dose Route Frequency  potassium chloride SR (KLOR-CON 10) tablet 40 mEq  40 mEq Oral DAILY  atorvastatin (LIPITOR) tablet 40 mg  40 mg Oral DAILY  aspirin chewable tablet 81 mg  81 mg Oral DAILY  enoxaparin (LOVENOX) injection 40 mg  40 mg SubCUTAneous Q12H  
 atenoloL (TENORMIN) tablet 50 mg  50 mg Oral DAILY  furosemide (LASIX) tablet 40 mg  40 mg Oral DAILY  HYDROcodone-acetaminophen (NORCO) 5-325 mg per tablet 1 Tab  1 Tab Oral Q4H PRN  
 hydroxyzine HCL (ATARAX) tablet 10 mg  10 mg Oral TID PRN  
 losartan (COZAAR) tablet 100 mg  100 mg Oral DAILY  montelukast (SINGULAIR) tablet 10 mg  10 mg Oral DAILY  albuterol-ipratropium (DUO-NEB) 2.5 MG-0.5 MG/3 ML  3 mL Nebulization Q4H PRN  
 sodium chloride (NS) flush 5-40 mL  5-40 mL IntraVENous Q8H  
 sodium chloride (NS) flush 5-40 mL  5-40 mL IntraVENous PRN  piperacillin-tazobactam (ZOSYN) 3.375 g in 0.9% sodium chloride (MBP/ADV) 100 mL  3.375 g IntraVENous Q8H Allergies Allergen Reactions  Codeine Itching History reviewed. No pertinent family history. Social History Socioeconomic History  Marital status: SINGLE Spouse name: Not on file  Number of children: Not on file  Years of education: Not on file  Highest education level: Not on file Tobacco Use  Smoking status: Former Smoker Packs/day: 0.50 Last attempt to quit: 12/14/2019 Years since quittin.1  Smokeless tobacco: Never Used Substance and Sexual Activity  Alcohol use: Not Currently Comment: occ  Drug use: Never Objective:  
  
Physical Exam 
Vitals:  
 20 0209 20 0906 20 1022 20 1331 BP: 152/81 151/80  144/84 Pulse: 69 71  69 Resp: 18 19  18 Temp: 98.6 °F (37 °C) 97.8 °F (36.6 °C)  97.8 °F (36.6 °C) SpO2: 93% 95% 95% 98% Weight:      
Height:      
 
 
General:  Alert, cooperative, no distress, appears stated age. Cachexia nad Eyes:  Conjunctivae/corneas clear. PERRL, EOMs intact. Ears:  Normal external ear canals both ears. Nose:  No drainage or sinus tenderness. Mouth/Throat: Moist mucous membranes. Dentition fair Neck: Symmetrical, trachea midline, no carotid bruit and ++ JVD up righ to jaw Back:   No CVA tenderness Lungs:   scatt rhonchi. Heart:  Regular rate and rhythm, S1, S2 normal, no murmur, click, rub or gallop. Abdomen:   Soft, non-tender. Bowel sounds normal. No masses,  No organomegaly. Extremities: Extremities  atraumatic, no cyanosis or edema. Vascular: 2+ and symmetric UE/LE bilat Skin: Skin color normal. No rashes or lesions Lymph nodes: No Lymphadenopathy Neurologic: CNII-XII intact. Normal strength Telemetry: normal sinus rhythm ECG: normal sinus rhythm, PAC's noted Echocardiogram: Abnormal, and reviewed by myself: PFO/ASD Data Review: No results for input(s): CPK, CKMB, TROIQ in the last 72 hours. No lab exists for component: CKQMB, CPKMB, BMPP Recent Labs 20 
8819 20 
0210  139  
K 3.6 3.3*  
 104 CO2 26 28 BUN 10 13 CREA 0.80 0.87 * 98  
CA 9.2 8.6 Recent Labs 20 
1119 20 
0210 WBC 15.3* 15.0* HGB 12.3 11.6 HCT 37.0 34.9*  
* 417* No results for input(s): PTP, INR, GPT, SGOT, AP, INREXT, INREXT in the last 72 hours. No lab exists for component: PTTP Recent Labs 02/03/20 
1836 CHOL 204* LDLC 112.4* No results for input(s): CRP, TSH, TSHEXT, TSHEXT in the last 72 hours. No lab exists for component: ESR Thank you very much for this referral. I appreciate the opportunity to participate in this patient's care.    
 
MD Rob Diane NP

## 2020-02-05 NOTE — CONSULTS
[address] [facility] Cardiology Consult        
[address] [facility] [address], [place] [address]  
[phone] fax [phone] Name: Ewa Oro [id number] 
2/4/2020 9:28 PM 
 
Called in tonight to discuss asd/pfo with patient. Would not pursue intervention at this time as it is not likely to be clinically relevant hubert in light of her getting full dose anticoagulation already as part of her mgmt plan. Reviewed with her, Dr. Suzi Garcia will follow- up in am 
 
Assessment/Plan: 
1. AMBROSE 2. Lung cancer with effusion, hypoxia, SVC syndrome, COPD 3. Cachexia POA Body mass index is 14.63 kg/m². 4. PFO/small ASD- seen on echo, given circumstances, does not seem that intervention on this would improve he rlcinical course. She is already on Lovenox for SVC syndrome, per Onc note(double check as seems to be dose discrepancy)afib/etc certainly possible but she is already being treated 5. CVA- reviewed causes with her, she is already anticoagulated so nothing further recommended Admit Date: 1/31/2020 Admit Diagnosis: Exertional dyspnea [R06.09] Primary Care Physician:Roosevelt Suggs Cancer, NP Attending Provider: Jennifer Hinkle MD 
 
CC/REASON FOR CONSULT: ASD/PFo in setting of CVA and hypoxia and SVC syndrome Subjective: 
 
 Ewa Oro is a 69 y.o. female admitted for Exertional dyspnea [R06.09]. Patient complains of feeling weak tired, short of breath. Has NSCLC and undergoing oncology evaluation and mgmt of her SVC syndrome. She denies chest pain. No real palpitations. Occasional dizziness, no falls, uses a walker and is very careful. Effusion pathology pending.  
+ tobacco 
+fhx lung cancer Review of Symptoms: A comprehensive review of systems was negative except for that written in the HPI. Previous treatment/evaluation includes echocardiogram . Cardiac risk factors: smoking/ tobacco exposure, sedentary life style, stress, post-menopausal. 
 
Past Medical History:  
Diagnosis Date  CAD (coronary artery disease)  Chronic obstructive pulmonary disease ([facility] [address]  History of common carotid artery stent placement  Hypercoagulable state ([facility] [address] SVC thrombus  Hypertension  Pleural effusion Past Surgical History:  
Procedure Laterality Date  HX ABDOMINAL LAPAROSCOPY    
 lots of intenstine removed/ lysis of adhesions  HX CAROTID STENT  2003  HX HYSTERECTOMY  HX TONSIL AND ADENOIDECTOMY  IR BRONCH W LUNG BIOPSY SINGLE LOBE  12/2019 Current Facility-Administered Medications Medication Dose Route Frequency  potassium chloride SR (KLOR-CON 10) tablet 40 mEq  40 mEq Oral DAILY  atorvastatin (LIPITOR) tablet 40 mg  40 mg Oral DAILY  [START ON 2/5/2020] aspirin chewable tablet 81 mg  81 mg Oral DAILY  enoxaparin (LOVENOX) injection 40 mg  40 mg SubCUTAneous Q12H  
 atenoloL (TENORMIN) tablet 50 mg  50 mg Oral DAILY  furosemide (LASIX) tablet 40 mg  40 mg Oral DAILY  HYDROcodone-acetaminophen (NORCO) 5-325 mg per tablet 1 Tab  1 Tab Oral Q4H PRN  
 hydroxyzine HCL (ATARAX) tablet 10 mg  10 mg Oral TID PRN  
 losartan (COZAAR) tablet 100 mg  100 mg Oral DAILY  montelukast (SINGULAIR) tablet 10 mg  10 mg Oral DAILY  albuterol-ipratropium (DUO-NEB) 2.5 MG-0.5 MG/3 ML  3 mL Nebulization Q4H PRN  
 sodium chloride (NS) flush 5-40 mL  5-40 mL IntraVENous Q8H  
 sodium chloride (NS) flush 5-40 mL  5-40 mL IntraVENous PRN  piperacillin-tazobactam (ZOSYN) 3.375 g in 0.9% sodium chloride (MBP/ADV) 100 mL  3.375 g IntraVENous Q8H Allergies Allergen Reactions  Codeine Itching History reviewed. No pertinent family history. Social History Socioeconomic History  Marital status: SINGLE Spouse name: Not on file  Number of children: Not on file  Years of education: Not on file  Highest education level: Not on file Tobacco Use  Smoking status: Former Smoker Packs/day: 0.50 Last attempt to quit: 2019 Years since quittin.1  Smokeless tobacco: Never Used Substance and Sexual Activity  Alcohol use: Not Currently Comment: occ  Drug use: Never Objective:  
  
Physical Exam 
Vitals:  
 20 0209 20 0859 20 1531 20 BP: 162/77 170/84 164/90 179/80 Pulse: 72 71 72 61 Resp: 18 18 17 17 Temp: 98.3 °F (36.8 °C) 97.7 °F (36.5 °C) 98.3 °F (36.8 °C) 98.4 °F (36.9 °C) SpO2: 92% 97% 95% 96% Weight:      
Height:      
 
 
General:  Alert, cooperative, no distress, appears stated age. Cachexia nad Eyes:  Conjunctivae/corneas clear. PERRL, EOMs intact. Ears:  Normal external ear canals both ears. Nose:  No drainage or sinus tenderness. Mouth/Throat: Moist mucous membranes. Dentition fair Neck: Symmetrical, trachea midline, no carotid bruit and ++ JVD up righ to jaw Back:   No CVA tenderness Lungs:   scatt rhonchi. Heart:  Regular rate and rhythm, S1, S2 normal, no murmur, click, rub or gallop. Abdomen:   Soft, non-tender. Bowel sounds normal. No masses,  No organomegaly. Extremities: Extremities  atraumatic, no cyanosis or edema. Vascular: 2+ and symmetric UE/LE bilat Skin: Skin color normal. No rashes or lesions Lymph nodes: No Lymphadenopathy Neurologic: CNII-XII intact. Normal strength Telemetry: normal sinus rhythm ECG: normal sinus rhythm, PAC's noted Echocardiogram: Abnormal, and reviewed by myself: PFO/ASD Data Review: No results for input(s): CPK, CKMB, TROIQ in the last 72 hours. No lab exists for component: CKQMB, CPKMB, BMPP Recent Labs 20 
9328  137  
K 3.3* 3.4*  
 103 CO2 28 31 BUN 13 15 CREA 0.87 0.86 GLU 98 96  
CA 8.6 8.7 Recent Labs 20 
4238 WBC 15.0* 15.3* HGB 11.6 11.7 HCT 34.9* 35.4 * 443* No results for input(s): PTP, INR, GPT, SGOT, AP, INREXT in the last 72 hours. 
 
No lab exists for component: PTTP Recent Labs 02/03/20 
1836 CHOL 204* LDLC 112.4* No results for input(s): CRP, TSH, TSHEXT in the last 72 hours. No lab exists for component: ESR Thank you very much for this referral. I appreciate the opportunity to participate in this patient's care.    
 
MD Denis Leyva, NP

## 2020-02-05 NOTE — PROGRESS NOTES
Problem: Falls - Risk of 
Goal: *Absence of Falls Description Document Perla Meyer Fall Risk and appropriate interventions in the flowsheet. Outcome: Progressing Towards Goal 
Note: Fall Risk Interventions: 
Mobility Interventions: Communicate number of staff needed for ambulation/transfer Medication Interventions: Teach patient to arise slowly History of Falls Interventions: Door open when patient unattended Problem: Patient Education: Go to Patient Education Activity Goal: Patient/Family Education Outcome: Progressing Towards Goal 
  
Problem: Pressure Injury - Risk of 
Goal: *Prevention of pressure injury Description Document Bright Scale and appropriate interventions in the flowsheet. Outcome: Progressing Towards Goal 
Note: Pressure Injury Interventions: 
Sensory Interventions: Assess changes in LOC, Assess need for specialty bed Moisture Interventions: Absorbent underpads, Apply protective barrier, creams and emollients Activity Interventions: Increase time out of bed Mobility Interventions: Float heels, HOB 30 degrees or less Nutrition Interventions: Document food/fluid/supplement intake Friction and Shear Interventions: Apply protective barrier, creams and emollients, HOB 30 degrees or less Problem: Patient Education: Go to Patient Education Activity Goal: Patient/Family Education Outcome: Progressing Towards Goal 
  
Problem: Discharge Planning Goal: *Discharge to safe environment Outcome: Progressing Towards Goal 
  
Problem: Nutrition Deficit Goal: *Optimize nutritional status Outcome: Progressing Towards Goal

## 2020-02-05 NOTE — PROGRESS NOTES
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 Her shortness of breath is worse with exertion. She has also associated cough, on and off productive, but no fever, left-sided chest pain, palpitation, sweating, nausea, vomiting, abdominal pain, urinary complaint or abnormal bowel movement. She has on and off right upper extremity and facial swelling for which she takes Lasix. She was evaluated by her oncologist and he had CT scan on 01/30/2020, and she was found to have right pleural effusion. She was also seeing radiation oncologist and she was advised to come to [facility] for right thoracentesis. 1/31/2020 Assessment/Plan: 
Right pleural effusion - unclear etiology 
- CT chest: Moderately large right pleural effusion, new since the prior study. - ultrasound-guided thoracentesis done on 2/2: 400ml fluid removed. - fluid was sent only for cytology , no testing done for cx or analysis  
- weaned off oxygen. Saturating well on RA. Passed 6 min walk test 2/2 - Rpt CXR 2/4: Small right pleural effusion has increased in size. - cytology:  Reactive mesothelial cells and mixed acute and chronic inflammation - pt c/o sob again. She feels she gaining pleural fluid again 
- will wait for oncology recs on this Right lung non-small cell lung cancer status post chemoradiation.   
- Oncologist on board. - CT chest: Significant increase in size of a right upper lobe parenchymal mass since the prior study. - MRI brain with contrast done today under sedation -  8 mm enhancing focus at the left frontal convexity suspicious for metastasis. 2 additional subtle areas of enhancement in the right frontal lobe, also concerning for metastases 
- Dr. Claudeen Dawley discussed with patient regarding the above report Acute small CVA 
- pt asymptomatic - MRI: Small right temporal lobe periventricular white matter acute lacunar infarct. - neurology on board -  
- carotid duplex: Mild, less than 50 percent, stenosis of the right and left internal carotid artery. - Echo positive for Right to left shunt seen at rest and right to left shunt seen with Valsalva. - appreciate Cardiology input : no need for surgical intervention  
- started on aspirin and Lipitor 40mg Leukocytosis 17.3 on poa -  persistent 15 
- since pleural effusion cytology did not show malignant cells. Need r/o infectious cause  
- will c/w IV Zosyn for now Hyponatremia, likely related to her non-small-cell lung cancer. 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 -  c/w Lovenox 40 mg q.12 hrs CAD s/p stent, stable. Continue home medication, atenolol. HTN  Blood pressure stable. Continue home medication losartan and atenolol. Palliative care on board Code status: Full. Son is NOK 
DVT prophylaxis: Lovenox Disposition: home when ready. ---------------------------------------------- 
 
CC: Pleural effusion S: Patient is seen and examined at bedside. Daughter present. Plan for MRI with contrast today afternoon. She is anxious about that. Discussed with oncology Dr. Malini Ambriz regarding rpt CXR report and pt stating sob worsening. She recommended outpt CXR and if needed pluerex cath. Discussed with oncology Dr. Malini Ambriz regarding MRI brain report, she recommended discharge with outpt f/u 
 
5pm: discussed MRI report with patient and daughter at bedside.  Explained that she has mets in brain and has stage IV cancer. Dr. Katie De Paz walked in and he discussed the report in detail. He expressed if patient may benefit from Pleurex cath. Patient wants to discuss with Dr. Dell Shultz tomorrow regarding that. Review of Systems: A comprehensive review of systems was negative. O: 
Visit Vitals /84 (BP 1 Location: Right arm, BP Patient Position: Sitting) Pulse 69 Temp 97.8 °F (36.6 °C) Resp 18 Ht 5' 2\" (1.575 m) Wt 36.3 kg (80 lb) SpO2 98% BMI 14.63 kg/m² PHYSICAL EXAM: 
Gen: NAD, ill looking HEENT: anicteric sclerae, normal conjunctiva, oropharynx clear, MM moist 
Neck: supple, trachea midline, no adenopathy Heart: RRR, no MRG, no JVD, no peripheral edema Lungs: decreased breath sounds on right side Abd: soft, NT, ND, BS+, no organomegaly Extr: warm Skin: dry, no rash Neuro: CN II-XII grossly intact, normal speech, moves all extremities Psych: normal mood, appropriate affect Intake/Output Summary (Last 24 hours) at 2/5/2020 1612 Last data filed at 2/4/2020 2058 Gross per 24 hour Intake 1019 ml Output  Net 1019 ml Recent labs & imaging reviewed: 
Recent Results (from the past 24 hour(s)) CBC WITH AUTOMATED DIFF Collection Time: 02/05/20  2:09 AM  
Result Value Ref Range WBC 15.3 (H) 3.6 - 11.0 K/uL  
 RBC 3.87 3.80 - 5.20 M/uL  
 HGB 12.3 11.5 - 16.0 g/dL HCT 37.0 35.0 - 47.0 % MCV 95.6 80.0 - 99.0 FL  
 MCH 31.8 26.0 - 34.0 PG  
 MCHC 33.2 30.0 - 36.5 g/dL  
 RDW 13.2 11.5 - 14.5 % PLATELET 364 (H) 878 - 400 K/uL MPV 9.7 8.9 - 12.9 FL  
 NRBC 0.0 0  WBC ABSOLUTE NRBC 0.00 0.00 - 0.01 K/uL NEUTROPHILS 76 (H) 32 - 75 % LYMPHOCYTES 14 12 - 49 % MONOCYTES 7 5 - 13 % EOSINOPHILS 1 0 - 7 % BASOPHILS 1 0 - 1 % IMMATURE GRANULOCYTES 1 (H) 0.0 - 0.5 % ABS. NEUTROPHILS 11.7 (H) 1.8 - 8.0 K/UL  
 ABS. LYMPHOCYTES 2.1 0.8 - 3.5 K/UL  
 ABS. MONOCYTES 1.1 (H) 0.0 - 1.0 K/UL ABS. EOSINOPHILS 0.1 0.0 - 0.4 K/UL  
 ABS. BASOPHILS 0.1 0.0 - 0.1 K/UL  
 ABS. IMM. GRANS. 0.1 (H) 0.00 - 0.04 K/UL  
 DF AUTOMATED METABOLIC PANEL, BASIC Collection Time: 02/05/20  2:09 AM  
Result Value Ref Range Sodium 137 136 - 145 mmol/L Potassium 3.6 3.5 - 5.1 mmol/L Chloride 104 97 - 108 mmol/L  
 CO2 26 21 - 32 mmol/L Anion gap 7 5 - 15 mmol/L Glucose 101 (H) 65 - 100 mg/dL BUN 10 6 - 20 MG/DL Creatinine 0.80 0.55 - 1.02 MG/DL  
 BUN/Creatinine ratio 13 12 - 20 GFR est AA >60 >60 ml/min/1.73m2 GFR est non-AA >60 >60 ml/min/1.73m2 Calcium 9.2 8.5 - 10.1 MG/DL Recent Labs 02/05/20 
9570 02/04/20 
0210 WBC 15.3* 15.0* HGB 12.3 11.6 HCT 37.0 34.9*  
* 417* Recent Labs 02/05/20 
0689 02/04/20 
0210 02/03/20 
0339  139 137  
K 3.6 3.3* 3.4*  
 104 103 CO2 26 28 31 BUN 10 13 15 CREA 0.80 0.87 0.86 * 98 96  
CA 9.2 8.6 8.7 No results for input(s): SGOT, GPT, ALT, AP, TBIL, TBILI, TP, ALB, GLOB, GGT, AML, LPSE in the last 72 hours. No lab exists for component: AMYP, HLPSE No results for input(s): INR, PTP, APTT, INREXT, INREXT in the last 72 hours. No results for input(s): FE, TIBC, PSAT, FERR in the last 72 hours. No results found for: FOL, RBCF No results for input(s): PH, PCO2, PO2 in the last 72 hours. No results for input(s): CPK, CKNDX, TROIQ in the last 72 hours. No lab exists for component: CPKMB Lab Results Component Value Date/Time Cholesterol, total 204 (H) 02/03/2020 06:36 PM  
 HDL Cholesterol 46 02/03/2020 06:36 PM  
 LDL, calculated 112.4 (H) 02/03/2020 06:36 PM  
 Triglyceride 228 (H) 02/03/2020 06:36 PM  
 CHOL/HDL Ratio 4.4 02/03/2020 06:36 PM  
 
No results found for: [facility] No results found for: COLOR, APPRN, SPGRU, REFSG, JOE, PROTU, GLUCU, KETU, BILU, UROU, BLACK, LEUKU, GLUKE, EPSU, BACTU, WBCU, RBCU, CASTS, UCRY Med list reviewed Current Facility-Administered Medications Medication Dose Route Frequency  potassium chloride SR (KLOR-CON 10) tablet 40 mEq  40 mEq Oral DAILY  atorvastatin (LIPITOR) tablet 40 mg  40 mg Oral DAILY  aspirin chewable tablet 81 mg  81 mg Oral DAILY  enoxaparin (LOVENOX) injection 40 mg  40 mg SubCUTAneous Q12H  
 atenoloL (TENORMIN) tablet 50 mg  50 mg Oral DAILY  furosemide (LASIX) tablet 40 mg  40 mg Oral DAILY  HYDROcodone-acetaminophen (NORCO) 5-325 mg per tablet 1 Tab  1 Tab Oral Q4H PRN  
 hydroxyzine HCL (ATARAX) tablet 10 mg  10 mg Oral TID PRN  
 losartan (COZAAR) tablet 100 mg  100 mg Oral DAILY  montelukast (SINGULAIR) tablet 10 mg  10 mg Oral DAILY  albuterol-ipratropium (DUO-NEB) 2.5 MG-0.5 MG/3 ML  3 mL Nebulization Q4H PRN  
 sodium chloride (NS) flush 5-40 mL  5-40 mL IntraVENous Q8H  
 sodium chloride (NS) flush 5-40 mL  5-40 mL IntraVENous PRN  piperacillin-tazobactam (ZOSYN) 3.375 g in 0.9% sodium chloride (MBP/ADV) 100 mL  3.375 g IntraVENous Q8H Care Plan discussed with:  Patient/Family and Nurse Kristene Goodpasture, MD 
Internal Medicine Date of Service: 2/5/2020

## 2020-02-05 NOTE — PROGRESS NOTES
[facility] [place] at [address] 
[address], [address] W: [phone]  F: [phone] Reason for Consult:  
Birder Soulier is a 69 y.o. female who is seen in consultation at the request of Dr. Bhavani Sun for evaluation of shortness of breath in setting of NSCLC. Treatment History:  
· 12/15/2019-CTA showed right upper lobe lung nodule measuring 2.1 x 1.4 cm, occluded SVC with adjustment mediastinal soft tissue mass. The whole thing measuring approximately 2.3 to 1.8 cm. · 12/24/2020-EBUS and biopsy of mediastinal mass showed poorly differentiated adenocarcinoma · 1/13/2020-PET scan showed right upper lobe lung nodule with an SUV of 20, right mediastinal adenopathy with SUV of 16, right retrocaval clavicular subcentimeter focus with an SUV of 3.2 History of Present Illness:  
Patient is a 69 y.o. female with PMH as below who presented to the ED with shortness of breath which has been increasing in the last 3 weeks. She had a CT Sim with Radiation Oncology on 1/27/2020 had an episode of desaturation down to 80% on exertion. On review of the CT, Dr. Shanelle Delong discovered a significant pleural effusion and called the patient to come to the ED for further assessment. She had a thoracentecis on 2/2/2020 Breathing has improved though thinks fluid is coming back. Shortness of breath is somewhat worsened today. Some chest pain overnight that could be related to her mass. Discussed the need to get treatment started right away. Discussed port placement. Pain fair on [place]. Patient is anxious for MRI with sedation today. Oncologic history Se presented in [facility] 2019 with c/o increasing swelling of face , neck and arms along with SOB. Her problems started with a choking sensation and head tightness in Nov 2019. She initially saw ENT and was to see Pulmonary. However she presented to the Interim.  She had imaging as above that showed SVC thrombus and mediastinal mass. Subsequently had a biopsy and diagnosis as above. Was placed on Lovenox and had a mechanical thrombectomy of the SVC thrombus  on 2020. Plans were to start ChemoRT for stage III NSCLC She is a current smoker Brother and father  of lung cancer Past Medical History:  
Diagnosis Date  CAD (coronary artery disease)  Chronic obstructive pulmonary disease ([facility] [address]  History of common carotid artery stent placement  Hypercoagulable state ([facility] [address] SVC thrombus  Hypertension  Pleural effusion Past Surgical History:  
Procedure Laterality Date  HX ABDOMINAL LAPAROSCOPY    
 lots of intenstine removed/ lysis of adhesions  HX CAROTID STENT    HX HYSTERECTOMY  HX TONSIL AND ADENOIDECTOMY  IR BRONCH W LUNG BIOPSY SINGLE LOBE  2019 Social History Tobacco Use  Smoking status: Former Smoker Packs/day: 0.50 Last attempt to quit: 2019 Years since quittin.1  Smokeless tobacco: Never Used Substance Use Topics  Alcohol use: Not Currently Comment: occ Current Facility-Administered Medications Medication Dose Route Frequency  potassium chloride SR (KLOR-CON 10) tablet 40 mEq  40 mEq Oral DAILY  atorvastatin (LIPITOR) tablet 40 mg  40 mg Oral DAILY  aspirin chewable tablet 81 mg  81 mg Oral DAILY  enoxaparin (LOVENOX) injection 40 mg  40 mg SubCUTAneous Q12H  
 atenoloL (TENORMIN) tablet 50 mg  50 mg Oral DAILY  furosemide (LASIX) tablet 40 mg  40 mg Oral DAILY  HYDROcodone-acetaminophen (NORCO) 5-325 mg per tablet 1 Tab  1 Tab Oral Q4H PRN  
 hydroxyzine HCL (ATARAX) tablet 10 mg  10 mg Oral TID PRN  
 losartan (COZAAR) tablet 100 mg  100 mg Oral DAILY  montelukast (SINGULAIR) tablet 10 mg  10 mg Oral DAILY  albuterol-ipratropium (DUO-NEB) 2.5 MG-0.5 MG/3 ML  3 mL Nebulization Q4H PRN  
  sodium chloride (NS) flush 5-40 mL  5-40 mL IntraVENous Q8H  
 sodium chloride (NS) flush 5-40 mL  5-40 mL IntraVENous PRN  piperacillin-tazobactam (ZOSYN) 3.375 g in 0.9% sodium chloride (MBP/ADV) 100 mL  3.375 g IntraVENous Q8H Allergies Allergen Reactions  Codeine Itching Review of Systems: A complete review of systems was obtained, negative except as described above. Physical Exam:  
 
Visit Vitals /80 (BP 1 Location: Right arm, BP Patient Position: Sitting) Pulse 71 Temp 97.8 °F (36.6 °C) Resp 19 Ht 5' 2\" (1.575 m) Wt 80 lb (36.3 kg) SpO2 95% BMI 14.63 kg/m² ECOG PS: 1 General: frail, appearing in no acute distress Eyes: PERRL, anicteric sclerae HENT: Atraumatic, OP clear Neck: Supple, no edema Respiratory: normal respiratory effort MS: Moves all extremities, Digits without clubbing or cyanosis. Skin: ecchymoses to bilateral forearms, No rash or petechiae. Normal temperature, turgor, and texture. Psych: Alert, oriented, appropriate affect, normal judgment/insight Results:  
 
Lab Results Component Value Date/Time WBC 15.3 (H) 02/05/2020 02:09 AM  
 HGB 12.3 02/05/2020 02:09 AM  
 HCT 37.0 02/05/2020 02:09 AM  
 PLATELET 131 (H) 86/79/6185 02:09 AM  
 MCV 95.6 02/05/2020 02:09 AM  
 ABS. NEUTROPHILS 11.7 (H) 02/05/2020 02:09 AM  
 
Lab Results Component Value Date/Time Sodium 137 02/05/2020 02:09 AM  
 Potassium 3.6 02/05/2020 02:09 AM  
 Chloride 104 02/05/2020 02:09 AM  
 CO2 26 02/05/2020 02:09 AM  
 Glucose 101 (H) 02/05/2020 02:09 AM  
 BUN 10 02/05/2020 02:09 AM  
 Creatinine 0.80 02/05/2020 02:09 AM  
 GFR est AA >60 02/05/2020 02:09 AM  
 GFR est non-AA >60 02/05/2020 02:09 AM  
 Calcium 9.2 02/05/2020 02:09 AM  
 
Lab Results Component Value Date/Time Bilirubin, total 0.2 02/01/2020 02:20 AM  
 ALT (SGPT) 20 02/01/2020 02:20 AM  
 AST (SGOT) 16 02/01/2020 02:20 AM  
 Alk.  phosphatase 104 02/01/2020 02:20 AM  
 Protein, total 6.7 02/01/2020 02:20 AM  
 Albumin 2.6 (L) 02/01/2020 02:20 AM  
 Globulin 4.1 (H) 02/01/2020 02:20 AM  
 
 
Records reviewed and summarized above. Pathology report(s) reviewed above. Radiology report(s) reviewed above. PET CT 
IMPRESSION IMPRESSION:  
1. RUL malignancy. 2. Mediastinal griffin metastasis. 3. Likely subcentimeter right retroclavicular griffin metastasis. Ct 1/31/2020 IMPRESSION IMPRESSION: 
  
1. Moderately large right pleural effusion, new since the prior study. 2. Significant increase in size of a right upper lobe parenchymal mass since the 
prior study. 3. Stable emphysematous change. 4. Probably stable pretracheal lymphadenopathy. The superior vena cava cannot be 
assessed without contrast. 
4. Small pericardial effusion, new. 5. Hypodense poorly circumscribed area in the left hepatic lobe on axial 
imaging, not confirmed on reformatted images. Therefore, this may be 
artifactual.   
6. Stable right hepatic lobe cyst and other incidental findings Pathology 2/2/2020 CYTOLOGIC INTERPRETATION:  
Pleural Fluid, Right, ThinPrep and cell block:  
Reactive mesothelial cells and mixed acute and chronic inflammation See comment General Categorization No cells diagnostic for malignancy Specimen Adequacy Satisfactory for evaluation Comment Immunohistochemical stains performed on the cell block reveal that reactive cells are strongly positive for calretinin and WT-1, while negative for BerEP4 and B72.3. These findings are compatible with reactive mesothelial cells and lend no support for metastatic adenocarcinoma Assessment:  
1) NSCLC- Right R lung mass, mediastinal adenopathy, SVC tumor thrombus and a possible R retroclavicular node 
T1N3M0-Stage IIIC Poorly differentiated adenocarcinoma Molecular studies - EGFR negative, PD-L1 is 80%, others pending Scans and pathology reviewed in thoracic tumor board Tumor is unresectable In general the 5 year OS for stage IIIC NSCLC is 13-20% I recommended Radiation with concurrent Carboplatin and taxol given weekly New R effusion with negative cytology Repeat CXR shows minimal reaccumulation. MRI W contrast today to r/o brain metastasis If that is the case - we will plan on ChemoRT - with carbo+ Taxol followed by adjuvant Durvalumab 2) Shortness of Breath Improved post thoracentecis. Now with some increased SOB. 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 Management per primary team 
 
6)CAD Management per primary team 
 
7)Acute Stroke? Detected on MRI Neurology recommendations appreciated Plan: · Pleural fluid cytology shows no malignant cells. Will continue with radiation and chemotherapy as planned. · Continue therapeutic lovenox. · If MRI brain is negative will plan to get port placed before discharge tomorrow · Appreciate Palliative care I appreciate the opportunity to participate in Ms. Lia Adame's care. D/W Dr. Jany Craig Signed By: Luci Bustos MD

## 2020-02-05 NOTE — ANESTHESIA PREPROCEDURE EVALUATION
Relevant Problems No relevant active problems Anesthetic History No history of anesthetic complications Review of Systems / Medical History Patient summary reviewed, nursing notes reviewed and pertinent labs reviewed Pulmonary Within defined limits COPD: severe Neuro/Psych Within defined limits Cardiovascular Within defined limits Hypertension CAD 
 
 
  
GI/Hepatic/Renal 
Within defined limits Endo/Other Within defined limits Other Findings Physical Exam 
 
Airway Mallampati: II 
TM Distance: > 6 cm Neck ROM: normal range of motion Mouth opening: Normal 
 
 Cardiovascular Regular rate and rhythm,  S1 and S2 normal,  no murmur, click, rub, or gallop Dental 
No notable dental hx Pulmonary Breath sounds clear to auscultation Abdominal 
GI exam deferred Other Findings Anesthetic Plan ASA: 4 Anesthesia type: MAC Anesthetic plan and risks discussed with: Patient

## 2020-02-05 NOTE — DISCHARGE INSTRUCTIONS
Please bring this form with you to show your primary care provider at your follow-up appointment. Primary care provider:  Dr. Derik Mehta NP    Discharging provider:  Terrance Zambrano MD    You have been admitted to the hospital with the following diagnoses:  · Exertional dyspnea [R06.09]    FOLLOW-UP CARE RECOMMENDATIONS:    APPOINTMENTS:  · Follow-up with primary care provider, Dr. Derik Mehta NP  -  Please call [phone] shortly after discharge to set up an appointment to be seen in  1 week   · Oncology f/u    FOLLOW-UP TESTS recommended: none    PENDING TEST RESULTS:  At the time of your discharge the following test results are still pending: none  Please make sure you review these results with your outpatient follow-up provider(s). SYMPTOMS to watch for: chest pain, shortness of breath, fever, chills, nausea, vomiting, diarrhea, change in mentation, falling, weakness, bleeding. DIET/what to eat:  Regular Diet    ACTIVITY:  Activity as tolerated    What to do if new or unexpected symptoms occur? If you experience any of the above symptoms (or should other concerns or questions arise after discharge) please call your primary care physician. Return to the emergency room if you cannot get hold of your doctor. · It is very important that you keep your follow-up appointment(s). · Please bring discharge papers, medication list (and/or medication bottles) to your follow-up appointments for review by your outpatient provider(s). · Please check the list of medications and be sure it includes every medication (even non-prescription medications) that your provider wants you to take. · It is important that you take the medication exactly as they are prescribed. · Keep your medication in the bottles provided by the pharmacist and keep a list of the medication names, dosages, and times to be taken in your wallet. · Do not take other medications without consulting your doctor.    · If you have any questions about your medications or other instructions, please talk to your nurse or care provider before you leave the hospital.    I understand that if any problems occur once I am at home I am to contact my physician. These instructions were explained to me and I had the opportunity to ask questions.

## 2020-02-05 NOTE — ACP (ADVANCE CARE PLANNING)
Palliative Medicine Social Work I met with patient and her family friend Senia Crawfordch. Patient was alert and oriented x 4 and able to make decisions about her medical care. Provided education re: Advanced Care Planning - patient agreeable to complete AMD. MPOA Primary Decision Maker: Natividad Srinivasan - [phone] Secondary Decision Maker: Rom Perez - [phone] Living Will Had a good discussion about patient's end of life wishes and what she deems quality of life. She is very clear that she would not want life prolonging measures when she is at end of life. She told me that she does not want to get a to a point where she is lying around sick in bed without ability to be active and productive. This mirrors her sentiments from yesterday about needing to be active in her life to have meaning. She would not want her kids having to take care of her. \"I am the caretaker not my kids. \" These values around QOL are also informing how she wants to approach her chemotherapy, \"I'll give it two rounds\" meaning if the treatment makes her very sick, she would not be willing to continue with it. She has other family members who  from lung cx and is clear about where her boundaries are re: QOL/less time versus prolonging life with low QOL. She said she has many family members waiting for her \"up there,\" and does not express fear about dying at this time. Patient admitted that her son is struggling with acceptance. Darcie isn't ready for patient to go but said she will \"keep him in line\" in terms of honoring patient's wishes if she doesn't want to continue with tx. Patient selected no life prolonging interventions if death is imminent and/or she is in a coma or vegetative state. Organ Donation Patient IS an organ donor (registered at [facility]) Code Status - DNR/DDNR We also discussed code status and patient completed DDNR. Copies of AMD and DDNR on chart to be filed. Patient has original and copies for MPOAs. Thank you for the opportunity to be involved in the care of Ms. Marcia Duarte and her family. Chacorta Camarena LMSW, Supervisee in Social Work Palliative Medicine  [phone]

## 2020-02-05 NOTE — PROGRESS NOTES
Neurology Progress Note Patient ID: 
Sho Gore [id number] 
69 y.o. 
1945 HISTORY OF PRESENT ILLNESS:  This is a 69-year-old right-handed female who was admitted on the 31st with shortness of breath and O2 sats 86% with a known large right pleural effusion and recent diagnosis of non-small-cell lung cancer. Plan was for her to undergo thoracentesis which she has and Lovenox was held for this. The patient had an MRI of brain today as a screening for metastatic disease and an incidental small acute right temporal stroke was seen. She also has extensive chronic ischemic white matter changes. The patient denies any prior history of stroke. She does have stroke risk factors of hypertension, tobacco use, hyperlipidemia, coronary artery disease with history of stenting as well as embolic risk of the recent superior vena cava thrombus and occlusion likely due to her hypercoagulable state and now off Lovenox for this procedure. The patient reports she has not had her cholesterol checked in a long time. She tried statins in the past and had myalgias in her lower extremities. The patient denies any numbness, weakness, speech, swallowing difficulties, vision changes cognitive changes since admission. Subjective:  
 Doing well this morning. Echo, PFO/Small ASD, cardiology consulted, No intervention, management would not change. Doppler, preliminary results results are unrevealing. Objective: All records in [facility] reviewed and noted ROS: 
Per HPI All other 12 pt ROS negative Meds: 
Current Facility-Administered Medications Medication Dose Route Frequency  potassium chloride SR (KLOR-CON 10) tablet 40 mEq  40 mEq Oral DAILY  atorvastatin (LIPITOR) tablet 40 mg  40 mg Oral DAILY  aspirin chewable tablet 81 mg  81 mg Oral DAILY  enoxaparin (LOVENOX) injection 40 mg  40 mg SubCUTAneous Q12H  
 atenoloL (TENORMIN) tablet 50 mg  50 mg Oral DAILY  furosemide (LASIX) tablet 40 mg  40 mg Oral DAILY  HYDROcodone-acetaminophen (NORCO) 5-325 mg per tablet 1 Tab  1 Tab Oral Q4H PRN  
 hydroxyzine HCL (ATARAX) tablet 10 mg  10 mg Oral TID PRN  
 losartan (COZAAR) tablet 100 mg  100 mg Oral DAILY  montelukast (SINGULAIR) tablet 10 mg  10 mg Oral DAILY  albuterol-ipratropium (DUO-NEB) 2.5 MG-0.5 MG/3 ML  3 mL Nebulization Q4H PRN  
 sodium chloride (NS) flush 5-40 mL  5-40 mL IntraVENous Q8H  
 sodium chloride (NS) flush 5-40 mL  5-40 mL IntraVENous PRN  piperacillin-tazobactam (ZOSYN) 3.375 g in 0.9% sodium chloride (MBP/ADV) 100 mL  3.375 g IntraVENous Q8H Imaging: MRI Results (most recent): 
Results from Hospital Encounter encounter on 01/31/20 MRI BRAIN WO CONT Narrative *PRELIMINARY REPORT* Small acute infarct in the right temporal periventricular white matter. There is 
no midline shift or mass effect. The findings were discussed with the patient's nurse on 2/3/2020 at [facility] 4037 hours 
by Dr. Leonidas Sanchez. [facility] 128 Final report to follow. *END PRELIMINARY REPORT* *FINAL REPORT BELOW* EXAM:  MRI BRAIN WO CONT INDICATION:  r/o mets, patient with history small cell carcinoma of the right 
lung. TECHNIQUE: 
Sagittal T1, axial FLAIR, T2,T1 and gradient echo images as well as coronal T2 
weighted images and axial diffusion weighted images of the head were obtained. COMPARISON:  None available FINDINGS: 
Small subcentimeter focus of restricted diffusion in the right posterior medial 
temporal lobe white matter adjacent to the lateral aspect of the atria of the 
right lateral ventricle. Appearance is consistent with an acute small lacunar 
white matter infarct. No other restricted diffusion or findings of acute abnormality. Note is made of multiple foci T2 hyperintensity in cerebral white matter with 
some areas of confluence in a pattern suggesting chronic small vessel ischemic 
change. No evidence of acute intracranial hemorrhage, mass or abnormal extra-axial fluid 
collections. Flow voids are present in vertebral basilar and carotid artery systems. Mild mucosal thickening paranasal sinuses. Impression IMPRESSION: 
1. Small right temporal lobe periventricular white matter acute lacunar infarct. 2. Chronic-appearing multifocal white matter T2 hyperintensity possibly related 
to chronic small vessel ischemia. 3. MRI was specifically ordered without contrast and therefore sensitivity for 
evaluating cerebral metastasis from patient's known small cell carcinoma the 
lung is less than optimal. MRI performed as ordered as confirmed by the hospital 
service M.D. 
  
  
01/31/20 ECHO ADULT COMPLETE 02/04/2020 2/4/2020 Narrative · Image quality for this study was technically difficult. · Agitated saline contrast study was performed. Right-to-left shunt at  
rest and right to left shunt with Valsalva. · Normal cavity size, wall thickness and systolic function (ejection  
fraction normal). Estimated left ventricular ejection fraction is 60 -  
65%. Age-appropriate left ventricular diastolic function. · Mild tricuspid valve regurgitation is present. · Mild pulmonic valve regurgitation is present. · Mitral valve non-specific thickening and thickening. · Severely elevated central venous pressure (15+ mmHg); IVC diameter is  
larger than 21 mm and collapses less than 50% with respiration. Signed by: Ashkan Garcia MD  
 
 
. Lab Review Recent Results (from the past 24 hour(s)) DUPLEX CAROTID BILATERAL Collection Time: 02/04/20 12:10 PM  
Result Value Ref Range Right cca dist sys 47.4 cm/s Right CCA dist capone 9.9 cm/s Right CCA prox sys 54.4 cm/s Right CCA prox capone 9.9 cm/s Right eca sys 62.6 cm/s RIGHT EXTERNAL CAROTID ARTERY D 5.69 cm/s Right ICA dist sys 41.3 cm/s Right ICA dist capone 11.6 cm/s Right ICA mid sys 57.0 cm/s Right ICA mid capone 13.4 cm/s Right ICA prox sys 70.7 cm/s Right ICA prox capone 22.3 cm/s Right vertebral sys 29.1 cm/s RIGHT VERTEBRAL ARTERY D 0.00 cm/s Right ICA/CCA sys 1.5 Left CCA dist sys 46.0 cm/s Left CCA dist capone 7.5 cm/s Left CCA prox sys 62.7 cm/s Left CCA prox capone 10.3 cm/s Left ECA sys 64.6 cm/s LEFT EXTERNAL CAROTID ARTERY D 8.34 cm/s Left ICA dist sys 65.6 cm/s Left ICA dist capone 19.3 cm/s Left ICA mid sys 69.6 cm/s Left ICA mid capone 15.3 cm/s Left ICA prox sys 68.3 cm/s Left ICA prox capone 18.0 cm/s Left vertebral sys 46.8 cm/s LEFT VERTEBRAL ARTERY D 10.12 cm/s Left ICA/CCA sys 1.51   
ECHO ADULT COMPLETE Collection Time: 02/04/20  1:39 PM  
Result Value Ref Range Aortic Valve Systolic Peak Velocity 560.32 cm/s Aortic Valve Area by Continuity of Peak Velocity 1.9 cm2 AoV PG 5.2 mmHg LVIDd 2.61 (A) 3.9 - 5.3 cm  
 LVPWd 0.87 0.6 - 0.9 cm LVIDs 1.98 cm IVSd 0.66 0.6 - 0.9 cm  
 LVOT d 1.79 cm  
 LVOT Peak Velocity 87.39 cm/s LVOT Peak Gradient 3.1 mmHg MV E Brock 77.56 cm/s RVIDd 3.26 cm  
 LV Mass AL 41.7 (A) 67 - 162 g  
 LV Mass AL Index 32.2 43 - 95 g/m2 E/E' lateral 10.45 Left Atrium Major Axis 2.83 cm Triscuspid Valve Regurgitation Peak Gradient 41.3 mmHg Pulmonic Valve Max Velocity 81.65 cm/s  
 TR Max Velocity 321.33 cm/s Left Ventricular Fractional Shortening by 2D [id number] % AV Velocity Ratio 0.76   
 PV peak gradient 2.7 mmHg LV E' Lateral Velocity 7.42 cm/s Tapse 2.02 (A) 1.5 - 2.0 cm PASP 35.0 mmHg CBC WITH AUTOMATED DIFF Collection Time: 02/05/20  2:09 AM  
Result Value Ref Range WBC 15.3 (H) 3.6 - 11.0 K/uL  
 RBC 3.87 3.80 - 5.20 M/uL  
 HGB 12.3 11.5 - 16.0 g/dL HCT 37.0 35.0 - 47.0 % MCV 95.6 80.0 - 99.0 FL  
 MCH 31.8 26.0 - 34.0 PG  
 MCHC 33.2 30.0 - 36.5 g/dL  
 RDW 13.2 11.5 - 14.5 % PLATELET 548 (H) 143 - 400 K/uL  MPV 9.7 8.9 - 12.9 FL  
 NRBC 0.0 0  WBC ABSOLUTE NRBC 0.00 0.00 - 0.01 K/uL NEUTROPHILS 76 (H) 32 - 75 % LYMPHOCYTES 14 12 - 49 % MONOCYTES 7 5 - 13 % EOSINOPHILS 1 0 - 7 % BASOPHILS 1 0 - 1 % IMMATURE GRANULOCYTES 1 (H) 0.0 - 0.5 % ABS. NEUTROPHILS 11.7 (H) 1.8 - 8.0 K/UL  
 ABS. LYMPHOCYTES 2.1 0.8 - 3.5 K/UL  
 ABS. MONOCYTES 1.1 (H) 0.0 - 1.0 K/UL  
 ABS. EOSINOPHILS 0.1 0.0 - 0.4 K/UL  
 ABS. BASOPHILS 0.1 0.0 - 0.1 K/UL  
 ABS. IMM. GRANS. 0.1 (H) 0.00 - 0.04 K/UL  
 DF AUTOMATED METABOLIC PANEL, BASIC Collection Time: 02/05/20  2:09 AM  
Result Value Ref Range Sodium 137 136 - 145 mmol/L Potassium 3.6 3.5 - 5.1 mmol/L Chloride 104 97 - 108 mmol/L  
 CO2 26 21 - 32 mmol/L Anion gap 7 5 - 15 mmol/L Glucose 101 (H) 65 - 100 mg/dL BUN 10 6 - 20 MG/DL Creatinine 0.80 0.55 - 1.02 MG/DL  
 BUN/Creatinine ratio 13 12 - 20 GFR est AA >60 >60 ml/min/1.73m2 GFR est non-AA >60 >60 ml/min/1.73m2 Calcium 9.2 8.5 - 10.1 MG/DL Exam: 
Visit Vitals /80 (BP 1 Location: Right arm, BP Patient Position: Sitting) Pulse 71 Temp 97.8 °F (36.6 °C) Resp 19 Ht 5' 2\" (1.575 m) Wt 36.3 kg (80 lb) SpO2 95% BMI 14.63 kg/m² Gen: Well developed CV: RRR Lungs: non labored breathing Neuro: A&O x 3, no dysarthria or aphasia CN II-XII:EOMI, face symmetric, tongue/palate midline Motor: strength 5/5 all four ext Sensory: intact to LT Gait:deferred Assessment:  
 
Patient Active Problem List  
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 [address] C34.90  Exertional dyspnea R06.09 Plan:  
Right temporal ischemic infarct  
- HgbA1C, 5.4 , at goal  
- .4, Goal 70, Continue Lipitor 40 mg HS, no sign of myalgias 
-Continue Lovenox and ASA Thank you very much for this consultation.  No further neurologic recommendations at this time. Will sign off but please call with questions.  
Signed: 
Val Rosen NP 
2/5/2020 
11:19 AM

## 2020-02-06 NOTE — DISCHARGE SUMMARY
Discharge Summary PATIENT ID: Yifan Gonzalez MRN: [id number] YOB: 1945 DATE OF ADMISSION: 1/31/2020 10:50 AM   
DATE OF DISCHARGE: 2/6/2020 PRIMARY CARE PROVIDER: Pia Clements NP  
 
ATTENDING PHYSICIAN: Guanaco Aguilera MD 
DISCHARGING PROVIDER: Radha Lindo MD   
To contact this individual call [phone] and ask the  to page. If unavailable ask to be transferred the Adult Hospitalist Department. CONSULTATIONS: IP CONSULT TO ONCOLOGY 
IP CONSULT TO RADIATION ONCOLOGY 
IP CONSULT TO NEUROLOGY 
IP CONSULT TO ANESTHESIOLOGY 
IP CONSULT TO PALLIATIVE CARE - PROVIDER 
IP CONSULT TO THORACIC SURGERY 
IP CONSULT TO CARDIOLOGY PROCEDURES/SURGERIES: * No surgery found * [id number] [facility] COURSE:  
 
  
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  Her shortness of breath is worse with exertion.  She has also associated cough, on and off productive, but no fever, left-sided chest pain, palpitation, sweating, nausea, vomiting, abdominal pain, urinary complaint or abnormal bowel movement.  She has on and off right upper extremity and facial swelling for which she takes Lasix.  She was evaluated by her oncologist and he had CT scan on 01/30/2020, and she was found to have right pleural effusion.  She was also seeing radiation oncologist and she was advised to come to [facility] for right thoracentesis.   3/45/2489 
  
Right pleural effusion - unclear etiology 
- CT chest: Moderately large right pleural effusion, new since the prior study. - ultrasound-guided thoracentesis done on 2/2: 400ml fluid removed. - fluid was sent only for cytology , no testing done for cx or analysis  
- weaned off oxygen. Saturating well on RA. Passed 6 min walk test 2/2 - Rpt CXR 2/4: Small right pleural effusion has increased in size. - cytology:  Reactive mesothelial cells and mixed acute and chronic inflammation  
- pt c/o sob again. She feels she gaining pleural fluid again 
-Oncology is on board Right lung non-small cell lung cancer status post chemoradiation.   
- CT chest: Significant increase in size of a right upper lobe parenchymal mass since the prior study. 
- MRI brain with contrast done today under sedation 8 mm enhancing focus at the left frontal convexity suspicious for metastasis. 2 additional subtle areas of enhancement in the right frontal lobe, also concerning for metastases 
- Dr. Petty Bradley discussed with patient regarding the above report  
Acute small CVA 
- pt asymptomatic - MRI: Small right temporal lobe periventricular white matter acute lacunar infarct. - neurology on board -  
- carotid duplex: Mild, less than 50 percent, stenosis of the right and left internal carotid artery. - Echo positive for Right to left shunt seen at rest and right to left shunt seen with Valsalva. - appreciate Cardiology input : no need for surgical intervention  
- started on aspirin and Lipitor 40mg Leukocytosis 17.3 on poa -  persistent 15 
- since pleural effusion cytology did not show malignant cells. Need r/o infectious cause  
- on iv zosyn, switch to po abx Hyponatremia, likely related to her non-small-cell lung cancer.   
-resolved Hypokalemia  
- resolved  
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 -  c/w Lovenox 40 mg q.12 hrs CAD s/p stent,  
-stable.  Continue home medication, atenolol.  
HTN   
-Blood pressure stable.  Continue home medication losartan and atenolol. 
  
  
Code status: DNR 
 DVT prophylaxis: Lovenox DISCHARGE DIAGNOSES / PLAN:   
 
Right pleural effusion unclear etiology 
-improved Right lung non-small cell lung cancer status post chemoradiation.   
-Out patient following with Oncologist 
Acute small CVA 
- continue aspirin and Lipitor 40mg Leukocytosis 17.3 on poa -  persistent 15 
-switch to Augmentin 500-125 mg po bid for five days with [facility] Hyponatremia, likely related to her non-small-cell lung cancer.   
-resolved Hypokalemia  
- resolved  
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  
-continue Lovenox 40 mg q.12 hrs CAD s/p stent,  
-Continue home medication, atenolol.  
HTN   
-continue losartan and atenolol. 
  
 
PENDING TEST RESULTS:  
At the time of discharge the following test results are still pending: None FOLLOW UP APPOINTMENTS:   
Follow-up Information Follow up With Specialties Details Why Contact Info Soraya Oconnor NP Nurse Practitioner In one week  [address] [phone] Shalom Johnson MD Hematology and Oncology, Internal Medicine, Hematology, Oncology In one week  [address] [place] [address] [address] [place], [place] [phone] Scarlet Cunningham MD Radiation Oncology In one week  [place] [address] [facility] 
[phone] DIET: Cardiac Diet ACTIVITY: Activity as tolerated WOUND CARE: None EQUIPMENT needed: None DISCHARGE MEDICATIONS: 
Current Discharge Medication List  
  
START taking these medications Details  
amoxicillin-clavulanate (AUGMENTIN) 500-125 mg per tablet Take 1 Tab by mouth two (2) times a day for 5 days. Qty: 10 Tab, Refills: 0  
  
L.acidoph & parac-S.therm-Bifido (CIRILO Q2/RISAQUAD-2) 16 billion cell cap cap Take 1 Cap by mouth daily. Qty: 10 Cap, Refills: 0  
  
aspirin 81 mg chewable tablet Take 1 Tab by mouth daily. Qty: 30 Tab, Refills: 0  
  
atorvastatin (LIPITOR) 40 mg tablet Take 1 Tab by mouth daily. Qty: 30 Tab, Refills: 0 CONTINUE these medications which have CHANGED Details HYDROcodone-acetaminophen (NORCO) 5-325 mg per tablet Take 1 Tab by mouth every four (4) hours as needed for Pain for up to 3 days. Max Daily Amount: 6 Tabs. Qty: 8 Tab, Refills: 0 Associated Diagnoses: Non-small cell lung cancer, right ([address] potassium chloride SR (K-TAB) 20 mEq tablet Take 2 Tabs by mouth daily. Qty: 30 Tab, Refills: 1 CONTINUE these medications which have NOT CHANGED Details  
enoxaparin (LOVENOX) 40 mg/0.4 mL 40 mg by SubCUTAneous route two (2) times a day. albuterol sulfate (PROVENTIL;VENTOLIN) 2.5 mg/0.5 mL nebu nebulizer solution 0.5 mL by Nebulization route every four (4) hours as needed for Wheezing or Shortness of Breath. Qty: 20 mL, Refills: 5  
  
furosemide (LASIX) 40 mg tablet Take 1 Tab by mouth daily. Qty: 30 Tab, Refills: 1  
  
albuterol (PROVENTIL HFA, VENTOLIN HFA, PROAIR HFA) 90 mcg/actuation inhaler Take 2 Puffs by inhalation. atenolol (TENORMIN) 50 mg tablet Take 50 mg by mouth daily. losartan (COZAAR) 100 mg tablet Take 100 mg by mouth daily. budesonide (ENTOCORT EC) 3 mg capsule Take 6 mg by mouth every morning. fluticasone propionate (FLONASE ALLERGY RELIEF NA) by Nasal route. hydrOXYzine HCl (ATARAX) 10 mg tablet Take 10 mg by mouth three (3) times daily as needed for Itching. montelukast (SINGULAIR) 10 mg tablet Take 10 mg by mouth daily. STOP taking these medications LORazepam (ATIVAN) 0.5 mg tablet Comments:  
Reason for Stopping:   
   
  
 
 
 
NOTIFY YOUR PHYSICIAN FOR ANY OF THE FOLLOWING:  
Fever over 101 degrees for 24 hours. Chest pain, shortness of breath, fever, chills, nausea, vomiting, diarrhea, change in mentation, falling, weakness, bleeding. Severe pain or pain not relieved by medications. Or, any other signs or symptoms that you may have questions about.  
 
DISPOSITION: 
x  Home With: 
 OT  PT x HH  RN  
  
 Long term SNF/Inpatient Rehab Independent/assisted living Hospice Other:  
 
 
PATIENT CONDITION AT DISCHARGE:  
 
Functional status Poor   
x Deconditioned Independent Cognition  
x  Lucid Forgetful Dementia Catheters/lines (plus indication) [place] PICC   
 PEG   
x None Code status Full code   
x DNR PHYSICAL EXAMINATION AT DISCHARGE: 
Patient Vitals for the past 24 hrs: 
 Temp Pulse Resp BP SpO2  
02/06/20 1534 97.9 °F (36.6 °C) 79 17 127/84 97 % 02/06/20 0820 97.8 °F (36.6 °C) 98 17 160/86 92 % 02/06/20 0254 98.5 °F (36.9 °C) 96 18 (!) 174/99 92 % General:          Alert, cooperative, no distress, appears stated age. HEENT:           Atraumatic, anicteric sclerae, pink conjunctivae No oral ulcers, mucosa moist, throat clear, dentition fair Neck:               Supple, symmetrical 
Lungs:             Clear to auscultation bilaterally. No Wheezing or Rhonchi. No rales. Chest wall:      No tenderness  No Accessory muscle use. Heart:              Regular  rhythm,  No  murmur   No edema Abdomen:        Soft, non-tender. Not distended. Bowel sounds normal 
Extremities:     No cyanosis. No clubbing,   
                        Skin turgor normal, Capillary refill normal 
Skin:                Not pale. Not Jaundiced  No rashes Psych:             Not anxious or agitated. Neurologic:      Alert, moves all extremities, answers questions appropriately and responds to commands Recent Results (from the past 24 hour(s)) CBC WITH AUTOMATED DIFF Collection Time: 02/06/20 12:38 AM  
Result Value Ref Range WBC 18.8 (H) 3.6 - 11.0 K/uL  
 RBC 3.92 3.80 - 5.20 M/uL  
 HGB 12.3 11.5 - 16.0 g/dL HCT 37.8 35.0 - 47.0 % MCV 96.4 80.0 - 99.0 FL  
 MCH 31.4 26.0 - 34.0 PG  
 MCHC 32.5 30.0 - 36.5 g/dL  
 RDW 13.4 11.5 - 14.5 % PLATELET 422 (H) 940 - 400 K/uL  MPV 9.7 8.9 - 12.9 FL  
 NRBC 0.0 0  WBC ABSOLUTE NRBC 0.00 0.00 - 0.01 K/uL NEUTROPHILS 76 (H) 32 - 75 % LYMPHOCYTES 14 12 - 49 % MONOCYTES 7 5 - 13 % EOSINOPHILS 1 0 - 7 % BASOPHILS 1 0 - 1 % IMMATURE GRANULOCYTES 1 (H) 0.0 - 0.5 % ABS. NEUTROPHILS 14.6 (H) 1.8 - 8.0 K/UL  
 ABS. LYMPHOCYTES 2.5 0.8 - 3.5 K/UL  
 ABS. MONOCYTES 1.3 (H) 0.0 - 1.0 K/UL  
 ABS. EOSINOPHILS 0.1 0.0 - 0.4 K/UL  
 ABS. BASOPHILS 0.1 0.0 - 0.1 K/UL  
 ABS. IMM. GRANS. 0.1 (H) 0.00 - 0.04 K/UL  
 DF AUTOMATED METABOLIC PANEL, BASIC Collection Time: 02/06/20 12:38 AM  
Result Value Ref Range Sodium 137 136 - 145 mmol/L Potassium 3.3 (L) 3.5 - 5.1 mmol/L Chloride 106 97 - 108 mmol/L  
 CO2 26 21 - 32 mmol/L Anion gap 5 5 - 15 mmol/L Glucose 101 (H) 65 - 100 mg/dL BUN 17 6 - 20 MG/DL Creatinine 0.92 0.55 - 1.02 MG/DL  
 BUN/Creatinine ratio 18 12 - 20 GFR est AA >60 >60 ml/min/1.73m2 GFR est non-AA 60 (L) >60 ml/min/1.73m2 Calcium 9.0 8.5 - 10.1 MG/DL  
 
CHRONIC MEDICAL DIAGNOSES: 
Problem List as of 2/6/2020 Date Reviewed: 1/21/2020 Codes Class Noted - Resolved Exertional dyspnea ICD-10-CM: R06.09 
ICD-9-CM: 786.09  1/31/2020 - Present Lung cancer [facility]) ICD-10-CM: C34.90 ICD-9-CM: 162.9  1/27/2020 - Present Superior vena cava thrombosis (HCC) ICD-10-CM: R07.919 ICD-9-CM: 453.87  1/14/2020 - Present Lung mass ICD-10-CM: R91.8 ICD-9-CM: 786.6  12/16/2019 - Present SVC (superior vena cava obstruction) ICD-10-CM: I87.1 ICD-9-CM: 459.2  12/16/2019 - Present SVC syndrome ICD-10-CM: I87.1 ICD-9-CM: 459.2  12/15/2019 - Present Greater than 45 minutes were spent with the patient on counseling and coordination of care Signed:  
Radha Lindo MD 
2/6/2020 
4:42 PM

## 2020-02-06 NOTE — PROGRESS NOTES
Lovenox Monitoring Indication: hypercoagulable state and prior SVC thrombus Current Weight: 34.6 kg 
Est. CrCl = 29.7 ml/min Current Dose: 40 mg subcutaneously every 12 hours. Plan: Change to 30 mg every 24 hours for weight and renal function.

## 2020-02-06 NOTE — PROGRESS NOTES
[address] [facility] Adult  Hospitalist Group Hospitalist Progress Note Darlene Oglesby MD 
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  Her shortness of breath is worse with exertion.  She has also associated cough, on and off productive, but no fever, left-sided chest pain, palpitation, sweating, nausea, vomiting, abdominal pain, urinary complaint or abnormal bowel movement.  She has on and off right upper extremity and facial swelling for which she takes Lasix.  She was evaluated by her oncologist and he had CT scan on 2020, and she was found to have right pleural effusion.  She was also seeing radiation oncologist and she was advised to come to [facility] for right thoracentesis. 2020 
  
 
Interval history / Subjective: She said she feels better,   
 
Assessment & Plan:  
 
Right pleural effusion - unclear etiology 
- CT chest: Moderately large right pleural effusion, new since the prior study. - ultrasound-guided thoracentesis done on : 400ml fluid removed. - fluid was sent only for cytology , no testing done for cx or analysis  
- weaned off oxygen. Saturating well on RA. Passed 6 min walk test  - Rpt CXR : Small right pleural effusion has increased in size. - cytology:  Reactive mesothelial cells and mixed acute and chronic inflammation - pt c/o sob again. She feels she gaining pleural fluid again 
-Oncology is on board 
  
Right lung non-small cell lung cancer status post chemoradiation.   
- CT chest: Significant increase in size of a right upper lobe parenchymal mass since the prior study. 
- MRI brain with contrast done today under sedation 8 mm enhancing focus at the left frontal convexity suspicious for metastasis. 2 additional subtle areas of enhancement in the right frontal lobe, also concerning for metastases 
- Dr. Brandan Friend discussed with patient regarding the above report  
  
Acute small CVA 
- pt asymptomatic - MRI: Small right temporal lobe periventricular white matter acute lacunar infarct. - neurology on board -  
- carotid duplex: Mild, less than 50 percent, stenosis of the right and left internal carotid artery. - Echo positive for Right to left shunt seen at rest and right to left shunt seen with Valsalva. - appreciate Cardiology input : no need for surgical intervention  
- started on aspirin and Lipitor 40mg  
  
Leukocytosis 17.3 on poa -  persistent 15 
- since pleural effusion cytology did not show malignant cells. Need r/o infectious cause  
- on iv zosyn, switch to po abx 
  
Hyponatremia, likely related to her non-small-cell lung cancer.   
-resolved Hypokalemia  
- resolved  
  
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 -  c/w Lovenox 40 mg q.12 hrs 
  
CAD s/p stent,  
-stable.  Continue home medication, atenolol. HTN   
-Blood pressure stable.  Continue home medication losartan and atenolol. 
  
 
Code status: DNR 
DVT prophylaxis: Lovenox Care Plan discussed with: Patient/Family and Nurse Anticipated Disposition: home with family Anticipated Discharge: Less than 24 hours Hospital Problems  Date Reviewed: 1/21/2020 Codes Class Noted POA Exertional dyspnea ICD-10-CM: R06.09 
ICD-9-CM: 786.09  1/31/2020 Unknown Vital Signs:  
 Last 24hrs VS reviewed since prior progress note. Most recent are: 
Visit Vitals /84 Pulse 79 Temp 97.9 °F (36.6 °C) Resp 17 Ht 5' 2\" (1.575 m) Wt 34.6 kg (76 lb 4.8 oz) SpO2 97% BMI 13.96 kg/m² No intake or output data in the 24 hours ending 02/06/20 1613 Physical Examination:  
 
 
     
Constitutional:  No acute distress, cooperative, pleasant ENT:  Oral mucosa moist, oropharynx benign. Resp:  Decrease bronchial breath sound bilaterally. No wheezing/rhonchi/rales. No accessory muscle use CV:  Regular rhythm, normal rate, no murmurs, gallops, rubs GI:  Soft, non distended, non tender. normoactive bowel sounds, no hepatosplenomegaly Musculoskeletal:  No edema, Neurologic:  Moves all extremities. AAOx3, CN II-XII reviewed Skin:  Good turgor, no rashes or ulcers Data Review:  
 Review and/or order of clinical lab test 
Review and/or order of tests in the radiology section of CPT Review and/or order of tests in the medicine section of CPT Labs:  
 
Recent Labs 02/06/20 
0038 02/05/20 
7956 WBC 18.8* 15.3* HGB 12.3 12.3 HCT 37.8 37.0 * 456* Recent Labs 02/06/20 
0038 02/05/20 
4211 02/04/20 
0210  137 139  
K 3.3* 3.6 3.3*  
 104 104 CO2 26 26 28 BUN 17 10 13 CREA 0.92 0.80 0.87 * 101* 98  
CA 9.0 9.2 8.6 No results for input(s): SGOT, GPT, ALT, AP, TBIL, TBILI, TP, ALB, GLOB, GGT, AML, LPSE in the last 72 hours. No lab exists for component: AMYP, HLPSE No results for input(s): INR, PTP, APTT, INREXT in the last 72 hours. No results for input(s): FE, TIBC, PSAT, FERR in the last 72 hours. No results found for: FOL, RBCF No results for input(s): PH, PCO2, PO2 in the last 72 hours. No results for input(s): CPK, CKNDX, TROIQ in the last 72 hours. No lab exists for component: CPKMB Lab Results Component Value Date/Time Cholesterol, total 204 (H) 02/03/2020 06:36 PM  
 HDL Cholesterol 46 02/03/2020 06:36 PM  
 LDL, calculated 112.4 (H) 02/03/2020 06:36 PM  
 Triglyceride 228 (H) 02/03/2020 06:36 PM  
 CHOL/HDL Ratio 4.4 02/03/2020 06:36 PM  
 
No results found for: [facility] No results found for: COLOR, APPRN, SPGRU, REFSG, JOE, PROTU, GLUCU, KETU, BILU, UROU, BLACK, LEUKU, GLUKE, EPSU, BACTU, WBCU, RBCU, CASTS, UCRY Medications Reviewed:  
 
Current Facility-Administered Medications Medication Dose Route Frequency  [START ON 2/7/2020] enoxaparin (LOVENOX) injection 30 mg (TREATMENT NOT PROPHYLAXIS)  30 mg SubCUTAneous Q24H  potassium chloride SR (KLOR-CON 10) tablet 40 mEq  40 mEq Oral DAILY  atorvastatin (LIPITOR) tablet 40 mg  40 mg Oral DAILY  aspirin chewable tablet 81 mg  81 mg Oral DAILY  atenoloL (TENORMIN) tablet 50 mg  50 mg Oral DAILY  furosemide (LASIX) tablet 40 mg  40 mg Oral DAILY  HYDROcodone-acetaminophen (NORCO) 5-325 mg per tablet 1 Tab  1 Tab Oral Q4H PRN  
 hydroxyzine HCL (ATARAX) tablet 10 mg  10 mg Oral TID PRN  
 losartan (COZAAR) tablet 100 mg  100 mg Oral DAILY  montelukast (SINGULAIR) tablet 10 mg  10 mg Oral DAILY  albuterol-ipratropium (DUO-NEB) 2.5 MG-0.5 MG/3 ML  3 mL Nebulization Q4H PRN  
 sodium chloride (NS) flush 5-40 mL  5-40 mL IntraVENous Q8H  
 sodium chloride (NS) flush 5-40 mL  5-40 mL IntraVENous PRN  piperacillin-tazobactam (ZOSYN) 3.375 g in 0.9% sodium chloride (MBP/ADV) 100 mL  3.375 g IntraVENous Q8H  
 
______________________________________________________________________ EXPECTED LENGTH OF STAY: 4d 21h ACTUAL LENGTH OF STAY:          6 Malissa Long MD

## 2020-02-06 NOTE — PROGRESS NOTES
[facility] [place] [facility] [place] [address] [address], [address] W: [phone]  F: [phone] Reason for Consult:  
Zan Galvez is a 69 y.o. female who is seen in consultation at the request of Dr. Huey Culver for evaluation of shortness of breath in setting of NSCLC. Treatment History:  
· 12/15/2019-CTA showed right upper lobe lung nodule measuring 2.1 x 1.4 cm, occluded SVC with adjustment mediastinal soft tissue mass. The whole thing measuring approximately 2.3 to 1.8 cm. · 2020-EBUS and biopsy of mediastinal mass showed poorly differentiated adenocarcinoma · 2020-PET scan showed right upper lobe lung nodule with an SUV of 20, right mediastinal adenopathy with SUV of 16, right retrocaval clavicular subcentimeter focus with an SUV of 3.2 History of Present Illness:  
Patient is a 69 y.o. female with PMH as below who presented to the ED with shortness of breath which has been increasing in the last 3 weeks. She had a CT Sim with Radiation Oncology on 2020 had an episode of desaturation down to 80% on exertion. On review of the CT, Dr. Ethan Pearlta discovered a significant pleural effusion and called the patient to come to the ED for further assessment. She had a thoracentecis on 2020 Oncologic history Se presented in [facility] Dec 2019 with c/o increasing swelling of face , neck and arms along with SOB. Her problems started with a choking sensation and head tightness in 2019. She initially saw ENT and was to see Pulmonary. However she presented to the Interim. She had imaging as above that showed SVC thrombus and mediastinal mass. Subsequently had a biopsy and diagnosis as above. Was placed on Lovenox and had a mechanical thrombectomy of the SVC thrombus  on 2020. Plans were to start ChemoRT for stage III NSCLC She is a current smoker Brother and father  of lung cancer Interval history: Patient had MRI brain yesterday afternoon. Found to have 8mm enhancing focus suspicious for metastasis and 2 additional areas concerning for metastases. Discussed this result with patient. Due to results, we will change treatment plan to palliative XRT and Keytruda. Patient appears to be coping well and has daughter at bedside. She states she is having dyspnea on exertion but feels fine laying in bed. Thoracic surgery assessed her for pleurex drain in future. Past Medical History:  
Diagnosis Date  CAD (coronary artery disease)  Chronic obstructive pulmonary disease ([facility]  History of common carotid artery stent placement  Hypercoagulable state ([facility] [address] SVC thrombus  Hypertension  Pleural effusion Past Surgical History:  
Procedure Laterality Date  HX ABDOMINAL LAPAROSCOPY    
 lots of intenstine removed/ lysis of adhesions  HX CAROTID STENT    HX HYSTERECTOMY  HX TONSIL AND ADENOIDECTOMY  IR BRONCH W LUNG BIOPSY SINGLE LOBE  2019 Social History Tobacco Use  Smoking status: Former Smoker Packs/day: 0.50 Last attempt to quit: 2019 Years since quittin.1  Smokeless tobacco: Never Used Substance Use Topics  Alcohol use: Not Currently Comment: occ Current Facility-Administered Medications Medication Dose Route Frequency  [START ON 2020] enoxaparin (LOVENOX) injection 30 mg (TREATMENT NOT PROPHYLAXIS)  30 mg SubCUTAneous Q24H  potassium chloride SR (KLOR-CON 10) tablet 40 mEq  40 mEq Oral DAILY  atorvastatin (LIPITOR) tablet 40 mg  40 mg Oral DAILY  aspirin chewable tablet 81 mg  81 mg Oral DAILY  atenoloL (TENORMIN) tablet 50 mg  50 mg Oral DAILY  furosemide (LASIX) tablet 40 mg  40 mg Oral DAILY  HYDROcodone-acetaminophen (NORCO) 5-325 mg per tablet 1 Tab  1 Tab Oral Q4H PRN  
 hydroxyzine HCL (ATARAX) tablet 10 mg  10 mg Oral TID PRN  
  losartan (COZAAR) tablet 100 mg  100 mg Oral DAILY  montelukast (SINGULAIR) tablet 10 mg  10 mg Oral DAILY  albuterol-ipratropium (DUO-NEB) 2.5 MG-0.5 MG/3 ML  3 mL Nebulization Q4H PRN  
 sodium chloride (NS) flush 5-40 mL  5-40 mL IntraVENous Q8H  
 sodium chloride (NS) flush 5-40 mL  5-40 mL IntraVENous PRN  piperacillin-tazobactam (ZOSYN) 3.375 g in 0.9% sodium chloride (MBP/ADV) 100 mL  3.375 g IntraVENous Q8H Allergies Allergen Reactions  Codeine Itching Review of Systems: A complete review of systems was obtained, negative except as described above. Physical Exam:  
 
Visit Vitals /86 (BP 1 Location: Right arm, BP Patient Position: Sitting) Pulse 98 Temp 97.8 °F (36.6 °C) Resp 17 Ht 5' 2\" (1.575 m) Wt 76 lb 4.8 oz (34.6 kg) SpO2 92% BMI 13.96 kg/m² ECOG PS: 1 General: frail, appearing in no acute distress Eyes: PERRL, anicteric sclerae HENT: Atraumatic, OP clear Neck: Supple, no edema Respiratory: normal respiratory effort MS: Moves all extremities, Digits without clubbing or cyanosis. Skin: ecchymoses to bilateral forearms, No rash or petechiae. Normal temperature, turgor, and texture. Psych: Alert, oriented, appropriate affect, normal judgment/insight Results:  
 
Lab Results Component Value Date/Time WBC 18.8 (H) 02/06/2020 12:38 AM  
 HGB 12.3 02/06/2020 12:38 AM  
 HCT 37.8 02/06/2020 12:38 AM  
 PLATELET 336 (H) 17/48/9763 12:38 AM  
 MCV 96.4 02/06/2020 12:38 AM  
 ABS. NEUTROPHILS 14.6 (H) 02/06/2020 12:38 AM  
 
Lab Results Component Value Date/Time  Sodium 137 02/06/2020 12:38 AM  
 Potassium 3.3 (L) 02/06/2020 12:38 AM  
 Chloride 106 02/06/2020 12:38 AM  
 CO2 26 02/06/2020 12:38 AM  
 Glucose 101 (H) 02/06/2020 12:38 AM  
 BUN 17 02/06/2020 12:38 AM  
 Creatinine 0.92 02/06/2020 12:38 AM  
 GFR est AA >60 02/06/2020 12:38 AM  
 GFR est non-AA 60 (L) 02/06/2020 12:38 AM  
 Calcium 9.0 02/06/2020 12:38 AM  
 
 Lab Results Component Value Date/Time Bilirubin, total 0.2 02/01/2020 02:20 AM  
 ALT (SGPT) 20 02/01/2020 02:20 AM  
 AST (SGOT) 16 02/01/2020 02:20 AM  
 Alk. phosphatase 104 02/01/2020 02:20 AM  
 Protein, total 6.7 02/01/2020 02:20 AM  
 Albumin 2.6 (L) 02/01/2020 02:20 AM  
 Globulin 4.1 (H) 02/01/2020 02:20 AM  
 
 
Records reviewed and summarized above. Pathology report(s) reviewed above. Radiology report(s) reviewed above. PET CT 
IMPRESSION IMPRESSION:  
1. RUL malignancy. 2. Mediastinal griffin metastasis. 3. Likely subcentimeter right retroclavicular griffin metastasis. Ct 1/31/2020 IMPRESSION IMPRESSION: 
  
1. Moderately large right pleural effusion, new since the prior study. 2. Significant increase in size of a right upper lobe parenchymal mass since the 
prior study. 3. Stable emphysematous change. 4. Probably stable pretracheal lymphadenopathy. The superior vena cava cannot be 
assessed without contrast. 
4. Small pericardial effusion, new. 5. Hypodense poorly circumscribed area in the left hepatic lobe on axial 
imaging, not confirmed on reformatted images. Therefore, this may be 
artifactual.   
6. Stable right hepatic lobe cyst and other incidental findings Pathology 2/2/2020 CYTOLOGIC INTERPRETATION:  
Pleural Fluid, Right, ThinPrep and cell block:  
Reactive mesothelial cells and mixed acute and chronic inflammation See comment General Categorization No cells diagnostic for malignancy Specimen Adequacy Satisfactory for evaluation Comment Immunohistochemical stains performed on the cell block reveal that reactive cells are strongly positive for calretinin and WT-1, while negative for BerEP4 and B72.3. These findings are compatible with reactive mesothelial cells and lend no support for metastatic adenocarcinoma MRI Brain w. Cont 25/2020 IMPRESSION: 
1. 8 mm enhancing focus at the left frontal convexity suspicious for metastasis. 2 additional subtle areas of enhancement in the right frontal lobe, also 
concerning for metastases. No edema. 2. Persistent small focus of diffusion signal right periatrial white matter 
likely an evolving infarction, with no associated enhancement. Assessment:  
1) NSCLC- Right- stage IV 
 
R lung mass, mediastinal adenopathy, SVC tumor thrombus and a possible R retroclavicular node 
T1N3M1- small brain metastasis Poorly differentiated adenocarcinoma Molecular studies - EGFR negative, PD-L1 is 80%, ALK negative New R effusion with negative cytology Repeat CXR shows minimal reaccumulation. Plan to pursue palliative RT x 2 weeks and start Henna Ache OP 2) Shortness of Breath Improved post thoracentecis. Appreciate thoracic surgery 3)Hypoxia Does not meet criteria for home O2. Will continue to asses outpatient. 4)SVC syndrome Due to tumor thrombus as the mass itself is not large enough She is s/p clot evacuation and prescribed Lovenox BID Resume Lovenox 5)COPD On albuterol, montelukast, and budesonide. Management per primary team 
 
6)CAD Management per primary team 
 
7)Acute Stroke? Detected on MRI Neurology recommendations appreciated Plan: · OK to d/c · She will follow up with Oncology and Radiation Oncology as outpatient · Continue Lovenox I appreciate the opportunity to participate in Ms. Kyrie Adame's care.  
 
Signed By: Randal Tiwari MD

## 2020-02-06 NOTE — PROGRESS NOTES
I have reviewed discharge instructions with the patient. Hard prescriptions given to patient. Questions and concerns addressed. The patient verbalized understanding. IV access removed. Patient transported via private vehicle.

## 2020-02-06 NOTE — CONSULTS
Asked to see Mrs Herrera Hollins re: recurrent right pleural effusion Referred by Dr. Wali Burgess Mrs Herrera Hollins is a pleasant 68 y/o lady with a past medical history significant for COPD, HTN and CAD. She was recently diagnosed with NSCLC of the RUl and presented with SVC syndrome and had a thrombectomy by Vascular surgery. She is undergoing treatment planning and during CT simulation with [facility] she was diagnosed with a large right pleural effusion. This has been tapped. She reports feeling dyspneic but much better since drainage. Allergies Allergen Reactions  Codeine Itching PMHx: HTN, CAD, COPD PSHx: thrombectomy SVC SocHx: longstanding smoker History reviewed. No pertinent family history. Outpatient Medications Marked as Taking for the 1/31/20 encounter [facility] Encounter) Medication Sig Dispense Refill  potassium chloride SR (K-TAB) 20 mEq tablet Take 2 Tabs by mouth daily. 30 Tab 1  
 aspirin 81 mg chewable tablet Take 1 Tab by mouth daily. 30 Tab 0  
 atorvastatin (LIPITOR) 40 mg tablet Take 1 Tab by mouth daily. 30 Tab 0  
 enoxaparin (LOVENOX) 40 mg/0.4 mL 40 mg by SubCUTAneous route two (2) times a day.  albuterol sulfate (PROVENTIL;VENTOLIN) 2.5 mg/0.5 mL nebu nebulizer solution 0.5 mL by Nebulization route every four (4) hours as needed for Wheezing or Shortness of Breath. 20 mL 5  
 furosemide (LASIX) 40 mg tablet Take 1 Tab by mouth daily. 30 Tab 1  
 albuterol (PROVENTIL HFA, VENTOLIN HFA, PROAIR HFA) 90 mcg/actuation inhaler Take 2 Puffs by inhalation.  atenolol (TENORMIN) 50 mg tablet Take 50 mg by mouth daily.  losartan (COZAAR) 100 mg tablet Take 100 mg by mouth daily.  budesonide (ENTOCORT EC) 3 mg capsule Take 6 mg by mouth every morning. ROS: 
 
Constitutional- some weight loss, easily fatugued HEENT- denies dysphagia Neuro- denies syncope Optho- no recent changes in vision Resp- dyspnea CV- denies angina or palpitations GI- denies abd pain - no complaints ID- denies recent fevers Vasc- denies claudication Blood pressure 160/86, pulse 98, temperature 97.8 °F (36.6 °C), resp. rate 17, height 5' 2\" (1.575 m), weight 80 lb (36.3 kg), SpO2 92 %. On exam she is seated upright Alert and oriented Appears her stated age Not tachypneic Breathing not labored No cervical or supraclavicular lymphadenopathy Lungs CTa b/l Chest wall non tender Rrr, no murmurs Radial pulses palp b/l 
abd sntnd, no organoemagly LE non edmetaous 
 
 
============================== Recent Results (from the past 24 hour(s)) CBC WITH AUTOMATED DIFF Collection Time: 02/06/20 12:38 AM  
Result Value Ref Range WBC 18.8 (H) 3.6 - 11.0 K/uL  
 RBC 3.92 3.80 - 5.20 M/uL  
 HGB 12.3 11.5 - 16.0 g/dL HCT 37.8 35.0 - 47.0 % MCV 96.4 80.0 - 99.0 FL  
 MCH 31.4 26.0 - 34.0 PG  
 MCHC 32.5 30.0 - 36.5 g/dL  
 RDW 13.4 11.5 - 14.5 % PLATELET 669 (H) 152 - 400 K/uL MPV 9.7 8.9 - 12.9 FL  
 NRBC 0.0 0  WBC ABSOLUTE NRBC 0.00 0.00 - 0.01 K/uL NEUTROPHILS 76 (H) 32 - 75 % LYMPHOCYTES 14 12 - 49 % MONOCYTES 7 5 - 13 % EOSINOPHILS 1 0 - 7 % BASOPHILS 1 0 - 1 % IMMATURE GRANULOCYTES 1 (H) 0.0 - 0.5 % ABS. NEUTROPHILS 14.6 (H) 1.8 - 8.0 K/UL  
 ABS. LYMPHOCYTES 2.5 0.8 - 3.5 K/UL  
 ABS. MONOCYTES 1.3 (H) 0.0 - 1.0 K/UL  
 ABS. EOSINOPHILS 0.1 0.0 - 0.4 K/UL  
 ABS. BASOPHILS 0.1 0.0 - 0.1 K/UL  
 ABS. IMM. GRANS. 0.1 (H) 0.00 - 0.04 K/UL  
 DF AUTOMATED METABOLIC PANEL, BASIC Collection Time: 02/06/20 12:38 AM  
Result Value Ref Range Sodium 137 136 - 145 mmol/L Potassium 3.3 (L) 3.5 - 5.1 mmol/L Chloride 106 97 - 108 mmol/L  
 CO2 26 21 - 32 mmol/L Anion gap 5 5 - 15 mmol/L Glucose 101 (H) 65 - 100 mg/dL BUN 17 6 - 20 MG/DL Creatinine 0.92 0.55 - 1.02 MG/DL  
 BUN/Creatinine ratio 18 12 - 20 GFR est AA >60 >60 ml/min/1.73m2 GFR est non-AA 60 (L) >60 ml/min/1.73m2 Calcium 9.0 8.5 - 10.1 MG/DL  
 
 
============================== 
 
I have personally reviewed her Chest CT and CXRs. Her last chest CT showed a large right pleural effusion with no pleural thickening. RUL mass present and stable. CXR following thoracentesis showed greatly decreased effusion. CXR from today shows a tiny effusion that is decreased compared to previous days. 
 
============================== 
 
PFTs- none 
 
============================== 
 
Path-  Cytology from pleural fluid negative 
 
============================ Diagnoses  1:  Stage IV NSCLC RUL  2: right pleural effusion 
 
============================= 
 
Mrs. Hilda Moody had a large symptomatic right pleural effusion. This has resolved with Thoracentesis. It was not a malignant effusion. There is concern for recurrence and since she is about to start treatments her Oncology team would like a plan to address the effusion. I discussed three options with mrs. Adame. 1-  do repeat thoracenteses  2- pleur-X catheter  3- VATS talc pleurodesis. After reviewing each procedure she would modesto to consider a Pleur-X if the effusion does recur. I think this is reasonable especially since she is getting radiation to her chest. 
 
We left her our card. I will remain in contact with her Oncology team and if needed we will plan for a Pleur-X. At this time no indications for immediate intervention. Thank you for allowing us to care for Mrs Randy Cheng

## 2020-02-06 NOTE — PROGRESS NOTES
Problem: Falls - Risk of 
Goal: *Absence of Falls Description Document Carolyn Latin Fall Risk and appropriate interventions in the flowsheet. Outcome: Progressing Towards Goal 
Note: Fall Risk Interventions: 
Mobility Interventions: Communicate number of staff needed for ambulation/transfer Medication Interventions: Teach patient to arise slowly History of Falls Interventions: Door open when patient unattended Problem: Pressure Injury - Risk of 
Goal: *Prevention of pressure injury Description Document Bright Scale and appropriate interventions in the flowsheet. Outcome: Progressing Towards Goal 
Note: Pressure Injury Interventions: 
Sensory Interventions: Assess changes in LOC, Assess need for specialty bed Moisture Interventions: Absorbent underpads, Apply protective barrier, creams and emollients Activity Interventions: Increase time out of bed Mobility Interventions: HOB 30 degrees or less Nutrition Interventions: Document food/fluid/supplement intake Friction and Shear Interventions: Apply protective barrier, creams and emollients, HOB 30 degrees or less Problem: Nutrition Deficit Goal: *Optimize nutritional status Outcome: Progressing Towards Goal

## 2020-02-13 NOTE — PROGRESS NOTES
Dimitrios Villagran is a 69 y.o. female Chief Complaint Patient presents with  Follow-up NSCLC 1. Have you been to the ER, urgent care clinic since your last visit? Hospitalized since your last visit? No. 
 
2. Have you seen or consulted any other health care providers outside of the [address] [place] [address] since your last visit? Include any pap smears or colon screening.  No.

## 2020-02-13 NOTE — PROGRESS NOTES
This note will not be viewable in [address] Social Work Navigator Encounter Patient Name:  Macario Bloch Medical History: NSCLC. Advance Directives: Patient has an AMD on file. Narrative:  
Please see initial assessment completed on 1/21/2020. The patient shared that she will be starting [facility] [place] soon. Her daughter is visiting from [place] this weekend. Provided this 's contact information as well as information regarding the complementary services provided by the [facility]. Barriers to Care: No barriers to care identified at this time. Assessment/Action: 1. Re-introduced self and role of this  in the [address] 2.  Reminded the patient of the [facility] and available resources there. 3.  Continue to meet with the patient when she returns to the clinic for ongoing assessment of the patients adjustment to her diagnosis and treatment. 4.  Ongoing psychosocial support as desired by patient. Plan/Referral:  
Complementary therapies referral 
Ritu Chau LCSW

## 2020-02-13 NOTE — PROGRESS NOTES
[facility] [place] [address] [address], [id number] [place] [address] Road, [address] [place] [address] W: [phone]  F: [phone] Reason for Consult:  
Danisha Gomez is a 69 y.o. female who is seen for follow up of NSCLC- stage IV with brain metastasis. Treatment History:  
· 12/15/2019-CTA showed right upper lobe lung nodule measuring 2.1 x 1.4 cm, occluded SVC with adjustment mediastinal soft tissue mass. The whole thing measuring approximately 2.3 to 1.8 cm. · 12/24/2020-EBUS and biopsy of mediastinal mass showed poorly differentiated adenocarcinoma · 1/13/2020-PET scan showed right upper lobe lung nodule with an SUV of 20, right mediastinal adenopathy with SUV of 16, right retrocaval clavicular subcentimeter focus with an SUV of 3.2 
· 2/3/2020: MRI brain: 8 mm enhancing focus at the left frontal convexity suspicious for metastasis. 2 additional subtle areas of enhancement in the right frontal lobe, also concerning for metastases History of Present Illness:  
Patient is a 69 y.o. female with PMH as below presented in [facility] Dec 2019 with c/o increasing swelling of face , neck and arms along with SOB. Her problems started with a choking sensation and head tightness in Nov 2019. She initially saw ENT and was to see Pulmonary. However she presented to the Interim. She had imaging as above that showed SVC thrombus and mediastinal mass. Subsequently had a biopsy and diagnosis as above. Was placed on Lovenox and had a mechanical thrombectomy of the SVC thrombus  on 1/14/2020. She had a CT Sim with Radiation Oncology on 1/27/2020 had an episode of desaturation down to 80% on exertion. On review of the CT, Dr. Claritza Scott discovered a significant pleural effusion and called the patient to come to the ED for further assessment. She had a thoracentecis on 2/2/2020 which showed no malignant cells.  MRI brain done for staging revealed a possible acute infarct but small foci of metastatic disease . She has since started RT as of 2020 for palliation. Comes to review further management SOB is stable, she has had no hemoptysis. Is using Lovenox. No HA or falls Past Medical History:  
Diagnosis Date  CAD (coronary artery disease)  Chronic obstructive pulmonary disease ([facility]  History of common carotid artery stent placement  Hypercoagulable state ([facility] [address] SVC thrombus  Hypertension  Pleural effusion Past Surgical History:  
Procedure Laterality Date  HX ABDOMINAL LAPAROSCOPY    
 lots of intenstine removed/ lysis of adhesions  HX CAROTID STENT    HX HYSTERECTOMY  HX TONSIL AND ADENOIDECTOMY  IR BRONCH W LUNG BIOPSY SINGLE LOBE  2019 Social History Tobacco Use  Smoking status: Former Smoker Packs/day: 0.50 Last attempt to quit: 2019 Years since quittin.1  Smokeless tobacco: Never Used Substance Use Topics  Alcohol use: Not Currently Comment: occ Current Outpatient Medications Medication Sig  
 L.acidoph & parac-S.therm-Bifido (CIRILO Q2/RISAQUAD-2) 16 billion cell cap cap Take 1 Cap by mouth daily.  potassium chloride SR (K-TAB) 20 mEq tablet Take 2 Tabs by mouth daily.  aspirin 81 mg chewable tablet Take 1 Tab by mouth daily.  atorvastatin (LIPITOR) 40 mg tablet Take 1 Tab by mouth daily.  enoxaparin (LOVENOX) 40 mg/0.4 mL 40 mg by SubCUTAneous route two (2) times a day.  furosemide (LASIX) 40 mg tablet Take 1 Tab by mouth daily.  fluticasone propionate (FLONASE ALLERGY RELIEF NA) by Nasal route.  montelukast (SINGULAIR) 10 mg tablet Take 10 mg by mouth daily.  albuterol (PROVENTIL HFA, VENTOLIN HFA, PROAIR HFA) 90 mcg/actuation inhaler Take 2 Puffs by inhalation.  atenolol (TENORMIN) 50 mg tablet Take 50 mg by mouth daily.  losartan (COZAAR) 100 mg tablet Take 100 mg by mouth daily.  budesonide (ENTOCORT EC) 3 mg capsule Take 6 mg by mouth every morning.  albuterol sulfate (PROVENTIL;VENTOLIN) 2.5 mg/0.5 mL nebu nebulizer solution 0.5 mL by Nebulization route every four (4) hours as needed for Wheezing or Shortness of Breath.  hydrOXYzine HCl (ATARAX) 10 mg tablet Take 10 mg by mouth three (3) times daily as needed for Itching. No current facility-administered medications for this visit. Allergies Allergen Reactions  Codeine Itching Review of Systems: A complete review of systems was obtained, negative except as described above. Physical Exam:  
 
Visit Vitals Ht 5' 2\" (1.575 m) BMI 13.96 kg/m² ECOG PS: 1 General: frail, appearing in no acute distress Eyes: PERRL, anicteric sclerae HENT: Atraumatic, OP clear Neck: Supple, no edema Respiratory: normal respiratory effort MS: Moves all extremities, Digits without clubbing or cyanosis. Skin: ecchymoses to bilateral forearms, No rash or petechiae. Normal temperature, turgor, and texture. Psych: Alert, oriented, appropriate affect, normal judgment/insight Results:  
 
Lab Results Component Value Date/Time WBC 18.8 (H) 02/06/2020 12:38 AM  
 HGB 12.3 02/06/2020 12:38 AM  
 HCT 37.8 02/06/2020 12:38 AM  
 PLATELET 401 (H) 18/37/4102 12:38 AM  
 MCV 96.4 02/06/2020 12:38 AM  
 ABS. NEUTROPHILS 14.6 (H) 02/06/2020 12:38 AM  
 
Lab Results Component Value Date/Time Sodium 137 02/06/2020 12:38 AM  
 Potassium 3.3 (L) 02/06/2020 12:38 AM  
 Chloride 106 02/06/2020 12:38 AM  
 CO2 26 02/06/2020 12:38 AM  
 Glucose 101 (H) 02/06/2020 12:38 AM  
 BUN 17 02/06/2020 12:38 AM  
 Creatinine 0.92 02/06/2020 12:38 AM  
 GFR est AA >60 02/06/2020 12:38 AM  
 GFR est non-AA 60 (L) 02/06/2020 12:38 AM  
 Calcium 9.0 02/06/2020 12:38 AM  
 
Lab Results Component Value Date/Time  Bilirubin, total 0.2 02/01/2020 02:20 AM  
 ALT (SGPT) 20 02/01/2020 02:20 AM  
 AST (SGOT) 16 02/01/2020 02:20 AM  
 Alk. phosphatase 104 02/01/2020 02:20 AM  
 Protein, total 6.7 02/01/2020 02:20 AM  
 Albumin 2.6 (L) 02/01/2020 02:20 AM  
 Globulin 4.1 (H) 02/01/2020 02:20 AM  
 
 
Records reviewed and summarized above. Pathology report(s) reviewed above. Radiology report(s) reviewed above. PET CT 
IMPRESSION IMPRESSION:  
1. RUL malignancy. 2. Mediastinal griffin metastasis. 3. Likely subcentimeter right retroclavicular griffin metastasis. Ct 1/31/2020 IMPRESSION IMPRESSION: 
  
1. Moderately large right pleural effusion, new since the prior study. 2. Significant increase in size of a right upper lobe parenchymal mass since the 
prior study. 3. Stable emphysematous change. 4. Probably stable pretracheal lymphadenopathy. The superior vena cava cannot be 
assessed without contrast. 
4. Small pericardial effusion, new. 5. Hypodense poorly circumscribed area in the left hepatic lobe on axial 
imaging, not confirmed on reformatted images. Therefore, this may be 
artifactual.   
6. Stable right hepatic lobe cyst and other incidental findings MRI brain 2/3/2020 IMPRESSION IMPRESSION: 
  
1. 8 mm enhancing focus at the left frontal convexity suspicious for metastasis. 2 additional subtle areas of enhancement in the right frontal lobe, also 
concerning for metastases. No edema. 2. Persistent small focus of diffusion signal right periatrial white matter 
likely an evolving infarction, with no associated enhancement. Pathology 2/2/2020 CYTOLOGIC INTERPRETATION:  
Pleural Fluid, Right, ThinPrep and cell block:  
Reactive mesothelial cells and mixed acute and chronic inflammation See comment General Categorization No cells diagnostic for malignancy Specimen Adequacy Satisfactory for evaluation Comment Immunohistochemical stains performed on the cell block reveal that reactive cells are strongly positive for calretinin and WT-1, while negative for BerEP4 and B72.3. These findings are compatible with reactive mesothelial cells and lend no support for metastatic adenocarcinoma MRI Brain w. Cont 25/2020 IMPRESSION: 
1. 8 mm enhancing focus at the left frontal convexity suspicious for metastasis. 2 additional subtle areas of enhancement in the right frontal lobe, also 
concerning for metastases. No edema. 2. Persistent small focus of diffusion signal right periatrial white matter 
likely an evolving infarction, with no associated enhancement. Assessment:  
1) NSCLC- Right- stage IV 
 
R lung mass, mediastinal adenopathy, SVC tumor thrombus and a possible R retroclavicular node 
T1N3M1- small brain metastasis Poorly differentiated adenocarcinoma Molecular studies - EGFR negative, PD-L1 is 80%, ALK negative New R effusion with negative cytology Repeat CXR shows minimal reaccumulation. Plan to pursue palliative RT x 2 weeks and start [facility] She starte RT 2/13/2020 and has 10 fractions planned Hoping to start [place] soon after Today she was counseled on the side effects of [place] Consent obtained 2) Shortness of Breath / Pleural effusion R thoracentesis 2/2020 Negative cytology Improved post thoracentecis. Now on palliative RT 3)Hypoxia Does not meet criteria for home O2.  
 
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 Management per primary team 
 
6)CAD Management per primary team 
 
7)Acute Stroke? Detected on MRI No deficits Plan: · Follow with Rad Onc as scheduled · Plan to start palliative Keytruda every 3 weeks until progression or side effects soon after completion of RT 
· Labs each visit CBC, CMP, TSH · Continue Lovenox I appreciate the opportunity to participate in Ms. Ruben Adame's care. RTC 2/28 OR 3/3/3020 to start [facility]ry Signed By: Rafa Rivers MD

## 2020-02-27 NOTE — TELEPHONE ENCOUNTER
Called earlier today by Dr. Alfred Hernandez re: Mrs. Perez Roys and increasing dyspnea. There is concern for recurring pleural effusion. I scheduled her to be seen in our clinic tomorrow and possibly have a thoracentesis vs a Pleur-X. Dr Cathi Olson was able to get a CXR after her Radiation Tx today. I called Mrs. Wendi hou to discuss the CXR. The CXR shows clear bilateral lung fields. There are very trace b/l effusions, no pneumothorax and no overt parenchymal process. At this juncture there is no acute cardiopulmonary process and nothing I would intervene on. She was relieved to hear this and is glad her effusion is not coming back. She does not need to keep her appt tomorrow with me. I will continue to follow her progress and will remain available to care for her should the need arise.

## 2020-02-28 NOTE — ED TRIAGE NOTES
Triage: Pt arrives from home with CC of chest pain and SOB. She was  Referred by her oncologist due to history of PEs. She is on Lovenox twice a day. She also reports some swelling and has been taking lasix.

## 2020-02-29 NOTE — ED NOTES
Pt given discharge instructions, patient education and follow-up information by provider. Pt states understanding - all questions answered. Pt discharged to home in private vehicle. Pt A&Ox4, RA, with pain controlled.

## 2020-02-29 NOTE — ED PROVIDER NOTES
69 y.o. female with past medical history significant for COPD, CAD, HTN, CAD, Pleural effusion, lung cancer, pleural effusion, right coronary artery stent, and superior vena cava syndrome who presents from home with chief complaint of shortness of breath. Of note, Patient has non-small cell carcinoma of the right upper lobe which she is receiving radiation for. Patient also has a history of Superior vena cava syndrome and had a mechanical thrombectomy and she is instructed to take Lovenox twice a day for the superior vena cava syndrome. Patient states that she started to experience swelling in her arms yesterday (2/27/2020) subsequent to radiation. Patient reports that she took Lasix for the arm swelling with minimal relief. Patient states that she decided to take another one earlier today and she wanted to get her prescription filled but she was advised by 's, radiation oncologist, office to go to the ED for further evaluation. Patient presents to [facility] ED with complaints of SOB. Patient endorses accompanying dizziness, fatigue, swollen arms, and one episode of a nosebleed earlier today. Of note, patent states that she will start immunotherapy next week. Patient denies fever and cough. There are no other acute medical concerns at this time. Social hx: Former smoker, No EtOH use, No illicit drug use. PCP: Mary Woodward NP Old Chart Review: Patient was addmitted on 1/31/2020 with SOB and was found to have a right pleural effusion and had a thoracentesis. Patient had an MRI during her stay in the hospital that showed several areas that were concerning for brain metastasis. Note written by Lorraine Talbert, as dictated by Dilip Walsh MD at 7:35 PM  
 
 
The history is provided by the patient and medical records. No  was used. Past Medical History:  
Diagnosis Date  CAD (coronary artery disease)  Chronic obstructive pulmonary disease ([facility] [address]  History of common carotid artery stent placement  Hypercoagulable state ([facility] [address] SVC thrombus  Hypertension  Pleural effusion Past Surgical History:  
Procedure Laterality Date  HX ABDOMINAL LAPAROSCOPY    
 lots of intenstine removed/ lysis of adhesions  HX CAROTID STENT    HX HYSTERECTOMY  HX TONSIL AND ADENOIDECTOMY  IR BRONCH W LUNG BIOPSY SINGLE LOBE  2019 No family history on file. Social History Socioeconomic History  Marital status: SINGLE Spouse name: Not on file  Number of children: Not on file  Years of education: Not on file  Highest education level: Not on file Occupational History  Not on file Social Needs  Financial resource strain: Not on file  Food insecurity:  
  Worry: Not on file Inability: Not on file  Transportation needs:  
  Medical: Not on file Non-medical: Not on file Tobacco Use  Smoking status: Former Smoker Packs/day: 0.50 Last attempt to quit: 2019 Years since quittin.2  Smokeless tobacco: Never Used Substance and Sexual Activity  Alcohol use: Not Currently Comment: occ  Drug use: Never  Sexual activity: Not on file Lifestyle  Physical activity:  
  Days per week: Not on file Minutes per session: Not on file  Stress: Not on file Relationships  Social connections:  
  Talks on phone: Not on file Gets together: Not on file Attends Protestant service: Not on file Active member of club or organization: Not on file Attends meetings of clubs or organizations: Not on file Relationship status: Not on file  Intimate partner violence:  
  Fear of current or ex partner: Not on file Emotionally abused: Not on file Physically abused: Not on file Forced sexual activity: Not on file Other Topics Concern  Not on file Social History Narrative  Not on file ALLERGIES: Codeine Review of Systems Constitutional: Positive for fatigue. Negative for fever. Eyes: Negative for visual disturbance. Respiratory: Positive for shortness of breath. Negative for cough and wheezing. Cardiovascular: Negative for chest pain and leg swelling. Gastrointestinal: Negative for abdominal pain, diarrhea, nausea and vomiting. Genitourinary: Negative for dysuria. Musculoskeletal: Negative. Negative for back pain and neck stiffness. Swollen arms Skin: Negative for rash. Neurological: Positive for dizziness. Negative for syncope and headaches. Psychiatric/Behavioral: Negative for confusion. All other systems reviewed and are negative. Vitals:  
 02/28/20 1622 02/28/20 2033 BP: 147/89 135/75 Pulse: 97 88 Resp: 16 17 Temp: 97.8 °F (36.6 °C) 98 °F (36.7 °C) SpO2: 91% 95% Physical Exam 
Vitals signs and nursing note reviewed. Constitutional:   
   General: She is not in acute distress. Comments: Thin. HENT:  
   Head: Normocephalic. Eyes:  
   Pupils: Pupils are equal, round, and reactive to light. Neck: Musculoskeletal: Normal range of motion. Cardiovascular:  
   Rate and Rhythm: Normal rate and regular rhythm. Heart sounds: No murmur. Pulmonary:  
   Breath sounds: Decreased breath sounds (Bilaterally) present. Abdominal:  
   Palpations: Abdomen is soft. Tenderness: There is no abdominal tenderness. Musculoskeletal: Normal range of motion. Skin: 
   General: Skin is warm and dry. Neurological:  
   Mental Status: She is alert and oriented to person, place, and time. Mental status is at baseline. Cranial Nerves: No cranial nerve deficit. Psychiatric:     
   Behavior: Behavior normal.  
 
 Note written by Lorraine Pitts, as dictated by Julian Dominguez MD at 7:35 PM  
 
[facility] Procedures PROGRESS NOTE: 
8:13 PM 
Patient's superior vena cava syndorme is coming back but it is in the early stages with no SOB showing. Patient might also need her pleural effusion to be drained. Patient was offered addmision but she denied. Patient states that she will follow up with her doctor on Monday (3/2/2020). 8:20 PM 
Provider told patient that the thoracic surgeon and the interventional radiologist  can treat her issue and were available on the weekends.

## 2020-02-29 NOTE — DISCHARGE INSTRUCTIONS
Patient Education        Shortness of Breath: Care Instructions  Your Care Instructions  Shortness of breath has many causes. Sometimes conditions such as anxiety can lead to shortness of breath. Some people get mild shortness of breath when they exercise. Trouble breathing also can be a symptom of a serious problem, such as asthma, lung disease, emphysema, heart problems, and pneumonia. If your shortness of breath continues, you may need tests and treatment. Watch for any changes in your breathing and other symptoms. Follow-up care is a key part of your treatment and safety. Be sure to make and go to all appointments, and call your doctor if you are having problems. It's also a good idea to know your test results and keep a list of the medicines you take. How can you care for yourself at home? · Do not smoke or allow others to smoke around you. If you need help quitting, talk to your doctor about stop-smoking programs and medicines. These can increase your chances of quitting for good. · Get plenty of rest and sleep. · Take your medicines exactly as prescribed. Call your doctor if you think you are having a problem with your medicine. · Find healthy ways to deal with stress. ? Exercise daily. ? Get plenty of sleep. ? Eat regularly and well. When should you call for help? Call 911 anytime you think you may need emergency care. For example, call if:    · You have severe shortness of breath.     · You have symptoms of a heart attack. These may include:  ? Chest pain or pressure, or a strange feeling in the chest.  ? Sweating. ? Shortness of breath. ? Nausea or vomiting. ? Pain, pressure, or a strange feeling in the back, neck, jaw, or upper belly or in one or both shoulders or arms. ? Lightheadedness or sudden weakness. ? A fast or irregular heartbeat. After you call 911, the  may tell you to chew 1 adult-strength or 2 to 4 low-dose aspirin. Wait for an ambulance.  Do not try to drive yourself.    Call your doctor now or seek immediate medical care if:    · Your shortness of breath gets worse or you start to wheeze. Wheezing is a high-pitched sound when you breathe.     · You wake up at night out of breath or have to prop your head up on several pillows to breathe.     · You are short of breath after only light activity or while at rest.    Watch closely for changes in your health, and be sure to contact your doctor if:    · You do not get better over the next 1 to 2 days. Where can you learn more? Go to http://dom-merle.info/. Enter S780 in the search box to learn more about \"Shortness of Breath: Care Instructions. \"  Current as of: June 9, 2019  Content Version: 12.2  © 4357-2654 [employer]. Care instructions adapted under license by [employer] (which disclaims liability or warranty for this information). If you have questions about a medical condition or this instruction, always ask your healthcare professional. [place] [address] any warranty or liability for your use of this information. Patient Education        Thoracentesis: Before Your Procedure  What is thoracentesis? Thoracentesis (say \"uaxu-mr-zlb-SERENA-sis\") is a procedure to remove fluid from the space between the lungs and the chest wall. This is called the pleural space. The procedure may also be called a \"chest tap. \"  It is normal to have a small amount of fluid in the pleural space. But too much fluid can build up because of problems such as infection, heart failure, and lung cancer. The procedure may be done to help with shortness of breath and pain caused by the fluid buildup. Or you may have it done so the doctor can test the fluid to find the cause of the buildup. Your doctor will put a long, thin needle or a thin plastic tube, called a catheter, between two of your ribs. The doctor will use the needle or catheter to take fluid out.   You may get medicine before the procedure. This helps with pain and helps you relax. The procedure will take about 15 minutes. Most people go home shortly after. You can go back to work or your normal activities as soon as you feel up to it. If the doctor sends the fluid to a lab for testing, it usually takes a few hours to get the results. Some of the test results may take a few days. The doctor or nurse will discuss the results with you. Follow-up care is a key part of your treatment and safety. Be sure to make and go to all appointments, and call your doctor if you are having problems. It's also a good idea to know your test results and keep a list of the medicines you take. What happens before the procedure?   Preparing for the procedure    · Understand exactly what procedure is planned, along with the risks, benefits, and other options. · Tell your doctors ALL the medicines, vitamins, supplements, and herbal remedies you take. Some of these can increase the risk of bleeding or interact with anesthesia.     · If you take blood thinners, such as warfarin (Coumadin), clopidogrel (Plavix), or aspirin, be sure to talk to your doctor. He or she will tell you if you should stop taking these medicines before your procedure. Make sure that you understand exactly what your doctor wants you to do.     · Your doctor will tell you which medicines to take or stop before your procedure. You may need to stop taking certain medicines a week or more before your procedure. So talk to your doctor as soon as you can.     · If you have an advance directive, let your doctor know. It may include a living will and a durable power of  for health care. Bring a copy to the hospital. If you don't have one, you may want to prepare one. It lets your doctor and loved ones know your health care wishes. Doctors advise that everyone prepare these papers before any type of surgery or procedure. Procedures can be stressful.  This information will help you understand what you can expect. And it will help you safely prepare for your procedure. What happens on the day of the procedure?    · Take off all jewelry and piercings.    At the hospital, surgery center, or doctor's office   · Bring a picture ID.     · The doctor may take a chest X-ray or use ultrasound or CT scan pictures to help find the exact spot where fluid has built up.     · The doctor will give you a shot of numbing medicine in the skin where the needle or catheter will go.     · The procedure will take about 15 minutes.     · The doctor may take a chest X-ray after the procedure. Going home  · You may need someone to drive you home. · You will be given more specific instructions about recovering from your procedure. When should you call your doctor? · You have questions or concerns.     · You don't understand how to prepare for your procedure.     · You become ill before the procedure (such as fever, flu, or a cold).     · You need to reschedule or have changed your mind about having the procedure. Where can you learn more? Go to http://dom-merle.info/. Enter W865 in the search box to learn more about \"Thoracentesis: Before Your Procedure. \"  Current as of: June 9, 2019  Content Version: 12.2  © 1721-1906 [employer]. Care instructions adapted under license by [employer] (which disclaims liability or warranty for this information). If you have questions about a medical condition or this instruction, always ask your healthcare professional. [place] [address] any warranty or liability for your use of this information.

## 2020-03-03 NOTE — TELEPHONE ENCOUNTER
Called patient and confirmed x2 identifiers   Patient stated had gone to ER per MD Matta to be assessed for plural effusion   Patient stated MD in ER did not clarify on results from scans and patient declined admission to hospital     Patient stated she had received call from MD Stefan Dinh and was informed she didn't have a plural effusion and did not need to come to the scheduled appointment     Patient has no new complaints at this time and confirmed office appointment

## 2020-03-05 NOTE — PROGRESS NOTES
Outpatient Infusion Center - Chemotherapy Progress Note 1420 Pt admit to [facility] for [facility] ambulatory with walker in stable condition accompanied by family. Assessment completed. Pt reports chronic upper back pain. No new concerns voiced. PIV access established in L arm with positive blood return. Labs drawn per order and sent. Line flushed, clamped, Curos Cap applied to end clave. Pt upstairs to MD appt. 1620 Return to [facility]; line flushed, NS infusing Visit Vitals Temp 97.7 °F (36.5 °C) Resp 18 Breastfeeding No  
 
 
 
1800 Pt tolerated treatment well. PIV removed at discharge. D/c home  ambulatory with walker in no distress. Pt aware of next [facility] appointment scheduled for 03/26/2020. Recent Results (from the past 12 hour(s)) CBC WITH AUTOMATED DIFF Collection Time: 03/05/20  2:27 PM  
Result Value Ref Range WBC 13.5 (H) 3.6 - 11.0 K/uL  
 RBC 3.83 3.80 - 5.20 M/uL  
 HGB 12.1 11.5 - 16.0 g/dL HCT 37.7 35.0 - 47.0 % MCV 98.4 80.0 - 99.0 FL  
 MCH 31.6 26.0 - 34.0 PG  
 MCHC 32.1 30.0 - 36.5 g/dL  
 RDW 14.4 11.5 - 14.5 % PLATELET 217 151 - 808 K/uL MPV 10.2 8.9 - 12.9 FL  
 NRBC 0.0 0  WBC ABSOLUTE NRBC 0.00 0.00 - 0.01 K/uL NEUTROPHILS 92 (H) 32 - 75 % LYMPHOCYTES 5 (L) 12 - 49 % MONOCYTES 3 (L) 5 - 13 % EOSINOPHILS 0 0 - 7 % BASOPHILS 0 0 - 1 % IMMATURE GRANULOCYTES 0 0.0 - 0.5 % ABS. NEUTROPHILS 12.4 (H) 1.8 - 8.0 K/UL  
 ABS. LYMPHOCYTES 0.7 (L) 0.8 - 3.5 K/UL  
 ABS. MONOCYTES 0.4 0.0 - 1.0 K/UL  
 ABS. EOSINOPHILS 0.0 0.0 - 0.4 K/UL  
 ABS. BASOPHILS 0.0 0.0 - 0.1 K/UL  
 ABS. IMM. GRANS. 0.0 0.00 - 0.04 K/UL  
 DF SMEAR SCANNED    
 RBC COMMENTS NORMOCYTIC, NORMOCHROMIC METABOLIC PANEL, COMPREHENSIVE Collection Time: 03/05/20  2:27 PM  
Result Value Ref Range Sodium 138 136 - 145 mmol/L Potassium 4.0 3.5 - 5.1 mmol/L Chloride 103 97 - 108 mmol/L  
 CO2 29 21 - 32 mmol/L  Anion gap 6 5 - 15 mmol/L  
 Glucose 83 65 - 100 mg/dL BUN 21 (H) 6 - 20 MG/DL Creatinine 0.73 0.55 - 1.02 MG/DL  
 BUN/Creatinine ratio 29 (H) 12 - 20 GFR est AA >60 >60 ml/min/1.73m2 GFR est non-AA >60 >60 ml/min/1.73m2 Calcium 9.0 8.5 - 10.1 MG/DL Bilirubin, total 0.2 0.2 - 1.0 MG/DL  
 ALT (SGPT) 30 12 - 78 U/L  
 AST (SGOT) 21 15 - 37 U/L Alk. phosphatase 107 45 - 117 U/L Protein, total 6.7 6.4 - 8.2 g/dL Albumin 3.2 (L) 3.5 - 5.0 g/dL Globulin 3.5 2.0 - 4.0 g/dL A-G Ratio 0.9 (L) 1.1 - 2.2 TSH 3RD GENERATION Collection Time: 03/05/20  2:27 PM  
Result Value Ref Range TSH 1.56 0.36 - 3.74 uIU/mL

## 2020-03-05 NOTE — PROGRESS NOTES
Pharmacy Note- Immunotherapy Education Lawayne Gaucher is a  69 y. o.female  diagnosed with NSCLC here today for Cycle 1, Day 1 chemotherapy counseling. Ms. Jazmyne Viveros is being treated with Pembrolizumab. Ms. Jazmyne Viveros was provided information regarding the risks of immune-mediated adverse reactions secondary to Pembrolizumab that may require interruption/delay of treatment and possible use of corticosteroids. These reactions include, but are not limited to: colitis (diarrhea or severe abdominal pain); hepatitis (jaundice, severe nausea, or vomiting, easy bruising, and/or bleeding); hypophysitis (persistent or unusual headache, extreme weakness, dizziness/fainting, and/or vision changes); dermatitis (skin rash, mouth sores, skin blisters, and/or skin peeling); ocular toxicity (uveitis, iritis, and/or episcleritis); neuropathy (motor or sensory); hyper/hypothyroidism. Patient given ways to manage these side effects and when to contact office. [employer] Handout of medications provided to patient. Ms. Jazmyne Viveros verbalized understanding of the information presented and all of the patient's questions were answered.  
 
Eloise Phelan, PharmD, BCPS, BCOP

## 2020-03-05 NOTE — PROGRESS NOTES
Olivier Kelly is a 69 y.o. female Chief Complaint Patient presents with  Follow-up NSCLC 1. Have you been to the ER, urgent care clinic since your last visit? Hospitalized since your last visit? Yes 2/28/20 -SOB- CT scan 2. Have you seen or consulted any other health care providers outside of the [address] [place] [address] since your last visit? Include any pap smears or colon screening.  No

## 2020-03-05 NOTE — Clinical Note
Dima- she has symptoms of SVC syndrome again with swelling, sob, epistaxisRT did not help nor did anticagulationDo you think she needs a stent?

## 2020-03-05 NOTE — PROGRESS NOTES
[facility] [place] [address] [address], [id number] [place] [address] Road, [address] [place] [address] W: [phone]  F: [phone] Reason for Consult:  
Yaneth Hyman is a 69 y.o. female who is seen for follow up of NSCLC- stage IV with brain metastasis. Treatment History:  
· 12/15/2019-CTA showed right upper lobe lung nodule measuring 2.1 x 1.4 cm, occluded SVC with adjustment mediastinal soft tissue mass. The whole thing measuring approximately 2.3 to 1.8 cm. · 12/24/2020-EBUS and biopsy of mediastinal mass showed poorly differentiated adenocarcinoma · 1/13/2020-PET scan showed right upper lobe lung nodule with an SUV of 20, right mediastinal adenopathy with SUV of 16, right retrocaval clavicular subcentimeter focus with an SUV of 3.2 
· 2/3/2020: MRI brain: 8 mm enhancing focus at the left frontal convexity suspicious for metastasis. 2 additional subtle areas of enhancement in the right frontal lobe, also concerning for metastases · 2/27/20: completed 10 sessions of RT  
· 3/5/20: cycle 1 Keytruda History of Present Illness:  
Patient is a 69 y.o. female with PMH as below presented in [facility] Dec 2019 with c/o increasing swelling of face , neck and arms along with SOB. Her problems started with a choking sensation and head tightness in Nov 2019. She initially saw ENT and was to see Pulmonary. However she presented to the Interim. She had imaging as above that showed SVC thrombus and mediastinal mass. Subsequently had a biopsy and diagnosis as above. Was placed on Lovenox and had a mechanical thrombectomy of the SVC thrombus  on 1/14/2020. She had a CT Sim with Radiation Oncology on 1/27/2020 had an episode of desaturation down to 80% on exertion. On review of the CT, Dr. Katie De Paz discovered a significant pleural effusion and called the patient to come to the ED for further assessment. She had a thoracentecis on 2/2/2020 which showed no malignant cells.  MRI brain done for staging revealed a possible acute infarct but small foci of metastatic disease . She completed RT on 2020 for palliation. She comes today after completing RT and for cycle 1 of palliative Keytruda. She recently went to ER on 20 for increased SOB. This showed worsening lung mass compressing SVC. She has some facial swelling and feels SOB is worse. She has no home oxygen. She has a productive cough but this is mostly unchanged, no hemotysis. She continues to use lovenox injections and denies bleeding. She denies nausea/vomiting, dysphagia, mouth sores, diarrhea/constipation. No falls. She comes with her supportive daughter today. Past Medical History:  
Diagnosis Date  CAD (coronary artery disease)  Chronic obstructive pulmonary disease ([facility] [address]  History of common carotid artery stent placement  Hypercoagulable state ([facility] [address] SVC thrombus  Hypertension  Pleural effusion Past Surgical History:  
Procedure Laterality Date  HX ABDOMINAL LAPAROSCOPY    
 lots of intenstine removed/ lysis of adhesions  HX CAROTID STENT    HX HYSTERECTOMY  HX TONSIL AND ADENOIDECTOMY  IR BRONCH W LUNG BIOPSY SINGLE LOBE  2019 Social History Tobacco Use  Smoking status: Former Smoker Packs/day: 0.50 Last attempt to quit: 2019 Years since quittin.2  Smokeless tobacco: Never Used Substance Use Topics  Alcohol use: Not Currently Comment: occ Current Outpatient Medications Medication Sig  
 L.acidoph & parac-S.therm-Bifido (CIRILO Q2/RISAQUAD-2) 16 billion cell cap cap Take 1 Cap by mouth daily.  potassium chloride SR (K-TAB) 20 mEq tablet Take 2 Tabs by mouth daily.  aspirin 81 mg chewable tablet Take 1 Tab by mouth daily.  atorvastatin (LIPITOR) 40 mg tablet Take 1 Tab by mouth daily.  enoxaparin (LOVENOX) 40 mg/0.4 mL 40 mg by SubCUTAneous route two (2) times a day.  albuterol sulfate (PROVENTIL;VENTOLIN) 2.5 mg/0.5 mL nebu nebulizer solution 0.5 mL by Nebulization route every four (4) hours as needed for Wheezing or Shortness of Breath.  furosemide (LASIX) 40 mg tablet Take 1 Tab by mouth daily.  fluticasone propionate (FLONASE ALLERGY RELIEF NA) by Nasal route.  hydrOXYzine HCl (ATARAX) 10 mg tablet Take 10 mg by mouth three (3) times daily as needed for Itching.  montelukast (SINGULAIR) 10 mg tablet Take 10 mg by mouth daily.  albuterol (PROVENTIL HFA, VENTOLIN HFA, PROAIR HFA) 90 mcg/actuation inhaler Take 2 Puffs by inhalation.  atenolol (TENORMIN) 50 mg tablet Take 50 mg by mouth daily.  losartan (COZAAR) 100 mg tablet Take 100 mg by mouth daily.  budesonide (ENTOCORT EC) 3 mg capsule Take 6 mg by mouth every morning. No current facility-administered medications for this visit. Allergies Allergen Reactions  Codeine Itching Review of Systems: A complete review of systems was obtained, negative except as described above. Physical Exam:  
 
Visit Vitals /71 Pulse 66 Temp 97.5 °F (36.4 °C) Ht 5' 2\" (1.575 m) Wt 78 lb 6.4 oz (35.6 kg) SpO2 98% BMI 14.34 kg/m² ECOG PS: 1 General: frail, appearing in no acute distress Eyes: PERRL, anicteric sclerae HENT: Atraumatic, OP clear, per patient face more swollen then baseline Neck: Supple, no edema Respiratory: normal respiratory effort, diminished throughout MS: Moves all extremities, Digits without clubbing or cyanosis. Skin: ecchymoses to bilateral forearms, No rash or petechiae. Normal temperature, turgor, and texture. Psych: Alert, oriented, appropriate affect, normal judgment/insight Results:  
 
Lab Results Component Value Date/Time  WBC 13.5 (H) 03/05/2020 02:27 PM  
 HGB 12.1 03/05/2020 02:27 PM  
 HCT 37.7 03/05/2020 02:27 PM  
 PLATELET 151 15/97/2356 02:27 PM  
 MCV 98.4 03/05/2020 02:27 PM  
 ABS. NEUTROPHILS PENDING 03/05/2020 02:27 PM  
 
Lab Results Component Value Date/Time Sodium 138 03/05/2020 02:27 PM  
 Potassium 4.0 03/05/2020 02:27 PM  
 Chloride 103 03/05/2020 02:27 PM  
 CO2 29 03/05/2020 02:27 PM  
 Glucose 83 03/05/2020 02:27 PM  
 BUN 21 (H) 03/05/2020 02:27 PM  
 Creatinine 0.73 03/05/2020 02:27 PM  
 GFR est AA >60 03/05/2020 02:27 PM  
 GFR est non-AA >60 03/05/2020 02:27 PM  
 Calcium 9.0 03/05/2020 02:27 PM  
 
Lab Results Component Value Date/Time Bilirubin, total 0.2 03/05/2020 02:27 PM  
 ALT (SGPT) 30 03/05/2020 02:27 PM  
 AST (SGOT) 21 03/05/2020 02:27 PM  
 Alk. phosphatase 107 03/05/2020 02:27 PM  
 Protein, total 6.7 03/05/2020 02:27 PM  
 Albumin 3.2 (L) 03/05/2020 02:27 PM  
 Globulin 3.5 03/05/2020 02:27 PM  
 
 
Records reviewed and summarized above. Pathology report(s) reviewed above. Radiology report(s) reviewed above. PET CT 
IMPRESSION IMPRESSION:  
1. RUL malignancy. 2. Mediastinal griffin metastasis. 3. Likely subcentimeter right retroclavicular griffin metastasis. Ct 1/31/2020 IMPRESSION IMPRESSION: 
  
1. Moderately large right pleural effusion, new since the prior study. 2. Significant increase in size of a right upper lobe parenchymal mass since the 
prior study. 3. Stable emphysematous change. 4. Probably stable pretracheal lymphadenopathy. The superior vena cava cannot be 
assessed without contrast. 
4. Small pericardial effusion, new. 5. Hypodense poorly circumscribed area in the left hepatic lobe on axial 
imaging, not confirmed on reformatted images. Therefore, this may be 
artifactual.   
6. Stable right hepatic lobe cyst and other incidental findings MRI brain 2/3/2020 IMPRESSION IMPRESSION: 
  
1. 8 mm enhancing focus at the left frontal convexity suspicious for metastasis. 2 additional subtle areas of enhancement in the right frontal lobe, also 
concerning for metastases. No edema. 2. Persistent small focus of diffusion signal right periatrial white matter 
likely an evolving infarction, with no associated enhancement. Pathology 2/2/2020 CYTOLOGIC INTERPRETATION:  
Pleural Fluid, Right, ThinPrep and cell block:  
Reactive mesothelial cells and mixed acute and chronic inflammation See comment General Categorization No cells diagnostic for malignancy Specimen Adequacy Satisfactory for evaluation Comment Immunohistochemical stains performed on the cell block reveal that reactive cells are strongly positive for calretinin and WT-1, while negative for BerEP4 and B72.3. These findings are compatible with reactive mesothelial cells and lend no support for metastatic adenocarcinoma MRI Brain w. Cont 25/2020 IMPRESSION: 
1. 8 mm enhancing focus at the left frontal convexity suspicious for metastasis. 2 additional subtle areas of enhancement in the right frontal lobe, also 
concerning for metastases. No edema. 2. Persistent small focus of diffusion signal right periatrial white matter 
likely an evolving infarction, with no associated enhancement. CTA 2/28/20: IMPRESSION:  
1. Negative for pulmonary embolus. 2. Significantly increased mass in the medial right upper lobe. 3. Increased mediastinal mass occluding the SVC. 4. New mild to moderate right pleural effusion. 5. Severe emphysema. Assessment:  
1) NSCLC- Right- stage IV 
 
R lung mass, mediastinal adenopathy, SVC tumor thrombus and a possible R retroclavicular node 
T1N3M1- small brain metastasis Poorly differentiated adenocarcinoma Molecular studies - EGFR negative, PD-L1 is 80%, ALK negative New R effusion with negative cytology Completed palliative RT on 2/27/20 Had repeat imaging on 2/28/20 in ER that showed worsening mass in RUL and increase in size of mediastinal mass occluding the SVC.  
I will review these images in thoracic tumor board to see if there would be a roll for SVC stenting vs drainage of pleural effusion is necessary Today is cycle 1 of palliative Keytruda 2) Shortness of Breath / Pleural effusion R thoracentesis 2/2020 Negative cytology S/p palliative RT as above See #1 regarding thoracic tumor board review 3)Hypoxia She does meet criteria for home o2 Room air at rest: 88% Ambulating on room air: 86% Ambulation on 2L oxygen: 93& 
Will assist in obtaining oxygen 4)SVC syndrome Due to tumor thrombus as the mass itself is not large enough She is s/p clot evacuation and prescribed Lovenox BID Will switch to Eliquis 5) COPD On albuterol, montelukast, and budesonide. 6) CAD 7) Acute Stroke? Detected on MRI No deficits Plan: · Proceed today with cycle #1 of palliative Keytruda every 3 weeks until progression or side effects · Labs each visit CBC, CMP, TSH · Stop Lovenox and Switch to Eliquis 5mg BID tomorrow · Thoracic tumor board review next week · Arrange fu with palliative care at patient request  
· Dilaudid 2mg PRN for pain control RTC in 3 weeks for cycle 2 I appreciate the opportunity to participate in Ms. Greyson Adame's care. Seen in conjunction with Anastasiia Bundy NP Discussed with Dr. Queen Wu and Dr. Lomeli Leader We will reach out to Dr. Carlos Flores to see if an SVC stent is an option Will also arrange for thoracentecis- she has refused a Pleurx Signed By: Tip Sands MD

## 2020-03-05 NOTE — Clinical Note
Please add to next thoracic tumor board for scan review thanks SUBJECTIVE:     Ely Pritchard is a 17 year old female, here for a routine health maintenance visit.    Patient was roomed by: Rachel Rodriguez CMA    Concerns/Questions:   Mom concerned about anxiety, started online after Terry last year due to being anxious, triggered with not making basketball team, social was stressful. Decided to go back to school this fall. She states she likes getting out the house. She is excited about independence. She has a job interview today. Getting adequate sleep. Confidentially, Ely denies high risk behaviors including tobacco, alcohol or Rx or street drug use. Denies sexual activity. No SI or HI. No cutting.     LAKE-7 SCORE 9/16/2019   Total Score 10     Neck pain-right side, started years ago, worse in last year, no weakness, no radiating pain, chiro without help    Chest tightness-with head cold x 1.5 weeks, cough is mild, not able to play last 4 days, no fevers, no shortness of breath, no history of wheezing, mom desires an x-ray    Concussion-last fall, once    Gray hair--no skin hypopigmentation on scalp or body, no dry/thinning hair    Well Child     Social History  Forms to complete? No  Child lives with::  Mother, father and brothers  Languages spoken in the home:  English  Recent family changes/ special stressors?:  None noted    Safety / Health Risk    TB Exposure:     No TB exposure    Child always wear seatbelt?  Yes  Helmet worn for bicycle/roller blades/skateboard?  Yes    Home Safety Survey:      Firearms in the home?: YES          Are trigger locks present?  Yes        Is ammunition stored separately? Yes     Daily Activities    Diet     Child gets at least 4 servings fruit or vegetables daily: Yes    Servings of juice, non-diet soda, punch or sports drinks per day: 0    Sleep       Sleep concerns: difficulty falling asleep and frequent waking     Bedtime: 22:30     Wake time on school day: 06:10     Sleep duration (hours): 7     Does your child have difficulty  shutting off thoughts at night?: Yes   Does your child take day time naps?: No    Dental    Water source:  Well water    Dental provider: patient has a dental home    Dental exam in last 6 months: No     Risks: a parent has had a cavity in past 3 years and child has or had a cavity    Media    TV in child's room: YES    Types of media used: social media    Daily use of media (hours): 4    School    Name of school: [employer]    Grade level: 10th    School performance: at grade level    Grades: B    Schooling concerns? no    Days missed current/ last year: 1    Academic problems: no problems in reading, no problems in mathematics, no problems in writing and no learning disabilities     Activities    Minimum of 60 minutes per day of physical activity: Yes    Activities: age appropriate activities    Organized/ Team sports: soccer    Sports physical needed: Yes    GENERAL QUESTIONS  1. Do you have any concerns that you would like to discuss with a provider?: Yes  2. Has a provider ever denied or restricted your participation in sports for any reason?: No    3. Do you have any ongoing medical issues or recent illness?: No    HEART HEALTH QUESTIONS ABOUT YOU  4. Have you ever passed out or nearly passed out during or after exercise?: No  5. Have you ever had discomfort, pain, tightness, or pressure in your chest during exercise?: Yes    6. Does your heart ever race, flutter in your chest, or skip beats (irregular beats) during exercise?: No    7. Has a doctor ever told you that you have any heart problems?: No  8. Has a doctor ever requested a test for your heart? For example, electrocardiography (ECG) or echocardiography.: No    9. Do you ever get light-headed or feel shorter of breath than your friends during exercise?: Yes    10. Have you ever had a seizure?: No      HEART HEALTH QUESTIONS ABOUT YOUR FAMILY  11. Has any family member or relative  of heart problems or had an unexpected or unexplained  sudden death before age 35 years (including drowning or unexplained car crash)?: No    12. Does anyone in your family have a genetic heart problem such as hypertrophic cardiomyopathy (HCM), Marfan syndrome, arrhythmogenic right ventricular cardiomyopathy (ARVC), long QT syndrome (LQTS), short QT syndrome (SQTS), Brugada syndrome, or catecholaminergic polymorphic ventricular tachycardia (CPVT)?  : No    13. Has anyone in your family had a pacemaker or an implanted defibrillator before age 35?: No      BONE AND JOINT QUESTIONS  14. Have you ever had a stress fracture or an injury to a bone, muscle, ligament, joint, or tendon that caused you to miss a practice or game?: No    15. Do you have a bone, muscle, ligament, or joint injury that bothers you?: No      MEDICAL QUESTIONS  16. Do you cough, wheeze, or have difficulty breathing during or after exercise?  : No   17. Are you missing a kidney, an eye, a testicle (males), your spleen, or any other organ?: No    18. Do you have groin or testicle pain or a painful bulge or hernia in the groin area?: No    19. Do you have any recurring skin rashes or rashes that come and go, including herpes or methicillin-resistant Staphylococcus aureus (MRSA)?: No    20. Have you had a concussion or head injury that caused confusion, a prolonged headache, or memory problems?: Yes    21. Have you ever had numbness, tingling, weakness in your arms or legs, or been unable to move your arms or legs after being hit or falling?: Yes    22. Have you ever become ill while exercising in the heat?: No    23. Do you or does someone in your family have sickle cell trait or disease?: No    24. Have you ever had, or do you have any problems with your eyes or vision?: No    25. Do you worry about your weight?: No    26.  Are you trying to or has anyone recommended that you gain or lose weight?: Yes    27. Are you on a special diet or do you avoid certain types of foods or food groups?: Yes    28. Have  you ever had an eating disorder?: No      FEMALES ONLY  29. Have you ever had a menstrual period? : Yes    30. How old were you when you had your first menstrual period?:  14 31. When was your most recent menstrual period?: september 7th  32. How many periods have you had in the past 12 months?:  12          Dental visit recommended: Dental home established, continue care every 6 months  Dental varnish declined by parent    Cardiac risk assessment:     Family history (males <55, females <65) of angina (chest pain), heart attack, heart surgery for clogged arteries, or stroke: no    Biological parent(s) with a total cholesterol over 240:  no  Dyslipidemia risk:    None  MenB Vaccine: not indicated.    VISION :  Testing not done; patient has seen eye doctor in the past 12 months.    HEARING :  Testing not done; parent declined    PSYCHO-SOCIAL/DEPRESSION  General screening:    Electronic PSC   PSC SCORES 9/16/2019   Y-PSC Total Score 18 (Negative)      FOLLOWUP RECOMMENDED  Anxiety    ACTIVITIES:  Physical activity: soccer    DRUGS  Smoking:  no  Passive smoke exposure:  no  Alcohol:  no  Drugs:  no    SEXUALITY  Sexual activity: No    MENSTRUAL HISTORY  Normal      PROBLEM LIST  Patient Active Problem List   Diagnosis     Allergy to mold     Wears glasses     Hair graying premature     Acne, unspecified acne type     Neck pain, chronic     Childhood overweight, BMI 85-94.9 percentile     Anxious mood     MEDICATIONS  Current Outpatient Medications   Medication Sig Dispense Refill     adapalene (DIFFERIN) 0.1 % external gel Apply topically At Bedtime 45 g 3     epinephrine (EPIPEN) 0.3 MG/0.3ML (1:1000) injection Inject 0.3 mLs into the muscle as needed for anaphylaxis. (Patient not taking: Reported on 9/16/2019) 1 each 3      ALLERGY  Allergies   Allergen Reactions     Mold      Seasonal Allergies        IMMUNIZATIONS  Immunization History   Administered Date(s) Administered     DTAP (<7y) 01/05/2005, 08/26/2005,  "08/29/2006, 02/12/2007, 05/11/2009     HepB 08/29/2006, 02/12/2007, 05/14/2007     Hib (PRP-T) 02/12/2007     Influenza (IIV3) PF 10/02/2007, 10/30/2007, 12/01/2008, 09/15/2009, 09/20/2010, 09/20/2011     Influenza Vaccine IM > 6 months Valent IIV4 11/12/2015, 09/16/2019     MMR 08/26/2005, 05/11/2009     Meningococcal (Menactra ) 11/12/2015, 09/16/2019     Pneumococcal (PCV 7) 02/12/2007, 10/02/2007     Pneumococcal 23 valent 08/07/2007     Poliovirus, inactivated (IPV) 08/29/2006, 02/12/2007, 04/05/2007, 05/14/2007, 09/16/2019     TDAP Vaccine (Adacel) 06/20/2015     Varicella 05/14/2007, 05/11/2009       HEALTH HISTORY SINCE LAST VISIT  No surgery, major illness or injury since last physical exam    ROS  Constitutional, eye, ENT, skin, respiratory, cardiac, GI, MSK, neuro, and allergy are normal except as otherwise noted.    OBJECTIVE:   EXAM  /60   Pulse 78   Temp 98.7  F (37.1  C) (Temporal)   Resp 14   Ht 5' 4.76\" (1.645 m)   Wt 167 lb (75.8 kg)   LMP 09/07/2019   SpO2 98%   BMI 27.99 kg/m    62 %ile based on CDC (Girls, 2-20 Years) Stature-for-age data based on Stature recorded on 9/16/2019.  94 %ile based on CDC (Girls, 2-20 Years) weight-for-age data based on Weight recorded on 9/16/2019.  94 %ile based on CDC (Girls, 2-20 Years) BMI-for-age based on body measurements available as of 9/16/2019.  Blood pressure percentiles are 59 % systolic and 25 % diastolic based on the August 2017 AAP Clinical Practice Guideline.   GENERAL: Active, alert, in no acute distress.  SKIN: face with moderate closed and open comedones and few papulopustules in \"T-zone\" distribution, no cysts or nodules, no scars. Back and chest clear. gray patch(es) of hair. No significant rash, abnormal pigmentation or lesions  HEAD: Normocephalic  EYES: Pupils equal, round, reactive, Extraocular muscles intact. Normal conjunctivae.  EARS: Normal canals. Tympanic membranes are normal; gray and translucent.  NOSE: Normal without " discharge.  MOUTH/THROAT: Clear. No oral lesions. Teeth without obvious abnormalities.  NECK: Supple, no masses.  No thyromegaly.  LYMPH NODES: No adenopathy  LUNGS: Clear. No rales, rhonchi, wheezing or retractions  HEART: Regular rhythm. Normal S1/S2. No murmurs. Normal pulses.  ABDOMEN: Soft, non-tender, not distended, no masses or hepatosplenomegaly. Bowel sounds normal.   NEUROLOGIC: No focal findings. Cranial nerves grossly intact: DTR's normal. Normal gait, strength and tone  BACK: Spine is straight, no scoliosis.  EXTREMITIES: Full range of motion, no deformities  -F: Normal female external genitalia, Pee stage 4.   BREASTS:  Pee stage 4.  No abnormalities.    X-ray of chest and neck: normal    Labs:  Results for orders placed or performed in visit on 11/23/18   CBC with platelets differential   Result Value Ref Range    WBC 6.7 4.0 - 11.0 10e9/L    RBC Count 4.88 3.7 - 5.3 10e12/L    Hemoglobin 13.2 11.7 - 15.7 g/dL    Hematocrit 40.0 35.0 - 47.0 %    MCV 82 77 - 100 fl    MCH 27.0 26.5 - 33.0 pg    MCHC 33.0 31.5 - 36.5 g/dL    RDW 13.4 10.0 - 15.0 %    Platelet Count 279 150 - 450 10e9/L    % Neutrophils 55.5 %    % Lymphocytes 31.7 %    % Monocytes 8.9 %    % Eosinophils 3.0 %    % Basophils 0.9 %    Absolute Neutrophil 3.7 1.3 - 7.0 10e9/L    Absolute Lymphocytes 2.1 1.0 - 5.8 10e9/L    Absolute Monocytes 0.6 0.0 - 1.3 10e9/L    Absolute Eosinophils 0.2 0.0 - 0.7 10e9/L    Absolute Basophils 0.1 0.0 - 0.2 10e9/L    Diff Method Automated Method    TSH   Result Value Ref Range    TSH 1.76 0.40 - 4.00 mU/L   T4 free   Result Value Ref Range    T4 Free 1.08 0.76 - 1.46 ng/dL   Comprehensive metabolic panel   Result Value Ref Range    Sodium 142 133 - 143 mmol/L    Potassium 4.4 3.4 - 5.3 mmol/L    Chloride 106 96 - 110 mmol/L    Carbon Dioxide 28 20 - 32 mmol/L    Anion Gap 8 3 - 14 mmol/L    Glucose 91 70 - 99 mg/dL    Urea Nitrogen 9 7 - 19 mg/dL    Creatinine 0.84 0.50 - 1.00 mg/dL    GFR  Estimate GFR not calculated, patient <16 years old. mL/min/1.7m2    GFR Estimate If Black GFR not calculated, patient <16 years old. mL/min/1.7m2    Calcium 9.6 9.1 - 10.3 mg/dL    Bilirubin Total 0.4 0.2 - 1.3 mg/dL    Albumin 4.2 3.4 - 5.0 g/dL    Protein Total 7.7 6.8 - 8.8 g/dL    Alkaline Phosphatase 71 70 - 230 U/L    ALT 18 0 - 50 U/L    AST 9 0 - 35 U/L   Cholesterol HDL and Non HDL Panel   Result Value Ref Range    Cholesterol 134 <170 mg/dL    HDL Cholesterol 47 >45 mg/dL    Non HDL Cholesterol 87 <120 mg/dL          ASSESSMENT/PLAN:     1. Encounter for routine child health examination w/o abnormal findings    2. Persistent cough    3. Neck pain, chronic    4. Acne, unspecified acne type    5. Hair graying premature    6. Childhood overweight, BMI 85-94.9 percentile    7. Anxious mood            ANTICIPATORY GUIDANCE  The following topics were discussed:  SOCIAL/ FAMILY:    Peer pressure    Increased responsibility    Parent/ teen communication    TV/ media    School/ homework  NUTRITION:    Healthy food choices    Calcium    Vitamins/supplements    Weight management  HEALTH/ SAFETY:    Adequate sleep/ exercise    Sleep issues    Dental care    Drugs, ETOH, smoking    Seat belts    Bike/ sport helmets  SEXUALITY:    Body changes with puberty    Dating/ relationships    Encourage abstinence    Contraception    Safe sex / STDs      Preventive Care Plan  Immunizations    [facility]  I reviewed the signs and symptoms of adverse effects and when to seek medical care if they should arise.    Reviewed, parents decline Hepatitis A - Pediatric 2 dose and HPV - Human Papilloma Virus because of Conscientious objector.  Risks of not vaccinating discussed.  Referrals/Ongoing Specialty care: refer to physical therapy. Refer to spine if neck pain not improved with physical therapy x 1 month(s) or has worsening signs/symptoms.   Refer to psychology if having and anxiety.   Acne cares per Patient  Instructions.   Antibiotic(s) not indicated. Recheck cough in 1 week if not improved, sooner with worsening signs/symptoms.   See other orders in EpicCare.  Cleared for sports:  Yes  BMI at 94 %ile based on CDC (Girls, 2-20 Years) BMI-for-age based on body measurements available as of 9/16/2019.    OBESITY ACTION PLAN    Exercise and nutrition counseling performed      FOLLOW-UP:    in 1 year for a Preventive Care visit    Resources  HPV and Cancer Prevention:  What Parents Should Know  What Kids Should Know About HPV and Cancer  Goal Tracker: Be More Active  Goal Tracker: Less Screen Time  Goal Tracker: Drink More Water  Goal Tracker: Eat More Fruits and Veggies  [place] Child and Teen Checkups (C&TC) Schedule of Age-Related Screening Standards    Anika Alvarado MD, MD  [facility]

## 2020-03-06 NOTE — TELEPHONE ENCOUNTER
Lorena Sin NP was going to give patient a prescription at 03/05/2020 appointment stronger dose of pain medication. Patient did not get the prescription, and would like for son to come by and pick it up.

## 2020-03-14 NOTE — TELEPHONE ENCOUNTER
Patient called for worsening dyspnea. Asked to use nebulizer.  If still remains dyspneic, go to ED for eval.

## 2020-03-16 NOTE — DISCHARGE INSTRUCTIONS
Side effects of sedation medications and other medications used today have been reviewed. Notify us of nausea, itching, hives, dizziness, or any unusual symptoms. [address]  Radiology Department      Radiologist:  Dr. Ebony Hale    Date:  3-       Right Side -Thoracentesis Discharge Instructions    You may have an aching pain in the puncture site tonight as the numbing medicine wears off. You may take Tylenol, as directed on the label, for pain or discomfort. Avoid ibuprofen (Advil, Motrin) and aspirin products for the next 48 hours as these drugs may increase your chance of bleeding. You have develop a mild cough as the lungs re expand with air, this should resolve with in the next 24 hours. Resume your previous diet and continue your prescribed medicaitons. Rest for the next 24 hours. If you experience shortness of breath (other than your normal breathing pattern) difficulty breathing, or have severe chest pain, call 911 and go to the nearest Emergency Room. Be sure to follow up with your referring physician as soon as possible     Should you experience any of these significant changes, please call [phone] between the hours of 7:30 am and 10 pm or [phone] after hours. After hours, ask the  to page the [address] Technologist, and describe the problem to the technologist.     If you notice any increasing redness, pain, drainage at the site for fever, please call you physician right away as this could be signs of infection.     Juan F Brown RN  3-    Patient signature_______________________________________________________________

## 2020-03-16 NOTE — PROGRESS NOTES
Thoracentesis completed by Dr. Greta Patel. 300 ml clear yellow fluid removed from right. VSS. Denies SOB or CP. Discharge instructions reviewed with patient. Verbalized understanding of same. DDI right upper back. Chest x-ray completed and read at bedside by Dr. Greta Patel. To auto with son via wheelchair.

## 2020-03-20 NOTE — PROGRESS NOTES
[address] to ensure dilaudid refill went through. Per tech, rx went through & ready for pick-up.

## 2020-03-20 NOTE — TELEPHONE ENCOUNTER
Returned patient's call and confirmed x2 identifiers   Patient stated she had thoracentesis done on 3/16/2020 and is having discomfort   Discomfort is noted \"somewhat at the site\" and across the shoulders  Stated pain was bad yesterday and increase pain medication to the 2 tablets as stated on prescription; medication does work but [employer] a while to kick in\"    Patient confirmed she has home oxygen and inhalers and has been using them   Patient also stated that thoracentesis did not making breathing better but also not worse; does complain of shortness of breath   Patient stated she did not want to go ER at this time     Informed patient per NP Makenzie Castorena request to take 4mg of DILAUDID every 4hrs as scheduled if current regimen is working; refill will be sent to pharmacy to ensure patient will have enough until next office visit   Monitor site of thoracentesis for signs of infection; patient stated it was slightly swollen but denies fevers, redness, or leaking of fluids   Patient can apply lidocaine patches to shoulder area; warm compress can assist with easing of swelling     Ensure patient has inhaler refills and informed to contact MD for the refills    If shortness of breath were to worsen patient was informed to go to ER to be properly assessed; informed to call office to with questions or concerns     Patient agreed with plan   No new questions or concerns at this time

## 2020-03-26 PROBLEM — C34.91 NON-SMALL CELL CANCER OF RIGHT LUNG (HCC): Status: ACTIVE | Noted: 2020-01-01

## 2020-03-26 NOTE — PROGRESS NOTES
[facility] [place] [address] [address], [id number] [place] [address] Road, [address] [place] [address] Nahed W: [phone]  F: [phone] Reason for Consult:  
Yifan Gonzalez is a 69 y.o. female who is seen for follow up of NSCLC- stage IV with brain metastasis. Treatment History:  
· 12/15/2019-CTA showed right upper lobe lung nodule measuring 2.1 x 1.4 cm, occluded SVC with adjustment mediastinal soft tissue mass. The whole thing measuring approximately 2.3 to 1.8 cm. · 12/24/2020-EBUS and biopsy of mediastinal mass showed poorly differentiated adenocarcinoma · 1/13/2020-PET scan showed right upper lobe lung nodule with an SUV of 20, right mediastinal adenopathy with SUV of 16, right retrocaval clavicular subcentimeter focus with an SUV of 3.2 
· 2/3/2020: MRI brain: 8 mm enhancing focus at the left frontal convexity suspicious for metastasis. 2 additional subtle areas of enhancement in the right frontal lobe, also concerning for metastases · 2/27/20: completed 10 sessions of RT  
· 3/5/20: cycle 1 Keytruda History of Present Illness:  
Patient is a 69 y.o. female with PMH as below presented in [facility] Dec 2019 with c/o increasing swelling of face , neck and arms along with SOB. Her problems started with a choking sensation and head tightness in Nov 2019. She initially saw ENT and was to see Pulmonary. However she presented to the Interim. She had imaging as above that showed SVC thrombus and mediastinal mass. Subsequently had a biopsy and diagnosis as above. Was placed on Lovenox and had a mechanical thrombectomy of the SVC thrombus  on 1/14/2020. She had a CT Sim with Radiation Oncology on 1/27/2020 had an episode of desaturation down to 80% on exertion. On review of the CT, Dr. Huey Sumner discovered a significant pleural effusion and called the patient to come to the ED for further assessment. She had a thoracentecis on 2/2/2020 which showed no malignant cells.  MRI brain done for staging revealed a possible acute infarct but small foci of metastatic disease . She completed RT on 2020 for palliation. She comes today for cycle 2 of palliative Keytruda. She states she has been having some mild coughing with white sputum that is occasionally light yellow. She has been having right arm swelling, worse in the evenings. She is also complaining of right thoracic back pain; she is taking dilaudid. She states this helped better previously but seems to not help as much. She states she is occasionally taking 2mg and occasionally taking 4mg. She states sleeping is the most difficult. Discussed melatonin which she states does not help. I am hesitant to prescribe a sleeping pill due to the narcotics which she understands. Denies facial swelling, bleeding, nausea/vomiting, diarrhea/constipation, mouth sours. No falls. She comes with her supportive family member. Past Medical History:  
Diagnosis Date  CAD (coronary artery disease)  Chronic obstructive pulmonary disease ([facility]  History of common carotid artery stent placement  Hypercoagulable state ([facility] SVC thrombus  Hypertension  Pleural effusion Past Surgical History:  
Procedure Laterality Date  HX ABDOMINAL LAPAROSCOPY    
 lots of intenstine removed/ lysis of adhesions  HX CAROTID STENT    HX HYSTERECTOMY  HX TONSIL AND ADENOIDECTOMY  IR BRONCH W LUNG BIOPSY SINGLE LOBE  2019 Social History Tobacco Use  Smoking status: Former Smoker Packs/day: 0.50 Last attempt to quit: 2019 Years since quittin.2  Smokeless tobacco: Never Used Substance Use Topics  Alcohol use: Not Currently Comment: occ Current Outpatient Medications Medication Sig  
 HYDROmorphone (DILAUDID) 4 mg tablet Take 1 Tab by mouth every four (4) hours as needed for Pain for up to 30 days. Max Daily Amount: 24 mg.  apixaban (ELIQUIS) 5 mg tablet Take 1 Tab by mouth two (2) times a day.  L.acidoph & parac-S.therm-Bifido (CIRILO Q2/RISAQUAD-2) 16 billion cell cap cap Take 1 Cap by mouth daily.  potassium chloride SR (K-TAB) 20 mEq tablet Take 2 Tabs by mouth daily.  aspirin 81 mg chewable tablet Take 1 Tab by mouth daily.  atorvastatin (LIPITOR) 40 mg tablet Take 1 Tab by mouth daily.  albuterol sulfate (PROVENTIL;VENTOLIN) 2.5 mg/0.5 mL nebu nebulizer solution 0.5 mL by Nebulization route every four (4) hours as needed for Wheezing or Shortness of Breath.  fluticasone propionate (FLONASE ALLERGY RELIEF NA) by Nasal route.  hydrOXYzine HCl (ATARAX) 10 mg tablet Take 10 mg by mouth three (3) times daily as needed for Itching.  montelukast (SINGULAIR) 10 mg tablet Take 10 mg by mouth daily.  albuterol (PROVENTIL HFA, VENTOLIN HFA, PROAIR HFA) 90 mcg/actuation inhaler Take 2 Puffs by inhalation.  atenolol (TENORMIN) 50 mg tablet Take 50 mg by mouth daily.  losartan (COZAAR) 100 mg tablet Take 100 mg by mouth daily.  budesonide (ENTOCORT EC) 3 mg capsule Take 6 mg by mouth every morning.  furosemide (LASIX) 40 mg tablet Take 1 Tab by mouth daily. No current facility-administered medications for this visit. Allergies Allergen Reactions  Codeine Itching Review of Systems: A complete review of systems was obtained, negative except as described above. Physical Exam:  
 
Visit Vitals /78 Pulse 74 Temp 98.3 °F (36.8 °C) Resp 18 Ht 5' 2\" (1.575 m) Wt 80 lb (36.3 kg) SpO2 94% BMI 14.63 kg/m² ECOG PS: 1 General: frail, appearing in no acute distress Eyes: PERRL, anicteric sclerae HENT: Atraumatic, OP clear Neck: Supple, no edema CV: HRR, edema to bilateral upper extremities R>L Respiratory: normal respiratory effort, diminished throughout, cough with deep breath MS: Moves all extremities, Digits without clubbing or cyanosis. Skin: ecchymoses to bilateral forearms, No rash or petechiae. Normal temperature, turgor, and texture. Psych: Alert, oriented, appropriate affect, normal judgment/insight Results:  
 
Lab Results Component Value Date/Time WBC 13.5 (H) 03/05/2020 02:27 PM  
 HGB 12.1 03/05/2020 02:27 PM  
 HCT 37.7 03/05/2020 02:27 PM  
 PLATELET 812 64/99/8576 02:27 PM  
 MCV 98.4 03/05/2020 02:27 PM  
 ABS. NEUTROPHILS 12.4 (H) 03/05/2020 02:27 PM  
 
Lab Results Component Value Date/Time Sodium 138 03/05/2020 02:27 PM  
 Potassium 4.0 03/05/2020 02:27 PM  
 Chloride 103 03/05/2020 02:27 PM  
 CO2 29 03/05/2020 02:27 PM  
 Glucose 83 03/05/2020 02:27 PM  
 BUN 21 (H) 03/05/2020 02:27 PM  
 Creatinine 0.73 03/05/2020 02:27 PM  
 GFR est AA >60 03/05/2020 02:27 PM  
 GFR est non-AA >60 03/05/2020 02:27 PM  
 Calcium 9.0 03/05/2020 02:27 PM  
 
Lab Results Component Value Date/Time Bilirubin, total 0.2 03/05/2020 02:27 PM  
 ALT (SGPT) 30 03/05/2020 02:27 PM  
 AST (SGOT) 21 03/05/2020 02:27 PM  
 Alk. phosphatase 107 03/05/2020 02:27 PM  
 Protein, total 6.7 03/05/2020 02:27 PM  
 Albumin 3.2 (L) 03/05/2020 02:27 PM  
 Globulin 3.5 03/05/2020 02:27 PM  
 
 
Records reviewed and summarized above. Pathology report(s) reviewed above. Radiology report(s) reviewed above. PET CT 
IMPRESSION IMPRESSION:  
1. RUL malignancy. 2. Mediastinal griffin metastasis. 3. Likely subcentimeter right retroclavicular griffin metastasis. Ct 1/31/2020 IMPRESSION IMPRESSION: 
  
1. Moderately large right pleural effusion, new since the prior study. 2. Significant increase in size of a right upper lobe parenchymal mass since the 
prior study. 3. Stable emphysematous change. 4. Probably stable pretracheal lymphadenopathy. The superior vena cava cannot be 
assessed without contrast. 
4. Small pericardial effusion, new.  
5. Hypodense poorly circumscribed area in the left hepatic lobe on axial 
 imaging, not confirmed on reformatted images. Therefore, this may be 
artifactual.   
6. Stable right hepatic lobe cyst and other incidental findings MRI brain 2/3/2020 IMPRESSION IMPRESSION: 
  
1. 8 mm enhancing focus at the left frontal convexity suspicious for metastasis. 2 additional subtle areas of enhancement in the right frontal lobe, also 
concerning for metastases. No edema. 2. Persistent small focus of diffusion signal right periatrial white matter 
likely an evolving infarction, with no associated enhancement. Pathology 2/2/2020 CYTOLOGIC INTERPRETATION:  
Pleural Fluid, Right, ThinPrep and cell block:  
Reactive mesothelial cells and mixed acute and chronic inflammation See comment General Categorization No cells diagnostic for malignancy Specimen Adequacy Satisfactory for evaluation Comment Immunohistochemical stains performed on the cell block reveal that reactive cells are strongly positive for calretinin and WT-1, while negative for BerEP4 and B72.3. These findings are compatible with reactive mesothelial cells and lend no support for metastatic adenocarcinoma MRI Brain w. Cont 25/2020 IMPRESSION: 
1. 8 mm enhancing focus at the left frontal convexity suspicious for metastasis. 2 additional subtle areas of enhancement in the right frontal lobe, also 
concerning for metastases. No edema. 2. Persistent small focus of diffusion signal right periatrial white matter 
likely an evolving infarction, with no associated enhancement. CTA 2/28/20: IMPRESSION:  
1. Negative for pulmonary embolus. 2. Significantly increased mass in the medial right upper lobe. 3. Increased mediastinal mass occluding the SVC. 4. New mild to moderate right pleural effusion. 5. Severe emphysema. Assessment:  
1) NSCLC- Right- stage IV 
 
R lung mass, mediastinal adenopathy, SVC tumor thrombus and a possible R retroclavicular node 
T1N3M1- small brain metastasis Poorly differentiated adenocarcinoma Molecular studies - EGFR negative, PD-L1 is 80%, ALK negative New R effusion with negative cytology Completed palliative RT on 2/27/20 Had repeat imaging on 2/28/20 in ER that showed worsening mass in RUL and increase in size of mediastinal mass occluding the SVC. I will review these images in thoracic tumor board to see if there would be a roll for SVC stenting vs drainage of pleural effusion is necessary Today is cycle 2 of palliative Keytruda Tolerated cycle 1 well. Will continue with treatment. 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 Patient states swelling is only slightly bothersome. Instructed to prop up arms and wrap with ace bandage if needed. 5) Right chest/back pain Patient states dilaudid helps slightly but pain is worst at night. Pain worsened after 2nd thoracentesis - patient declined PleurX Palliative Care referral ordered. 6) COPD On albuterol, montelukast, and budesonide. Patient concerned for sinus infection - instructed to increase flonase to BID 7) CAD 8) Acute Stroke? Detected on MRI No deficits Plan: · Proceed today with cycle #2 of palliative Keytruda every 3 weeks until progression or side effects · Labs each visit CBC, CMP, TSH · Continue Eliquis 5mg BID · Palliative Care referral placed · Dilaudid 2-4mg PRN for pain control Will arrange for e-visit 2 days prior to cycle 3 I appreciate the opportunity to participate in Ms. Morgangorge Adame's care Seen in conjunction with Rose Patten NP Signed By: King Gregoria MD

## 2020-03-26 NOTE — PROGRESS NOTES
Pt here for routine follow up, she reports some back pain and right arm swelling. Visit Vitals /78 Pulse 74 Temp 98.3 °F (36.8 °C) Resp 18 Ht 5' 2\" (1.575 m) Wt 80 lb (36.3 kg) SpO2 94% BMI 14.63 kg/m² Pain Scale: 6/10 Pain Location: 1. Have you been to the ER, urgent care clinic since your last visit? Hospitalized since your last visit? no 
 
2. Have you seen or consulted any other health care providers outside of the [address] [place] [address] since your last visit? Include any pap smears or colon screening. No 
 
Health Maintenance reviewed

## 2020-03-26 NOTE — PROGRESS NOTES
[facility] Progress Note Date: 2020 Name: Sho Miller MRN: [id number] : 1945 Ms. Adame Arrived 1410 and in no distress for cycle 2 Keytruda . Assessment was completed, no acute issues at this time, no new complaints voiced. PIV in [facility] accessed with positive blood return. Labs drawn and sent for processing. Chemotherapy Flowsheet 3/26/2020 Cycle C2 Date 3/26/2020 Drug / Regimen Thais Tatum Ms. Adame's vitals were reviewed. Patient Vitals for the past 12 hrs: 
 Temp Pulse Resp BP  
20 1646  83  127/59  
20 1407 98.2 °F (36.8 °C) 71 17 147/79 Lab results were obtained and reviewed. Recent Results (from the past 12 hour(s)) CBC WITH AUTOMATED DIFF Collection Time: 20  2:15 PM  
Result Value Ref Range WBC 6.1 3.6 - 11.0 K/uL  
 RBC 3.96 3.80 - 5.20 M/uL  
 HGB 12.4 11.5 - 16.0 g/dL HCT 38.4 35.0 - 47.0 % MCV 97.0 80.0 - 99.0 FL  
 MCH 31.3 26.0 - 34.0 PG  
 MCHC 32.3 30.0 - 36.5 g/dL  
 RDW 13.2 11.5 - 14.5 % PLATELET 375 217 - 197 K/uL MPV 9.4 8.9 - 12.9 FL  
 NRBC 0.0 0  WBC ABSOLUTE NRBC 0.00 0.00 - 0.01 K/uL NEUTROPHILS 74 32 - 75 % LYMPHOCYTES 13 12 - 49 % MONOCYTES 9 5 - 13 % EOSINOPHILS 2 0 - 7 % BASOPHILS 2 (H) 0 - 1 % IMMATURE GRANULOCYTES 0 0.0 - 0.5 % ABS. NEUTROPHILS 4.6 1.8 - 8.0 K/UL  
 ABS. LYMPHOCYTES 0.8 0.8 - 3.5 K/UL  
 ABS. MONOCYTES 0.5 0.0 - 1.0 K/UL  
 ABS. EOSINOPHILS 0.1 0.0 - 0.4 K/UL  
 ABS. BASOPHILS 0.1 0.0 - 0.1 K/UL  
 ABS. IMM. GRANS. 0.0 0.00 - 0.04 K/UL  
 DF SMEAR SCANNED    
 RBC COMMENTS NORMOCYTIC, NORMOCHROMIC METABOLIC PANEL, COMPREHENSIVE Collection Time: 20  2:15 PM  
Result Value Ref Range Sodium 136 136 - 145 mmol/L Potassium 3.9 3.5 - 5.1 mmol/L Chloride 101 97 - 108 mmol/L  
 CO2 33 (H) 21 - 32 mmol/L Anion gap 2 (L) 5 - 15 mmol/L Glucose 96 65 - 100 mg/dL  BUN 13 6 - 20 MG/DL  
 Creatinine 0.67 0.55 - 1.02 MG/DL  
 BUN/Creatinine ratio 19 12 - 20 GFR est AA >60 >60 ml/min/1.73m2 GFR est non-AA >60 >60 ml/min/1.73m2 Calcium 9.1 8.5 - 10.1 MG/DL Bilirubin, total 0.3 0.2 - 1.0 MG/DL  
 ALT (SGPT) 24 12 - 78 U/L  
 AST (SGOT) 22 15 - 37 U/L Alk. phosphatase 98 45 - 117 U/L Protein, total 6.6 6.4 - 8.2 g/dL Albumin 2.9 (L) 3.5 - 5.0 g/dL Globulin 3.7 2.0 - 4.0 g/dL A-G Ratio 0.8 (L) 1.1 - 2.2 Pre-medications  were administered as ordered and chemotherapy was initiated. Medications Administered 0.9% sodium chloride infusion Admin Date 
03/26/2020 Action New Bag Dose 25 mL/hr Rate 25 mL/hr Route IntraVENous Administered By 
Mendoza Barnard RN  
  
  
 pembrolizumab [facility]) 200 mg in 0.9% sodium chloride 100 mL, overfill volume 10 mL IVPB Admin Date 
03/26/2020 Action New Bag Dose 
200 mg Rate 236 mL/hr Route IntraVENous Administered By 
Mendoza Barnard RN  
  
  
  
 
 
 
 
Ms. Clary Gandara tolerated treatment well. PIV maintained positive blood return throughout treatment. Patient was discharged from [place] [address] in stable condition at 1650. Future Appointments Date Time Provider Alen Peralta 4/16/2020  2:00 PM A3 [facility] [facility]  
4/16/2020  2:15 PM Wendy Luis MD [facility] [address]  
5/7/2020  2:00 PM A3 ANDREAS  [facility] [address] A Gerald Pastor RN 
March 26, 2020

## 2020-04-03 NOTE — TELEPHONE ENCOUNTER
Call returned to pt after discussing with Dr. Kylee Denis. Pt states she is feeling better this afternoon after taking dilaudid consistently but still has some pain. Explained need for bone scan to rule out cancer in the bones. Also discussed course of dexamethasone to aid in pain relief and use of lidocaine patches. Instructed her to take Dilaudid 4mg q4 hours and can use Tylenol if needed in between. She understood information and had no additional questions/concerns at this time. Daughter also on speaker phone and aware of plan.

## 2020-04-03 NOTE — TELEPHONE ENCOUNTER
Returned call to patient. She is in severe pain in her neck, bilateral shoulders, and upper spine. This has been going on approximately 1 week. She states she has screamed it has gotten so bad. She is currently taking dilaudid 4mg q4 hours with little relief. States SOB and swelling in upper extremities is unchanged, no worse, no better. She has not heard from palliative care about an appointment yet. She states she does NOT want to go to ER due to COVID-19. I told her I would discuss with team and give her a call back. She understands and had no additional questions/concerns.

## 2020-04-07 NOTE — PROGRESS NOTES
Please let her know bone scan showed no cancer in the bones which is good  See how her pain is and ensure she has the palliative apt scheduled ASAP

## 2020-04-08 NOTE — TELEPHONE ENCOUNTER
Called patient and confirmed x2 identifiers   Assessed patient's pain medication regimen   Patient stated pain is doing \"better\"; rated pain 4 of 10 on 0-10 pain scaled where 0 means no pain  Patient reported having constipation 4-5 days   Provided education on bowel regimen and informed her to take miralax and colace twice today and could start milk of magnesia once today   Will check with patient tomorrow on regimen     Provided patient to bone scan results; patient stated she has palliative appointment scheduled for 4/13/2020     Patient agreed with plan and verbalized understanding   No new questions or concerns at this time

## 2020-04-09 NOTE — TELEPHONE ENCOUNTER
Called patient and confirmed x2 identifiers   Patient stated she had bowel movement   Reviewed bowel regimen with patient and informed patient to discuss with Palliative Medicine on bowel regimen     Patient agreed with plan   No new questions or concerns at this time

## 2020-04-09 NOTE — TELEPHONE ENCOUNTER
Called patient to advise/confirm upcoming appt with Dr. Edu Hare on 4/13/20     at 1:30pm at [facility]. No answer so left voicemail and ask patient to arrive between 1:00pm and 1:10pm.  Also advised to please bring in your Drivers License and Insurance Card with you to appointment as well as any medication in the original container with you to appt.

## 2020-04-13 NOTE — TELEPHONE ENCOUNTER
Bobby Mcdermott with PCP office called me back. Provided her with information for insurance referral.  Bobby Mcdermott obtained insurance referral and faxed to office at [phone].

## 2020-04-13 NOTE — TELEPHONE ENCOUNTER
Calling [employer] regarding need to see if patient need prior authorization or certification before we can see.       See note:    Note: REFERRAL REQUIRED (insurance referral required before speciality can see)  for CPT 28215 and CPT 02968, NO precertification or authorization is required per Raya Sims 4/13/20, call ref:  [id number];

## 2020-04-13 NOTE — PROGRESS NOTES
Palliative Medicine Outpatient Services Mims: [phone] Patient Name: Romel Samuels YOB: 1945 Date of Current Visit: 20 Location of Current Visit:   
[] [facility] Office 
[] [facility] Office [x] [facility] Office 
[] Home 
[] Other:   
 
Date of Initial Visit: 2020 Referral from: Dr. Karma Elena Primary Care Physician: Malik Dexter NP 
  
 SUMMARY:  
Romel Samuels is a 69y.o. 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 Karma Elena for management of pain and psychosocial support. The patients social history includes worked as a  for a [employer], now retired and lives with her son Joseluis Villagomez and 15year-old granddaughter Linda Tran Initial Referral Intake note reviewed PALLIATIVE DIAGNOSES:  
 
  ICD-10-CM ICD-9-CM 1. Insomnia, unspecified type G47.00 780.52 traZODone (DESYREL) 50 mg tablet 2. Mid back pain M54.9 724.5 traZODone (DESYREL) 50 mg tablet 3. Lung cancer metastatic to brain ([address] C34.90 162.9 traZODone (DESYREL) 50 mg tablet  
 C79.31 198.3 4. Pain of right orbit H57.11 379.91 traZODone (DESYREL) 50 mg tablet PLAN:  
 
Patient Instructions Dear Romel Samuels , It was a pleasure seeing you today at [facility] office We will see you again in 4 weeks If labs or imaging tests have been ordered for you today, please call the office  at [phone] 48 hours after completion to obtain the results. Your stated goal: - To have good quality of life more than quantity. Your described symptoms were: Fatigue: 8 Drowsiness: 5 Depression: 2 Pain: 7 Anxiety: 3 Nausea: 5 Anorexia: 6 Dyspnea: 8 Best Well-Bein Constipation: Yes Other Problem (Comment): 0 This is the plan we talked about: 1. Severe mid back pain - This is likely from the lung mass - You are currently on Dilaudid 4 mg two to three times a day. - It is very important that you keep a pain journal as we discussed so we can understand your pain needs better. - You have about 40 tabs, I will provide a refill when you are done with current supply. 2. Constipation 
-Constipation is a common side effect of pain medications as they slow your bowels. -Please start Pericolace 2 tabs daily and increase to 2 tabs two times a day if needed. This is necessary to keep your bowels soft. - Add Miralax every other day as a laxative. - Goal is to have soft bowel movements every day or every other day. - Please call us if you do not have bowel movements for more than 3 days. 3. Insomnia - You tried Melatonin and it did not help. Not sleeping well is adding to your fatigue 
- let us try Trazodone 25 mg (half tab of 50 mg) at bedtime. If you want, you can take quarter of a tab to see if this dose is enough. 4. You and your son have a good understanding of your disease. You want quality of life more than quantity. 
- You have an AMD and DDNR in chart. This is what you have shared with us about Advance Care Planning: 
 
  Primary Decision Maker: Kirk Nash - [phone] Secondary Decision Maker: Radha Mendez - [phone] Advance Care Planning 3/26/2020 Patient's Healthcare Decision Maker is: Named in scanned ACP document Confirm Advance Directive Yes, not on file Does the patient have other document types - The Palliative Medicine Team is here to support you and your family. Sincerely, 
 
 
Meggan Farrar MD and the Palliative Medicine Team 
 
 
 GOALS OF CARE / TREATMENT PREFERENCES:  
[====Goals of Care====] GOALS OF CARE: 
Patient / health care proxy stated goals: See Patient Instructions / Summary TREATMENT PREFERENCES:  
Code Status:  [] Attempt Resuscitation       [x] Do Not Attempt Resuscitation Advance Care Planning: 
[x] The Pall Med Interdisciplinary Team has updated the ACP Navigator with Decision Maker and Patient Capacity Primary Decision Maker: Lucas Haro - [phone] Secondary Decision Maker: Manoj Quinn - [phone] Advance Care Planning 3/26/2020 Patient's Healthcare Decision Maker is: Named in scanned ACP document Confirm Advance Directive Yes, not on file Does the patient have other document types - Other: 
(If patient appropriate for POST, consider using PALLPOST smart phrase here) The palliative care team has discussed with patient / health care proxy about goals of care / treatment preferences for patient. 
[====Goals of Care====] PRESCRIPTIONS GIVEN:  
 
Medications Ordered Today Medications  traZODone (DESYREL) 50 mg tablet Sig: Take 0.5 Tabs by mouth nightly for 20 days. Dispense:  10 Tab Refill:  0  
  
 
 
 FOLLOW UP: Future Appointments Date Time Provider Alen Peralta 4/16/2020  2:00 PM [facility] [address] [place] [address]  
4/16/2020  2:15 PM Antony Ugarte MD [facility] [address]  
5/7/2020  2:00 PM [facility] [address]  
5/13/2020 12:30 PM Abdiaziz Billingsley MD [facility] [address] PHYSICIANS INVOLVED IN CARE:  
Patient Care Team: 
Mariama Flood NP as PCP - General (Nurse Practitioner) HISTORY:  
Reviewed patient-completed ESAS and advance care planning form. Reviewed patient record in prescription monitoring program. 
 
CHIEF COMPLAINT: No chief complaint on file. HPI/SUBJECTIVE: The patient is: [x] Verbal / [] Nonverbal  
 
Patient here with her son. She appears anxious and wants to talk about her disease. She also wanted to know more information about hospice. Complains of uncontrolled back pain. She is been taking Dilaudid 4 mg about 3 times a day but is very hesitant to take anymore.   She has severe insomnia. She has tried melatonin which did not work. She does take several naps during the day. Clinical Pain Assessment (nonverbal scale for nonverbal patients):  
[++++ Clinical Pain Assessment++++] [++++Pain Severity++++]: Pain: 7 
[++++Pain Character++++]: gnawing 
[++++Pain Duration++++]: months 
[++++Pain Effect++++]: functional 
[++++Pain Factors++++]: none in particular 
[++++Pain Frequency++++]: constant [++++Pain Location++++]: mid upper back [++++ Clinical Pain Assessment++++] FUNCTIONAL ASSESSMENT:  
 
Palliative Performance Scale (PPS): PPS: 70 PSYCHOSOCIAL/SPIRITUAL SCREENING:  
 
Any spiritual / Zoroastrianism concerns: 
[] Yes /  [x] No 
 
Caregiver Burnout: 
[] Yes /  [x] No /  [] No Caregiver Present Anticipatory grief assessment:  
[x] Normal  / [] Maladaptive ESAS Anxiety: Anxiety: 3 ESAS Depression: Depression: 2 REVIEW OF SYSTEMS:  
 
The following systems were [x] reviewed / [] unable to be reviewed Systems: constitutional, ears/nose/mouth/throat, respiratory, gastrointestinal, genitourinary, musculoskeletal, integumentary, neurologic, psychiatric, endocrine. Positive findings noted below. Modified ESAS Completed by: provider Fatigue: 8 Drowsiness: 5 Depression: 2 Pain: 7 Anxiety: 3 Nausea: 5 Anorexia: 6 Dyspnea: 8 Best Well-Bein Constipation: Yes Other Problem (Comment): 0 PHYSICAL EXAM:  
 
Wt Readings from Last 3 Encounters:  
20 78 lb 3.2 oz (35.5 kg) 20 80 lb (36.3 kg) 20 80 lb (36.3 kg) Blood pressure 167/83, pulse 91, temperature 97.9 °F (36.6 °C), temperature source Oral, resp. rate 18, height 5' 2\" (1.575 m), weight 78 lb 3.2 oz (35.5 kg), SpO2 98 %. Last bowel movement: See Nursing Note Constitutional: Alert, oriented, frail Eyes: pupils equal, anicteric ENMT: no nasal discharge, moist mucous membranes Cardiovascular: regular rhythm, distal pulses intact Respiratory: breathing not labored, symmetric Gastrointestinal: soft non-tender, +bowel sounds Musculoskeletal: no deformity, no tenderness to palpation Skin: warm, dry, bruises all over Neurologic: following commands, moving all extremities Psychiatric: full affect, no hallucinations Other: 
 
 
 HISTORY:  
 
Past Medical History:  
Diagnosis Date  CAD (coronary artery disease)  Chronic obstructive pulmonary disease [address] [address]  History of common carotid artery stent placement  Hypercoagulable state ([facility] [address] SVC thrombus  Hypertension  Pleural effusion Past Surgical History:  
Procedure Laterality Date  HX ABDOMINAL LAPAROSCOPY    
 lots of intenstine removed/ lysis of adhesions  HX CAROTID STENT    HX HYSTERECTOMY  HX TONSIL AND ADENOIDECTOMY  IR BRONCH W LUNG BIOPSY SINGLE LOBE  2019 No family history on file. History reviewed, no pertinent family history. Social History Tobacco Use  Smoking status: Former Smoker Packs/day: 0.50 Last attempt to quit: 2019 Years since quittin.3  Smokeless tobacco: Never Used Substance Use Topics  Alcohol use: Not Currently Comment: occ Allergies Allergen Reactions  Codeine Itching Current Outpatient Medications Medication Sig  
 traZODone (DESYREL) 50 mg tablet Take 0.5 Tabs by mouth nightly for 20 days.  HYDROmorphone (DILAUDID) 4 mg tablet Take 1 Tab by mouth every four (4) hours as needed for Pain for up to 30 days. Max Daily Amount: 24 mg.  
 apixaban (ELIQUIS) 5 mg tablet Take 1 Tab by mouth two (2) times a day.  aspirin 81 mg chewable tablet Take 1 Tab by mouth daily.  albuterol sulfate (PROVENTIL;VENTOLIN) 2.5 mg/0.5 mL nebu nebulizer solution 0.5 mL by Nebulization route every four (4) hours as needed for Wheezing or Shortness of Breath.  fluticasone propionate (FLONASE ALLERGY RELIEF NA) by Nasal route.  albuterol (PROVENTIL HFA, VENTOLIN HFA, PROAIR HFA) 90 mcg/actuation inhaler Take 2 Puffs by inhalation.  atenolol (TENORMIN) 50 mg tablet Take 50 mg by mouth daily.  losartan (COZAAR) 100 mg tablet Take 100 mg by mouth daily.  dexAMETHasone (DECADRON) 4 mg tablet Take 1 tab two times daily for 7 days  lidocaine (LIDODERM) 5 % 1 Patch by TransDERmal route every twenty-four (24) hours. Apply patch to the affected area for 12 hours a day and remove for 12 hours a day.  L.acidoph & parac-S.therm-Bifido (CIRILO Q2/RISAQUAD-2) 16 billion cell cap cap Take 1 Cap by mouth daily.  potassium chloride SR (K-TAB) 20 mEq tablet Take 2 Tabs by mouth daily.  atorvastatin (LIPITOR) 40 mg tablet Take 1 Tab by mouth daily.  furosemide (LASIX) 40 mg tablet Take 1 Tab by mouth daily.  hydrOXYzine HCl (ATARAX) 10 mg tablet Take 10 mg by mouth three (3) times daily as needed for Itching.  montelukast (SINGULAIR) 10 mg tablet Take 10 mg by mouth daily.  budesonide (ENTOCORT EC) 3 mg capsule Take 6 mg by mouth every morning. No current facility-administered medications for this visit. LAB DATA REVIEWED:  
 
Lab Results Component Value Date/Time WBC 6.1 03/26/2020 02:15 PM  
 HGB 12.4 03/26/2020 02:15 PM  
 PLATELET 172 78/17/0557 02:15 PM  
 
Lab Results Component Value Date/Time Sodium 136 03/26/2020 02:15 PM  
 Potassium 3.9 03/26/2020 02:15 PM  
 Chloride 101 03/26/2020 02:15 PM  
 CO2 33 (H) 03/26/2020 02:15 PM  
 BUN 13 03/26/2020 02:15 PM  
 Creatinine 0.67 03/26/2020 02:15 PM  
 Calcium 9.1 03/26/2020 02:15 PM  
 Magnesium 2.0 12/18/2019 11:50 PM  
 Phosphorus 3.0 12/18/2019 11:50 PM  
  
Lab Results Component Value Date/Time AST (SGOT) 22 03/26/2020 02:15 PM  
 Alk. phosphatase 98 03/26/2020 02:15 PM  
 Protein, total 6.6 03/26/2020 02:15 PM  
 Albumin 2.9 (L) 03/26/2020 02:15 PM  
 Globulin 3.7 03/26/2020 02:15 PM  
 
Lab Results Component Value Date/Time INR 1.0 08/08/2009 03:43 PM  
 Prothrombin time 10.2 08/08/2009 03:43 PM  
 aPTT 61.8 (H) 12/20/2019 08:48 AM  
  
No results found for: IRON, FE, TIBC, IBCT, PSAT, FERR CONTROLLED SUBSTANCES SAFETY ASSESSMENT (IF ON CONTROLLED SUBSTANCES):  
 
Reviewed opioid safety handout:  [x] Yes   [] No 
24 hour opioid dose >150mg morphine equivalent/day:  [] Yes   [x] No 
Benzodiazepines:  [] Yes   [x] No 
Sleep apnea:  [] Yes   [x] No 
Urine Toxicology Testing within last 6 months:  [] Yes   [x] No 
History of or new aberrant medication taking behaviors:  [] Yes   [x] No 
Has Narcan been prescribed [x] Yes   [] No 
 
   
 
Total time: 70 minutes Counseling / coordination time: 60 minutes 
> 50% counseling / coordination?:  Yes

## 2020-04-13 NOTE — PROGRESS NOTES
Palliative Medicine Office Visit Palliative Medicine Nurse Check In 
[phone] Patient Name: Miguel Posey YOB: 1945 Date of Office Visit: 4/13/2020 Patient states: \"  \" 
 
From Check In Sheet (scanned in Media): 
Has a medical provider talked with you about cardiopulmonary resuscitation (CPR)? [x] Yes   [] No   [] Unable to obtain Nurse reminder to complete or update ACP FlowSheet: 
 
Is ACP on the Problem List?    [x] Yes    [] No 
IF ACP Document is ON FILE; Nurse to place ACP on Problem List  
 
Is there an ACP Note in Chart Review/Note? [x] Yes    [] No  
If NO: ALERT PROVIDER Advance Care Planning 3/26/2020 Patient's Healthcare Decision Maker is: Named in scanned ACP document Confirm Advance Directive Yes, not on file Does the patient have other document types - Is there anything that we should know about you as a person in order to provide you the best care possible? Have you been to the ER, urgent care clinic since your last visit? [x] Yes   [] No   [] Unable to obtain Have you been hospitalized since your last visit? [x] Yes   [] No   [] Unable to obtain Have you seen or consulted any other health care providers outside of the [address] [place] [address] since your last visit? [] Yes   [x] No   [] Unable to obtain Functional status (describe):  
 
 
Last BM:  
 
 accessed (date): 4/12/2020 Bottle review (for opioid pain medication): 
Medication 1:  
Date filled:  
Directions:  
# filled: # left: # pills taking per day: 
Last dose taken: 
 
Medication 2:  
Date filled:  
Directions:  
# filled: # left: # pills taking per day: 
Last dose taken: 
 
Medication 3:  
Date filled:  
Directions:  
# filled: # left: # pills taking per day: 
Last dose taken: 
 
Medication 4:  
Date filled:  
Directions:  
# filled: # left: # pills taking per day: 
Last dose taken:

## 2020-04-13 NOTE — PATIENT INSTRUCTIONS
Dear Concepcion Mojica , It was a pleasure seeing you today at [facility] office We will see you again in 4 weeks If labs or imaging tests have been ordered for you today, please call the office  at [phone] 48 hours after completion to obtain the results. Your stated goal: - To have good quality of life more than quantity. Your described symptoms were: Fatigue: 8 Drowsiness: 5 Depression: 2 Pain: 7 Anxiety: 3 Nausea: 5 Anorexia: 6 Dyspnea: 8 Best Well-Bein Constipation: Yes Other Problem (Comment): 0 This is the plan we talked about: 1. Severe mid back pain - This is likely from the lung mass - You are currently on Dilaudid 4 mg two to three times a day. - It is very important that you keep a pain journal as we discussed so we can understand your pain needs better. - You have about 40 tabs, I will provide a refill when you are done with current supply. 2. Constipation 
-Constipation is a common side effect of pain medications as they slow your bowels. -Please start Pericolace 2 tabs daily and increase to 2 tabs two times a day if needed. This is necessary to keep your bowels soft. - Add Miralax every other day as a laxative. - Goal is to have soft bowel movements every day or every other day. - Please call us if you do not have bowel movements for more than 3 days. 3. Insomnia - You tried Melatonin and it did not help. Not sleeping well is adding to your fatigue 
- let us try Trazodone 25 mg (half tab of 50 mg) at bedtime. If you want, you can take quarter of a tab to see if this dose is enough. 4. You and your son have a good understanding of your disease. You want quality of life more than quantity. 
- You have an AMD and DDNR in chart. This is what you have shared with us about Advance Care Planning: 
 
  Primary Decision Maker: Ihsan Nevarez - [phone] Secondary Decision Maker: Zully Liang - [phone] Advance Care Planning 3/26/2020 Patient's Healthcare Decision Maker is: Named in scanned ACP document Confirm Advance Directive Yes, not on file Does the patient have other document types - The Palliative Medicine Team is here to support you and your family.   
 
 
Sincerely, 
 
 
Elvira Roque MD and the Palliative Medicine Team

## 2020-04-13 NOTE — PROGRESS NOTES
[facility] Palliative Medicine Outpatient Psychosocial Assessment [phone] Reason for Assessment:   
[x] Initial Social Work Encounter 
[] Continued Social Work Assessment from previous encounter Sources of Information:   
[x]Patient  [x]Family  []Staff  [x]Medical Record Narrative:  
Ms. Bruce Martino is a 70 y/o woman whose pmh includes: Stage 4 NSCLC with mets to brain; COPD; HTN; CAD. Pt lives at home with her son, Zulema Johnson (present for today's visit), and Mike's 15 y/o daughter, Tessa Roe. Pt has 2 daughters, Andrews Becerril, who lives in [place]; Fabi Pearson, an ICU RN in [place] [address]. Pt has great grandchildren through daughter, Kelly Ruiz. Pt receives regular calls from Lionel Marlow, but as much from Kelly Ruiz due to her job. Pt's friend, Junior Guillaume, provides much support as she is local.  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 Pt smiled, saying she was the only female in her company with this job. She received her lung CA dx in he last 6 months, saying she is at peace with it, but would like material to help with granddaughter, Tessa Roe, with whom she is close. Because of the [facility] [place] virus, Tessa Roe is home with pt and helpful with ADLs and safety around the house. Assessment / Action: 
· SW encouraged expression while actively listening. · Introduced Palliative Social Work as part of the PM supportive team by providing emotional support, resource referrals, advocacy, assistance with AMDs and counseling. · Reviewed pt's AMD (see ACP note from Karthik Urbano, 2/5/20), with pt saying she likes it as it is. · Explored support system, learning of support from family and friends. · Explored the emotional toll on pt and son through her diagnosis.   Both responded stoically, but pt expressed the need for help with resource information to help 15 y/o granddaughter. SW offered to provide this information via email to pt, which pt said would be helpful. · Son was not talkative, appeared fatigued. Pt answered most of the questions, unless a question was directed to son, who provided brief answers. Advance Care Planning: 
  Primary Decision Maker: Sabina Kern - [phone] Secondary Decision Maker: Jerome Toussaint - [phone] Advance Care Planning 3/26/2020 Patient's Healthcare Decision Maker is: Named in scanned ACP document Confirm Advance Directive Yes, not on file Does the patient have other document types - Mental Status:   
[x]Alert  []Lethargic  []Unresponsive Oriented to:  [x]Person  [x]Place  [x]Time  [x]Situation Barriers to Learning:   
[]Language  []Developmental  []Cognitive  []Altered Mental Status  []Forgetful []Visual/Hearing Impairment  []Unable to Read/Write  []Motivational   [x]No Barriers Identified  []Other: 
 
Relationship Status: 
[]Single  []  []Significant Other/Life Partner  [x]  []  [] Living Circumstances: 
[]Lives Alone  [x]Family in Household  []Roommates  []Children in the Home  []Paid Caregivers  []Assisted Living Facility/Group Home  []Skilled [address]  []Homeless  []Incarcerated  []Environmental/Care Concerns  []Transportation Issues  [] Issues  []Domestic Violence []Other: 
 
Support System:   
[x]Strong  []Fair  []Limited Sleep Habits:  
[x]Poor []Unsatisfactory []Satisfactory []Good []Very Good Specific sleep problems? History of insomnia Financial/Legal Concerns:   
[]Uninsured  []Limited Income/Resources  []Non-Citizen  []Utility Issues  []Food Insecurity [x]No Concerns Identified  []Financial POA:   
[]Other: 
 
Hoahaoism/Spiritual/Existential: 
[]Strong Sense of Spirituality  []Involved in [employer] []Request  Visit  []Expressing Spiritual/Existential Angst  [x]No Concerns Identified Coping with Illness:       
 Patient: Family/Caregiver:  
Understanding and Acceptance of Illness/Prognosis  [x] [] Strong Sense of Resilience [x] [] Self Reflection [x] [] Engaged Support System [x] [x] Does not Readily Discuss Illness [] [x] Denial of Terminal Status [] [] Anger [] [] Depression [] [] Anxiety/Fear [] []  
Bargaining [] [] Recent Diagnosis/Prognosis [x] [] Difficulties with Body Image [] [] Loss of Identity [] [] Excessive Substance Use [] [] Mental Health History [] []  
Enmeshed Relationships [] [] History of Loss [] [] Anticipatory Grief [] [x] Concern for Complicated Grief [] [] Suicidal Ideation or Plan [] [] Caregiver Stress [] [] Unable to assess [] [] Goals/Plan:  
Ongoing psychosocial support including assessments, links with resources and counseling prn. Sudarshan Reeves, ABHISHEK, MSSW, LCSW Palliative  [address] [facility] 
[phone]

## 2020-04-13 NOTE — PROGRESS NOTES
Patient returned call informs she has Dilaudid 4 mg #68 tabs left on hand , was informing nurse and physician.

## 2020-04-15 NOTE — TELEPHONE ENCOUNTER
Palliative Medicine  Nursing Note  [phone]  Fax [phone]      Telephone Call  Patient Name: Wyvonnia Apley  YOB: 1945    4/15/2020        Primary Decision Maker: Angelika Gonsales - [phone]    Secondary Decision Maker: Trisha Acosta - [phone]   Advance Care Planning 3/26/2020   Patient's Healthcare Decision Maker is: Named in scanned ACP document   Confirm Advance Directive Yes, not on file   Does the patient have other document types -       Spoke with patient regarding her pain level, she reported feeling pain is pretty good today - she reported Pain medication regimen is Dilaudid 4 mg - 2-3 times per day as needed      Patient c/o of nausea today, which is daily - she does not have any Zofran and not sure if pain medication makes her nauseated - advised we can send Zofran to her local pharmacy to take 10-15 minutes prior to pain medication. She reported last BM this morning - no constipation noted. Advised to start colace twice daily as she will be taking her pain medication more consistently. Advised to call if she goes 3 days without BM - patient verbalized understanding    Patient reported that she have not picked up trazodone as yet. Per patient she is concerned about starting it as she is not sure how she will react.   Advised per MD ok to take 1/4 of tablet if 1/2 is too much for her    Information was shared with Dr. Baron Wolf, RN  Palliative Medicine

## 2020-04-17 NOTE — PROGRESS NOTES
[facility] [place] [facility] [place] [address] [address], [address] W: [phone]  F: [phone] Reason for Consult:  
Acquanetta Skiff is a 69 y.o. female who is seen for follow up of NSCLC- stage IV with brain metastasis. Treatment History:  
· 12/15/2019-CTA showed right upper lobe lung nodule measuring 2.1 x 1.4 cm, occluded SVC with adjustment mediastinal soft tissue mass. The whole thing measuring approximately 2.3 to 1.8 cm. · 12/24/2020-EBUS and biopsy of mediastinal mass showed poorly differentiated adenocarcinoma · 1/13/2020-PET scan showed right upper lobe lung nodule with an SUV of 20, right mediastinal adenopathy with SUV of 16, right retrocaval clavicular subcentimeter focus with an SUV of 3.2 
· 2/3/2020: MRI brain: 8 mm enhancing focus at the left frontal convexity suspicious for metastasis. 2 additional subtle areas of enhancement in the right frontal lobe, also concerning for metastases · 2/27/20: completed 10 sessions of RT  
· 3/5/20: cycle 1 Keytruda History of Present Illness:  
Patient is a 69 y.o. female with PMH as below presented in [facility] Dec 2019 with c/o increasing swelling of face , neck and arms along with SOB. Her problems started with a choking sensation and head tightness in Nov 2019. She initially saw ENT and was to see Pulmonary. However she presented to the Interim. She had imaging as above that showed SVC thrombus and mediastinal mass. Subsequently had a biopsy and diagnosis as above. Was placed on Lovenox and had a mechanical thrombectomy of the SVC thrombus  on 1/14/2020. She had a CT Sim with Radiation Oncology on 1/27/2020 had an episode of desaturation down to 80% on exertion. On review of the CT, Dr. Niraj Ball discovered a significant pleural effusion and called the patient to come to the ED for further assessment. She had a thoracentecis on 2/2/2020 which showed no malignant cells.  MRI brain done for staging revealed a possible acute infarct but small foci of metastatic disease . She completed RT on 2020 for palliation. She comes today for cycle 3 of palliative Keytruda. She had worsened back pain after cycle 2 that improved with dexamethasone. She has seen palliative care and continues to take dilaudid 4mg PRN. The swelling to her bilat UE has greatly improved and no longer bothersome to her. She still has decreased appetite but she states this is not worsened. She did have some mild nausea and has not picked up zofran. Discussed the need to get medication. She states her breathing is also stable. Denies HA, dizziness, chest pain, vomiting, bleeding. No falls. She comes with her supportive family member. Past Medical History:  
Diagnosis Date  CAD (coronary artery disease)  Chronic obstructive pulmonary disease ([facility]  History of common carotid artery stent placement  Hypercoagulable state ([facility] SVC thrombus  Hypertension  Pleural effusion Past Surgical History:  
Procedure Laterality Date  HX ABDOMINAL LAPAROSCOPY    
 lots of intenstine removed/ lysis of adhesions  HX CAROTID STENT    HX HYSTERECTOMY  HX TONSIL AND ADENOIDECTOMY  IR BRONCH W LUNG BIOPSY SINGLE LOBE  2019 Social History Tobacco Use  Smoking status: Former Smoker Packs/day: 0.50 Last attempt to quit: 2019 Years since quittin.3  Smokeless tobacco: Never Used Substance Use Topics  Alcohol use: Not Currently Comment: occ Current Outpatient Medications Medication Sig  
 ondansetron (ZOFRAN ODT) 8 mg disintegrating tablet Take 1 Tab by mouth every eight (8) hours as needed for Nausea or Vomiting.  traZODone (DESYREL) 50 mg tablet Take 0.5 Tabs by mouth nightly for 20 days.  dexAMETHasone (DECADRON) 4 mg tablet Take 1 tab two times daily for 7 days  lidocaine (LIDODERM) 5 % 1 Patch by TransDERmal route every twenty-four (24) hours. Apply patch to the affected area for 12 hours a day and remove for 12 hours a day.  HYDROmorphone (DILAUDID) 4 mg tablet Take 1 Tab by mouth every four (4) hours as needed for Pain for up to 30 days. Max Daily Amount: 24 mg.  
 apixaban (ELIQUIS) 5 mg tablet Take 1 Tab by mouth two (2) times a day.  L.acidoph & parac-S.therm-Bifido (CIRILO Q2/RISAQUAD-2) 16 billion cell cap cap Take 1 Cap by mouth daily.  potassium chloride SR (K-TAB) 20 mEq tablet Take 2 Tabs by mouth daily.  aspirin 81 mg chewable tablet Take 1 Tab by mouth daily.  atorvastatin (LIPITOR) 40 mg tablet Take 1 Tab by mouth daily.  albuterol sulfate (PROVENTIL;VENTOLIN) 2.5 mg/0.5 mL nebu nebulizer solution 0.5 mL by Nebulization route every four (4) hours as needed for Wheezing or Shortness of Breath.  furosemide (LASIX) 40 mg tablet Take 1 Tab by mouth daily.  fluticasone propionate (FLONASE ALLERGY RELIEF NA) by Nasal route.  hydrOXYzine HCl (ATARAX) 10 mg tablet Take 10 mg by mouth three (3) times daily as needed for Itching.  montelukast (SINGULAIR) 10 mg tablet Take 10 mg by mouth daily.  albuterol (PROVENTIL HFA, VENTOLIN HFA, PROAIR HFA) 90 mcg/actuation inhaler Take 2 Puffs by inhalation.  atenolol (TENORMIN) 50 mg tablet Take 50 mg by mouth daily.  losartan (COZAAR) 100 mg tablet Take 100 mg by mouth daily.  budesonide (ENTOCORT EC) 3 mg capsule Take 6 mg by mouth every morning. No current facility-administered medications for this visit. Allergies Allergen Reactions  Codeine Itching Review of Systems: A complete review of systems was obtained, negative except as described above. Physical Exam:  
 
Visit Vitals /86 Pulse 70 Temp 98.1 °F (36.7 °C) Ht 5' 2\" (1.575 m) Wt 78 lb (35.4 kg) SpO2 98% BMI 14.27 kg/m² ECOG PS: 1 General: frail, appearing in no acute distress Eyes: PERRL, anicteric sclerae HENT: Atraumatic, OP clear Neck: Supple, no edema Respiratory: normal respiratory effort MS: Moves all extremities, Digits without clubbing or cyanosis. Walks with rolator. Skin: ecchymoses to bilateral forearms, No rash or petechiae. Normal temperature, turgor, and texture. Psych: Alert, oriented, appropriate affect, normal judgment/insight Results:  
 
Lab Results Component Value Date/Time WBC 6.1 03/26/2020 02:15 PM  
 HGB 12.4 03/26/2020 02:15 PM  
 HCT 38.4 03/26/2020 02:15 PM  
 PLATELET 280 23/08/5206 02:15 PM  
 MCV 97.0 03/26/2020 02:15 PM  
 ABS. NEUTROPHILS 4.6 03/26/2020 02:15 PM  
 
Lab Results Component Value Date/Time Sodium 136 03/26/2020 02:15 PM  
 Potassium 3.9 03/26/2020 02:15 PM  
 Chloride 101 03/26/2020 02:15 PM  
 CO2 33 (H) 03/26/2020 02:15 PM  
 Glucose 96 03/26/2020 02:15 PM  
 BUN 13 03/26/2020 02:15 PM  
 Creatinine 0.67 03/26/2020 02:15 PM  
 GFR est AA >60 03/26/2020 02:15 PM  
 GFR est non-AA >60 03/26/2020 02:15 PM  
 Calcium 9.1 03/26/2020 02:15 PM  
 
Lab Results Component Value Date/Time Bilirubin, total 0.3 03/26/2020 02:15 PM  
 ALT (SGPT) 24 03/26/2020 02:15 PM  
 AST (SGOT) 22 03/26/2020 02:15 PM  
 Alk. phosphatase 98 03/26/2020 02:15 PM  
 Protein, total 6.6 03/26/2020 02:15 PM  
 Albumin 2.9 (L) 03/26/2020 02:15 PM  
 Globulin 3.7 03/26/2020 02:15 PM  
 
 
Records reviewed and summarized above. Pathology report(s) reviewed above. Radiology report(s) reviewed above. PET CT 
IMPRESSION IMPRESSION:  
1. RUL malignancy. 2. Mediastinal griffin metastasis. 3. Likely subcentimeter right retroclavicular griffin metastasis. Ct 1/31/2020 IMPRESSION IMPRESSION: 
  
1. Moderately large right pleural effusion, new since the prior study. 2. Significant increase in size of a right upper lobe parenchymal mass since the 
prior study. 3. Stable emphysematous change. 4. Probably stable pretracheal lymphadenopathy.  The superior vena cava cannot be 
assessed without contrast. 
 4. Small pericardial effusion, new. 5. Hypodense poorly circumscribed area in the left hepatic lobe on axial 
imaging, not confirmed on reformatted images. Therefore, this may be 
artifactual.   
6. Stable right hepatic lobe cyst and other incidental findings MRI brain 2/3/2020 IMPRESSION IMPRESSION: 
  
1. 8 mm enhancing focus at the left frontal convexity suspicious for metastasis. 2 additional subtle areas of enhancement in the right frontal lobe, also 
concerning for metastases. No edema. 2. Persistent small focus of diffusion signal right periatrial white matter 
likely an evolving infarction, with no associated enhancement. Pathology 2/2/2020 CYTOLOGIC INTERPRETATION:  
Pleural Fluid, Right, ThinPrep and cell block:  
Reactive mesothelial cells and mixed acute and chronic inflammation See comment General Categorization No cells diagnostic for malignancy Specimen Adequacy Satisfactory for evaluation Comment Immunohistochemical stains performed on the cell block reveal that reactive cells are strongly positive for calretinin and WT-1, while negative for BerEP4 and B72.3. These findings are compatible with reactive mesothelial cells and lend no support for metastatic adenocarcinoma MRI Brain w. Cont 25/2020 IMPRESSION: 
1. 8 mm enhancing focus at the left frontal convexity suspicious for metastasis. 2 additional subtle areas of enhancement in the right frontal lobe, also 
concerning for metastases. No edema. 2. Persistent small focus of diffusion signal right periatrial white matter 
likely an evolving infarction, with no associated enhancement. CTA 2/28/20: IMPRESSION:  
1. Negative for pulmonary embolus. 2. Significantly increased mass in the medial right upper lobe. 3. Increased mediastinal mass occluding the SVC. 4. New mild to moderate right pleural effusion. 5. Severe emphysema. Bone Scan 4/7/2020 IMPRESSION 
 IMPRESSION: No evidence of osseous metastatic disease. Assessment:  
1) NSCLC- Right- stage IV 
 
R lung mass, mediastinal adenopathy, SVC tumor thrombus and a possible R retroclavicular node 
T1N3M1- small brain metastasis Poorly differentiated adenocarcinoma Molecular studies - EGFR negative, PD-L1 is 80%, ALK negative New R effusion with negative cytology Completed palliative RT on 2/27/20 Had repeat imaging on 2/28/20 in ER that showed worsening mass in RUL and increase in size of mediastinal mass occluding the SVC. Today is cycle 3 of palliative Keytruda Had severe back pain after cycle 2 relieved by dexamethasone. 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 Will continue to monitor 5) Right chest/back pain Patient states dilaudid helps slightly but pain is worst at night. Pain worsened after 2nd thoracentesis - patient declined PleurX Worsened after cycle 2 - improved with dex Bone scan 4/7 negative for metastasis Will consider 10mg predisone daily if worsens again. Following with Palliative Care 6) COPD On albuterol, montelukast, and budesonide. Patient concerned for sinus infection - instructed to increase flonase to BID 7) CAD 8) Acute Stroke? Detected on MRI No deficits Plan: · Proceed today with cycle #3 of palliative Keytruda every 3 weeks until progression or side effects · Labs each visit CBC, CMP, TSH · Continue Eliquis 5mg BID · Following Palliative Care · Dilaudid 4mg PRN for pain control · Scans after next treatment Will arrange for e-visit 2 days prior to cycle 4 
 
 I appreciate the opportunity to participate in Ms. Alden Adame's care Seen in conjunction with Emil Patrick NP Signed By: [place] MD

## 2020-04-17 NOTE — ROUTINE PROCESS
1300 Pt admit to [facility] for [address] ambulatory in stable condition. Assessment completed. No new concerns voiced. Peripheral IV established with positive blood return. Labs drawn per order and sent for processing. Patient to MD office. Patient returned to [facility], Normal Saline started at [facility]. Visit Vitals  /71   Pulse 73   Temp 98.1 °F (36.7 °C)   Breastfeeding No       Medications:  Medications Administered     0.9% sodium chloride infusion     Admin Date  04/17/2020 Action  New Bag Dose  25 mL/hr Rate  25 mL/hr Route  IntraVENous Administered By  Zeb Dalton RN          pembrolizumab [facility]) 200 mg in 0.9% sodium chloride 100 mL, overfill volume 10 mL IVPB     Admin Date  04/17/2020 Action  New Bag Dose  200 mg Rate  236 mL/hr Route  IntraVENous Administered By  Zeb Dalton RN                  1600 Pt tolerated treatment well. Peripheral IV maintained positive blood return throughout treatment, flushed with positive blood return and removed at conclusion. D/c home ambulatory in no distress. Pt aware of next [facility] appointment scheduled for 5/7/2020. Recent Results (from the past 12 hour(s))   CBC WITH AUTOMATED DIFF    Collection Time: 04/17/20  1:21 PM   Result Value Ref Range    WBC 11.9 (H) 3.6 - 11.0 K/uL    RBC 4.07 3.80 - 5.20 M/uL    HGB 12.3 11.5 - 16.0 g/dL    HCT 38.9 35.0 - 47.0 %    MCV 95.6 80.0 - 99.0 FL    MCH 30.2 26.0 - 34.0 PG    MCHC 31.6 30.0 - 36.5 g/dL    RDW 13.0 11.5 - 14.5 %    PLATELET 942 248 - 443 K/uL    MPV 10.0 8.9 - 12.9 FL    NRBC 0.0 0  WBC    ABSOLUTE NRBC 0.00 0.00 - 0.01 K/uL    NEUTROPHILS 77 (H) 32 - 75 %    LYMPHOCYTES 11 (L) 12 - 49 %    MONOCYTES 10 5 - 13 %    EOSINOPHILS 1 0 - 7 %    BASOPHILS 1 0 - 1 %    IMMATURE GRANULOCYTES 0 0.0 - 0.5 %    ABS. NEUTROPHILS 9.2 (H) 1.8 - 8.0 K/UL    ABS. LYMPHOCYTES 1.3 0.8 - 3.5 K/UL    ABS. MONOCYTES 1.2 (H) 0.0 - 1.0 K/UL    ABS. EOSINOPHILS 0.1 0.0 - 0.4 K/UL    ABS.  BASOPHILS 0.1 0.0 - 0.1 K/UL ABS. IMM. GRANS. 0.1 (H) 0.00 - 0.04 K/UL    DF AUTOMATED     METABOLIC PANEL, COMPREHENSIVE    Collection Time: 04/17/20  1:21 PM   Result Value Ref Range    Sodium 134 (L) 136 - 145 mmol/L    Potassium 3.9 3.5 - 5.1 mmol/L    Chloride 99 97 - 108 mmol/L    CO2 30 21 - 32 mmol/L    Anion gap 5 5 - 15 mmol/L    Glucose 67 65 - 100 mg/dL    BUN 18 6 - 20 MG/DL    Creatinine 0.63 0.55 - 1.02 MG/DL    BUN/Creatinine ratio 29 (H) 12 - 20      GFR est AA >60 >60 ml/min/1.73m2    GFR est non-AA >60 >60 ml/min/1.73m2    Calcium 8.7 8.5 - 10.1 MG/DL    Bilirubin, total 0.3 0.2 - 1.0 MG/DL    ALT (SGPT) 22 12 - 78 U/L    AST (SGOT) 14 (L) 15 - 37 U/L    Alk.  phosphatase 76 45 - 117 U/L    Protein, total 5.9 (L) 6.4 - 8.2 g/dL    Albumin 3.0 (L) 3.5 - 5.0 g/dL    Globulin 2.9 2.0 - 4.0 g/dL    A-G Ratio 1.0 (L) 1.1 - 2.2     TSH 3RD GENERATION    Collection Time: 04/17/20  1:21 PM   Result Value Ref Range    TSH 0.85 0.36 - 3.74 uIU/mL

## 2020-04-17 NOTE — PROGRESS NOTES
Tam Cramer is a 69 y.o. female Chief Complaint Patient presents with  Follow-up NSCLC 1. Have you been to the ER, urgent care clinic since your last visit? Hospitalized since your last visit? no 
 
2. Have you seen or consulted any other health care providers outside of the [address] [place] [address] since your last visit? Include any pap smears or colon screening.  no

## 2020-04-20 NOTE — TELEPHONE ENCOUNTER
[address] [facility]  Palliative Social Work  Telephone Call      Time in: 2:00  Time out: 2:15    Note:  LCSW followed up with pt, leaving a voice message, inviting her to call back. LCSW also sent pt an email which included resources for her 15 y/o granddaughter re: anticipatory grief, along with literature from the [address] \"When Someone in [facility]. \"    Plan:  Follow up with pt in the next 2 weeks.     Evelyn Ward, ABHISHEK, MSSW, LCSW  Palliative   [facility] Palliative Medicine  [phone]

## 2020-04-21 NOTE — TELEPHONE ENCOUNTER
Patient reports pain in neck and muscle in shoulder directly after having last two doses of  Rochele Locket,  report it is easing up today 5/10 today and the last two days 7/10. Has rx for dilaudid every 3-4 hours , reports only taking 3-4 tabs a day not big on medications.      Reports she has about 30-35 tabs of Dilaudid left, Advised patient again to keep some type of pain journal to better aid in helping with pain and that she has Medication on hand and can take an extra a day , patient states she understands but does not like taking it , advised physician would be notified

## 2020-04-21 NOTE — TELEPHONE ENCOUNTER
[address] [facility]  Palliative Social Work  Telephone Call      Time in: 10:00  Time out: 10:15    Note:  Returned call to pt, who received SW's email with attachments of literature for her 15 y/o granddaughter. Pt said she feels it will be helpful, although she keeps things as normal as possible for her. SW suggested she look over the material and share it with her son (granddaughter's father) to help in their communication as needed. Pt told about pain she associates with Keytruda. She said after 2 treatments, she had a sharp pain down her neck into her shoulder so that she couldn't turn her head. She said it has let up today, but she doesn't feel she can go through that pain again. SW suggested she talk with the PM office for ideas on how to treat the pain. Pt gave SW permission to include her pain description in this note to inform Dr Ronny Dhaliwal and her team.    Plan:  Ongoing psychosocial support including assessments, links with resources and counseling prn.        Raj Wen, CCM, MSSW, LCSW  Palliative   [facility] Palliative Medicine  [phone]

## 2020-04-21 NOTE — TELEPHONE ENCOUNTER
Calling PCP office to advise that we have never received the authorization/referral .  No answer so left voicemail asking for a call back.

## 2020-04-22 NOTE — TELEPHONE ENCOUNTER
Calling PCP office back to advise that I have not received fax for insurance referral as of today. Left voicemail to advise that still have not received fax with referral number.

## 2020-04-23 NOTE — TELEPHONE ENCOUNTER
Patient calling to speak to nurse regarding something that happened to fingers today. States that the first 3 fingers on her right hand today are blistered and one has broken open.   Advised nurse would call her back to discuss

## 2020-04-23 NOTE — TELEPHONE ENCOUNTER
Returned call to patient and she stated that when she got up this morning she noted that her finger tips were white and blistered (right hand) opened and started bleeding which last a couple minutes and then stop. She inquired if it could be one of her medication that was causing it - advised that it is a possibility but not 100%. Patient reported that she took pictures - requested them sent to this nurse or MD review    Recommend that when she washes her hand or use white to ensure that her hands are dried properly, apply Vaseline to keep moisturized and prevent open blisters, also suggest wearing gloves to keep hands protected.   Patient verbalized understanding

## 2020-04-24 NOTE — TELEPHONE ENCOUNTER
Dr. Faby Haskins reviewed and advised to call Dr. Gvain Arnold office - Left message for Dr. Gavin Arnold nurse to review images and possibly decreasing medication, Keytruda.

## 2020-04-27 NOTE — TELEPHONE ENCOUNTER
Called pt at request of palliative care d/t to rash on face and blood blister on finger. Pt states both have since resolved. She has no discoloration or arm swelling either. Advised that rash is likely secondary to immunotherapy and it is recommended she take Zyrtec each night. She verbalized understanding and will pick this up at her local drug store [facility]. She had no additional questions/concerns at this time and appreciated phone call.

## 2020-04-29 NOTE — TELEPHONE ENCOUNTER
Triage for Controlled Substance Refill Request    Pain Diagnosis: _Malignant neoplasm of upper lobe of right lung [facility]) (C34.11)    Last Outpatient Visit: _4/13/2020    Next Outpatient Visit: _5/13/2020    Reason for refill needed outside of office visit?   -Appointment not scheduled prior to need for scheduled refill      Pharmacy: [facility] [address] [facility]     Medication:HYDROmorphone (DILAUDID) 4 mg  Dose and directions: 1 tab by mouth every 4 hours   Number dispensed:180  Date filled ( or Pharmacy):3/20/2020  # left:12         reviewed: _yes     Date of Urine Drug Screen:  _n/a     Opioid Safety Handout given:  _yes     Appropriate for refill:  _yes     Action:  _ please refill

## 2020-04-29 NOTE — TELEPHONE ENCOUNTER
Patient is calling stating that she needs her pain medication refilled. Patient could not give me name stated she was in bed. She only has 12 left and she takes average of 3 a day. Advised of her upcoming Virtual visit on 5/13/2020 at 12:30pm.  Pt states pharmacy [facility] in [place] is correct.

## 2020-05-06 NOTE — TELEPHONE ENCOUNTER
Calling again today to try to get referral number that was faxed to our office. We have never received the referral/authorization from PCP. No answer so left detailed message asking for a call back since patient in for a visit.

## 2020-05-06 NOTE — PROGRESS NOTES
[facility] [place] [facility] [place] [address] [address], [address] W: [phone]  F: [phone] Reason for Consult:  
Charmayne Crick is a 69 y.o. female who is seen for follow up of NSCLC- stage IV with brain metastasis. Treatment History:  
· 12/15/2019-CTA showed right upper lobe lung nodule measuring 2.1 x 1.4 cm, occluded SVC with adjustment mediastinal soft tissue mass. The whole thing measuring approximately 2.3 to 1.8 cm. · 12/24/2020-EBUS and biopsy of mediastinal mass showed poorly differentiated adenocarcinoma · 1/13/2020-PET scan showed right upper lobe lung nodule with an SUV of 20, right mediastinal adenopathy with SUV of 16, right retrocaval clavicular subcentimeter focus with an SUV of 3.2 
· 2/3/2020: MRI brain: 8 mm enhancing focus at the left frontal convexity suspicious for metastasis. 2 additional subtle areas of enhancement in the right frontal lobe, also concerning for metastases · 2/27/20: completed 10 sessions of RT  
· 3/5/20: cycle 1 Keytruda History of Present Illness:  
Patient is a 69 y.o. female with PMH as below presented in [facility] Dec 2019 with c/o increasing swelling of face , neck and arms along with SOB. Her problems started with a choking sensation and head tightness in Nov 2019. She initially saw ENT and was to see Pulmonary. However she presented to the Interim. She had imaging as above that showed SVC thrombus and mediastinal mass. Subsequently had a biopsy and diagnosis as above. Was placed on Lovenox and had a mechanical thrombectomy of the SVC thrombus  on 1/14/2020. She had a CT Sim with Radiation Oncology on 1/27/2020 had an episode of desaturation down to 80% on exertion. On review of the CT, Dr. Eligio Sultana discovered a significant pleural effusion and called the patient to come to the ED for further assessment. She had a thoracentecis on 2/2/2020 which showed no malignant cells.  MRI brain done for staging revealed a possible acute infarct but small foci of metastatic disease . She completed RT on 2020 for palliation. She comes today for cycle 4 of palliative Keytruda. She had a pruritic rash to her face and blood blisters to her right hand after cycle 3. These resolved without intervention but she did start taking zyrtec nightly at our instruction. She has a circumscribed erythematous rash to her right shin she believes is a spider bite and applying OTC hydrocortisone. She states she has been doing well but having more fatigue. She states she sleeps more than she is awake. Discussed her diet as her weight decreased by 1lb since last visit and BMI 14. She states she has very small meals because she is full. She had previously been drinking 3 ensures per day but now only one. Encouraged increasing ensure intake between meals to still allow for her to eat without getting too full. Patient is agreeable to RD referral. She states her breathing is also stable. Denies HA, dizziness, chest pain, vomiting, bleeding. No falls. Past Medical History:  
Diagnosis Date  CAD (coronary artery disease)  Chronic obstructive pulmonary disease ([address]  History of common carotid artery stent placement  Hypercoagulable state ([address] SVC thrombus  Hypertension  Pleural effusion Past Surgical History:  
Procedure Laterality Date  HX ABDOMINAL LAPAROSCOPY    
 lots of intenstine removed/ lysis of adhesions  HX CAROTID STENT    HX HYSTERECTOMY  HX TONSIL AND ADENOIDECTOMY  IR BRONCH W LUNG BIOPSY SINGLE LOBE  2019 Social History Tobacco Use  Smoking status: Former Smoker Packs/day: 0.50 Last attempt to quit: 2019 Years since quittin.3  Smokeless tobacco: Never Used Substance Use Topics  Alcohol use: Not Currently Comment: occ Current Outpatient Medications Medication Sig  
  HYDROmorphone (DILAUDID) 4 mg tablet Take 1 Tab by mouth every four (4) hours as needed for Pain for up to 30 days. Max Daily Amount: 24 mg.  
 ondansetron (ZOFRAN ODT) 8 mg disintegrating tablet Take 1 Tab by mouth every eight (8) hours as needed for Nausea or Vomiting.  dexAMETHasone (DECADRON) 4 mg tablet Take 1 tab two times daily for 7 days  lidocaine (LIDODERM) 5 % 1 Patch by TransDERmal route every twenty-four (24) hours. Apply patch to the affected area for 12 hours a day and remove for 12 hours a day.  apixaban (ELIQUIS) 5 mg tablet Take 1 Tab by mouth two (2) times a day.  L.acidoph & parac-S.therm-Bifido (CIRILO Q2/RISAQUAD-2) 16 billion cell cap cap Take 1 Cap by mouth daily.  potassium chloride SR (K-TAB) 20 mEq tablet Take 2 Tabs by mouth daily.  aspirin 81 mg chewable tablet Take 1 Tab by mouth daily.  atorvastatin (LIPITOR) 40 mg tablet Take 1 Tab by mouth daily.  albuterol sulfate (PROVENTIL;VENTOLIN) 2.5 mg/0.5 mL nebu nebulizer solution 0.5 mL by Nebulization route every four (4) hours as needed for Wheezing or Shortness of Breath.  furosemide (LASIX) 40 mg tablet Take 1 Tab by mouth daily.  fluticasone propionate (FLONASE ALLERGY RELIEF NA) by Nasal route.  hydrOXYzine HCl (ATARAX) 10 mg tablet Take 10 mg by mouth three (3) times daily as needed for Itching.  montelukast (SINGULAIR) 10 mg tablet Take 10 mg by mouth daily.  albuterol (PROVENTIL HFA, VENTOLIN HFA, PROAIR HFA) 90 mcg/actuation inhaler Take 2 Puffs by inhalation.  atenolol (TENORMIN) 50 mg tablet Take 50 mg by mouth daily.  losartan (COZAAR) 100 mg tablet Take 100 mg by mouth daily.  budesonide (ENTOCORT EC) 3 mg capsule Take 6 mg by mouth every morning. No current facility-administered medications for this visit. Allergies Allergen Reactions  Codeine Itching Review of Systems: A complete review of systems was obtained, negative except as described above. Physical Exam:  
 
Visit Vitals BP (!) 178/98 Pulse 85 Temp 96.5 °F (35.8 °C) Ht 5' 2\" (1.575 m) Wt 77 lb 9.6 oz (35.2 kg) SpO2 94% BMI 14.19 kg/m² ECOG PS: 1 General: frail, appearing in no acute distress Eyes: PERRL, anicteric sclerae HENT: Atraumatic, OP clear Neck: Supple, no edema Respiratory: normal respiratory effort MS: Moves all extremities, Digits without clubbing or cyanosis. Walks with rolator. Skin: ecchymoses to bilateral forearms, Macular/papular rash to forehead, circumscribed erythematous rash to right shin, erythema to palmar surfaces. Normal temperature, turgor, and texture. Psych: Alert, oriented, appropriate affect, normal judgment/insight Results:  
 
Lab Results Component Value Date/Time WBC 11.9 (H) 04/17/2020 01:21 PM  
 HGB 12.3 04/17/2020 01:21 PM  
 HCT 38.9 04/17/2020 01:21 PM  
 PLATELET 400 99/87/6476 01:21 PM  
 MCV 95.6 04/17/2020 01:21 PM  
 ABS. NEUTROPHILS 9.2 (H) 04/17/2020 01:21 PM  
 
Lab Results Component Value Date/Time Sodium 134 (L) 04/17/2020 01:21 PM  
 Potassium 3.9 04/17/2020 01:21 PM  
 Chloride 99 04/17/2020 01:21 PM  
 CO2 30 04/17/2020 01:21 PM  
 Glucose 67 04/17/2020 01:21 PM  
 BUN 18 04/17/2020 01:21 PM  
 Creatinine 0.63 04/17/2020 01:21 PM  
 GFR est AA >60 04/17/2020 01:21 PM  
 GFR est non-AA >60 04/17/2020 01:21 PM  
 Calcium 8.7 04/17/2020 01:21 PM  
 
Lab Results Component Value Date/Time Bilirubin, total 0.3 04/17/2020 01:21 PM  
 ALT (SGPT) 22 04/17/2020 01:21 PM  
 AST (SGOT) 14 (L) 04/17/2020 01:21 PM  
 Alk. phosphatase 76 04/17/2020 01:21 PM  
 Protein, total 5.9 (L) 04/17/2020 01:21 PM  
 Albumin 3.0 (L) 04/17/2020 01:21 PM  
 Globulin 2.9 04/17/2020 01:21 PM  
 
 
Records reviewed and summarized above. Pathology report(s) reviewed above. Radiology report(s) reviewed above. PET CT 
IMPRESSION IMPRESSION:  
1. RUL malignancy. 2. Mediastinal griffin metastasis. 3. Likely subcentimeter right retroclavicular griffin metastasis. Ct 1/31/2020 IMPRESSION IMPRESSION: 
  
1. Moderately large right pleural effusion, new since the prior study. 2. Significant increase in size of a right upper lobe parenchymal mass since the 
prior study. 3. Stable emphysematous change. 4. Probably stable pretracheal lymphadenopathy. The superior vena cava cannot be 
assessed without contrast. 
4. Small pericardial effusion, new. 5. Hypodense poorly circumscribed area in the left hepatic lobe on axial 
imaging, not confirmed on reformatted images. Therefore, this may be 
artifactual.   
6. Stable right hepatic lobe cyst and other incidental findings MRI brain 2/3/2020 IMPRESSION IMPRESSION: 
  
1. 8 mm enhancing focus at the left frontal convexity suspicious for metastasis. 2 additional subtle areas of enhancement in the right frontal lobe, also 
concerning for metastases. No edema. 2. Persistent small focus of diffusion signal right periatrial white matter 
likely an evolving infarction, with no associated enhancement. Pathology 2/2/2020 CYTOLOGIC INTERPRETATION:  
Pleural Fluid, Right, ThinPrep and cell block:  
Reactive mesothelial cells and mixed acute and chronic inflammation See comment General Categorization No cells diagnostic for malignancy Specimen Adequacy Satisfactory for evaluation Comment Immunohistochemical stains performed on the cell block reveal that reactive cells are strongly positive for calretinin and WT-1, while negative for BerEP4 and B72.3. These findings are compatible with reactive mesothelial cells and lend no support for metastatic adenocarcinoma MRI Brain w. Cont 25/2020 IMPRESSION: 
1. 8 mm enhancing focus at the left frontal convexity suspicious for metastasis. 2 additional subtle areas of enhancement in the right frontal lobe, also 
concerning for metastases. No edema. 2. Persistent small focus of diffusion signal right periatrial white matter 
likely an evolving infarction, with no associated enhancement. CTA 2/28/20: IMPRESSION:  
1. Negative for pulmonary embolus. 2. Significantly increased mass in the medial right upper lobe. 3. Increased mediastinal mass occluding the SVC. 4. New mild to moderate right pleural effusion. 5. Severe emphysema. Bone Scan 4/7/2020 IMPRESSION IMPRESSION: No evidence of osseous metastatic disease. Assessment:  
1) NSCLC- Right- stage IV 
 
R lung mass, mediastinal adenopathy, SVC tumor thrombus and a possible R retroclavicular node 
T1N3M1- small brain metastasis Poorly differentiated adenocarcinoma Molecular studies - EGFR negative, PD-L1 is 80%, ALK negative New R effusion with negative cytology Completed palliative RT on 2/27/20 Had repeat imaging on 2/28/20 in ER that showed worsening mass in RUL and increase in size of mediastinal mass occluding the SVC. Today is cycle 4 of palliative Keytruda Had severe back pain after cycle 2 relieved by dexamethasone. Tolerated cycle 3 with grade 1 rash. Scans ordered. 0.5 mg ativan ordered to be taken prior to jordan MRI. 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 Will continue to monitor 5) Right chest/back pain Patient states dilaudid helps slightly but pain is worst at night. Pain worsened after 2nd thoracentesis - patient declined PleurX Worsened after cycle 2 - improved with dex Bone scan 4/7 negative for metastasis Will consider 10mg predisone daily if worsens again. Following with Palliative Care 6) COPD On albuterol, montelukast, and budesonide. Flonase to BID Albuterol inhaler - refilled 7) CAD 8) Acute Stroke? Detected on MRI No deficits 9) Rash/Blisters Patient reported rash to face and blood blisters on fingers after cycle 3 of [place] She has a circumscribed rash on her right shin she believes is a spider bite and believes the others were possibly spider bites as well Zyrtec nightly and OTC hydrocortisone cream as needed 10) Cachexia BMI 14 Encouraged boost between meals, small frequent snacks. RD referral.  
 
11) HTN Elevated today. Pt states did not take home BP meds today. Plan: · Proceed today with cycle #4 of palliative Keytruda every 3 weeks until progression or side effects · Labs each visit CBC, CMP, TSH · Continue Eliquis 5mg BID · Following Palliative Care · Dilaudid 4mg PRN for pain control · Scans ordered; to be performed prior to Cycle 5 RTC cycle 5 day 1 with scans I appreciate the opportunity to participate in MsAydin Andrade Adame's care Seen in conjunction with John Lynch NP Signed By: Micky Contreras MD

## 2020-05-06 NOTE — TELEPHONE ENCOUNTER
Maxine Covington calling to advise me Authorization number is [id number], good for 12 visits from 4/13/2020 to 7/11/2020. Will get name of referring provider.

## 2020-05-07 NOTE — ROUTINE PROCESS
1425 Pt admit to [facility] for [address] [facility] ambulatory in stable condition. Assessment completed. No new concerns voiced. Peripheral IV established with positive blood return. Labs drawn per order and sent for processing. Patient to MD office. Patient returned to [facility], Normal Saline started at [facility]. Visit Vitals  BP (!) 186/98 Comment: pt did not take any BP meds. Temp 96.7 °F (35.9 °C)   Breastfeeding No       Medications:  Medications Administered     0.9% sodium chloride infusion     Admin Date  05/07/2020 Action  New Bag Dose  25 mL/hr Rate  25 mL/hr Route  IntraVENous Administered By  Maricruz Correa RN          pembrolizumab [facility]) 200 mg in 0.9% sodium chloride 100 mL, overfill volume 10 mL IVPB     Admin Date  05/07/2020 Action  New Bag Dose  200 mg Rate  236 mL/hr Route  IntraVENous Administered By  Maricruz Correa RN                    1700 Pt tolerated treatment well. Peripheral IV maintained positive blood return throughout treatment, flushed with positive blood return and removed at conclusion. D/c home ambulatory in no distress.  Pt aware of next [facility] appointment scheduled for   Future Appointments   Date Time Provider Alen Peralta   5/11/2020 11:30 AM Aram Alamo MD [facility] 10.   5/28/2020  2:00 PM A3 [facility] [address] [place] [address]   5/28/2020  2:15 PM Devon Russo  [facility] Mean   6/18/2020  2:00 PM A3 [facility]    7/9/2020  2:00 PM A3 [facility]          Recent Results (from the past 12 hour(s))   CBC WITH AUTOMATED DIFF    Collection Time: 05/07/20  2:36 PM   Result Value Ref Range    WBC 10.4 3.6 - 11.0 K/uL    RBC 4.48 3.80 - 5.20 M/uL    HGB 13.1 11.5 - 16.0 g/dL    HCT 41.4 35.0 - 47.0 %    MCV 92.4 80.0 - 99.0 FL    MCH 29.2 26.0 - 34.0 PG    MCHC 31.6 30.0 - 36.5 g/dL    RDW 13.2 11.5 - 14.5 %    PLATELET 524 665 - 101 K/uL    MPV 9.6 8.9 - 12.9 FL    NRBC 0.0 0  WBC    ABSOLUTE NRBC 0.00 0.00 - 0.01 K/uL    NEUTROPHILS 79 (H) 32 - 75 %    LYMPHOCYTES 10 (L) 12 - 49 %    MONOCYTES 9 5 - 13 %    EOSINOPHILS 1 0 - 7 %    BASOPHILS 1 0 - 1 %    IMMATURE GRANULOCYTES 0 0.0 - 0.5 %    ABS. NEUTROPHILS 8.1 (H) 1.8 - 8.0 K/UL    ABS. LYMPHOCYTES 1.0 0.8 - 3.5 K/UL    ABS. MONOCYTES 0.9 0.0 - 1.0 K/UL    ABS. EOSINOPHILS 0.1 0.0 - 0.4 K/UL    ABS. BASOPHILS 0.1 0.0 - 0.1 K/UL    ABS. IMM. GRANS. 0.0 0.00 - 0.04 K/UL    DF AUTOMATED     METABOLIC PANEL, COMPREHENSIVE    Collection Time: 05/07/20  2:36 PM   Result Value Ref Range    Sodium 136 136 - 145 mmol/L    Potassium 3.7 3.5 - 5.1 mmol/L    Chloride 101 97 - 108 mmol/L    CO2 33 (H) 21 - 32 mmol/L    Anion gap 2 (L) 5 - 15 mmol/L    Glucose 89 65 - 100 mg/dL    BUN 14 6 - 20 MG/DL    Creatinine 0.71 0.55 - 1.02 MG/DL    BUN/Creatinine ratio 20 12 - 20      GFR est AA >60 >60 ml/min/1.73m2    GFR est non-AA >60 >60 ml/min/1.73m2    Calcium 9.0 8.5 - 10.1 MG/DL    Bilirubin, total 0.2 0.2 - 1.0 MG/DL    ALT (SGPT) 21 12 - 78 U/L    AST (SGOT) 18 15 - 37 U/L    Alk.  phosphatase 87 45 - 117 U/L    Protein, total 6.6 6.4 - 8.2 g/dL    Albumin 3.5 3.5 - 5.0 g/dL    Globulin 3.1 2.0 - 4.0 g/dL    A-G Ratio 1.1 1.1 - 2.2

## 2020-05-07 NOTE — PROGRESS NOTES
Meryl Bañuelos is a 69 y.o. female Chief Complaint Patient presents with  Follow-up NSCLC 1. Have you been to the ER, urgent care clinic since your last visit? Hospitalized since your last visit? No 
 
2. Have you seen or consulted any other health care providers outside of the [address] [place] [address] since your last visit? Include any pap smears or colon screening.  No

## 2020-05-08 NOTE — TELEPHONE ENCOUNTER
Calling patient to advise of Virtual Visit with Dr. Siri Duffy on 11:30am with nurse calling between 10:00am and 10:30am.  No answer so left voicemail.

## 2020-05-08 NOTE — TELEPHONE ENCOUNTER
[facility] [place] [facility] [place]  [address] [facility], [address] [facility] [address]  Phillip Huffmanhead: [phone]  F: [address] at [facility]   [address], [address]  Lorenzo Lopez, [address]   W: [phone]  F: [phone]    Medical Nutrition Therapy  Nutrition Referral:    Referral received regarding weight loss. Patient with non-small cell lung cancer currently on palliative keytruda. Office notes states she is sleeping a lot which could account for her weight loss as she is sleeping through meals. She is only consuming 1 Ensure a day and reports filling up quickly. Called and left a message, explained that RD is available to address nutrition throughout the spectrum of care. Provided contact information.       Chemotherapy Flowsheet 5/7/2020   Cycle C4   Date 5/7/2020   Drug / Regimen Keytruda   Notes given     Wt Readings from Last 5 Encounters:   05/07/20 77 lb 9.6 oz (35.2 kg)   04/17/20 78 lb (35.4 kg)   04/13/20 78 lb 3.2 oz (35.5 kg)   03/26/20 80 lb (36.3 kg)   03/16/20 80 lb (36.3 kg)     Estimated Nutrition Needs:   Calorie Range: 1400-1575kcal/day   Protein Range: 40-50g/day    Fluid Needs: 1600ml       Signed By: Devon Abraham, [address] [place] [address], [address] [place] , [address] [facility]

## 2020-05-11 NOTE — PATIENT INSTRUCTIONS
Dear Casandra Castellanos , It was a pleasure seeing you today in your home virtually We will see you again in 4 weeks If labs or imaging tests have been ordered for you today, please call the office  at [phone] 48 hours after completion to obtain the results. Your stated goal: - To have good quality of life more than quantity. Your described symptoms were: Fatigue: 4 Drowsiness: 3 Depression: 0 Pain: 4 Anxiety: 0 Nausea: 0 Anorexia: 0 Dyspnea: 3 Best Well-Bein Constipation: No  
Other Problem (Comment): 0 This is the plan we talked about: 1. Severe mid back pain - This is likely from the lung mass - You are currently on Dilaudid 4 mg two to three times a day. - This is controlling your pain fairly well. You do not think you need a dose increase or addition of long-acting like we talked about before. There are days you only take 1 or 2 tablets a day and therefore there is no need to add a long-acting at this time. 2. Constipation 
-Constipation is a common side effect of pain medications as they slow your bowels. -Please continue Pericolace 2 tabs daily and increase to 2 tabs two times a day if needed. This is necessary to keep your bowels soft. - Add Miralax every other day as a laxative. - Goal is to have soft bowel movements every day or every other day. - Please call us if you do not have bowel movements for more than 3 days. 3. Insomnia 
-We started you on trazodone but you have not needed it. You feel that you are sleeping quite well with the Dilaudid at bedtime. 4. You and your son have a good understanding of your disease. You want quality of life more than quantity. 
- You have an AMD and DDNR in chart. This is what you have shared with us about Advance Care Planning: 
 
  Primary Decision Maker: Jennifer Reyes - [phone] Secondary Decision Maker: Kadi Butts - [phone] Advance Care Planning 2020 Patient's Healthcare Decision Maker is: Named in scanned ACP document Confirm Advance Directive Yes, on file Does the patient have other document types Do Not Resuscitate The Palliative Medicine Team is here to support you and your family.   
 
 
Sincerely, 
 
 
Nyasia Ludwig MD and the Palliative Medicine Team

## 2020-05-11 NOTE — PROGRESS NOTES
Palliative Medicine Office Visit Palliative Medicine Nurse Check In 
[phone] Patient Name: Geoff Carvajal YOB: 1945 Date of Office Visit: 5/11/2020 Patient states: \"  \" 
 
From Check In Sheet (scanned in Media): 
Has a medical provider talked with you about cardiopulmonary resuscitation (CPR)? [x] Yes   [] No   [] Unable to obtain Nurse reminder to complete or update ACP FlowSheet: 
 
Is ACP on the Problem List?    [x] Yes    [] No 
IF ACP Document is ON FILE; Nurse to place ACP on Problem List  
 
Is there an ACP Note in Chart Review/Note? [x] Yes    [] No  
If NO: ALERT PROVIDER Primary Decision Maker: Jeyson Peoples - [phone] Secondary Decision Maker: Angela Webb - [phone] Advance Care Planning 5/11/2020 Patient's Healthcare Decision Maker is: Named in scanned ACP document Confirm Advance Directive Yes, on file Does the patient have other document types Do Not Resuscitate Is there anything that we should know about you as a person in order to provide you the best care possible? Have you been to the ER, urgent care clinic since your last visit? [] Yes   [x] No   [] Unable to obtain Have you been hospitalized since your last visit? [] Yes   [x] No   [] Unable to obtain Have you seen or consulted any other health care providers outside of the [address] [place] [address] since your last visit? [] Yes   [x] No   [] Unable to obtain Functional status (describe):  
 
 
Last BM: 5/9/2020  accessed (date): 5/11/2020 Bottle review (for opioid pain medication): 
Medication 1:  
Date filled:  
Directions:  
# filled: # left: # pills taking per day: 
Last dose taken: 
 
Medication 2:  
Date filled:  
Directions:  
# filled: # left: # pills taking per day: 
Last dose taken: 
 
Medication 3:  
Date filled:  
Directions:  
# filled: # left: # pills taking per day: 
Last dose taken: Medication 4:  
Date filled:  
Directions:  
# filled: # left: # pills taking per day: 
Last dose taken:

## 2020-05-11 NOTE — PROGRESS NOTES
Palliative Medicine Outpatient Services [place]: [phone] ([phone]) Patient Name: Mariama Condon YOB: 1945 Date of Current Visit: 20 Location of Current Visit:   
[] [facility] Office 
[] [facility] Office [] [address] [place] [address] 
[] Home 
[x] Other: Virtual visit Date of Initial Visit: 2020 Referral from: Dr. Tiffani Aragon Primary Care Physician: Walter Klein NP 
  
 SUMMARY:  
Mariama Condon is a 69y.o. 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 Tiffani Aragon for management of pain and psychosocial support. The patients social history includes worked as a  for a [employer], now retired and lives with her son Carrie Carroll and 15year-old granddaughter Shanti Tinsley PALLIATIVE DIAGNOSES:  
 
  ICD-10-CM ICD-9-CM 1. Mid back pain M54.9 724.5 2. Pain of right orbit H57.11 379.91   
3. Lung cancer metastatic to brain ([facility] C34.90 162.9   
 C79.31 198.3 4. Insomnia, unspecified type G47.00 780.52 PLAN:  
 
Patient Instructions Dear Mariama Condon , It was a pleasure seeing you today in your home virtually We will see you again in 4 weeks If labs or imaging tests have been ordered for you today, please call the office  at [phone] 48 hours after completion to obtain the results. Your stated goal: - To have good quality of life more than quantity. Your described symptoms were: Fatigue: 4 Drowsiness: 3 Depression: 0 Pain: 4 Anxiety: 0 Nausea: 0 Anorexia: 0 Dyspnea: 3 Best Well-Bein Constipation: No  
Other Problem (Comment): 0 This is the plan we talked about: 1. Severe mid back pain - This is likely from the lung mass - You are currently on Dilaudid 4 mg two to three times a day. Patient : Reece Pantoja Age: 56 year old Sex: male   MRN: [id number] Encounter Date: 1/3/2020      History     Chief Complaint   Patient presents with   • Fall   • Cough   • Alcohol Problem   • Breathing Problem     HPI     The patient is a 56-year-old male presenting with altered mental status.  The patient reports that he has not felt well for several weeks.  Describes a cough that is productive.  Was at a gas station today when he fell and misstepped hitting his head.  Denies headache or loss of consciousness.  Denies neck pain.  Admits to alcohol use.  Describes drinking for whiskeys and beer today.  Was given a DuoNeb in route.    No Known Allergies    There are no discharge medications for this patient.      There are no discharge medications for this patient.      Past Medical History:   Diagnosis Date   • Anemia    • Gastroesophageal reflux disease    • No abnormality seen        Past Surgical History:   Procedure Laterality Date   • GANGLION CYST EXCISION      right hand       Family History   Problem Relation Age of Onset   • NEGATIVE FAMILY HX OF Father    • NEGATIVE FAMILY HX OF Brother    • NEGATIVE FAMILY HX OF Brother        Social History     Tobacco Use   • Smoking status: Current Every Day Smoker     Packs/day: 1.00     Types: Cigarettes   • Smokeless tobacco: Never Used   • Tobacco comment: thinking about it   Substance Use Topics   • Alcohol use: Yes     Comment: 8 drinks today   • Drug use: Yes     Types: Marijuana     Comment: 2 days ago       Review of Systems   Constitutional: Positive for fatigue.   Respiratory: Positive for cough and shortness of breath.    Neurological: Positive for weakness.   All other systems reviewed and are negative.      Physical Exam     ED Triage Vitals [01/03/20 1051]   ED Triage Vitals Group      Temp 98.2 °F (36.8 °C)      Pulse 107      Resp 28      /83      SpO2 92 %      EtCO2 mmHg       Height       Weight 158 lb 11.7 oz (72 kg)      Weight Scale Used ED  - This is controlling your pain fairly well. You do not think you need a dose increase or addition of long-acting like we talked about before. There are days you only take 1 or 2 tablets a day and therefore there is no need to add a long-acting at this time. 2. Constipation 
-Constipation is a common side effect of pain medications as they slow your bowels. -Please continue Pericolace 2 tabs daily and increase to 2 tabs two times a day if needed. This is necessary to keep your bowels soft. - Add Miralax every other day as a laxative. - Goal is to have soft bowel movements every day or every other day. - Please call us if you do not have bowel movements for more than 3 days. 3. Insomnia 
-We started you on trazodone but you have not needed it. You feel that you are sleeping quite well with the Dilaudid at bedtime. 4. You and your son have a good understanding of your disease. You want quality of life more than quantity. 
- You have an AMD and DDNR in chart. This is what you have shared with us about Advance Care Planning: 
 
  Primary Decision Maker: Deisy De Paz - [phone] Secondary Decision Maker: Bri Milligan - [phone] Advance Care Planning 5/11/2020 Patient's Healthcare Decision Maker is: Named in scanned ACP document Confirm Advance Directive Yes, on file Does the patient have other document types Do Not Resuscitate The Palliative Medicine Team is here to support you and your family. Sincerely, 
 
 
Governor Mariana MD and the Palliative Medicine Team 
 
 
 GOALS OF CARE / TREATMENT PREFERENCES:  
[====Goals of Care====] GOALS OF CARE: 
Patient / health care proxy stated goals: See Patient Instructions / Summary TREATMENT PREFERENCES:  
Code Status:  [] Attempt Resuscitation       [x] Do Not Attempt Resuscitation Advance Care Planning: 
[x] The [facility] Interdisciplinary Team has updated the ACP Navigator with Actual      BMI (Calculated)       IBW/kg (Calculated)        Physical Exam   Constitutional: He is oriented to person, place, and time. He appears well-developed and well-nourished. No distress.   HENT:   Head: Normocephalic and atraumatic.   Mouth/Throat: Oropharynx is clear and moist. No oropharyngeal exudate.   Eyes: Conjunctivae are normal. Right eye exhibits no discharge. Left eye exhibits no discharge.   Neck: Normal range of motion. No JVD present.   Cardiovascular: Normal rate, normal heart sounds and intact distal pulses. Exam reveals no gallop and no friction rub.   No murmur heard.  Pulmonary/Chest: Tachypnea noted. No respiratory distress. He has no wheezes. He has rhonchi. He exhibits no tenderness.   Tachypnea   Abdominal: Soft. Bowel sounds are normal. He exhibits no distension and no mass. There is no tenderness. There is no rebound and no guarding. Musculoskeletal: Normal range of motion.         General: No edema.     Neurological: He is alert and oriented to person, place, and time. No cranial nerve deficit. He exhibits normal muscle tone.   Skin: Skin is warm and dry. No rash noted. He is not diaphoretic.   Psychiatric: He has a normal mood and affect.   Nursing note and vitals reviewed.      ED Course     Procedures    Lab Results     Results for orders placed or performed during the hospital encounter of 01/03/20   CBC with Automated Differential   Result Value Ref Range    WBC 8.1 4.2 - 11.0 K/mcL    RBC 2.92 (L) 4.50 - 5.90 mil/mcL    HGB 10.5 (L) 13.0 - 17.0 g/dL    HCT 30.5 (L) 39.0 - 51.0 %    .5 (H) 78.0 - 100.0 fl    MCH 36.0 (H) 26.0 - 34.0 pg    MCHC 34.4 32.0 - 36.5 g/dL    RDW-CV 13.6 11.0 - 15.0 %     140 - 450 K/mcL    NRBC 0 0 /100 WBC    DIFF TYPE AUTOMATED DIFFERENTIAL     Neutrophil 71 %    LYMPH 18 %    MONO 9 %    EOSIN 0 %    BASO 1 %    Percent Immature Granuloctyes 1 %    Absolute Neutrophil 5.5 1.8 - 7.7 K/mcL    Absolute Lymph 1.3 1.0 - 4.0 K/mcL     Absolute Mono 0.7 0.3 - 0.9 K/mcL    Absolute Eos 0.0 (L) 0.1 - 0.5 K/mcL    Absolute Baso 0.1 0.0 - 0.3 K/mcL    Absolute Immature Granulocytes 0.1 0 - 0.2 K/mcl   COMPREHENSIVE METABOLIC PANEL   Result Value Ref Range    Sodium 128 (L) 135 - 145 mmol/L    Potassium 3.2 (L) 3.4 - 5.1 mmol/L    Chloride 88 (L) 98 - 107 mmol/L    Carbon Dioxide 29 21 - 32 mmol/L    Anion Gap 14 10 - 20 mmol/L    Glucose 122 (H) 65 - 99 mg/dL    BUN 3 (L) 6 - 20 mg/dL    Creatinine 0.48 (L) 0.67 - 1.17 mg/dL    GFR Estimate,  >90     GFR Estimate, Non African American >90     BUN/Creatinine Ratio 6 (L) 7 - 25    CALCIUM 8.2 (L) 8.4 - 10.2 mg/dL    TOTAL BILIRUBIN 0.2 0.2 - 1.0 mg/dL    AST/SGOT 77 (H) <38 Units/L    ALT/SGPT 51 <64 Units/L    ALK PHOSPHATASE 154 (H) 45 - 117 Units/L    TOTAL PROTEIN 6.8 6.4 - 8.2 g/dL    Albumin 2.2 (L) 3.6 - 5.1 g/dL    GLOBULIN 4.6 (H) 2.0 - 4.0 g/dL    A/G Ratio, Serum 0.5 (L) 1.0 - 2.4   Troponin I Ultra Sensitive   Result Value Ref Range    TROPONIN I 0.03 <0.05 ng/mL   BLOOD GAS, VENOUS   Result Value Ref Range    PH Venous 7.41 7.35 - 7.45 Units    PCO2 Venous 47 41 - 54 mm Hg    PO2 Venous 54 (H) 35 - 42 mm Hg    HCO3 Venous 29 (H) 22 - 28 mmol/L    Base Excess Venous 4 (H) 0 - 2 mmol/L    sO2 Venous 83 (H) 60 - 80 %    fO2HB Venous 82 (H) 60 - 80 %    FIO2 3L %    O2 FLOW(LPM) 3L LPM   URINALYSIS & REFLEX MICRO WITH CULTURE IF INDICATED   Result Value Ref Range    COLOR YELLOW YELLOW    APPEARANCE CLEAR     GLUCOSE(URINE) NEGATIVE NEGATIVE mg/dL    BILIRUBIN NEGATIVE NEGATIVE    KETONES NEGATIVE NEGATIVE mg/dL    SPECIFIC GRAVITY <1.005 (L) 1.005 - 1.030    BLOOD NEGATIVE NEGATIVE    pH 6.0 5.0 - 7.0 Units    PROTEIN(URINE) NEGATIVE NEGATIVE mg/dL    UROBILINOGEN 0.2 0.0 - 1.0 mg/dL    NITRITE NEGATIVE NEGATIVE    LEUKOCYTE ESTERASE NEGATIVE NEGATIVE    SPECIMEN TYPE URINE CLEAN CATCH    Alcohol   Result Value Ref Range    Alcohol Ethyl 295 (A) None detected. mg/dL   LACTIC  Decision Maker and Patient Capacity Primary Decision Maker: Rosa El - [phone] Secondary Decision Maker: Raymond Hair - [phone] Advance Care Planning 5/11/2020 Patient's Healthcare Decision Maker is: Named in scanned ACP document Confirm Advance Directive Yes, on file Does the patient have other document types Do Not Resuscitate Other: 
(If patient appropriate for POST, consider using PALLPOST smart phrase here) The palliative care team has discussed with patient / health care proxy about goals of care / treatment preferences for patient. 
[====Goals of Care====] PRESCRIPTIONS GIVEN:  
 
No orders of the defined types were placed in this encounter. FOLLOW UP: Future Appointments Date Time Provider Alen Peralta 5/24/2020 11:00 AM [facility] CT 2 [facility]  
5/24/2020 12:00 PM [facility] CT 2 [facility]  
5/24/2020  1:00 PM [facility] MRI 1 [facility]  
5/28/2020  2:00 PM [facility] [address] [place] [address]  
5/28/2020  2:15 PM Kenya Reyes  [facility]  
6/18/2020  2:00 PM [facility] [address] [place] [address]  
7/9/2020  2:00 PM [facility]  
  
 
 
 PHYSICIANS INVOLVED IN CARE:  
Patient Care Team: 
Milena Marshall NP as PCP - General (Nurse Practitioner) HISTORY:  
Reviewed patient-completed ESAS and advance care planning form. Reviewed patient record in prescription monitoring program. 
 
CHIEF COMPLAINT: No chief complaint on file. HPI/SUBJECTIVE: The patient is: [x] Verbal / [] Nonverbal  
 
Patient seen virtually today in her home. She feels fairly well. We talked about the pain regimen that we had her on the last month and she feels that staying ahead of the pain and taking the Dilaudid 4 mg has been helpful.   She has been sleeping better as well, will she try trazodone for a few days but did not think she needed it once her pain was better ACID, VENOUS   Result Value Ref Range    Lactic Acid Venous 2.1 (HH) 0 - 2.0 mmol/L   TYPE/SCREEN   Result Value Ref Range    ABO/RH(D) A POSITIVE     ANTIBODY SCREEN NEGATIVE     CROSSMATCH  2020    RAPID INFLUENZA A/B BY PCR   Result Value Ref Range    Specimen Source Nasopharyngeal     Influenza A Virus NOT DETECTED NOT DETECTED    Influenza B Virus NOT DETECTED NOT DETECTED       EKG Results     EKG Interpretation  Rate: 112  Rhythm: sinus tachycardia   Abnormality: no    EKG tracing interpreted by ED physician    Radiology Results     Imaging Results          XR Chest PA and Lateral (Final result)  Result time 20 12:50:31    Final result                 Impression:    IMPRESSION: Left lung base atelectasis or scarring. No acute process.               Narrative:    PROCEDURE: CHEST, 2 VIEWS, FRONTAL AND LATERAL    COMPARISONS: None    CLINICAL INDICATION: Chest pain    FINDINGS: Mild linear opacity within lateral left lung base. Normal heart  size. Mild degenerative change involves the spine. Old healed left rib  fractures.                                    CT Cervical Spine (Final result)  Result time 20 12:56:01    Final result                 Impression:    IMPRESSION:   1. No acute fracture.  2. Significant degenerative disc disease and facet hypertrophy.  3. Severe sinus disease.               Narrative:    PROCEDURE: CERVICAL SPINE CT, NONCONTRAST    COMPARISONS: Head CT from today    CLINICAL INDICATION: C-spine trauma, NEXUS negative    TECHNIQUE: Fine-cut axial images were obtained through the cervical spine.  Multiplanar reformatted images were computed.    FINDINGS: Study limited by motion artifact. Facet hypertrophy causes  several millimeters of retrolisthesis of C5 relative to C6. No fracture.  Normal prevertebral soft tissues. Moderate to severe degenerative disc  disease with disc height loss and osteophyte formation is greatest at  C5-C6, with moderate to severe spinal  controlled. She admits to being anxious about her cancer and coronavirus in general but overall handling things as best as she can. 
 
----------- Patient here with her son. She appears anxious and wants to talk about her disease. She also wanted to know more information about hospice. Complains of uncontrolled back pain. She is been taking Dilaudid 4 mg about 3 times a day but is very hesitant to take anymore. She has severe insomnia. She has tried melatonin which did not work. She does take several naps during the day. Clinical Pain Assessment (nonverbal scale for nonverbal patients):  
[++++ Clinical Pain Assessment++++] [++++Pain Severity++++]: Pain: 4 
[++++Pain Character++++]: gnawing 
[++++Pain Duration++++]: months 
[++++Pain Effect++++]: functional 
[++++Pain Factors++++]: none in particular 
[++++Pain Frequency++++]: constant [++++Pain Location++++]: mid upper back [++++ Clinical Pain Assessment++++] FUNCTIONAL ASSESSMENT:  
 
Palliative Performance Scale (PPS): PPS: 70 PSYCHOSOCIAL/SPIRITUAL SCREENING:  
 
Any spiritual / [employer] concerns: 
[] Yes /  [x] No 
 
Caregiver Burnout: 
[] Yes /  [x] No /  [] No Caregiver Present Anticipatory grief assessment:  
[x] Normal  / [] Maladaptive ESAS Anxiety: Anxiety: 0 
 
ESAS Depression: Depression: 0 REVIEW OF SYSTEMS:  
 
The following systems were [x] reviewed / [] unable to be reviewed Systems: constitutional, ears/nose/mouth/throat, respiratory, gastrointestinal, genitourinary, musculoskeletal, integumentary, neurologic, psychiatric, endocrine. Positive findings noted below. Modified ESAS Completed by: provider Fatigue: 4 Drowsiness: 3 Depression: 0 Pain: 4 Anxiety: 0 Nausea: 0 Anorexia: 0 Dyspnea: 3 Best Well-Bein Constipation: No  
Other Problem (Comment): 0 PHYSICAL EXAM:  
 
Wt Readings from Last 3 Encounters:  
20 77 lb (34.9 kg) 20 77 lb 9.6 oz (35.2 kg) 04/17/20 78 lb (35.4 kg) Resp. rate 18, height 5' 2\" (1.575 m), weight 77 lb (34.9 kg). Last bowel movement: See Nursing Note Constitutional   
[x] Appears well-developed and well-nourished in no apparent distress   
[] Abnormal: 
Mental status [x] Alert and awake [x] Oriented to person/place/time 
[x] Able to follow commands 
[] Abnormal:  
Eyes 
[x] EOM normal  
[x] Sclera normal  
[x] No visible ocular discharge 
[] Abnormal:  
HENT [x] Normocephalic, atraumatic [x] Mouth/Throat: Moist mucous membranes  
[x] External Ears normal 
[] Abnormal: 
Neck [x] No visualized mass 
[] Abnormal: 
Pulmonary/Chest  
[x] Respiratory effort normal 
[x] No visualized signs of difficulty breathing or respiratory distress 
[] Abnormal: Musculoskeletal 
[x] Normal gait with no signs of ataxia [x] Normal range of motion of neck [] Abnormal: 
Neurological:  
[x] No facial asymmetry (Cranial nerve 7 motor function) [x] No gaze palsy 
[] Abnormal:  
Skin 
[x] No significant exanthematous lesions or discoloration noted on facial skin 
[] Abnormal: Psychiatric [x] Normal affect [x] No hallucinations [] Abnormal: 
 
Other pertinent observable physical exam findings: 
 
Due to this being a TeleHealth evaluation, many elements of the physical examination are unable to be assessed. HISTORY:  
 
Past Medical History:  
Diagnosis Date  CAD (coronary artery disease)  Chronic obstructive pulmonary disease ([address]  History of common carotid artery stent placement  Hypercoagulable state ([address] SVC thrombus  Hypertension  Pleural effusion Past Surgical History:  
Procedure Laterality Date  HX ABDOMINAL LAPAROSCOPY    
 lots of intenstine removed/ lysis of adhesions  HX CAROTID STENT  2003  HX HYSTERECTOMY  HX TONSIL AND ADENOIDECTOMY  IR BRONCH W LUNG BIOPSY SINGLE LOBE  12/2019 No family history on file. canal narrowing. Moderate to severe  facet hypertrophy is present. Severe sinus disease again noted.                                 CT Head Brain (Final result)  Result time 01/03/20 12:52:21    Final result                 Impression:    IMPRESSION:   1. No acute intracranial injury.  2. Severe sinusitis.               Narrative:    PROCEDURE: HEAD CT WITHOUT CONTRAST    COMPARISONS: 06/23/2013    CLINICAL INDICATION: HEAD INJURY MILD OR MODERATE ACUTE, NO NEUROLOGICAL  DEFICIT    TECHNIQUE: Noncontrast head CT was performed with contiguous axial slices.  Brain and bone algorithms reconstructed.    FINDINGS:   No midline shift.  Ventricles, cisterns, and sulci are normal.  No abnormal extraaxial fluid collections.  The brain parenchyma is normal.    Most completely opacified ethmoid air cells. Fluid layers within the  maxillary sinuses. Mild sphenoid sinus mucosal thickening.  The orbits are normal.  The cranium is intact.                                  ED Medication Orders (From admission, onward)    Ordered Start     Status Ordering Provider    01/03/20 1228 01/03/20 1229  cefTRIAXone (ROCEPHIN) syringe 1,000 mg  ONCE      Last MAR action:  Given RAS HAMMER    01/03/20 1228 01/03/20 1229  azithromycin (ZITHROMAX) 500 mg in sodium chloride 0.9 % 250 mL IVPB  ONCE      Last MAR action:  New Bag RAS HAMMER    01/03/20 1155 01/03/20 1156  albuterol-ipratropium 2.5 mg/0.5 mg (DUONEB) nebulizer solution 3 mL  ONCE      Last MAR action:  Given RAS HAMMER    01/03/20 1155 01/03/20 1156  albuterol (VENTOLIN) nebulizer 2.5 mg  ONCE      Last MAR action:  Given RAS HAMMER    01/03/20 1047 01/03/20 1048  sodium chloride (NORMAL SALINE) 0.9 % bolus 1,000 mL  ONCE      Last MAR action:  Completed RAS HAMMER    01/03/20 1045 01/03/20 1046    ONCE      Discontinued RAS HAMMER    01/03/20 1045 01/03/20 1046  albuterol-ipratropium 2.5 mg/0.5 mg (DUONEB) nebulizer solution 3 mL  ONCE      Last MAR action:   Given AURELIO HAMMER    01/03/20 1045 01/03/20 1046  predniSONE (DELTASONE) tablet 40 mg  ONCE      Last MAR action:  Given AURELIO HAMMER               MDM     Patient is a 56-year-old male presenting with altered mental status.  He reports fatigue and cough for the past 3 weeks or so.  Tachycardic with tachypnea and coarse breath sounds on examination.  IV placed, labs are obtained.  DuoNeb and prednisone ordered.  CTs of the head and cervical spine were obtained secondary to the patient's fall with alcohol use.  A chest x-ray was also ordered.    Labs notable as above.  Patient's lactic acid was slightly elevated at 2.1 however he has no evidence of pneumonia on chest x-ray.  Has sinusitis and likely bronchitis.  Rocephin and azithromycin were ordered empirically.  1 L of IV fluids was given.  Patient does not meet criteria for 30 cc/kilogram bolus of IV fluids as his lactate is not greater than 4 and he is not hypertensive.  On re-evaluation after 3 breathing treatments he has persistent tachypnea and hypoxemia requiring 3 L of oxygen to maintain saturations of 91-93%.  Given these findings the decision was made to admit the patient for further evaluation and management.  I discussed the case with Dr. Florence who will admit the patient for further management.    Clinical Impression     ED Diagnosis   1. Bronchitis     2. Sinusitis, unspecified chronicity, unspecified location     3. Hypoxemia     4. Alcohol abuse         Disposition        Admit 1/3/2020  1:12 PM  Telemetry Bed?: Yes  Patient Class: Inpatient [1]  Level of Care: Acute [1]  Admission Diagnosis: Hypoxemia [799.02.ICD-9-CM]  Admitting Physician: RASHAUN FLORENCE [[id number]]  Is this a telephone or verbal order?: This is a telephone order from the admitting physician       Aurelio Hammer MD  01/03/20 4118     History reviewed, no pertinent family history. Social History Tobacco Use  Smoking status: Former Smoker Packs/day: 0.50 Last attempt to quit: 2019 Years since quittin.4  Smokeless tobacco: Never Used Substance Use Topics  Alcohol use: Not Currently Comment: occ Allergies Allergen Reactions  Codeine Itching Current Outpatient Medications Medication Sig  
 albuterol (PROVENTIL HFA, VENTOLIN HFA, PROAIR HFA) 90 mcg/actuation inhaler Take 2 Puffs by inhalation every four (4) hours as needed for Wheezing.  LORazepam (ATIVAN) 0.5 mg tablet Take 1 Tab by mouth See Admin Instructions. Take 1 tab prior to MRI  
 HYDROmorphone (DILAUDID) 4 mg tablet Take 1 Tab by mouth every four (4) hours as needed for Pain for up to 30 days. Max Daily Amount: 24 mg.  
 ondansetron (ZOFRAN ODT) 8 mg disintegrating tablet Take 1 Tab by mouth every eight (8) hours as needed for Nausea or Vomiting.  dexAMETHasone (DECADRON) 4 mg tablet Take 1 tab two times daily for 7 days  lidocaine (LIDODERM) 5 % 1 Patch by TransDERmal route every twenty-four (24) hours. Apply patch to the affected area for 12 hours a day and remove for 12 hours a day.  apixaban (ELIQUIS) 5 mg tablet Take 1 Tab by mouth two (2) times a day.  L.acidoph & parac-S.therm-Bifido (CIRILO Q2/RISAQUAD-2) 16 billion cell cap cap Take 1 Cap by mouth daily.  potassium chloride SR (K-TAB) 20 mEq tablet Take 2 Tabs by mouth daily.  aspirin 81 mg chewable tablet Take 1 Tab by mouth daily.  atorvastatin (LIPITOR) 40 mg tablet Take 1 Tab by mouth daily.  albuterol sulfate (PROVENTIL;VENTOLIN) 2.5 mg/0.5 mL nebu nebulizer solution 0.5 mL by Nebulization route every four (4) hours as needed for Wheezing or Shortness of Breath.  furosemide (LASIX) 40 mg tablet Take 1 Tab by mouth daily.  fluticasone propionate (FLONASE ALLERGY RELIEF NA) by Nasal route.  hydrOXYzine HCl (ATARAX) 10 mg tablet Take 10 mg by mouth three (3) times daily as needed for Itching.  montelukast (SINGULAIR) 10 mg tablet Take 10 mg by mouth daily.  atenolol (TENORMIN) 50 mg tablet Take 50 mg by mouth daily.  losartan (COZAAR) 100 mg tablet Take 100 mg by mouth daily.  budesonide (ENTOCORT EC) 3 mg capsule Take 6 mg by mouth every morning. No current facility-administered medications for this visit. LAB DATA REVIEWED:  
 
Lab Results Component Value Date/Time WBC 10.4 05/07/2020 02:36 PM  
 HGB 13.1 05/07/2020 02:36 PM  
 PLATELET 795 68/56/5638 02:36 PM  
 
Lab Results Component Value Date/Time Sodium 136 05/07/2020 02:36 PM  
 Potassium 3.7 05/07/2020 02:36 PM  
 Chloride 101 05/07/2020 02:36 PM  
 CO2 33 (H) 05/07/2020 02:36 PM  
 BUN 14 05/07/2020 02:36 PM  
 Creatinine 0.71 05/07/2020 02:36 PM  
 Calcium 9.0 05/07/2020 02:36 PM  
 Magnesium 2.0 12/18/2019 11:50 PM  
 Phosphorus 3.0 12/18/2019 11:50 PM  
  
Lab Results Component Value Date/Time AST (SGOT) 18 05/07/2020 02:36 PM  
 Alk. phosphatase 87 05/07/2020 02:36 PM  
 Protein, total 6.6 05/07/2020 02:36 PM  
 Albumin 3.5 05/07/2020 02:36 PM  
 Globulin 3.1 05/07/2020 02:36 PM  
 
Lab Results Component Value Date/Time INR 1.0 08/08/2009 03:43 PM  
 Prothrombin time 10.2 08/08/2009 03:43 PM  
 aPTT 61.8 (H) 12/20/2019 08:48 AM  
  
No results found for: IRON, FE, TIBC, IBCT, PSAT, FERR CONTROLLED SUBSTANCES SAFETY ASSESSMENT (IF ON CONTROLLED SUBSTANCES):  
 
Reviewed opioid safety handout:  [x] Yes   [] No 
24 hour opioid dose >150mg morphine equivalent/day:  [] Yes   [x] No 
Benzodiazepines:  [] Yes   [x] No 
Sleep apnea:  [] Yes   [x] No 
Urine Toxicology Testing within last 6 months:  [] Yes   [x] No 
History of or new aberrant medication taking behaviors:  [] Yes   [x] No 
Has Narcan been prescribed [x] Yes   [] No 
 
   
 
Total time: 70 minutes Counseling / coordination time: 60 minutes 
> 50% counseling / coordination?:  Yes Consent: 
He and/or health care decision maker is aware that that he may receive a bill for this telehealth service, depending on his insurance coverage, and has provided verbal consent to proceed: Yes CPT Codes 12976-57349 for Established Patients may apply to this Telehealth Visit Pursuant to the emergency declaration under the [address] [place] [address] waiver authority and the [employer] Act, this Virtual  Visit was conducted, with patient's consent, to reduce the patient's risk of exposure to COVID-19 and provide continuity of care for an established patient. Services were provided through a video synchronous discussion virtually to substitute for in-person clinic visit.

## 2020-05-21 NOTE — TELEPHONE ENCOUNTER
Cory Gomes with scheduling called and stated order in for CT ABD Kimberly Ramsey is not approved by insurance but to revise to CT ABD W CON so insurance will approve

## 2020-05-24 NOTE — TELEPHONE ENCOUNTER
Heme/Onc On Call Note  Received call from MRI. Patient there for routine MRI brain to evaluate response to therapy. MRI reveals new large brain met with some edema and shift, no herniation. I spoke to the patient. She reports some headaches, but they have actually been improving recently. No blurry vision, no weakness or numbness. She seems essentially asymptomatic. I spoke to the radiology tech and she appears to be ok clinically. I gave the patient the option of going to the ED now for urgent neurosurgery evaluation, versus returning home with dexamethasone and close follow up with Dr. Andree Lord after the holiday weekend. She prefers to avoid the ED at this time. I sent dexamethasone 4mg every 6 hours to her pharmacy. I instructed her on s/sx that should prompt her to visit the ED urgently. I asked her to call the office first thing Tuesday morning.

## 2020-05-26 PROBLEM — C79.31 BRAIN METASTASES (HCC): Status: ACTIVE | Noted: 2020-01-01

## 2020-05-26 NOTE — PROGRESS NOTES
[facility] [place] [address] [address] W: [phone]  F: [phone] Reason for Consult:  
Melchor Hernandez is a 69 y.o. female who is seen by synchronous (real-time) audio-video technology on 5/26/2020 for follow up of  NSCLC- stage IV with brain metastasis. Treatment History:  
· 12/15/2019-CTA showed right upper lobe lung nodule measuring 2.1 x 1.4 cm, occluded SVC with adjustment mediastinal soft tissue mass. The whole thing measuring approximately 2.3 to 1.8 cm. · 12/24/2020-EBUS and biopsy of mediastinal mass showed poorly differentiated adenocarcinoma · 1/13/2020-PET scan showed right upper lobe lung nodule with an SUV of 20, right mediastinal adenopathy with SUV of 16, right retrocaval clavicular subcentimeter focus with an SUV of 3.2 
· 2/3/2020: MRI brain: 8 mm enhancing focus at the left frontal convexity suspicious for metastasis. 2 additional subtle areas of enhancement in the right frontal lobe, also concerning for metastases · 2/27/20: completed 10 sessions of RT  
· 3/5/20: cycle 1 Keytruda History of Present Illness:  
Patient is a 69 y.o. female with PMH as below presented in [facility] Dec 2019 with c/o increasing swelling of face , neck and arms along with SOB. Her problems started with a choking sensation and head tightness in Nov 2019. She initially saw ENT and was to see Pulmonary. However she presented to the Interim. She had imaging as above that showed SVC thrombus and mediastinal mass. Subsequently had a biopsy and diagnosis as above. Was placed on Lovenox and had a mechanical thrombectomy of the SVC thrombus  on 1/14/2020. She had a CT Sim with Radiation Oncology on 1/27/2020 had an episode of desaturation down to 80% on exertion. On review of the CT, Dr. Jared Sheets discovered a significant pleural effusion and called the patient to come to the ED for further assessment. She had a thoracentecis on 2/2/2020 which showed no malignant cells. MRI brain done for staging revealed a possible acute infarct but small foci of metastatic disease . She completed RT on 2020 for palliation. She is seen with scans after 4 cycles of palliative Keytruda. She had a CT and brain MRI MRI showed a large frontal lesion and the radiologist called our on call physician which prompted this interim visit She has had no change in HAs / vision is no different, no nausea, no weakness. Breathing is stable. Sometimes uses inhalers. Appetite stable and so is her weight. Has no falls and no change in behavior. Past Medical History:  
Diagnosis Date  CAD (coronary artery disease)  Chronic obstructive pulmonary disease ([facility]  History of common carotid artery stent placement  Hypercoagulable state ([facility] SVC thrombus  Hypertension  Pleural effusion Past Surgical History:  
Procedure Laterality Date  HX ABDOMINAL LAPAROSCOPY    
 lots of intenstine removed/ lysis of adhesions  HX CAROTID STENT    HX HYSTERECTOMY  HX TONSIL AND ADENOIDECTOMY  IR BRONCH W LUNG BIOPSY SINGLE LOBE  2019 Social History Tobacco Use  Smoking status: Former Smoker Packs/day: 0.50 Last attempt to quit: 2019 Years since quittin.4  Smokeless tobacco: Never Used Substance Use Topics  Alcohol use: Not Currently Comment: occ Current Outpatient Medications Medication Sig  
 dexAMETHasone (DECADRON) 4 mg tablet Take 4 mg by mouth every six (6) hours.  albuterol (PROVENTIL HFA, VENTOLIN HFA, PROAIR HFA) 90 mcg/actuation inhaler Take 2 Puffs by inhalation every four (4) hours as needed for Wheezing.  LORazepam (ATIVAN) 0.5 mg tablet Take 1 Tab by mouth See Admin Instructions. Take 1 tab prior to MRI  
 HYDROmorphone (DILAUDID) 4 mg tablet Take 1 Tab by mouth every four (4) hours as needed for Pain for up to 30 days. Max Daily Amount: 24 mg.  ondansetron (ZOFRAN ODT) 8 mg disintegrating tablet Take 1 Tab by mouth every eight (8) hours as needed for Nausea or Vomiting.  lidocaine (LIDODERM) 5 % 1 Patch by TransDERmal route every twenty-four (24) hours. Apply patch to the affected area for 12 hours a day and remove for 12 hours a day.  apixaban (ELIQUIS) 5 mg tablet Take 1 Tab by mouth two (2) times a day.  L.acidoph & parac-S.therm-Bifido (CIRILO Q2/RISAQUAD-2) 16 billion cell cap cap Take 1 Cap by mouth daily.  potassium chloride SR (K-TAB) 20 mEq tablet Take 2 Tabs by mouth daily.  aspirin 81 mg chewable tablet Take 1 Tab by mouth daily.  atorvastatin (LIPITOR) 40 mg tablet Take 1 Tab by mouth daily.  albuterol sulfate (PROVENTIL;VENTOLIN) 2.5 mg/0.5 mL nebu nebulizer solution 0.5 mL by Nebulization route every four (4) hours as needed for Wheezing or Shortness of Breath.  furosemide (LASIX) 40 mg tablet Take 1 Tab by mouth daily.  fluticasone propionate (FLONASE ALLERGY RELIEF NA) by Nasal route.  hydrOXYzine HCl (ATARAX) 10 mg tablet Take 10 mg by mouth three (3) times daily as needed for Itching.  montelukast (SINGULAIR) 10 mg tablet Take 10 mg by mouth daily.  atenolol (TENORMIN) 50 mg tablet Take 50 mg by mouth daily.  losartan (COZAAR) 100 mg tablet Take 100 mg by mouth daily.  budesonide (ENTOCORT EC) 3 mg capsule Take 6 mg by mouth every morning. No current facility-administered medications for this visit. Allergies Allergen Reactions  Codeine Itching Review of Systems: A complete review of systems was obtained, negative except as described above. Physical Exam:  
 
 
Due to this being a TeleHealth evaluation, many elements of the physical examination are unable to be assessed. Constitutional: Appears well-developed and well-nourished in no apparent distress Mental status: Alert and awake, Oriented to person/place/time, Able to follow commands Eyes: EOM normal, Sclera normal, No visible ocular discharge HENT: Normocephalic, atraumatic; Mouth/Throat: Moist mucous membranes, External Ears normal 
Neck: No visualized mass Pulmonary/Chest: Respiratory effort normal, No visualized signs of difficulty breathing or respiratory distress Skin: No significant exanthematous lesions or discoloration noted on facial skin Psychiatric: Normal affect, normal judgment/insight. No hallucinations Results:  
 
Lab Results Component Value Date/Time WBC 10.4 05/07/2020 02:36 PM  
 HGB 13.1 05/07/2020 02:36 PM  
 HCT 41.4 05/07/2020 02:36 PM  
 PLATELET 364 01/59/5734 02:36 PM  
 MCV 92.4 05/07/2020 02:36 PM  
 ABS. NEUTROPHILS 8.1 (H) 05/07/2020 02:36 PM  
 
Lab Results Component Value Date/Time Sodium 136 05/07/2020 02:36 PM  
 Potassium 3.7 05/07/2020 02:36 PM  
 Chloride 101 05/07/2020 02:36 PM  
 CO2 33 (H) 05/07/2020 02:36 PM  
 Glucose 89 05/07/2020 02:36 PM  
 BUN 14 05/07/2020 02:36 PM  
 Creatinine 0.71 05/07/2020 02:36 PM  
 GFR est AA >60 05/07/2020 02:36 PM  
 GFR est non-AA >60 05/07/2020 02:36 PM  
 Calcium 9.0 05/07/2020 02:36 PM  
 
Lab Results Component Value Date/Time Bilirubin, total 0.2 05/07/2020 02:36 PM  
 ALT (SGPT) 21 05/07/2020 02:36 PM  
 AST (SGOT) 18 05/07/2020 02:36 PM  
 Alk. phosphatase 87 05/07/2020 02:36 PM  
 Protein, total 6.6 05/07/2020 02:36 PM  
 Albumin 3.5 05/07/2020 02:36 PM  
 Globulin 3.1 05/07/2020 02:36 PM  
 
 
Records reviewed and summarized above. Pathology report(s) reviewed above. Radiology report(s) reviewed above. PET CT 
IMPRESSION IMPRESSION:  
1. RUL malignancy. 2. Mediastinal griffin metastasis. 3. Likely subcentimeter right retroclavicular griffin metastasis. Ct 1/31/2020 IMPRESSION IMPRESSION: 
  
1. Moderately large right pleural effusion, new since the prior study. 2. Significant increase in size of a right upper lobe parenchymal mass since the 
prior study. 3. Stable emphysematous change. 4. Probably stable pretracheal lymphadenopathy. The superior vena cava cannot be 
assessed without contrast. 
4. Small pericardial effusion, new. 5. Hypodense poorly circumscribed area in the left hepatic lobe on axial 
imaging, not confirmed on reformatted images. Therefore, this may be 
artifactual.   
6. Stable right hepatic lobe cyst and other incidental findings MRI brain 2/3/2020 IMPRESSION IMPRESSION: 
  
1. 8 mm enhancing focus at the left frontal convexity suspicious for metastasis. 2 additional subtle areas of enhancement in the right frontal lobe, also 
concerning for metastases. No edema. 2. Persistent small focus of diffusion signal right periatrial white matter 
likely an evolving infarction, with no associated enhancement. Pathology 2/2/2020 CYTOLOGIC INTERPRETATION:  
Pleural Fluid, Right, ThinPrep and cell block:  
Reactive mesothelial cells and mixed acute and chronic inflammation See comment General Categorization No cells diagnostic for malignancy Specimen Adequacy Satisfactory for evaluation Comment Immunohistochemical stains performed on the cell block reveal that reactive cells are strongly positive for calretinin and WT-1, while negative for BerEP4 and B72.3. These findings are compatible with reactive mesothelial cells and lend no support for metastatic adenocarcinoma MRI Brain w. Cont 25/2020 IMPRESSION: 
1. 8 mm enhancing focus at the left frontal convexity suspicious for metastasis. 2 additional subtle areas of enhancement in the right frontal lobe, also 
concerning for metastases. No edema. 2. Persistent small focus of diffusion signal right periatrial white matter 
likely an evolving infarction, with no associated enhancement. CTA 2/28/20: IMPRESSION:  
1. Negative for pulmonary embolus. 2. Significantly increased mass in the medial right upper lobe. 3. Increased mediastinal mass occluding the SVC. 4. New mild to moderate right pleural effusion. 5. Severe emphysema. Bone Scan 4/7/2020 IMPRESSION IMPRESSION: No evidence of osseous metastatic disease. Brain MRI 5/24/2020 IMPRESSION:  
  
1. Large right frontal metastatic deposit, likely at the site of a tiny 
enhancing focus described on the prior study. There is significant associated 
edema and mass effect with 9 mm shift of midline structures, but no herniation, 
significant hemorrhage or additional lesions. Ct 5/24/2020 
  
IMPRESSION IMPRESSION: 
1. Interval decrease in size of medial right upper lobe lesion, now measuring 2.6 cm x 1.9 cm. 
2. Interval improvement of mediastinal lymphadenopathy, as described above. 3. Slight interval decrease in size of left upper lobe irregular lesion. 4. Interval resolution of small right pleural effusion. Assessment:  
1) NSCLC- Right- stage IV 
 
R lung mass, mediastinal adenopathy, SVC tumor thrombus and a possible R retroclavicular node 
T1N3M1- small brain metastasis Poorly differentiated adenocarcinoma Molecular studies - EGFR negative, PD-L1 is 80%, ALK negative New R effusion with negative cytology Completed palliative RT on 2/27/20 Had repeat imaging on 2/28/20 in ER that showed worsening mass in RUL and increase in size of mediastinal mass occluding the SVC. Today is cycle 4 of palliative Keytruda Had severe back pain after cycle 2 relieved by dexamethasone. Tolerated cycle 3 with grade 1 rash. Scans ordered. 0.5 mg ativan ordered to be taken prior to jordan MRI. 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 Will continue to monitor 5) Right chest/back pain Patient states dilaudid helps slightly but pain is worst at night. Pain worsened after 2nd thoracentesis - patient declined PleurX Worsened after cycle 2 - improved with dex Bone scan 4/7 negative for metastasis Will consider 10mg predisone daily if worsens again. Following with Palliative Care 6) COPD On albuterol, montelukast, and budesonide. Flonase to BID Albuterol inhaler - refilled 7) CAD 8) Acute Stroke? Detected on MRI No deficits 9) Rash/Blisters Patient reported rash to face and blood blisters on fingers after cycle 3 of [place] She has a circumscribed rash on her right shin she believes is a spider bite and believes the others were possibly spider bites as well Zyrtec nightly and OTC hydrocortisone cream as needed 10) Cachexia BMI 14 Encouraged boost between meals, small frequent snacks. RD referral.  
 
11) HTN Elevated today. Pt states did not take home BP meds today. Plan: · Proceed today with cycle #4 of palliative Keytruda every 3 weeks until progression or side effects · Labs each visit CBC, CMP, TSH · Continue Eliquis 5mg BID · Following Palliative Care · Dilaudid 4mg PRN for pain control · Scans ordered; to be performed prior to Cycle 5 RTC cycle 5 day 1 with scans I appreciate the opportunity to participate in Ms. Marlon Adame's care Seen in conjunction with Thai Avila NP Signed By: Lawyer Jacquie MD   
 
I was in the office while conducting this encounter. The patient was at her home. Consent: 
She and/or her healthcare decision maker is aware that this patient-initiated Telehealth encounter is a billable service, with coverage as determined by her insurance carrier. She is aware that she may receive a bill and has provided verbal consent to proceed: Yes Pursuant to the emergency declaration under the [address] and the National Emergencies Act, [address] [facility] waiver authority and the [employer] Act, this Virtual  Visit was conducted, with patient's (and/or legal guardian's) consent, to reduce the patient's risk of exposure to COVID-19 and provide necessary medical care. Services were provided through a video synchronous discussion virtually to substitute for in-person visit.

## 2020-05-26 NOTE — PROGRESS NOTES
[facility] [place] [place] [address] [address], [address] W: [phone]  F: [phone] Reason for Consult:  
Meryl Bañuelos is a 69 y.o. female who is seen by synchronous (real-time) audio-video technology on 5/26/2020 for follow up of  NSCLC- stage IV with brain metastasis. Treatment History:  
· 12/15/2019-CTA showed right upper lobe lung nodule measuring 2.1 x 1.4 cm, occluded SVC with adjustment mediastinal soft tissue mass. The whole thing measuring approximately 2.3 to 1.8 cm. · 12/24/2020-EBUS and biopsy of mediastinal mass showed poorly differentiated adenocarcinoma · 1/13/2020-PET scan showed right upper lobe lung nodule with an SUV of 20, right mediastinal adenopathy with SUV of 16, right retrocaval clavicular subcentimeter focus with an SUV of 3.2 
· 2/3/2020: MRI brain: 8 mm enhancing focus at the left frontal convexity suspicious for metastasis. 2 additional subtle areas of enhancement in the right frontal lobe, also concerning for metastases · 2/27/20: completed 10 sessions of RT  
· 3/5/20: cycle 1 Keytruda History of Present Illness:  
Patient is a 69 y.o. female with PMH as below presented in [facility] Dec 2019 with c/o increasing swelling of face , neck and arms along with SOB. Her problems started with a choking sensation and head tightness in Nov 2019. She initially saw ENT and was to see Pulmonary. However she presented to the Interim. She had imaging as above that showed SVC thrombus and mediastinal mass. Subsequently had a biopsy and diagnosis as above. Was placed on Lovenox and had a mechanical thrombectomy of the SVC thrombus  on 1/14/2020. She had a CT Sim with Radiation Oncology on 1/27/2020 had an episode of desaturation down to 80% on exertion. On review of the CT, Dr. Leafy Jeans discovered a significant pleural effusion and called the patient to come to the ED for further assessment. She had a thoracentecis on 2/2/2020 which showed no malignant cells. MRI brain done for staging revealed a possible acute infarct but small foci of metastatic disease . She completed RT on 2020 for palliation. She is seen with scans after 4 cycles of palliative Keytruda. She had a CT and brain MRI MRI showed a large frontal lesion and the radiologist called our on call physician which prompted this interim visit She has had no change in HAs / vision is no different, no nausea, no weakness. Breathing is stable. Sometimes uses inhalers. Appetite stable and so is her weight. Has no falls and no change in behavior. Past Medical History:  
Diagnosis Date  CAD (coronary artery disease)  Chronic obstructive pulmonary disease ([facility]  History of common carotid artery stent placement  Hypercoagulable state ([facility] SVC thrombus  Hypertension  Pleural effusion Past Surgical History:  
Procedure Laterality Date  HX ABDOMINAL LAPAROSCOPY    
 lots of intenstine removed/ lysis of adhesions  HX CAROTID STENT    HX HYSTERECTOMY  HX TONSIL AND ADENOIDECTOMY  IR BRONCH W LUNG BIOPSY SINGLE LOBE  2019 Social History Tobacco Use  Smoking status: Former Smoker Packs/day: 0.50 Last attempt to quit: 2019 Years since quittin.4  Smokeless tobacco: Never Used Substance Use Topics  Alcohol use: Not Currently Comment: occ Current Outpatient Medications Medication Sig  
 dexAMETHasone (DECADRON) 4 mg tablet Take 4 mg by mouth every six (6) hours.  albuterol (PROVENTIL HFA, VENTOLIN HFA, PROAIR HFA) 90 mcg/actuation inhaler Take 2 Puffs by inhalation every four (4) hours as needed for Wheezing.  LORazepam (ATIVAN) 0.5 mg tablet Take 1 Tab by mouth See Admin Instructions. Take 1 tab prior to MRI  
 HYDROmorphone (DILAUDID) 4 mg tablet Take 1 Tab by mouth every four (4) hours as needed for Pain for up to 30 days. Max Daily Amount: 24 mg.  ondansetron (ZOFRAN ODT) 8 mg disintegrating tablet Take 1 Tab by mouth every eight (8) hours as needed for Nausea or Vomiting.  lidocaine (LIDODERM) 5 % 1 Patch by TransDERmal route every twenty-four (24) hours. Apply patch to the affected area for 12 hours a day and remove for 12 hours a day.  apixaban (ELIQUIS) 5 mg tablet Take 1 Tab by mouth two (2) times a day.  L.acidoph & parac-S.therm-Bifido (CIRILO Q2/RISAQUAD-2) 16 billion cell cap cap Take 1 Cap by mouth daily.  potassium chloride SR (K-TAB) 20 mEq tablet Take 2 Tabs by mouth daily.  aspirin 81 mg chewable tablet Take 1 Tab by mouth daily.  atorvastatin (LIPITOR) 40 mg tablet Take 1 Tab by mouth daily.  albuterol sulfate (PROVENTIL;VENTOLIN) 2.5 mg/0.5 mL nebu nebulizer solution 0.5 mL by Nebulization route every four (4) hours as needed for Wheezing or Shortness of Breath.  furosemide (LASIX) 40 mg tablet Take 1 Tab by mouth daily.  fluticasone propionate (FLONASE ALLERGY RELIEF NA) by Nasal route.  hydrOXYzine HCl (ATARAX) 10 mg tablet Take 10 mg by mouth three (3) times daily as needed for Itching.  montelukast (SINGULAIR) 10 mg tablet Take 10 mg by mouth daily.  atenolol (TENORMIN) 50 mg tablet Take 50 mg by mouth daily.  losartan (COZAAR) 100 mg tablet Take 100 mg by mouth daily.  budesonide (ENTOCORT EC) 3 mg capsule Take 6 mg by mouth every morning. No current facility-administered medications for this visit. Allergies Allergen Reactions  Codeine Itching Review of Systems: A complete review of systems was obtained, negative except as described above. Physical Exam:  
 
 
Due to this being a TeleHealth evaluation, many elements of the physical examination are unable to be assessed. Constitutional: Appears well-developed and well-nourished in no apparent distress Mental status: Alert and awake, Oriented to person/place/time, Able to follow commands Eyes: EOM normal, Sclera normal, No visible ocular discharge HENT: Normocephalic, atraumatic; Mouth/Throat: Moist mucous membranes, External Ears normal 
Neck: No visualized mass Pulmonary/Chest: Respiratory effort normal, No visualized signs of difficulty breathing or respiratory distress Skin: No significant exanthematous lesions or discoloration noted on facial skin Psychiatric: Normal affect, normal judgment/insight. No hallucinations Results:  
 
Lab Results Component Value Date/Time WBC 10.4 05/07/2020 02:36 PM  
 HGB 13.1 05/07/2020 02:36 PM  
 HCT 41.4 05/07/2020 02:36 PM  
 PLATELET 330 23/82/9764 02:36 PM  
 MCV 92.4 05/07/2020 02:36 PM  
 ABS. NEUTROPHILS 8.1 (H) 05/07/2020 02:36 PM  
 
Lab Results Component Value Date/Time Sodium 136 05/07/2020 02:36 PM  
 Potassium 3.7 05/07/2020 02:36 PM  
 Chloride 101 05/07/2020 02:36 PM  
 CO2 33 (H) 05/07/2020 02:36 PM  
 Glucose 89 05/07/2020 02:36 PM  
 BUN 14 05/07/2020 02:36 PM  
 Creatinine 0.71 05/07/2020 02:36 PM  
 GFR est AA >60 05/07/2020 02:36 PM  
 GFR est non-AA >60 05/07/2020 02:36 PM  
 Calcium 9.0 05/07/2020 02:36 PM  
 
Lab Results Component Value Date/Time Bilirubin, total 0.2 05/07/2020 02:36 PM  
 ALT (SGPT) 21 05/07/2020 02:36 PM  
 Alk. phosphatase 87 05/07/2020 02:36 PM  
 Protein, total 6.6 05/07/2020 02:36 PM  
 Albumin 3.5 05/07/2020 02:36 PM  
 Globulin 3.1 05/07/2020 02:36 PM  
 
 
Records reviewed and summarized above. Pathology report(s) reviewed above. Radiology report(s) reviewed above. PET CT 
IMPRESSION IMPRESSION:  
1. RUL malignancy. 2. Mediastinal griffin metastasis. 3. Likely subcentimeter right retroclavicular griffin metastasis. Ct 1/31/2020 IMPRESSION IMPRESSION: 
  
1. Moderately large right pleural effusion, new since the prior study. 2. Significant increase in size of a right upper lobe parenchymal mass since the 
prior study. 3. Stable emphysematous change. 4. Probably stable pretracheal lymphadenopathy. The superior vena cava cannot be 
assessed without contrast. 
4. Small pericardial effusion, new. 5. Hypodense poorly circumscribed area in the left hepatic lobe on axial 
imaging, not confirmed on reformatted images. Therefore, this may be 
artifactual.   
6. Stable right hepatic lobe cyst and other incidental findings MRI brain 2/3/2020 IMPRESSION IMPRESSION: 
  
1. 8 mm enhancing focus at the left frontal convexity suspicious for metastasis. 2 additional subtle areas of enhancement in the right frontal lobe, also 
concerning for metastases. No edema. 2. Persistent small focus of diffusion signal right periatrial white matter 
likely an evolving infarction, with no associated enhancement. Pathology 2/2/2020 CYTOLOGIC INTERPRETATION:  
Pleural Fluid, Right, ThinPrep and cell block:  
Reactive mesothelial cells and mixed acute and chronic inflammation See comment General Categorization No cells diagnostic for malignancy Specimen Adequacy Satisfactory for evaluation Comment Immunohistochemical stains performed on the cell block reveal that reactive cells are strongly positive for calretinin and WT-1, while negative for BerEP4 and B72.3. These findings are compatible with reactive mesothelial cells and lend no support for metastatic adenocarcinoma MRI Brain w. Cont 25/2020 IMPRESSION: 
1. 8 mm enhancing focus at the left frontal convexity suspicious for metastasis. 2 additional subtle areas of enhancement in the right frontal lobe, also 
concerning for metastases. No edema. 2. Persistent small focus of diffusion signal right periatrial white matter 
likely an evolving infarction, with no associated enhancement. CTA 2/28/20: IMPRESSION:  
1. Negative for pulmonary embolus. 2. Significantly increased mass in the medial right upper lobe. 3. Increased mediastinal mass occluding the SVC. 4. New mild to moderate right pleural effusion. 5. Severe emphysema. Bone Scan 4/7/2020 IMPRESSION IMPRESSION: No evidence of osseous metastatic disease. Brain MRI 5/24/2020 IMPRESSION:  
  
1. Large right frontal metastatic deposit, likely at the site of a tiny 
enhancing focus described on the prior study. There is significant associated 
edema and mass effect with 9 mm shift of midline structures, but no herniation, 
significant hemorrhage or additional lesions. Ct 5/24/2020 
  
IMPRESSION IMPRESSION: 
1. Interval decrease in size of medial right upper lobe lesion, now measuring 2.6 cm x 1.9 cm. 
2. Interval improvement of mediastinal lymphadenopathy, as described above. 3. Slight interval decrease in size of left upper lobe irregular lesion. 4. Interval resolution of small right pleural effusion. Assessment:  
1) NSCLC- Right- stage IV 
 
R lung mass, mediastinal adenopathy, SVC tumor thrombus and a possible R retroclavicular node 
T1N3M1- small brain metastasis Poorly differentiated adenocarcinoma Molecular studies - EGFR negative, PD-L1 is 80%, ALK negative New R effusion with negative cytology Completed palliative RT on 2/27/20 Had repeat imaging on 2/28/20 in ER that showed worsening mass in RUL and increase in size of mediastinal mass occluding the SVC. CT scans after 4 cycles of palliative Keytruda show a response but unfortunately her intracranial metastasis has progressed rapidly 2) Shortness of Breath / Pleural effusion R thoracentesis 2/2020 Negative cytology S/p palliative RT as above Stable Effusion has not recurred 3)Brain metastasis 4 cm large frontal lobe mass with edema Reviewed in tumor board and discussed with Dr. 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 Will continue to monitor 5) Right chest/back pain Patient states dilaudid helps slightly but pain is worst at night. Pain worsened after 2nd thoracentesis - patient declined PleurX Worsened after cycle 2 - improved with dex Bone scan 4/7 negative for metastasis Will consider 10mg predisone daily if worsens again. Following with Palliative Care 6) COPD On albuterol, montelukast, and budesonide. Flonase to BID Albuterol inhaler - refilled 7) CAD 8) Acute Stroke? Detected on MRI No deficits 9) Rash/Blisters 10) Cachexia BMI 14 Encouraged boost between meals, small frequent snacks. RD referral.  
 
11) HTN Plan:  
 
·  
· Hold off on [facility] Friday · Continue Dexamethasone 4 mg every 6 hours · Start pepcid · See Dr. Rachael Stanley with Neurosurgery asap · Continue Eliquis 5mg BID · Following Palliative Care · Dilaudid 4mg PRN for pain control RTC 1 week I appreciate the opportunity to participate in Ms. Jenna Adame's care Signed By: Conchis Garzon MD   
 
I was in the office while conducting this encounter. The patient was at her home. Consent: 
She and/or her healthcare decision maker is aware that this patient-initiated Telehealth encounter is a billable service, with coverage as determined by her insurance carrier. She is aware that she may receive a bill and has provided verbal consent to proceed: Yes Pursuant to the emergency declaration under the [facility], [address] waiver authority and the [employer] and [employer] General Act, this Virtual  Visit was conducted, with patient's (and/or legal guardian's) consent, to reduce the patient's risk of exposure to COVID-19 and provide necessary medical care. Services were provided through a video synchronous discussion virtually to substitute for in-person visit.

## 2020-05-26 NOTE — PROGRESS NOTES
Geoff Carvajal is a 69 y.o. female Chief Complaint Patient presents with  Follow-up  Lung Cancer 1. Have you been to the ER, urgent care clinic since your last visit? Hospitalized since your last visit? No 
 
2. Have you seen or consulted any other health care providers outside of the [address] [place] [address] since your last visit? Include any pap smears or colon screening.  No

## 2020-06-02 NOTE — PERIOP NOTES
CALLED DR. UREÑA'S OFFICE AND CONSULTED IF CHEST X-RAY COULD BE DONE STAT DOS. PT WAS A NO SHOW FOR APPOINTMENT YESTERDAY AND 1 HOUR LATE FOR APPOINTMENT TODAY AND X-RAY CLOSES AT 4:30PM. 
CONSULTED [facility], [address] DR. UREÑA AND SHE STATED IT IS OK TO HAVE CHEST X-RAY DONE STAT DOS.

## 2020-06-03 PROBLEM — D49.6 BRAIN TUMOR (HCC): Status: ACTIVE | Noted: 2020-01-01

## 2020-06-03 NOTE — ANESTHESIA PREPROCEDURE EVALUATION
Relevant Problems No relevant active problems Anesthetic History No history of anesthetic complications Review of Systems / Medical History Patient summary reviewed, nursing notes reviewed and pertinent labs reviewed Pulmonary COPD Smoker Neuro/Psych Within defined limits Cardiovascular Hypertension CAD and cardiac stents GI/Hepatic/Renal 
Within defined limits Endo/Other Cancer Other Findings Physical Exam 
 
Airway Mallampati: II 
TM Distance: > 6 cm Neck ROM: normal range of motion Mouth opening: Normal 
 
 Cardiovascular Regular rate and rhythm,  S1 and S2 normal,  no murmur, click, rub, or gallop Dental 
No notable dental hx Pulmonary Breath sounds clear to auscultation Abdominal 
GI exam deferred Other Findings Anesthetic Plan ASA: 3 Anesthesia type: general 
 
Monitoring Plan: Arterial line Induction: Intravenous Anesthetic plan and risks discussed with: Patient

## 2020-06-03 NOTE — PERIOP NOTES
TRANSFER - OUT REPORT: 
 
Verbal report given to Rani(name) on Melchor Hernandez  being transferred to ICU (unit) for routine post - op Report consisted of patients Situation, Background, Assessment and  
Recommendations(SBAR). Time Pre op antibiotic [id number] Anesthesia Stop time: 1438 De [place] Cruz Present on Transfer to floor:YES Order for De La Cruz on Chart:YES Discharge Prescriptions with Chart:NO Information from the following report(s) SBAR, Kardex, OR Summary, Intake/Output, MAR, Med Rec Status and Cardiac Rhythm SR was reviewed with the receiving nurse. Opportunity for questions and clarification was provided. Is the patient on 02? YES 
     L/Min 3L Is the patient on a monitor? YES Is the nurse transporting with the patient? YES Surgical Waiting Area notified of patient's transfer from PACU? YES The following personal items collected during your admission accompanied patient upon transfer:  
Dental Appliance: Dental Appliances: Uppers, Lowers, With patient Vision:   
Hearing Aid: Hearing Aid: None Jewelry: Jewelry: None Clothing: Clothing: Footwear, Shirt, Socks, Pants, Undergarments(sent to [employer]) Other Valuables: Other Valuables: Eyeglasses(returned to patient) Valuables sent to safe:

## 2020-06-03 NOTE — PERIOP NOTES
Surgifoam - Placed on sterile field to be used PRN  
LOT# [id number] EXP 01-20-24 Surgicel- Placed on sterile field to be used PRN  
LOT# [id number] EXP# 07-31-24 Floseal Hemostatic Matrix - 10 mL x 2 for total of 20 mL-Placed on sterile field to be used PRN  
LOT# [id number] EXP# 01-06-21

## 2020-06-03 NOTE — ROUTINE PROCESS
Patient: Acquanetta Skiff MRN: [id number]  SSN: [id number] YOB: 1945  Age: 69 y.o. Sex: female Patient is status post Procedure(s): BRAIN LAB GUIDED RIGHT FRONTAL-TEMPORAL CRANIOTOMY  WITH RESECTION OF TUMOR (URGENT). Surgeon(s) and Role: 
   * Adam Zheng MD - Primary Local/Dose/Irrigation:  SEE MAR Peripheral IV 06/03/20 Left Arm (Active) Site Assessment Clean, dry, & intact 6/3/2020  8:42 AM  
Phlebitis Assessment 0 6/3/2020  8:42 AM  
Infiltration Assessment 0 6/3/2020  8:42 AM  
Dressing Status Clean, dry, & intact 6/3/2020  8:42 AM  
Dressing Type Transparent 6/3/2020  8:42 AM  
Hub Color/Line Status Green; Infusing 6/3/2020  8:42 AM  
   
Peripheral IV 06/03/20 Right Antecubital (Active) Arterial Line 06/03/20 Left Radial artery (Active) Hemovac Right Head (Active) Site Assessment Clean, dry, & intact 6/3/2020  1:37 PM  
Dressing Status Clean, dry, & intact 6/3/2020  1:37 PM  
Drainage Description Serosanguinous 6/3/2020  1:37 PM  
Status Charged 6/3/2020  1:37 PM  
  
Airway - Endotracheal Tube 06/03/20 Oral (Active) Dressing/Packing:  Wound Head Right 1 incision site-Dressing Type: 4 x 4;Petroleum gauze;Special tape (comment); Tubular dressing; Other (Comment)(Kerlix) (06/03/20 7668) Splint/Cast:  ] Other:  N\A

## 2020-06-03 NOTE — PROGRESS NOTES
[facility] 
 
ICU TEAM Progress Note Name: Richard Peter : 1945 MRN: [id number] Date: 6/3/2020 Subjective:  
Progress Note: 6/3/2020 Reason for ICU Admission: Patient with Stage IV NSCLC with metastasis to right frontal lobe. Patient went to OR today for resection. Patient transferred to ICU following surgery for monitoring. POD:Day of Surgery S/P: Procedure(s): BRAIN LAB GUIDED RIGHT FRONTAL-TEMPORAL CRANIOTOMY  WITH RESECTION OF TUMOR (URGENT) Active Problem List:  
 
Problem List  Date Reviewed: 2020 Codes Class Brain tumor [facility]) ICD-10-CM: D49.6 ICD-9-CM: 239.6 Brain metastases ([facility] [place] [address] ICD-10-CM: C79.31 
ICD-9-CM: 198.3 Non-small cell cancer of right lung (HCC) ICD-10-CM: C34.91 
ICD-9-CM: 162.9 Exertional dyspnea ICD-10-CM: R06.00 
ICD-9-CM: 786.09 Lung cancer [facility]) ICD-10-CM: C34.90 ICD-9-CM: 162.9 Superior vena cava thrombosis (HCC) ICD-10-CM: K91.144 ICD-9-CM: 453.87 Lung mass ICD-10-CM: R91.8 ICD-9-CM: 786.6 SVC (superior vena cava obstruction) ICD-10-CM: I87.1 ICD-9-CM: 459.2 SVC syndrome ICD-10-CM: I87.1 ICD-9-CM: 459.2 Past Medical History:  
 
 has a past medical history of CAD (coronary artery disease), Cancer ([facility] [place] [address] Chronic obstructive pulmonary disease ([facility] [place] [address] History of common carotid artery stent placement, Hypercoagulable state ([facility] [place] [address] Hypertension, and Pleural effusion. Past Surgical History:  
 
 has a past surgical history that includes hx carotid stent (); ir bronch w lung biopsy single lobe (2019); hx tonsil and adenoidectomy; hx abdominal laparoscopy; hx hysterectomy (); pr abdomen surgery proc unlisted; hx adenoidectomy; hx tonsillectomy; and hx other surgical (Left). Home Medications:  
 
Prior to Admission medications Medication Sig Start Date End Date Taking? Authorizing Provider dexAMETHasone (DECADRON) 4 mg tablet Take 4 mg by mouth every six (6) hours. 20  Yes Karma Musa MD  
albuterol (PROVENTIL HFA, VENTOLIN HFA, PROAIR HFA) 90 mcg/actuation inhaler Take 2 Puffs by inhalation every four (4) hours as needed for Wheezing. 20  Yes Klarissa Reyes NP  
albuterol sulfate (PROVENTIL;VENTOLIN) 2.5 mg/0.5 mL nebu nebulizer solution 0.5 mL by Nebulization route every four (4) hours as needed for Wheezing or Shortness of Breath. 19  Yes Carlos Guajardo MD  
atenolol (TENORMIN) 50 mg tablet Take 50 mg by mouth daily. Yes Other, MD Trudy  
losartan (COZAAR) 100 mg tablet Take 100 mg by mouth daily. Yes Other, MD Trudy  
HYDROmorphone (Dilaudid) 4 mg tablet Take 4 mg by mouth every four (4) hours as needed for Pain. Provider, Historical  
acetaminophen (TylenoL) 325 mg tablet Take 325 mg by mouth every four (4) hours as needed for Pain. Provider, Historical  
Oxygen 3 liters at night    Provider, Historical  
apixaban (ELIQUIS) 5 mg tablet Take 1 Tab by mouth two (2) times a day. 3/5/20   Dong Oneal NP Allergies/Social/Family History: Allergies Allergen Reactions  Codeine Itching Social History Tobacco Use  Smoking status: Former Smoker Packs/day: 0.50 Years: 45.00 Pack years: 22.50 Last attempt to quit: 2019 Years since quittin.4  Smokeless tobacco: Never Used Substance Use Topics  Alcohol use: Not Currently Comment: occ Family History Problem Relation Age of Onset  Lung Disease Mother  Heart Disease Mother  Lung Disease Father  Lung Disease Sister  Lung Disease Brother  Anesth Problems Neg Hx Review of Systems: A comprehensive review of systems was negative. Objective:  
Vital Signs: 
Visit Vitals /68 (BP 1 Location: Right arm, BP Patient Position: At rest) Pulse 90 Temp (!) 96.4 °F (35.8 °C) Resp 20 Ht 5' 2\" (1.575 m) Wt 31.3 kg (69 lb) SpO2 (!) 84% BMI 12.62 kg/m² O2 Flow Rate (L/min): 4 l/min O2 Device: Nasal cannula Temp (24hrs), Av.4 °F (36.3 °C), Min:96.4 °F (35.8 °C), Max:97.6 °F (36.4 °C) Intake/Output:  
 
Intake/Output Summary (Last 24 hours) at 6/3/2020 1719 Last data filed at 6/3/2020 1600 Gross per 24 hour Intake 3100 ml Output 2450 ml Net 650 ml Physical Exam: 
 
General:  Lethargic, no acute distress, craniotomy dressing C/D/I, hemovac drain to suction with sanguinous drainage Eyes:  Sclera anicteric. Pupils equally round and reactive to light. Mouth/Throat: Mucous membranes normal, oral pharynx clear Neck: Supple Lungs:   Clear to auscultation bilaterally, good effort CV:  Regular rate and rhythm,no murmur, click, rub or gallop Abdomen:   Soft, non-tender. bowel sounds normal. non-distended Extremities: No cyanosis or edema Skin: Skin color, texture, turgor normal. no acute rash or lesions Lymph nodes: Cervical and supraclavicular normal  
Musculoskeletal: No swelling or deformity Lines/Devices:  Intact, no erythema, drainage or tenderness Psych: Lethargic, patient opens eyes, not responding to questions, but following commands with R side, withdraws to pain in L extremities LABS AND  DATA: Personally reviewed Recent Labs  
  20 
1615 WBC 19.5* HGB 14.4 HCT 43.3 * Recent Labs  
  20 
1615 * K 3.7 CL 96* CO2 29 BUN 21* CREA 0.78 GLU 99  
CA 9.4 Recent Labs  
  20 
1615 AP 93  
TP 6.5 ALB 3.4*  
GLOB 3.1 No results for input(s): INR, PTP, APTT, INREXT in the last 72 hours. No results for input(s): PHI, PCO2I, PO2I, FIO2I in the last 72 hours. No results for input(s): CPK, CKMB, TROIQ, BNPP in the last 72 hours. MEDS: Reviewed Chest X-Ray: personally reviewed and report checked 2/4 TTE · Image quality for this study was technically difficult. · Agitated saline contrast study was performed. Right-to-left shunt at rest and right to left shunt with Valsalva. · Normal cavity size, wall thickness and systolic function (ejection fraction normal). Estimated left ventricular ejection fraction is 60 - 65%. Age-appropriate left ventricular diastolic function. · Mild tricuspid valve regurgitation is present. · Mild pulmonic valve regurgitation is present. · Mitral valve non-specific thickening and thickening. · Severely elevated central venous pressure (15+ mmHg); IVC diameter is larger than 21 mm and collapses less than 50% with respiration. Assessment:  
 
ICU Problems: 
- Stage IV NSCLC with metastasis to R front lobe s/p resection 
- mild hyponatremia 
- leukocytosis- likely in the setting of sterods 
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  
Plans for this Shift: 1. Neurosurgery following 2. Q1H neurochecks 3. Plan for CT head tomorrow am 
4. SBP < 140- nicardipine as needed 5. PT/OT/speech consult tomorrow 6. Check BMP, CBC now 7. Hold eliquis 8. Hold antihypertensives until patient more alert Multidisciplinary Rounds Completed:  No 
 
ABCDEF Bundle/Checklist 
Pain Medications: PRN medication Target RASS: 0 - Alert & Calm - Spontaneously pays attention to caregiver Sedation Medications: None CAM-ICU:  Negative Mobility: Poor PT/OT: will consult tomorrow Restraints: None needed at this time Discussed Plan of Care (goals of care): Yes Addressed Code Status: Full Code CARDIOVASCULAR Cardiac Gtts: None SBP Goal of: > 90 mmHg and < 140 mmHg MAP Goal of: > 65 mmHg Transfusion Trigger (Hgb): <7 g/dL RESPIRATORY Vent Goals: N/A 
DVT Prophylaxis (if no, list reason): SCD's or Sequential Compression Device SPO2 Goal: > 92% Pulmonary toilet: Incentive Spirometry GI/ De La Cruz Catheter Present: Yes GI Prophylaxis: Not at this time Nutrition: No NPO IVFs: NS 
 Bowel Movement: No 
Bowel Regimen: None needed at this time Insulin: none ANTIBIOTICS Antibiotics: 
none T/L/D Tubes: None Lines: Peripheral IV, Arterial Line and PICC Line Drains: hemovac SPECIAL EQUIPMENT None DISPOSITION Stay in ICU CRITICAL CARE CONSULTANT NOTE I had a face to face encounter with the patient, reviewed and interpreted patient data including clinical events, labs, images, vital signs, I/O's, and examined patient. I have discussed the case and the plan and management of the patient's care with the consulting services, the bedside nurses and the respiratory therapist.   
 
NOTE OF PERSONAL INVOLVEMENT IN CARE This patient has a high probability of imminent, clinically significant deterioration, which requires the highest level of preparedness to intervene urgently. I participated in the decision-making and personally managed or directed the management of the following life and organ supporting interventions that required my frequent assessment to treat or prevent imminent deterioration. Kashmir Guillermo [facility] Critical Care 6/3/2020

## 2020-06-03 NOTE — PROGRESS NOTES
1618: TRANSFER - IN REPORT: 
 
Verbal report received from [address] on Tam Cramer  being received from PACU for routine post - op Report consisted of patients Situation, Background, Assessment and  
Recommendations(SBAR). Information from the following report(s) SBAR, Kardex, OR Summary, Procedure Summary, Intake/Output, MAR, Recent Results, Cardiac Rhythm NSR and Alarm Parameters  was reviewed with the receiving nurse. Opportunity for questions and clarification was provided. Assessment completed upon patients arrival to unit and care assumed. 1650: Patient arrived to unit. Drowsy, responds to voice, oriented x3, follows commands. 3L NC, peripheral IV x2, Hemovac, right crani w/ dressing CDI, De La Cruz. 1930: Bedside and Verbal shift change report given to Deisy Zafar RN (oncoming nurse) by Birgit Valdez RN (offgoing nurse). Report included the following information SBAR, Kardex, Procedure Summary, Intake/Output, MAR, Recent Results, Cardiac Rhythm NSR, Alarm Parameters  and Dual Neuro Assessment.

## 2020-06-03 NOTE — ANESTHESIA PROCEDURE NOTES
Arterial Line Placement Start time: 6/3/2020 9:23 AM 
End time: 6/3/2020 9:30 AM 
Performed by: Chen Thomas MD 
Authorized by: Chen Thomas MD  
 
Pre-Procedure Indications:  Arterial pressure monitoring Preanesthetic Checklist: patient identified, risks and benefits discussed, anesthesia consent, site marked, patient being monitored, timeout performed and patient being monitored Timeout Time: 09:23 Procedure:  
Prep:  ChloraPrep Seldinger Technique?: Yes Orientation:  Left Location:  Radial artery Catheter size:  20 G Number of attempts:  1 Cont Cardiac Output Sensor: No   
 
Assessment:  
Post-procedure:  Sterile dressing applied and line secured Patient Tolerance:  Patient tolerated the procedure well with no immediate complications

## 2020-06-03 NOTE — BRIEF OP NOTE
Brief Postoperative Note Patient: Concepcion Mojica YOB: 1945 MRN: [id number] Date of Procedure: 6/3/2020 Pre-Op Diagnosis: BRAIN MET Post-Op Diagnosis: Same as preoperative diagnosis. Procedure(s): BRAIN LAB GUIDED RIGHT FRONTAL-TEMPORAL CRANIOTOMY  WITH RESECTION OF TUMOR (URGENT) Surgeon(s): 
Carrie Maravilla MD 
 
Surgical Assistant: None Anesthesia: General  
 
Estimated Blood Loss (mL): less than 100 Complications: None Specimens:  
ID Type Source Tests Collected by Time Destination 1 : Right Frontal Brain Met Frozen Section Tissue  Carrie Maravilla MD 6/3/2020 1107 Pathology 2 : Right Frontal Brain Met Fresh Brain  Carrie Maravilla MD 6/3/2020 1302 Pathology Implants:  
Implant Name Type Inv. Item Serial No.  Lot No. LRB No. Used Action GRAFT DURA REGEN MATRX 3X3IN -- DURAGEN PLUS - SN/A  GRAFT DURA REGEN MATRX 3X3IN -- DURAGEN PLUS N/A [employer] [id number] Right 1 Implanted COVER BUR H LG 0.5X18.5MM TI --  - SNA  COVER BUR H LG 0.5X18.5MM TI --  NA [employer] NA Right 3 Implanted COVER BUR H 2XL 0.5X25MM TI --  - SNA  COVER BUR H 2XL 0.5X25MM TI --  NA [employer] NA Right 1 Implanted SCR BNE ST HI TORQ 1.5X3.5 --  - SNA  SCR BNE ST HI TORQ 1.5X3.5 --  NA [employer] NA Right 12 Implanted Drains:  
Hemovac Right Head (Active) Site Assessment Clean, dry, & intact 6/3/2020  1:37 PM  
Dressing Status Clean, dry, & intact 6/3/2020  1:37 PM  
Drainage Description Serosanguinous 6/3/2020  1:37 PM  
Status Charged 6/3/2020  1:37 PM  
 
 
Findings: tumor Electronically Signed by Alessandro Shanks MD on 6/3/2020 at 1:44 PM

## 2020-06-04 NOTE — PROGRESS NOTES
1930: Bedside and Verbal shift change report given to Marquita RN (oncoming nurse) by Isac Shaikh (offgoing nurse). Report included the following information SBAR, Kardex, OR Summary, Procedure Summary, Intake/Output, MAR, Recent Results, Cardiac Rhythm NSR, Alarm Parameters , Pre Procedure Checklist and Dual Neuro Assessment. 2000: Pt alert and oriented to person, place, and time. Pt drowsy, easily to awake, drifts to sleep in middle of conversation/assessment. Follows commands on R side, L side withdraws, tongue deviates to the R with a L facial droop, new finding post op, Dr. Cyrus Odonnell aware. Radha hugger off, oral temp 98.6. Pt reported 4/10 headache, requested to rest for relief. 2340: /72, admin hydralazine 10 mg. 
 
2350: Pt reported back pain and headache, rating pain 6/10, admin dilaudid 1 mg. 
 
0115: /71, admin hydralazine 10 mg. 
 
2172-9182: Pt transported to CT, slightly agitated during scan, difficult for pt to stay still. 0530: Pt complaining saying she has to urinate and that her bladder hurts and is full, completed a bladder scan, 3 mL in bladder, adequate urine output. 0600: Pt complaining of back pain and very agitated/restless, kicking extremities in air, admin dilaudid 1 mg. 
 
 
0730: Bedside and Verbal shift change report given to Larry RN (oncoming nurse) by Pietro Aguillon RN (offgoing nurse). Report included the following information SBAR, Kardex, OR Summary, Procedure Summary, Intake/Output, MAR, Recent Results, Cardiac Rhythm NSR, Alarm Parameters  and Dual Neuro Assessment. Shift Summary: Pt alert and oriented to person, time, and situation, and on and off of place, still drowsy and restless, complaining of headache and back hurting. Treated high BP x2 and pain x2.

## 2020-06-04 NOTE — PROGRESS NOTES
0730: Bedside and Verbal shift change report given to JULIANO Juarez (oncoming nurse) by Yvonne Ny RN (offgoing nurse). Report included the following information SBAR, Kardex, Intake/Output, MAR, Recent Results, Cardiac Rhythm SR-ST, Alarm Parameters  and Dual Neuro Assessment. Shift Summary: Patient remained anxious and hypoxic throughout the day despite interventions based on patient's wishes and plan of care discussed with patient, her children, and critical care provider. See Flowsheet for care interventions. Patient needed PRN medications for pain/respiratory distress multiple times(see MAR). Patient's family updated on her condition multiple times during the shift. 1930: Bedside and Verbal shift change report given to JULIANO Caputo (oncoming nurse) by Lexi Melendez RN (offgoing nurse). Report included the following information SBAR, Kardex, Intake/Output, MAR, Recent Results, Cardiac Rhythm SR-ST, Alarm Parameters  and Dual Neuro Assessment.

## 2020-06-04 NOTE — PROGRESS NOTES
[facility] 
 
ICU TEAM Progress Note Name: Fannie Sandhoff : 1945 MRN: [id number] Date: 2020 Subjective:  
Progress Note: 2020 Reason for ICU Admission: Patient with Stage IV NSCLC with metastasis to right frontal lobe. Patient went to OR today for resection. Patient transferred to ICU following surgery for monitoring. Patient doing well this morning. However, while working with PT, patient had acute desaturation to the 70%. Patient now on 100% Hiflow nasal canula. Concern for PE given previous SVC clot, anticoagulation held for surgery, and hypercoagulable state from cancer. POD: 1 S/P: Procedure(s): BRAIN LAB GUIDED RIGHT FRONTAL-TEMPORAL CRANIOTOMY  WITH RESECTION OF TUMOR (URGENT) Active Problem List:  
 
Problem List  Date Reviewed: 2020 Codes Class Brain tumor [facility]) ICD-10-CM: D49.6 ICD-9-CM: 239.6 Brain metastases ([facility] ICD-10-CM: C79.31 
ICD-9-CM: 198.3 Non-small cell cancer of right lung (HCC) ICD-10-CM: C34.91 
ICD-9-CM: 162.9 Exertional dyspnea ICD-10-CM: R06.00 
ICD-9-CM: 786.09 Lung cancer [facility]) ICD-10-CM: C34.90 ICD-9-CM: 162.9 Superior vena cava thrombosis (HCC) ICD-10-CM: J50.089 ICD-9-CM: 453.87 Lung mass ICD-10-CM: R91.8 ICD-9-CM: 786.6 SVC (superior vena cava obstruction) ICD-10-CM: I87.1 ICD-9-CM: 459.2 SVC syndrome ICD-10-CM: I87.1 ICD-9-CM: 459.2 Past Medical History:  
 
 has a past medical history of CAD (coronary artery disease), Cancer ([facility] Chronic obstructive pulmonary disease ([facility] History of common carotid artery stent placement, Hypercoagulable state ([facility] Hypertension, and Pleural effusion.  
 
Past Surgical History:  
 
 has a past surgical history that includes hx carotid stent (); ir bronch w lung biopsy single lobe (2019); hx tonsil and adenoidectomy; hx abdominal laparoscopy; hx hysterectomy (1971); pr abdomen surgery proc unlisted; hx adenoidectomy; hx tonsillectomy; and hx other surgical (Left). Home Medications:  
 
Prior to Admission medications Medication Sig Start Date End Date Taking? Authorizing Provider  
dexAMETHasone (DECADRON) 4 mg tablet Take 4 mg by mouth every six (6) hours. 20  Yes Jane Musa MD  
albuterol (PROVENTIL HFA, VENTOLIN HFA, PROAIR HFA) 90 mcg/actuation inhaler Take 2 Puffs by inhalation every four (4) hours as needed for Wheezing. 20  Yes Klarissa Reyes NP  
albuterol sulfate (PROVENTIL;VENTOLIN) 2.5 mg/0.5 mL nebu nebulizer solution 0.5 mL by Nebulization route every four (4) hours as needed for Wheezing or Shortness of Breath. 19  Yes Pratibha Montes MD  
atenolol (TENORMIN) 50 mg tablet Take 50 mg by mouth daily. Yes Other, MD Trudy  
losartan (COZAAR) 100 mg tablet Take 100 mg by mouth daily. Yes Other, MD Trudy  
HYDROmorphone (Dilaudid) 4 mg tablet Take 4 mg by mouth every four (4) hours as needed for Pain. Provider, Historical  
acetaminophen (TylenoL) 325 mg tablet Take 325 mg by mouth every four (4) hours as needed for Pain. Provider, Historical  
Oxygen 3 liters at night    Provider, Historical  
apixaban (ELIQUIS) 5 mg tablet Take 1 Tab by mouth two (2) times a day. 3/5/20   Anali Stringer NP Allergies/Social/Family History: Allergies Allergen Reactions  Codeine Itching Social History Tobacco Use  Smoking status: Former Smoker Packs/day: 0.50 Years: 45.00 Pack years: 22.50 Last attempt to quit: 2019 Years since quittin.4  Smokeless tobacco: Never Used Substance Use Topics  Alcohol use: Not Currently Comment: occ Family History Problem Relation Age of Onset  Lung Disease Mother  Heart Disease Mother  Lung Disease Father  Lung Disease Sister  Lung Disease Brother  Anesth Problems Neg Hx Review of Systems: A comprehensive review of systems was negative. Objective:  
Vital Signs: 
Visit Vitals /88 Pulse 90 Temp 97.6 °F (36.4 °C) Resp 16 Ht 5' 2\" (1.575 m) Wt 34.5 kg (76 lb) Comment: unreliable due to amount of bedding on bed per pt request  
SpO2 (!) 63% BMI 13.90 kg/m² O2 Flow Rate (L/min): 45 l/min O2 Device: Hi flow nasal cannula Temp (24hrs), Av.8 °F (36.6 °C), Min:96.4 °F (35.8 °C), Max:98.8 °F (37.1 °C) Intake/Output:  
 
Intake/Output Summary (Last 24 hours) at 2020 1510 Last data filed at 2020 1300 Gross per 24 hour Intake 2461.25 ml Output 2355 ml Net 106.25 ml Physical Exam: 
 
General:  Lethargic, mild distress, craniotomy dressing C/D/I, hemovac drain to suction with sanguinous drainage Eyes:  Sclera anicteric. Pupils equally round and reactive to light. Mouth/Throat: Mucous membranes normal, oral pharynx clear Neck: Supple Lungs:   Clear/dimished to auscultation bilaterally, tachypneic, increased work of breathing CV:  Regular rate and rhythm,no murmur, click, rub or gallop Abdomen:   Soft, non-tender. bowel sounds normal. non-distended Extremities: No cyanosis or edema Skin: Skin color, texture, turgor normal. no acute rash or lesions Lymph nodes: Cervical and supraclavicular normal  
Musculoskeletal: No swelling or deformity Lines/Devices:  Intact, no erythema, drainage or tenderness Psych: Lethargic, L facial droop, patient opens eyes to voice, oriented x4, following commands in all extremities, but weaker in left extremities LABS AND  DATA: Personally reviewed Recent Labs  
  20 
0530 20 
1615 WBC 23.4* 19.5* HGB 14.1 14.4 HCT 42.7 43.3  412* Recent Labs  
  20 
0530 20 
1615  133* K 4.0 3.7  96* CO2 26 29 BUN 15 21* CREA 0.77 0.78 * 99  
CA 9.3 9.4 MG 1.8  --   
PHOS 3.6  --   
 
Recent Labs  
  20 1615  
AP 93  
TP 6.5 ALB 3.4*  
GLOB 3.1 Recent Labs  
  06/04/20 
0530 INR 1.0 PTP 10.6 APTT 26.0 No results for input(s): PHI, PCO2I, PO2I, FIO2I in the last 72 hours. No results for input(s): CPK, CKMB, TROIQ, BNPP in the last 72 hours. MEDS: Reviewed Chest X-Ray: personally reviewed and report checked 2/4 TTE · Image quality for this study was technically difficult. · Agitated saline contrast study was performed. Right-to-left shunt at rest and right to left shunt with Valsalva. · Normal cavity size, wall thickness and systolic function (ejection fraction normal). Estimated left ventricular ejection fraction is 60 - 65%. Age-appropriate left ventricular diastolic function. · Mild tricuspid valve regurgitation is present. · Mild pulmonic valve regurgitation is present. · Mitral valve non-specific thickening and thickening. · Severely elevated central venous pressure (15+ mmHg); IVC diameter is larger than 21 mm and collapses less than 50% with respiration. 6/4 CT head: Interval postsurgical changes right frontal lobe with improved right to left 
midline shift. Small amounts of bilateral postoperative extra-axial hemorrhage 
as above. Assessment:  
 
ICU Problems: - Acute hypoxic respiratory failure (DNR/DNI) - Stage IV NSCLC with metastasis to R front lobe s/p resection - Cerebral edema - Hypertension 
- leukocytosis- likely in the setting of steroids and stress- afebrile 
- history of SVC syndrome and thrombus- Eliquis held pre-op - Severe protein calorie malnutrition- patient weight 76 pounds with a BMI of 13.9 (significantly underweight)- patient with poor PO intake prior to admission per outpatient palliative and oncology notes ICU Comprehensive Plan of Care:  
Plans for this Shift: 1. Patient acutely hypoxic while working with PT. Concern for PE given hypercoagulable state from cancer, previous SVC clot, and Eliquis being held pre-op 2.  Plan for CTA chest 
 3. Will discuss with patient and family regarding risk/benefit of anticoagulation following scan 4. Will sent BNP and troponin 5. Neurosurgery following 6. Q1H neurochecks 7. Continue Decadron 8. Continue Keppra 9. SBP < 140- nicardipine as needed 10. Continue atenolol and losartan 11. PT/OT/speech consulted 12. Consult nutrition for supplementation of diet. May ultimately need dobhoff Multidisciplinary Rounds Completed:  No 
 
ABCDEF Bundle/Checklist 
Pain Medications: PRN medication Target RASS: 0 - Alert & Calm - Spontaneously pays attention to caregiver Sedation Medications: None CAM-ICU:  Negative Mobility: Poor PT/OT: will consult tomorrow Restraints: None needed at this time Discussed Plan of Care (goals of care): Yes Addressed Code Status: Full Code CARDIOVASCULAR Cardiac Gtts: None SBP Goal of: > 90 mmHg and < 140 mmHg MAP Goal of: > 65 mmHg Transfusion Trigger (Hgb): <7 g/dL RESPIRATORY Vent Goals: N/A 
DVT Prophylaxis (if no, list reason): SCD's or Sequential Compression Device SPO2 Goal: > 92% Pulmonary toilet: Incentive Spirometry GI/ De La Cruz Catheter Present: Yes GI Prophylaxis: Not at this time Nutrition: No NPO IVFs: NS Bowel Movement: No 
Bowel Regimen: None needed at this time Insulin: none ANTIBIOTICS Antibiotics: 
none T/L/D Tubes: None Lines: Peripheral IV, Arterial Line and PICC Line Drains: hemovac SPECIAL EQUIPMENT None DISPOSITION Stay in ICU CRITICAL CARE CONSULTANT NOTE I had a face to face encounter with the patient, reviewed and interpreted patient data including clinical events, labs, images, vital signs, I/O's, and examined patient. I have discussed the case and the plan and management of the patient's care with the consulting services, the bedside nurses and the respiratory therapist.   
 
NOTE OF PERSONAL INVOLVEMENT IN CARE This patient has a high probability of imminent, clinically significant deterioration, which requires the highest level of preparedness to intervene urgently. I participated in the decision-making and personally managed or directed the management of the following life and organ supporting interventions that required my frequent assessment to treat or prevent imminent deterioration. CCT- 60 minutes [facility]

## 2020-06-04 NOTE — WOUND CARE
WOCN Note:  
 
New consult for sacrum, spine, & elbows. Chart shows: 
Admitted for craniotomy with resection of tumor 6/3/20 History of thoracentesis 2/2/20, thrombectomy 1/14/20 Assessment:  
Highly mobile in bed with stress of breathing - sitting in high fowlers after turning onto right. She draws her knees up to her chest and also leans forward for breathing. De La Cruz Bed: Aki with air mattress POA bilateral heel, buttocks, elbows, mid-spine and sacral skin intact with slow-to-melissa erythema. 76 lbs with very prominent spine, sacrum, coccyx. There is a foam to cushion her spine but does not stay in place for sacrum with her movements in bed. RN, Larry, at bedside and will begin venelex to all bony areas. Recommendations:   
Venelex twice daily to all bony prominences. Turn/reposition approximately every 2 hours and offload heels with pillows at all times in bed. Air mattress. Transition of Care: Plan to follow weekly and as needed while admitted to hospital.   
 
Ascencion Bland, MARÍA ELENAN, RN, [facility] [employer] [facility] Certified Wound, Ostomy, Continence Nurse 
office [phone] 
pager [phone] or call  to page

## 2020-06-04 NOTE — PROGRESS NOTES
KYLE 
Patient admitted from home for scheduled right frontal temporal craniotomy for tumor resection. RUR 24 % Plan: pending medical progress and therapy recommendations. PCP: First and Last name:  Juan Alberto Rowe NP Name of Practice: [facility] in . [place] 45 Are you a current patient: Yes/No: Yes Approximate date of last visit: [place] Can you participate in a virtual visit if needed: Not sure Do you (patient/family) have any concerns for transition/discharge? No  
           
Plan for utilizing home health:   TBD Current Advanced Directive/Advance Care Plan: On file Patient is POD # 1 Craniotomy. Care manager spoke with patient's son Bakari Dinh [facility] [phone] to introduce self and explain role. Patient and her son live together. Per son patient uses a walker and wears Oxygen at HS and prn during the day, she was driving up until 2 months ago. Son is not sure who provides the oxygen. He confirmed his mother's PCP to be Juan Alberto Rowe NP and she sees her at least yearly and uses the [facility] in [place] as her pharmacy. CM confirmed her address and insurance information to be correct on the demographic sheet. Will follow for transitions of care. Xiao Vasquez RN,CRM Care Management Interventions PCP Verified by CM: Yes(Roxi Suggs NP) MyChart Signup: Yes Discharge Durable Medical Equipment: No 
Physical Therapy Consult: Yes Occupational Therapy Consult: Yes Speech Therapy Consult: Yes Current Support Network: (son Bakari Dinh [phone])

## 2020-06-04 NOTE — PROGRESS NOTES
Problem: Self Care Deficits Care Plan (Adult) Goal: *Acute Goals and Plan of Care (Insert Text) Description:  
FUNCTIONAL STATUS PRIOR TO ADMISSION: Patient was modified independent with ADLs and functional mobility/ ambulatory with rollator. HOME SUPPORT: Patient lived with family, who assisted with household IADLs. Occupational Therapy Goals Initiated 6/4/2020 1. Patient will perform grooming standing at sink with contact guard assist within 7 day(s). 2.  Patient will perform bathing with minimal assistance within 7 day(s). 3.  Patient will perform lower body dressing with minimal assistance within 7 day(s). 4.  Patient will perform toilet transfers with minimal assistance within 7 day(s). 5.  Patient will perform all aspects of toileting with moderate assistance  within 7 day(s). 6.  Patient will participate in upper extremity therapeutic exercise/activities with supervision/set-up for 10 minutes within 7 day(s). 7.  Patient will utilize energy conservation techniques during functional activities with verbal cues within 7 day(s). Outcome: Progressing Towards Goal 
  
OCCUPATIONAL THERAPY EVALUATION Patient: Diannah Fothergill (69 y.o. female) Date: 6/4/2020 Primary Diagnosis: [facility] ([facility] [D49.6] Procedure(s) (LRB): BRAIN LAB GUIDED RIGHT FRONTAL-TEMPORAL CRANIOTOMY  WITH RESECTION OF TUMOR (URGENT) (Right) 1 Day Post-Op Precautions: fall, SBP<140    
 
ASSESSMENT Based on the objective data described below, the patient presents with impaired cardiopulmonary tolerance, verbal perseveration, moderate L inattention to body and environment, partial/ very limited L visual tracking, LUE weakness (overall 3+ to 4-/5), and impaired sitting balance. Patient required frequent cues to initiate movements with LUE and LLE.   Patient performed supine<>sit with minimum assistance, but was limited by HR to 120s and SPO2 desat to 80% on 4L O2 NC, which was increased to 8L with return to supine but patient only recovered to 84%, RN present and addressing  Patient is significantly below functional baseline. Recommend inpatient rehab at d/c to maximize functional recovery. Current Level of Function Impacting Discharge (ADLs/self-care): minimum assistance supine<>sit, infer up to minimum assistance UB ADLs and maximum assistance LB ADLs Functional Outcome Measure: The patient scored 25/100 on the Barthel Index outcome measure which is indicative of ~75% impairment in functional performance. Patient will benefit from skilled therapy intervention to address the above noted impairments. PLAN : 
Recommendations and Planned Interventions: self care training, functional mobility training, therapeutic exercise, balance training, visual/perceptual training, therapeutic activities, cognitive retraining, endurance activities, neuromuscular re-education, patient education, home safety training, and family training/education Frequency/Duration: Patient will be followed by occupational therapy 5 times a week to address goals. Recommendation for discharge: (in order for the patient to meet his/her long term goals) Therapy 3 hours per day 5-7 days per week pending improved activity tolerance This discharge recommendation: 
Has not yet been discussed the attending provider and/or case management SUBJECTIVE:  
Patient stated I'm okay.  OBJECTIVE DATA SUMMARY:  
HISTORY:  
Past Medical History:  
Diagnosis Date  CAD (coronary artery disease) STENT PLACED 2003  Cancer ([facility] LUNG WITH METS TO BRAIN  
 Chronic obstructive pulmonary disease ([facility]  History of common carotid artery stent placement  Hypercoagulable state ([facility] SVC thrombus  Hypertension  Pleural effusion Past Surgical History:  
Procedure Laterality Date  ABDOMEN SURGERY PROC UNLISTED    
 LAPAROSCOPY X 9 FOR ADHESIONS  
 HX ABDOMINAL LAPAROSCOPY lots of intenstine removed/ lysis of adhesions  HX ADENOIDECTOMY  HX CAROTID STENT  2003 [address]  HX OTHER SURGICAL Left BUNION REMOVED  HX TONSIL AND ADENOIDECTOMY  HX TONSILLECTOMY  IR BRONCH W LUNG BIOPSY SINGLE LOBE  12/2019 Expanded or extensive additional review of patient history:  
 
Home Situation Home Environment: Private residence # Steps to Enter: 0 One/Two Story Residence: One story Living Alone: No 
Support Systems: Family member(s) Current DME Used/Available at Home: None Hand dominance: Right EXAMINATION OF PERFORMANCE DEFICITS: 
Cognitive/Behavioral Status: 
Neurologic State: Alert Orientation Level: Oriented X4 Cognition: Follows commands Perception: Cues to attend left visual field;Cues to attend to left side of body Perseveration: Perseverates during conversation;Perseverates during mobility Safety/Judgement: Decreased insight into deficits Skin: visible skin appears intact Edema: none noted Hearing: WDL Vision/Perceptual:   
Tracking: Able to track right of midline(only partial tracking to L with max cuing) Visual Fields: (able to detect in all fields) Diplopia: No   
    
  
 
Range of Motion: 
 
AROM: Within functional limits Strength: 
 
Strength: Generally decreased, functional(LUE overall 3+ to 4-/5) Coordination: 
Coordination: Generally decreased, functional 
Fine Motor Skills-Upper: Right Intact; Left Impaired Gross Motor Skills-Upper: Right Intact; Left Impaired Tone & Sensation: 
 
Tone: Normal 
Sensation: Intact Balance: 
Sitting: Impaired Sitting - Static: Fair (occasional) Sitting - Dynamic: Fair (occasional) Functional Mobility and Transfers for ADLs: 
Bed Mobility: 
Supine to Sit: Minimum assistance Sit to Supine: Minimum assistance ADL Assessment: 
Feeding: Setup(inferred) Oral Facial Hygiene/Grooming: Setup(inferred) Bathing: Moderate assistance(inferred d/t balance, coordination, activity tolerance) Upper Body Dressing: Minimum assistance(inferred) Lower Body Dressing: Maximum assistance(inferred d/t balance, coordination, activity tolerance) Toileting: Maximum assistance(inferred d/t balance, coordination, activity tolerance) ADL Intervention and task modifications: 
  
 
  
  
 
Cognitive Retraining Safety/Judgement: Decreased insight into deficits Functional Measure: 
Barthel Index: 
 
Bathin Bladder: 0 Bowels: 5 Groomin Dressin Feeding: 10 Mobility: 0 Stairs: 0 Toilet Use: 0 Transfer (Bed to Chair and Back): 0 Total: 25/100 The Barthel ADL Index: Guidelines 1. The index should be used as a record of what a patient does, not as a record of what a patient could do. 2. The main aim is to establish degree of independence from any help, physical or verbal, however minor and for whatever reason. 3. The need for supervision renders the patient not independent. 4. A patient's performance should be established using the best available evidence. Asking the patient, friends/relatives and nurses are the usual sources, but direct observation and common sense are also important. However direct testing is not needed. 5. Usually the patient's performance over the preceding 24-48 hours is important, but occasionally longer periods will be relevant. 6. Middle categories imply that the patient supplies over 50 per cent of the effort. 7. Use of aids to be independent is allowed. Frederica Del., Barthel, D.W. (1479). Functional evaluation: the Barthel Index. [address] [facility] (14)2. Byron Guadalupe shaheen MY Choudhury, Myrla Holter.Obi.Yanira, [address]  Measuring the change indisability after inpatient rehabilitation; comparison of the responsiveness of the Barthel Index and Functional Parsons Measure. Journal of Neurology, Neurosurgery, and Psychiatry, 66(4), 533-327. LETICIA Baeza, JACK Freitas, & Arnold Galvan M.A. (2004.) Assessment of post-stroke quality of life in cost-effectiveness studies: The usefulness of the Barthel Index and the EuroQoL-5D. [facility], 13, [phone] Occupational Therapy Evaluation Charge Determination History Examination Decision-Making LOW Complexity : Brief history review  MEDIUM Complexity : 3-5 performance deficits relating to physical, cognitive , or psychosocial skils that result in activity limitations and / or participation restrictions MEDIUM Complexity : Patient may present with comorbidities that affect occupational performnce. Miniml to moderate modification of tasks or assistance (eg, physical or verbal ) with assesment(s) is necessary to enable patient to complete evaluation Based on the above components, the patient evaluation is determined to be of the following complexity level: LOW Pain Rating: 
Patient did not report pain Activity Tolerance:  
Poor. HR to 120s and SPO2 desat to 80 on 4L O2 NC, which was increased to 8L with return to supine but patient only recovered to 84%, RN present and addressing. After treatment patient left in no apparent distress:   
Supine in bed, Call bell within reach, and Bed / chair alarm activated COMMUNICATION/EDUCATION:  
The patients plan of care was discussed with: Physical therapist and Registered nurse. Home safety education was provided and the patient/caregiver indicated understanding., Patient/family have participated as able in goal setting and plan of care. , and Patient/family agree to work toward stated goals and plan of care. This patients plan of care is appropriate for delegation to [facility]. Thank you for this referral. 
Bert Cameron OT Time Calculation: 22 mins

## 2020-06-04 NOTE — PROGRESS NOTES
SLP Contact Note SLP evaluation complete. Patient largely impeded by unwillingness to take PO. Would anticipate that this is baseline exacerbated now by confusion. Would encourage goals of care conversation. Can do essential meds crushed in applesauce if patient willing to take, which she states she is willing at this time. Full note to follow. Thank you, Benigno Mayberry M.Ed, CCC-SLP Speech-Language Pathologist

## 2020-06-04 NOTE — PROGRESS NOTES
Renal Dosing/Monitoring Medication: Famotidine Current regimen:  20 mg PO twice daily Recent Labs  
  06/04/20 
0530 06/02/20 
1615 CREA 0.77 0.78 BUN 15 21* Estimated CrCl:  ~30-40 ml/min Plan: Change to 20 mg PO once daily per [facility] P&T Committee Protocol with respect to renal function. Pharmacy will continue to monitor patient daily and will make dosage adjustments based upon changing renal function.

## 2020-06-04 NOTE — PROGRESS NOTES
Neurosurgery Progress Note Davy Basilio, [facility] 
[phone] Admit Date: 6/3/2020 LOS: 1 day Daily Progress Note: 2020 POD:1 Day Post-Op S/P: Procedure(s): BRAIN LAB GUIDED RIGHT FRONTAL-TEMPORAL CRANIOTOMY  WITH RESECTION OF TUMOR (URGENT) Subjective: The was initially diagnosed with NSCLC in 2019 when she was founf to have an SVC thrombus and mediastinal soft tissue mass. She had a mechanical thrombectomy on 2020 and was scheduled for CT simulation for radiation on 2020 when she had an episode of desaturation to 80% on exertion. She had a significant pleural effusion and was sent to the ER for treatment. She underwent thoracentesis on 2020 and also had a brain MRI for staging purposes. She only had a small foci of metastatic disease at that time. She completed radiation to her chest on 2020 and was then treated with [facility]. She completed 4 cycles and had re-staging scans. Her brain MRI showed a large right frontal mass, so she was seen by oncology and referred to Dr. Brionna Hyman for surgical excision of the lesion. She underwent a right frontal craniotomy with resection of tumor on 2020. This morning, she presents in the ICU. She has a left facial droop and left sided weakness. Speech saw the patient this morning, but the patient did not want to take anything by mouth at that time. Her BP is also elevated this morning. Denies numbness, tingling, chest pain, leg pain, and dyspnea. Objective:  
 
Vital signs Temp (24hrs), Av.8 °F (36.6 °C), Min:96.4 °F (35.8 °C), Max:98.8 °F (37.1 °C) 
  07 -  1900 In: 100 [I.V.:100] Out: 40 [Urine:30; Drains:10]   190 -  0700 In: 46 [I.V.:4520] Out: 3760 [Urine:3410; Drains:250] Visit Vitals /89 Pulse 81 Temp 98 °F (36.7 °C) Resp 15 Ht 5' 2\" (1.575 m) Wt 34.5 kg (76 lb) Comment: unreliable due to amount of bedding on bed per pt request  
 SpO2 99% BMI 13.90 kg/m² O2 Flow Rate (L/min): 4 l/min O2 Device: Nasal cannula Pain control Pain Assessment Pain Scale 1: Numeric (0 - 10) Pain Intensity 1: 0 Pain Location 1: Head, Back Pain Orientation 1: Right Pain Description 1: Aching Pain Intervention(s) 1: Medication (see MAR) PT/OT Gait Physical Exam: 
Gen:NAD. Neuro: Awake, but likes to keep eyes closed while she talks to me. Oriented x 3. Tells me the year, but not able to tell me the month. Follows commands R>L. Speech dysarthric. Affect flat PERRL. EOMI. Left central facial droop. Tongue midline. LANDRUM spontaneously. Strength 5/5 in RUE/ RLE, 3-/5 LUE proximal>distal, 3+/5. Gait deferred. Skin: Scalp dressing C/D/I HVAC 260 cc serosanguineous drainage since surgery. CT head without contrast on 06/04/2020 shows interval postsurgical changes right frontal lobe with improved right to left midline shift. Small amounts of bilateral postoperative extra-axial hemorrhage. 24 hour results: 
 
Recent Results (from the past 24 hour(s)) CBC WITH AUTOMATED DIFF Collection Time: 06/04/20  5:30 AM  
Result Value Ref Range WBC 23.4 (H) 3.6 - 11.0 K/uL  
 RBC 4.69 3.80 - 5.20 M/uL  
 HGB 14.1 11.5 - 16.0 g/dL HCT 42.7 35.0 - 47.0 % MCV 91.0 80.0 - 99.0 FL  
 MCH 30.1 26.0 - 34.0 PG  
 MCHC 33.0 30.0 - 36.5 g/dL  
 RDW 14.1 11.5 - 14.5 % PLATELET 465 672 - 679 K/uL MPV 9.9 8.9 - 12.9 FL  
 NRBC 0.0 0  WBC ABSOLUTE NRBC 0.00 0.00 - 0.01 K/uL NEUTROPHILS 89 (H) 32 - 75 % LYMPHOCYTES 3 (L) 12 - 49 % MONOCYTES 7 5 - 13 % EOSINOPHILS 0 0 - 7 % BASOPHILS 0 0 - 1 % IMMATURE GRANULOCYTES 1 (H) 0.0 - 0.5 % ABS. NEUTROPHILS 20.9 (H) 1.8 - 8.0 K/UL  
 ABS. LYMPHOCYTES 0.7 (L) 0.8 - 3.5 K/UL  
 ABS. MONOCYTES 1.6 (H) 0.0 - 1.0 K/UL  
 ABS. EOSINOPHILS 0.0 0.0 - 0.4 K/UL  
 ABS. BASOPHILS 0.0 0.0 - 0.1 K/UL  
 ABS. IMM.  GRANS. 0.2 (H) 0.00 - 0.04 K/UL  
 DF SMEAR SCANNED    
 RBC COMMENTS ANISOCYTOSIS 
1+ METABOLIC PANEL, BASIC Collection Time: 06/04/20  5:30 AM  
Result Value Ref Range Sodium 137 136 - 145 mmol/L Potassium 4.0 3.5 - 5.1 mmol/L Chloride 103 97 - 108 mmol/L  
 CO2 26 21 - 32 mmol/L Anion gap 8 5 - 15 mmol/L Glucose 127 (H) 65 - 100 mg/dL BUN 15 6 - 20 MG/DL Creatinine 0.77 0.55 - 1.02 MG/DL  
 BUN/Creatinine ratio 19 12 - 20 GFR est AA >60 >60 ml/min/1.73m2 GFR est non-AA >60 >60 ml/min/1.73m2 Calcium 9.3 8.5 - 10.1 MG/DL MAGNESIUM Collection Time: 06/04/20  5:30 AM  
Result Value Ref Range Magnesium 1.8 1.6 - 2.4 mg/dL PHOSPHORUS Collection Time: 06/04/20  5:30 AM  
Result Value Ref Range Phosphorus 3.6 2.6 - 4.7 MG/DL PROTHROMBIN TIME + INR Collection Time: 06/04/20  5:30 AM  
Result Value Ref Range INR 1.0 0.9 - 1.1 Prothrombin time 10.6 9.0 - 11.1 sec PTT Collection Time: 06/04/20  5:30 AM  
Result Value Ref Range aPTT 26.0 22.1 - 32.0 sec  
 aPTT, therapeutic range     58.0 - 77.0 SECS Assessment:  
 
Active Problems: 
  Brain tumor ([facility] [address] (6/3/2020) Plan: 1. Brain metastasis - s/p crani 06/03 
 - cont decadron 
 - cont keppra - will need adjuvant radiosurgery to tumor bed as an outpatient 
 - PT/OT/Speech - Normalize and mobilize - Will check on hemovac later today to see if it is clotted off and may remove if that is the case 2. Cerebral edema with brain compression 
 - due to #1 
 - plans as above 3. Left hemiparesis - due to #1, 2 
 - PT/OT evals - Speech eval. May need Dobhoff if pt unwilling to take PO. Will give her some more time. 4. HTN 
 - SBP<150 
 - cardene PRN 
 - hydralazine/labetalol PRN 
 - cont atenolol and losartan from home 5. Metastatic non-small cell lung cancer 
 - followed by Dr. Abhinav Thomas 
 - on [place] 6. Leukocytosis - WBC 23.4 
 - afebrile 
 - likely due to stress of surgery and steroids 
 - cont to monitor 7. SVC syndrome - Eliquis held pre-op 8. Underweight, acute protein calorie deficient malnutrition - Body mass index is 13.9 kg/m². - Speech following. If okayed for a diet, then will consult nutrition for supplements. Activity: up with assist 
DVT ppx: SCDs Dispo: tbd. Likely will need inpt rehab Plan d/w Dr. David Rabago, ICU nurse, therapy team.  
 
 
Sarah Monaco NP  
06/04/2020 [id number] Called by nurse around [phone] stating patient was working with therapy earlier in the day when her oxygen levels dropped and she was tachycardic while on 4L O2 MC. This was increased to 8 L and the patient's SpO2 only came up to 84%. Therapy session aborted. Patient's oxygenation continued to decline and she was placed on high-flow O2 at 100%. She has a durable DNR and is very adamant that she does not want to be placed on a ventilator. There is concern for a PE. She has a history of SVC syndrome and was on Eliquis, but was required to come off of it prior to brain surgery. Patient's son and daughter-in-law came to the bedside. We discussed with the patient the need to get a CTA of her chest to look for PE. Explained if it was positive for PE, it would require anticoagulation, which carries great risk due to her being POD1 from a craniotomy. When discussing this, she says she wouldn't want blood thinners though we remind her she was on Eliquis prior to surgery. When asked more about why she wouldn't want them, she says \"no ventilator. \" We explained without blood thinners, if she in fact has a PE, she could die from it if we are unable to get her oxygenation levels up. Not sure she fully understands this. We have decided to go ahead an obtain a CTA of the chest and will revisit this conversation if it is positive for PE. ICU NP Joel Flowers in the room discussing all of this with myself, the patient, the son, daughter-in-law and daughter from [place] on the phone. Dr. David Rabago aware of current situation.  
Sammie Joshi NP

## 2020-06-04 NOTE — PROGRESS NOTES
Problem: Mobility Impaired (Adult and Pediatric) Goal: *Acute Goals and Plan of Care (Insert Text) Description: FUNCTIONAL STATUS PRIOR TO ADMISSION: Patient was modified independent using a rolling walker for functional mobility. Pt required assistance with IADLs. Pt has supplemental O2 at baseline per NP. 
 
HOME SUPPORT PRIOR TO ADMISSION: The patient lived with son who assisted with IADLs. Physical Therapy Goals Initiated 6/4/2020 1. Patient will move from supine to sit and sit to supine , scoot up and down and roll side to side in bed with modified independence within 7 day(s). 2.  Patient will transfer from bed to chair and chair to bed with supervision/set-up using the least restrictive device within 7 day(s). 3.  Patient will perform sit to stand with supervision/set-up within 7 day(s). 4.  Patient will ambulate with supervision/set-up for 150 feet with the least restrictive device within 7 day(s). 5.  Patient will ascend/descend 2 stairs with bilateral handrail(s) with minimal assistance/contact guard assist within 7 day(s). Outcome: Progressing Towards Goal 
 PHYSICAL THERAPY EVALUATION- NEURO POPULATION Patient: Tam Cramer (77 y.o. female) Date: 6/4/2020 Primary Diagnosis: [facility] ([facility] [D49.6] Procedure(s) (LRB): BRAIN LAB GUIDED RIGHT FRONTAL-TEMPORAL CRANIOTOMY  WITH RESECTION OF TUMOR (URGENT) (Right) 1 Day Post-Op Precautions:   Fall ASSESSMENT Based on the objective data described below, the patient presents with decreased activity tolerance, balance deficits, and L sided impairments. Pt impulsive and excessive movements with commands (I.e. patient kicking leg up in air when prompted to lift off bed). Pt with high BP (-179, RN and NP aware and okay with pt mobilizing). Pt transferred toward EOB with increased cues and rest breaks. Pt presented with forward head posture and leaning trunk anteriorly.  Pt sitting for several minutes and demonstrating difficulty extending head to sit upright. Pt assisted back to supine due to high BP. Pt began to impulsively attempt to sit EOB and coughing. Pt began to desaturate to 80% requiring titration from 4 L O2 to 8 L O2, RN present and aware. Increased time for SpO2 to recover. RN remained bedside. Current Level of Function Impacting Discharge (mobility/balance): min A for bed mobility Functional Outcome Measure: The patient scored Total: 3/56 on the [facility] Assessment which is indicative of high fall risk. Other factors to consider for discharge: fall risk, impulsive/decreased insight, L sided weakness, O2 requirement, high BP Patient will benefit from skilled therapy intervention to address the above noted impairments. PLAN : 
Recommendations and Planned Interventions: bed mobility training, transfer training, gait training, therapeutic exercises, neuromuscular re-education, patient and family training/education and therapeutic activities Frequency/Duration: Patient will be followed by physical therapy:  5 times a week to address goals. Recommendation for discharge: (in order for the patient to meet his/her long term goals) Therapy 3 hours per day 5-7 days per week This discharge recommendation: 
Has not yet been discussed the attending provider and/or case management IF patient discharges home will need the following DME: wheelchair SUBJECTIVE:  
Patient stated [facility].  pt perseverating on number \"one\" OBJECTIVE DATA SUMMARY:  
HISTORY:   
Past Medical History:  
Diagnosis Date  CAD (coronary artery disease) STENT PLACED 2003  Cancer ([facility] [address] LUNG WITH METS TO BRAIN  
 Chronic obstructive pulmonary disease ([facility] [facility]  History of common carotid artery stent placement  Hypercoagulable state ([facility] [address] SVC thrombus  Hypertension  Pleural effusion Past Surgical History:  
Procedure Laterality Date  ABDOMEN SURGERY PROC UNLISTED    
 LAPAROSCOPY X 9 FOR ADHESIONS  
 HX ABDOMINAL LAPAROSCOPY    
 lots of intenstine removed/ lysis of adhesions  HX ADENOIDECTOMY  HX CAROTID STENT   [address]  HX OTHER SURGICAL Left BUNION REMOVED  HX TONSIL AND ADENOIDECTOMY  HX TONSILLECTOMY  IR BRONCH W LUNG BIOPSY SINGLE LOBE  2019 Personal factors and/or comorbidities impacting plan of care: PMH Home Situation Home Environment: Private residence # Steps to Enter: 0 One/Two Story Residence: One story Living Alone: No 
Support Systems: Family member(s) Current DME Used/Available at Home: None EXAMINATION/PRESENTATION/DECISION MAKING:  
Critical Behavior: 
Neurologic State: Alert Orientation Level: Oriented X4 Cognition: Follows commands Safety/Judgement: Decreased insight into deficits Hearing: 
  
Skin:  intact Edema: none noted Range Of Motion: 
AROM: Within functional limits Strength:   
Strength: Generally decreased, functional(LUE overall 3+ to 4-/5) Tone & Sensation:  
Tone: Normal 
  
Sensation: Intact Coordination: 
Coordination: Generally decreased, functional 
Vision:  
Tracking: Able to track right of midline(only partial tracking to L with max cuing) Visual Fields: (able to detect in all fields) Diplopia: No 
Functional Mobility: 
Bed Mobility: 
  
Supine to Sit: Minimum assistance Sit to Supine: Minimum assistance Transfers: 
 deferred due to high BP Balance:  
Sitting: Impaired Sitting - Static: Fair (occasional) Sitting - Dynamic: Fair (occasional) Functional Measure [place] Balance Test: 
 
Sitting to Standin Standing Unsupported: 3 Sitting with Back Unsupported: 0 Standing to Sittin Transfers: 0 Standing Unsupported with Eyes Closed: 0 Standing Unsupported with Feet Together: 0 Reach Forward with Outstretched Arm: 0  Object: 0 Turn to Look Over Shoulders: 0 Turn 360 Degrees: 0 Alternate Foot on Step/Stool: 0 Standing Unsupported One Foot in Front: 0 Stand on One Le Total: 3/56 
  
 
 
56=Maximum possible score;  
0-20=High fall risk 21-40=Moderate fall risk 41-56=Low fall risk Physical Therapy Evaluation Charge Determination History Examination Presentation Decision-Making MEDIUM  Complexity : 1-2 comorbidities / personal factors will impact the outcome/ POC  LOW Complexity : 1-2 Standardized tests and measures addressing body structure, function, activity limitation and / or participation in recreation  MEDIUM Complexity : Evolving with changing characteristics  LOW Complexity : FOTO score of  Based on the above components, the patient evaluation is determined to be of the following complexity level: LOW Activity Tolerance:  
Fair Please refer to the flowsheet for vital signs taken during this treatment. After treatment patient left in no apparent distress:  
Supine in bed, Call bell within reach, Bed / chair alarm activated, Side rails x 3 and RN bedside COMMUNICATION/EDUCATION:  
The patients plan of care was discussed with: Occupational therapist and Registered nurse. Patient and/or family was verbally educated on the BE FAST acronym for signs/symptoms of CVA and TIA. BE FAST was written on patient's communication board  for visual education and reinforcement. All questions answered with patient indicating fair understanding. Fall prevention education was provided and the patient/caregiver indicated understanding., Patient/family have participated as able in goal setting and plan of care. and Patient/family agree to work toward stated goals and plan of care. Thank you for this referral. 
Donato Quinones, PT, DPT Time Calculation: 24 mins

## 2020-06-04 NOTE — PROGRESS NOTES
Problem: Dysphagia (Adult) Goal: *Acute Goals and Plan of Care (Insert Text) Description: Speech Therapy Goals Initiated 6/4/2020 1. Patient will participate in swallow re-evaluation within 7 days. Outcome: Progressing Towards Goal 
  
SPEECH LANGUAGE PATHOLOGY BEDSIDE SWALLOW EVALUATION Patient: Meryl Bañuelos (30 y.o. female) Date: 6/4/2020 Primary Diagnosis: Brain tumor ([facility] [address] [D49.6] Procedure(s) (LRB): BRAIN LAB GUIDED RIGHT FRONTAL-TEMPORAL CRANIOTOMY  WITH RESECTION OF TUMOR (URGENT) (Right) 1 Day Post-Op Precautions: Fall ASSESSMENT : 
Patient largely impeded by unwillingness to take PO. Would anticipate that this is baseline exacerbated now by confusion. Would encourage goals of care conversation as pt only 76 lbs thus showing that she was not maintaining adequate nutrition prior to admission. Can do essential meds crushed in applesauce if patient willing to take, which she states she is willing at this time. However, pt refusing all other PO. Will reassess, Patient will benefit from skilled intervention to address the above impairments. Patients rehabilitation potential is considered to be Guarded PLAN : 
Recommendations and Planned Interventions: 
--Recommend holding PO for now except for essential meds crushed in applesauce 
--SLP will continue to re-evaluation Frequency/Duration: Patient will be followed by speech-language pathology 3 times a week to address goals. Discharge Recommendations: To Be Determined SUBJECTIVE:  
Patient stated, \"I am choking. Don't you see how I'm choking? . Pt was not actively coughing nor showing overt s/s of choking/aspiration. OBJECTIVE:  
 
Past Medical History:  
Diagnosis Date CAD (coronary artery disease) STENT PLACED 2003 Cancer ([facility] [address] LUNG WITH METS TO BRAIN Chronic obstructive pulmonary disease (HCC) History of common carotid artery stent placement Hypercoagulable state ([facility] [facility] SVC thrombus Hypertension Pleural effusion Past Surgical History:  
Procedure Laterality Date ABDOMEN SURGERY PROC UNLISTED    
 LAPAROSCOPY X 9 FOR ADHESIONS  
 HX ABDOMINAL LAPAROSCOPY    
 lots of intenstine removed/ lysis of adhesions HX ADENOIDECTOMY HX CAROTID STENT  2003 [address] HX OTHER SURGICAL Left BUNION REMOVED  
 HX TONSIL AND ADENOIDECTOMY HX TONSILLECTOMY IR BRONCH W LUNG BIOPSY SINGLE LOBE  12/2019 Prior Level of Function/Home Situation: 
Home Situation Home Environment: Private residence # Steps to Enter: 0 One/Two Story Residence: One story Living Alone: No 
Support Systems: Family member(s) Current DME Used/Available at Home: None Diet prior to admission: Unclear; however pt 76 lbs implying poor intake Current Diet:  NPO Cognitive and Communication Status: 
Neurologic State: Alert Orientation Level: Oriented X4 Cognition: Follows commands Perception: Cues to attend left visual field Perseveration: Perseverates during conversation Safety/Judgement: Decreased insight into deficits Oral Assessment: 
Oral Assessment Labial: Other (comment)(would not participate in oral [facility] exam. Noted weakness on l) Dentition: Natural 
Oral Hygiene: oral mucosa dry Mandible: No impairment P.O. Trials: 
Patient Position: upright in bed Vocal quality prior to P.O.: Hoarse Consistency Presented: Thin liquid;Puree How Presented: Cup/sip;Spoon Bolus Acceptance: Impaired Bolus Formation/Control: Impaired Type of Impairment: Delayed Propulsion: Delayed (# of seconds)(held in mouth) Initiation of Swallow: No impairment Laryngeal Elevation: Decreased Aspiration Signs/Symptoms: None Pharyngeal Phase Characteristics: No impairment, issues, or problems Oral Phase Severity: Moderate Pharyngeal Phase Severity : Other (comment)(unclear given minimal PO intake) NOMS:  
 The NOMS functional outcome measure was used to quantify this patient's level of swallowing impairment. Based on the NOMS, the patient was determined to be at level 1 for swallow function NOMS Swallowing Levels: 
Level 1 (CN): NPO Level 2 (CM): NPO but takes consistency in therapy Level 3 (CL): Takes less than 50% of nutrition p.o. and continues with nonoral feedings; and/or safe with mod cues; and/or max diet restriction Level 4 (CK): Safe swallow but needs mod cues; and/or mod diet restriction; and/or still requires some nonoral feeding/supplements Level 5 (CJ): Safe swallow with min diet restriction; and/or needs min cues Level 6 (CI): Independent with p.o.; rare cues; usually self cues; may need to avoid some foods or needs extra time Level 7 (CH): Independent for all p.o. TORY. (2003). National Outcomes Measurement System (NOMS): Adult Speech-Language Pathology User's Guide. Pain: 
Pain Scale 1: Numeric (0 - 10) Pain Intensity 1: 0 After treatment:  
Call bell within reach and Nursing notified COMMUNICATION/EDUCATION:  
 
The patient's plan of care including recommendations, planned interventions, and recommended diet changes were discussed with: Registered nurse. Patient is unable to participate in goal setting and plan of care. Thank you for this referral. 
LINO Valentine Time Calculation: 15 mins

## 2020-06-04 NOTE — ACP (ADVANCE CARE PLANNING)
Patient with Durable DNR. Discussed with patient, would not want intubation/ventilator. Patient with acute respiratory decompensation this afternoon. Now on 100% [place] with oxygen saturations in the high 80s/low 90s. Initial concern for PE, however CTA chest negative. Troponin negative. BNP mildly elevated to 1200. Will give a dose of lasix. Unclear etiology of decompensation. Explained to patient that given hypoxia I am concerned that she pass away. We discussed possible transition to comfort measures if patient condition worsened which the patient refused. I also discussed possible bipap with the patient which she refused. It is unclear if she is fully understanding because she could not explain to me why she did not want comfort measure if needed or why she did not want bipap. She is able to answer orientation questions correctly. I called her son, Jaquelin Sandhu, who she lives with to discuss the CT findings (he visited with her prior to the CT scan). I explained that there is no clot in her lung which is good because we don't have to decide about anticoagulation, but we still dont know what the cause of her hypoxia is. I explained that she did not want bipap, but could not tell me why. He said that she never \"wanted help\" from machines which is why she said no. I told him that I am worried she is going to worsen and that we will have nothing else to offer (in terms of oxygenation). I told him that if she were to worsen that I felt that comfort measures may be the best option for her. He agreed. I told him for right now she is stable and that we will call him if anything changes. Currently patient DNR/DNI with continuation of therapies.  
 
Deacon Lyons, EMAP

## 2020-06-05 NOTE — PROGRESS NOTES
Responded to notification of pt death in ICU 6; waited for and visited with pt's son in the room; provided ministry of presence and prayer of commendation at bedside. . Birdia Severance, Ph.D., M.Div., M.A., /Director of Spiritual Care Services Paging Service: [phone])

## 2020-06-05 NOTE — OP NOTES
[address]  
OPERATIVE REPORT Name:  Gonzalez Zambrano 
MR#:  [id number] :  1945 ACCOUNT #:  [de-identified] DATE OF SERVICE:  2020 PREOPERATIVE DIAGNOSIS:  Very large right frontotemporal and skull base brain metastasis. POSTOPERATIVE DIAGNOSIS:  Very large right frontotemporal and skull base brain metastasis. PROCEDURE PERFORMED:  BrainLAB-guided stereotactic right frontotemporal craniotomy with resection of brain metastasis, use of operating microscope and BrainLAB stereotaxy. SURGEON:  Bob Parr MD 
 
ASSISTANT:  MARCELO Amado ANESTHESIA:  General endotracheal anesthesia. COMPLICATIONS:  None. SPECIMENS REMOVED:  Tumor for frozen and permanent. IMPLANTS:  no. 
 
ESTIMATED BLOOD LOSS:  150 mL. OPERATIVE INDICATIONS:  This is a 69-year-old female with metastatic lung cancer with a lesion extending off of the sphenoid wing with at least 4.5 cm component in the right frontal lobe and a smaller component in the right temporal lobe. After counseling the patient and her family extensively preoperatively, informed consent was obtained. OPERATION IN DETAIL:  The patient was taken to the operating room, placed under general endotracheal anesthesia. All necessary lines and monitors were placed. She was given appropriate dose of IV antibiotics. SCDs and De La Cruz were placed. She was placed in Neumann three-point head fixation with her head turned to the left. All pressure points were padded. She was registered for [employer], which was used throughout the case for localization. The right frontotemporal region was clipped, prepped and draped in standard sterile fashion. A curvilinear incision was made from behind the hairline with a skin knife and carried down with Bovie electrocautery through the scalp. Temporalis muscle and fascia were divided as were the scalp. Carla clips were used for hemostasis. Self-retaining retractor was placed. Scalp flap was reflected anteriorly. A right frontotemporal craniotomy flap was made. The brain despite mannitol and hyperventilation was quite swollen. The dura which was somewhat macerated with the opening was divided and tacked up. There was no injury to the underlying brain. I performed a corticectomy in the inferior right frontal lobe down to the frontal floor. I encountered the tumor some of which had a little bit of hemorrhage in it, but otherwise was very necrotic-appearing. Frozen section was performed, came back as poorly differentiated carcinoma. I worked my way back towards the sylvian fissure and into the frontal lobe subfrontally removing tumor. Operating scope was brought into field. Under the microscope, I debulked the tumor and removed internally with the CUSA large amount of necrotic and fibrous portion of the internal tumor and then peeled the tumor away from the adjacent brain. I worked my way circumferentially around the frontal floor and the tumor abutted the right olfactory nerve and as I worked my way posteriorly and medially it did identify the right optic nerve as well as it did come all the way down on the sphenoid towards the optic canal.  Laterally, I removed the anterior temporal portion of the tumor, which was coming off the sphenoid wing. I worked into the sylvian fissure, identified the middle cerebral artery branch and protected this. I continued to work circumferentially dissecting the tumor away from the adjacent brain until it was done circumferentially as I worked my way along the skull base and on the backside of the tumor I encountered the right carotid artery which was preserved. A gross total resection of the tumor was performed. There may have been a little bit of tumor left along the skull base. This was cauterized aggressively.   No injury to major blood vessels or nerves. The brain was well relaxed when I was finished. The scope was taken out of field. The wound was copiously irrigated. Hemostasis was achieved with bipolar electrocautery as well as FloSeal which was irrigated out. I then placed a layer of Surgicel in the tumor bed. The dura was then reapproximated with interrupted 4-0 Nurolon sutures. Circumferential and central epidural tack-up sutures were placed. The bone flap was re-affixed with the Evtronet craniofacial plating system. The temporalis muscle and fascia were reapproximated with 2-0 Vicryl. the Hemovac drain was placed and brought out through a separate stab incision. The galea was closed with inverted 3-0 Vicryl due to its thin size and the skin was closed with running 3-0 Vicryl Rapide. The wounds were cleaned, dried and dressed with sterile dressing. The patient was then taken out of [facility], extubated, taken to recovery room in stable condition. Lionel Lauren MD 
 
 
[id number] 
D:  06/05/2020 7:25 
T:  06/05/2020 9:48 JOB #:  [id number] CC:  Dakota Jeter MD

## 2020-06-05 NOTE — PROGRESS NOTES
Bedside, Verbal and Written shift change report given to Carmela (oncoming nurse) by Larry (offgoing nurse). Report included the following information SBAR, Kardex, MAR and Recent Results. 2018- Assumed care of pt. Pt unresponsive- precedex gtt adjusted. Pt is on hi-flow, O2 sats in the 60's, RT at bedside setting up Nitric. BP 70/49- Notified - orders received. 2032- Pt is in asystole. Martha NP at bedside to pronounce. Son notified. 2113-[employer] notified- OK to release body.

## 2020-06-05 NOTE — DISCHARGE SUMMARY
Death Summary Patient ID: 
Alejo Mckeon [id number]  
69 y.o. 
1945 Admit date: 6/3/2020 Discharge Date: 06/04/2020. Time of death 2033. Admitting Physician: Roby Luna MD  
 
Discharge Physician: Jameson Landau, NP Admission Diagnoses: Brain tumor ([address] [D49.6] Last Procedure: Procedure(s): BRAIN LAB GUIDED RIGHT FRONTAL-TEMPORAL CRANIOTOMY  WITH RESECTION OF TUMOR (URGENT) Discharge Diagnoses: Active Problems: 
  Brain tumor ([address] (6/3/2020) Consults:  
1. Intensivist 
 
Significant Diagnostic Studies: 1. CT head without contrast on 06/04/2020 at 0143 shows interval postsurgical changes right frontal lobe with improved right to left midline shift. Small amounts of bilateral postoperative extra-axial hemorrhage. 2. CT head without contrast on 06/04/2020 at 1609 shows postoperative findings again noted with mild interval decrease in mass effect since early this morning. 3. CTA chest on 06/04/2020 shows no CT evidence for central pulmonary embolus at this time . Stable right upper lobe mass. Stable smaller opacities in the left upper lobe and right lower lobe which may represent scar. Postoperative changes on the left with volume loss in the left hemithorax, left basilar atelectasis or infiltrate with pleural effusion and fluid in the left main bronchus and lower lobe bronchi. Hospital Course: The was initially diagnosed with NSCLC in 12/2019 when she was founf to have an SVC thrombus and mediastinal soft tissue mass. She had a mechanical thrombectomy on 01/14/2020 and was scheduled for CT simulation for radiation on 01/27/2020 when she had an episode of desaturation to 80% on exertion. She had a significant pleural effusion and was sent to the ER for treatment. She underwent thoracentesis on 02/02/2020 and also had a brain MRI for staging purposes. She only had a small foci of metastatic disease at that time.  She completed radiation to her chest on 02/27/2020 and was then treated with Keytruda. She completed 4 cycles and had re-staging scans. Her brain MRI showed a large right frontal mass, so she was seen by oncology and referred to Dr. John Guerrero for surgical excision of the lesion. She underwent a right frontal craniotomy with resection of tumor on 06/03/2020. Intraoperative findings can be found in his operative report. Post-operatively, the patient was transferred to the ICU after a short stay in the recovery room. In the ICU, the patient was weak on the left side, but anti-gravity. She was not interested in taking PO and had a left-sided facial droop. She was afebrile. While working with PT/OT, she had a precipitous drop in her oxygenation levels while on 4L of O2 by nasal cannula. This was increased to 8L and her sats came up to only 84%. The session was aborted. The patient's O2 levels continued to decline, requiring her to be placed on high flow oxygen at 100%. Pt was very adamant that she did not want to be intubated. We were concerned about the possibility of a pulmonary emboli due to her history of SVC syndrome and her having to come off her Eliquis for surgery. We obtained a CTA of her chest and fortunately, this was negative for PE. Unfortunately, the patient's respiratory status continued to decline. The patient's son and daughter-in-law came to her bedside. BIPAP was discussed with the patient, but she was adamant that she did not want this type of support either. Nitric oxide was set up at the patient's bedside by RT. The patient was hypotensive. At 2032, the patient was in asystole. She had a documented durable DNR. She was pronounced by the intensivist NP as the patient was found unresponsive with pupils fixed and dilated. She had no pulse or respirations and absent heart sounds. Her time of death was noted to be 06/04/2020 at 2033. Family notified by staff. Signed: 
Miriam Schwartz NP 
June 9, 2020 
[id number]

## 2020-06-05 NOTE — PROGRESS NOTES
Death Declaration [facility] Pt Name  Guadalupe Jimenez Admit date:  6/3/2020 Date and time of death:  06/04/2020 @ 2033 Room Number  7106/01 Medical Record Number  [id number] @ . [address]  
Age  69 y.o. Date of Birth 1945 PCP Sukhwinder Capps NP Attending physician Cayetano Gonsalez MD   
 
Code Status  DNR Patient seen and examined Mental status   Unresponsive Pupils Dilated and Fixed Respiration Nil Pulse  Absent Heart Sounds  Absent Rhythm  Flat line/Asystole Family  Notified by Nursing staff [facility]  Notified by Nursing staff Death certificate and discharge summary completion remain  Dr. Cayetano Gonsalez MD's responsibility. 6/4/2020 Christen James [facility] Critical [facility] Medicine [facility] Physicians

## 2020-06-11 ENCOUNTER — APPOINTMENT (OUTPATIENT)
Dept: INFUSION THERAPY | Age: 75
End: 2020-06-11

## 2020-06-18 ENCOUNTER — APPOINTMENT (OUTPATIENT)
Dept: INFUSION THERAPY | Age: 75
End: 2020-06-18

## 2020-07-02 ENCOUNTER — APPOINTMENT (OUTPATIENT)
Dept: INFUSION THERAPY | Age: 75
End: 2020-07-02

## 2020-07-09 ENCOUNTER — APPOINTMENT (OUTPATIENT)
Dept: INFUSION THERAPY | Age: 75
End: 2020-07-09

## 2020-07-23 ENCOUNTER — APPOINTMENT (OUTPATIENT)
Dept: INFUSION THERAPY | Age: 75
End: 2020-07-23

## 2020-08-13 ENCOUNTER — APPOINTMENT (OUTPATIENT)
Dept: INFUSION THERAPY | Age: 75
End: 2020-08-13

## 2021-07-05 NOTE — ANESTHESIA POSTPROCEDURE EVALUATION
Post-Anesthesia Evaluation and Assessment Patient: Casandra Castellanos MRN: [id number]  SSN: [id number] YOB: 1945  Age: 69 y.o. Sex: female I have evaluated the patient and they are stable and ready for discharge from the PACU. Cardiovascular Function/Vital Signs Visit Vitals /72 Pulse (!) 110 Temp 36.4 °C (97.6 °F) Resp 15 Ht 5' 2\" (1.575 m) Wt 31.3 kg (69 lb) SpO2 95% BMI 12.62 kg/m² Patient is status post General anesthesia for Procedure(s): BRAIN LAB GUIDED RIGHT FRONTAL-TEMPORAL CRANIOTOMY  WITH RESECTION OF TUMOR (URGENT). Nausea/Vomiting: None Postoperative hydration reviewed and adequate. Pain: 
Pain Scale 1: Numeric (0 - 10) (06/03/20 0845) Pain Intensity 1: 5 (06/03/20 0845) Managed Neurological Status:  
Neuro (WDL): Within Defined Limits (06/03/20 0845) At baseline Mental Status, Level of Consciousness: Alert and  oriented to person, place, and time Pulmonary Status:  
O2 Device: Nasal cannula (06/03/20 1436) Adequate oxygenation and airway patent Complications related to anesthesia: None Post-anesthesia assessment completed. No concerns Signed By: Rosendo Campos MD   
 Inna 3, 2020 Procedure(s): BRAIN LAB GUIDED RIGHT FRONTAL-TEMPORAL CRANIOTOMY  WITH RESECTION OF TUMOR (URGENT). general 
 
<BSHSIANPOST> INITIAL Post-op Vital signs:  
Vitals Value Taken Time /72 6/3/2020  2:50 PM  
Temp 36.4 °C (97.6 °F) 6/3/2020  2:36 PM  
Pulse 84 6/3/2020  2:59 PM  
Resp 18 6/3/2020  2:59 PM  
SpO2 93 % 6/3/2020  2:59 PM  
Vitals shown include unvalidated device data. Never

## 2021-07-26 NOTE — ADDENDUM NOTE
Addended byRhett Manjarrez on: 1/10/2020 04:50 PM     Modules accepted: Orders
no fever and no chills.
